# Patient Record
Sex: FEMALE | Race: BLACK OR AFRICAN AMERICAN | NOT HISPANIC OR LATINO | Employment: OTHER | URBAN - METROPOLITAN AREA
[De-identification: names, ages, dates, MRNs, and addresses within clinical notes are randomized per-mention and may not be internally consistent; named-entity substitution may affect disease eponyms.]

---

## 2019-05-01 ENCOUNTER — ANCILLARY PROCEDURE (OUTPATIENT)
Dept: MAMMOGRAPHY | Facility: CLINIC | Age: 69
End: 2019-05-01
Attending: INTERNAL MEDICINE
Payer: COMMERCIAL

## 2019-05-01 ENCOUNTER — OFFICE VISIT (OUTPATIENT)
Dept: INTERNAL MEDICINE | Facility: CLINIC | Age: 69
End: 2019-05-01
Payer: COMMERCIAL

## 2019-05-01 VITALS
BODY MASS INDEX: 35.22 KG/M2 | DIASTOLIC BLOOD PRESSURE: 68 MMHG | TEMPERATURE: 98.1 F | RESPIRATION RATE: 16 BRPM | OXYGEN SATURATION: 98 % | WEIGHT: 198.8 LBS | HEART RATE: 75 BPM | SYSTOLIC BLOOD PRESSURE: 110 MMHG | HEIGHT: 63 IN

## 2019-05-01 DIAGNOSIS — M54.16 LUMBAR RADICULOPATHY: Primary | ICD-10-CM

## 2019-05-01 DIAGNOSIS — I10 ESSENTIAL HYPERTENSION, BENIGN: ICD-10-CM

## 2019-05-01 DIAGNOSIS — Z12.31 VISIT FOR SCREENING MAMMOGRAM: ICD-10-CM

## 2019-05-01 DIAGNOSIS — Z78.0 ASYMPTOMATIC POSTMENOPAUSAL STATUS: ICD-10-CM

## 2019-05-01 DIAGNOSIS — Z13.220 LIPID SCREENING: ICD-10-CM

## 2019-05-01 DIAGNOSIS — E66.01 MORBID OBESITY (H): ICD-10-CM

## 2019-05-01 DIAGNOSIS — M17.0 PRIMARY OSTEOARTHRITIS OF BOTH KNEES: ICD-10-CM

## 2019-05-01 LAB
ANION GAP SERPL CALCULATED.3IONS-SCNC: 5 MMOL/L (ref 3–14)
BUN SERPL-MCNC: 26 MG/DL (ref 7–30)
CALCIUM SERPL-MCNC: 9.9 MG/DL (ref 8.5–10.1)
CHLORIDE SERPL-SCNC: 105 MMOL/L (ref 94–109)
CHOLEST SERPL-MCNC: 238 MG/DL
CO2 SERPL-SCNC: 30 MMOL/L (ref 20–32)
CREAT SERPL-MCNC: 0.99 MG/DL (ref 0.52–1.04)
GFR SERPL CREATININE-BSD FRML MDRD: 59 ML/MIN/{1.73_M2}
GLUCOSE SERPL-MCNC: 103 MG/DL (ref 70–99)
HDLC SERPL-MCNC: 77 MG/DL
LDLC SERPL CALC-MCNC: 146 MG/DL
NONHDLC SERPL-MCNC: 161 MG/DL
POTASSIUM SERPL-SCNC: 3.8 MMOL/L (ref 3.4–5.3)
SODIUM SERPL-SCNC: 140 MMOL/L (ref 133–144)
TRIGL SERPL-MCNC: 75 MG/DL

## 2019-05-01 PROCEDURE — 99203 OFFICE O/P NEW LOW 30 MIN: CPT | Performed by: INTERNAL MEDICINE

## 2019-05-01 PROCEDURE — 36415 COLL VENOUS BLD VENIPUNCTURE: CPT | Performed by: INTERNAL MEDICINE

## 2019-05-01 PROCEDURE — 77067 SCR MAMMO BI INCL CAD: CPT | Mod: TC

## 2019-05-01 PROCEDURE — 80061 LIPID PANEL: CPT | Performed by: INTERNAL MEDICINE

## 2019-05-01 PROCEDURE — 80048 BASIC METABOLIC PNL TOTAL CA: CPT | Performed by: INTERNAL MEDICINE

## 2019-05-01 RX ORDER — MELOXICAM 7.5 MG/1
7.5-15 TABLET ORAL DAILY PRN
Qty: 30 TABLET | Refills: 5 | Status: SHIPPED | OUTPATIENT
Start: 2019-05-01 | End: 2020-02-26

## 2019-05-01 RX ORDER — HYDROCHLOROTHIAZIDE 25 MG/1
25 TABLET ORAL DAILY
Qty: 90 TABLET | Refills: 0 | Status: SHIPPED | OUTPATIENT
Start: 2019-05-01 | End: 2019-05-09

## 2019-05-01 ASSESSMENT — MIFFLIN-ST. JEOR: SCORE: 1392.94

## 2019-05-01 NOTE — PROGRESS NOTES
SUBJECTIVE:   Laure Wood is a 68 year old female who presents to clinic today for the following   health issues:    Patient recently moved back from [place] to the [facility].  She moved there 3 to 4 years ago and has not been seen since.  Previously she had been treated for high blood pressure, significant osteoarthritis in both knees as well as a history of some lower back pain related to osteoarthritis.    Back Pain       Duration: 1 week        Specific cause: none    Description:   Location of pain: all along low back  Character of pain: sharp and constant  Pain radiation:radiates into the right buttocks, radiates into the right leg, radiates into the right foot, radiates into the left buttocks, radiates into the left leg and radiates into the left foot  New numbness or weakness in legs, not attributed to pain:  no     Intensity: Currently 8/10, At its worst 10/10    History:   Pain interferes with job: Not applicable  History of back problems: no prior back problems  Any previous MRI or X-rays: None  Sees a specialist for back pain:  No  Therapies tried without relief: Aloe gel    Alleviating factors:   Improved by: none      Precipitating factors:  Worsened by: Sitting, Lying Flat and Walking      Accompanying Signs & Symptoms:  Risk of Fracture:  None  Risk of Cauda Equina:  None  Risk of Infection:  None  Risk of Cancer:  None  Risk of Ankylosing Spondylitis:  Onset at age <35, male, AND morning back stiffness. no     Additional history: as documented    Reviewed  and updated as needed this visit by clinical staff  Tobacco  Allergies  Meds  Med Hx  Surg Hx  Fam Hx  Soc Hx        Reviewed and updated as needed this visit by Provider         Labs reviewed in EPIC    ROS:  Constitutional, HEENT, cardiovascular, pulmonary, gi and gu systems are negative, except as otherwise noted.    OBJECTIVE:                                                    /68   Pulse 75   Temp 98.1  F (36.7  C)  "(Oral)   Resp 16   Ht 1.588 m (5' 2.5\")   Wt 90.2 kg (198 lb 12.8 oz)   SpO2 98%   Breastfeeding? No   BMI 35.78 kg/m    Body mass index is 35.78 kg/m .  GENERAL APPEARANCE: alert, no distress and over weight  RESP: lungs clear to auscultation - no rales, rhonchi or wheezes  CV: regular rates and rhythm, normal S1 S2, no S3 or S4 and no murmur, click or rub  MS: Crepitus decreased range of motion in both knees.  SKIN: no suspicious lesions or rashes    Diagnostic test results:  Results for orders placed or performed in visit on 05/01/19   Basic metabolic panel   Result Value Ref Range    Sodium 140 133 - 144 mmol/L    Potassium 3.8 3.4 - 5.3 mmol/L    Chloride 105 94 - 109 mmol/L    Carbon Dioxide 30 20 - 32 mmol/L    Anion Gap 5 3 - 14 mmol/L    Glucose 103 (H) 70 - 99 mg/dL    Urea Nitrogen 26 7 - 30 mg/dL    Creatinine 0.99 0.52 - 1.04 mg/dL    GFR Estimate 59 (L) >60 mL/min/[1.73_m2]    GFR Estimate If Black 68 >60 mL/min/[1.73_m2]    Calcium 9.9 8.5 - 10.1 mg/dL   Lipid panel reflex to direct LDL Fasting   Result Value Ref Range    Cholesterol 238 (H) <200 mg/dL    Triglycerides 75 <150 mg/dL    HDL Cholesterol 77 >49 mg/dL    LDL Cholesterol Calculated 146 (H) <100 mg/dL    Non HDL Cholesterol 161 (H) <130 mg/dL         ASSESSMENT/PLAN:                                                    1. Lumbar radiculopathy  Mild.  She has a history of degenerative changes in the lumbar spine and given her recent move she likely aggravated her back.  I think physical therapy will be helpful as would weight loss.  NSAIDs to help with symptoms as needed  - [facility], HAND, AND CHIROPRACTIC REFERRAL; Future  - meloxicam (MOBIC) 7.5 MG tablet; Take 1-2 tablets (7.5-15 mg) by mouth daily as needed for moderate pain  Dispense: 30 tablet; Refill: 5    2. Primary osteoarthritis of both knees  - ORTHOPEDICS ADULT REFERRAL  - meloxicam (MOBIC) 7.5 MG tablet; Take 1-2 tablets (7.5-15 mg) by mouth daily as needed for moderate pain  " Dispense: 30 tablet; Refill: 5    3. Morbid obesity (H)  Weight loss    4. Essential hypertension, benign  Continue medication  - hydrochlorothiazide (HYDRODIURIL) 25 MG tablet; Take 1 tablet (25 mg) by mouth daily  Dispense: 90 tablet; Refill: 0  - Basic metabolic panel    5. Visit for screening mammogram  - [facility] SCREENING DIGITAL BILAT - Future  (s+30); Future    6. Asymptomatic postmenopausal status  - DX Hip/Pelvis/Spine; Future    7. Lipid screening  - Lipid panel reflex to direct LDL Fasting      Follow up with Provider -wellness exam    Yoshi Bowen MD  [facility]

## 2019-05-02 ENCOUNTER — ANCILLARY PROCEDURE (OUTPATIENT)
Dept: BONE DENSITY | Facility: CLINIC | Age: 69
End: 2019-05-02
Attending: INTERNAL MEDICINE
Payer: COMMERCIAL

## 2019-05-02 DIAGNOSIS — Z78.0 ASYMPTOMATIC POSTMENOPAUSAL STATUS: ICD-10-CM

## 2019-05-02 PROCEDURE — 77080 DXA BONE DENSITY AXIAL: CPT | Performed by: INTERNAL MEDICINE

## 2019-05-09 ENCOUNTER — OFFICE VISIT (OUTPATIENT)
Dept: INTERNAL MEDICINE | Facility: CLINIC | Age: 69
End: 2019-05-09
Payer: COMMERCIAL

## 2019-05-09 VITALS
WEIGHT: 199 LBS | SYSTOLIC BLOOD PRESSURE: 120 MMHG | TEMPERATURE: 97.6 F | BODY MASS INDEX: 35.26 KG/M2 | RESPIRATION RATE: 18 BRPM | HEART RATE: 58 BPM | DIASTOLIC BLOOD PRESSURE: 78 MMHG | HEIGHT: 63 IN | OXYGEN SATURATION: 99 %

## 2019-05-09 DIAGNOSIS — Z11.59 ENCOUNTER FOR HEPATITIS C SCREENING TEST FOR LOW RISK PATIENT: ICD-10-CM

## 2019-05-09 DIAGNOSIS — Z00.00 MEDICARE ANNUAL WELLNESS VISIT, INITIAL: Primary | ICD-10-CM

## 2019-05-09 DIAGNOSIS — E78.2 MIXED HYPERLIPIDEMIA: ICD-10-CM

## 2019-05-09 DIAGNOSIS — R73.9 HYPERGLYCEMIA: ICD-10-CM

## 2019-05-09 DIAGNOSIS — Z12.11 COLON CANCER SCREENING: ICD-10-CM

## 2019-05-09 DIAGNOSIS — I10 ESSENTIAL HYPERTENSION, BENIGN: ICD-10-CM

## 2019-05-09 LAB
GLUCOSE SERPL-MCNC: 87 MG/DL (ref 70–99)
HBA1C MFR BLD: 5.5 % (ref 0–5.6)
HCV AB SERPL QL IA: NONREACTIVE

## 2019-05-09 PROCEDURE — 82947 ASSAY GLUCOSE BLOOD QUANT: CPT | Performed by: INTERNAL MEDICINE

## 2019-05-09 PROCEDURE — 86803 HEPATITIS C AB TEST: CPT | Performed by: INTERNAL MEDICINE

## 2019-05-09 PROCEDURE — G0438 PPPS, INITIAL VISIT: HCPCS | Performed by: INTERNAL MEDICINE

## 2019-05-09 PROCEDURE — 83036 HEMOGLOBIN GLYCOSYLATED A1C: CPT | Performed by: INTERNAL MEDICINE

## 2019-05-09 PROCEDURE — 36415 COLL VENOUS BLD VENIPUNCTURE: CPT | Performed by: INTERNAL MEDICINE

## 2019-05-09 PROCEDURE — 99214 OFFICE O/P EST MOD 30 MIN: CPT | Mod: 25 | Performed by: INTERNAL MEDICINE

## 2019-05-09 RX ORDER — SIMVASTATIN 20 MG
20 TABLET ORAL AT BEDTIME
Qty: 90 TABLET | Refills: 1 | Status: SHIPPED | OUTPATIENT
Start: 2019-05-09 | End: 2020-07-09

## 2019-05-09 RX ORDER — HYDROCHLOROTHIAZIDE 25 MG/1
25 TABLET ORAL DAILY
Qty: 90 TABLET | Refills: 1 | Status: SHIPPED | OUTPATIENT
Start: 2019-05-09 | End: 2020-03-15

## 2019-05-09 ASSESSMENT — MIFFLIN-ST. JEOR: SCORE: 1393.85

## 2019-05-09 ASSESSMENT — ACTIVITIES OF DAILY LIVING (ADL): CURRENT_FUNCTION: NO ASSISTANCE NEEDED

## 2019-05-09 NOTE — LETTER
May 18, 2019      Laure [place]  [address] [facility] [address]        Dear ,    We are writing to inform you of your test results.    Your test results fall within the expected range(s) or remain unchanged from previous results.  Please continue with current treatment plan.    Resulted Orders   Hepatitis C Screen Reflex to HCV RNA Quant and Genotype   Result Value Ref Range    Hepatitis C Antibody Nonreactive NR^Nonreactive      Comment:      Assay performance characteristics have not been established for newborns,   infants, and children     Glucose   Result Value Ref Range    Glucose 87 70 - 99 mg/dL   Hemoglobin A1c   Result Value Ref Range    Hemoglobin A1C 5.5 0 - 5.6 %      Comment:      Normal <5.7% Prediabetes 5.7-6.4%  Diabetes 6.5% or higher - adopted from ADA   consensus guidelines.         If you have any questions or concerns, please call the clinic at the number listed above.       Sincerely,        Yoshi Bowen MD

## 2019-05-09 NOTE — PROGRESS NOTES
"SUBJECTIVE:   Laure Wood is a 68 year old female who presents for Preventive Visit.    Are you in the first 12 months of your Medicare coverage?  No- Rt eye: 20/100, Lt eye: 20/100, Both eyes: 20/80    Healthy Habits:    In general, how would you rate your overall health?  Excellent    Frequency of exercise:  None    Do you usually eat at least 4 servings of fruit and vegetables a day, include whole grains    & fiber and avoid regularly eating high fat or \"junk\" foods?  Yes    Taking medications regularly:  Yes    Barriers to taking medications:  None    Medication side effects:  None    Ability to successfully perform activities of daily living:  No assistance needed    Home Safety:  No safety concerns identified    Hearing Impairment:  No hearing concerns    In the past 6 months, have you been bothered by leaking of urine?  No    In general, how would you rate your overall mental or emotional health?  Excellent      PHQ-2 Total Score:    Additional concerns today:  No    Do you feel safe in your environment? Yes    Do you have a Health Care Directive? No: Advance care planning was reviewed with patient; patient declined at this time.    Fall risk  Fallen 2 or more times in the past year?: No  Any fall with injury in the past year?: No    Cognitive Screening   1) Repeat 3 items (Leader, Season, Table)    2) Clock draw: NORMAL  3) 3 item recall: Recalls 1 object   Results: NORMAL clock, 1-2 items recalled: COGNITIVE IMPAIRMENT LESS LIKELY    Mini-CogTM Copyright SHELLEY Fleming. Licensed by the author for use in [facility]; reprinted with permission (pita@[facility]). All rights reserved.      Reviewed and updated as needed this visit by clinical staff  Tobacco  Allergies  Meds  Med Hx  Surg Hx  Fam Hx  Soc Hx        Reviewed and updated as needed this visit by Provider        Social History     Tobacco Use     Smoking status: Never Smoker     Smokeless tobacco: Never Used   Substance Use Topics     " "Alcohol use: No     If you drink alcohol do you typically have >3 drinks per day or >7 drinks per week? Not applicable    Alcohol Use 5/9/2019   Prescreen: >3 drinks/day or >7 drinks/week? Not Applicable       Current providers sharing in care for this patient include:   Patient Care Team:  Yoshi Bowen MD as PCP - General (Internal Medicine)    The following health maintenance items are reviewed in Epic and correct as of today:  Health Maintenance   Topic Date Due     HEPATITIS C SCREENING  07/26/1968     ZOSTER IMMUNIZATION (1 of 2) 07/26/2000     MEDICARE ANNUAL WELLNESS VISIT  04/09/2013     ADVANCE DIRECTIVE PLANNING Q5 YRS  04/19/2018     INFLUENZA VACCINE (Season Ended) 09/01/2019     COLONOSCOPY Q5 YR  09/05/2019     FALL RISK ASSESSMENT  05/01/2020     MAMMO SCREEN Q2 YR (SYSTEM ASSIGNED)  05/01/2021     DTAP/TDAP/TD IMMUNIZATION (2 - Td) 05/29/2022     LIPID SCREEN Q5 YR FEMALE (SYSTEM ASSIGNED)  05/01/2024     DEXA SCAN SCREENING (SYSTEM ASSIGNED)  Completed     PHQ-2  Completed     IPV IMMUNIZATION  Aged Out     MENINGITIS IMMUNIZATION  Aged Out     Unknown vaccine status    Review of Systems  Constitutional, HEENT, cardiovascular, pulmonary, GI, , musculoskeletal, neuro, skin, endocrine and psych systems are negative, except as otherwise noted.    OBJECTIVE:   /78   Pulse 58   Temp 97.6  F (36.4  C) (Temporal)   Resp 18   Ht 1.588 m (5' 2.5\")   Wt 90.3 kg (199 lb)   SpO2 99%   Breastfeeding? No   BMI 35.82 kg/m   Estimated body mass index is 35.82 kg/m  as calculated from the following:    Height as of this encounter: 1.588 m (5' 2.5\").    Weight as of this encounter: 90.3 kg (199 lb).  Physical Exam  GENERAL APPEARANCE: alert, no distress and over weight  EYES: Eyes grossly normal to inspection, PERRL and conjunctivae and sclerae normal  HENT: ear canals and TM's normal, nose and mouth without ulcers or lesions, oropharynx clear and oral mucous membranes moist  NECK: no " adenopathy, no asymmetry, masses, or scars and thyroid normal to palpation  RESP: lungs clear to auscultation - no rales, rhonchi or wheezes  CV: regular rate and rhythm, normal S1 S2, no S3 or S4, no murmur, click or rub, no peripheral edema and peripheral pulses strong  ABDOMEN: soft, nontender, no hepatosplenomegaly, no masses and bowel sounds normal  MS: gait is age appropriate without ataxia, LLE exam reveals  Crepitus in L knee  SKIN: no suspicious lesions or rashes  NEURO: Normal strength and tone, sensory exam grossly normal, mentation intact and speech normal  PSYCH: mentation appears normal and affect normal/bright    Results for orders placed or performed in visit on 05/09/19   Glucose   Result Value Ref Range    Glucose 87 70 - 99 mg/dL   Hemoglobin A1c   Result Value Ref Range    Hemoglobin A1C 5.5 0 - 5.6 %       ASSESSMENT / PLAN:   1. Medicare annual wellness visit, initial  Completed Dex, mammography.  Colon cancer screening ordered  Statin for primary prevention discussed.  Patient wishes to proceed with treatment due to elevated CV risk.  Negative fall risk screen   - Hepatitis C Screen Reflex to HCV RNA Quant and Genotype    2. Hyperglycemia  Repeat normal- no evidence of pre diabetes or diabetes  - Glucose  - Hemoglobin A1c    3. Colon cancer screening  - GASTROENTEROLOGY ADULT REF PROCEDURE ONLY [facility] [phone]    5. Essential hypertension, benign  Well controlled  - hydrochlorothiazide (HYDRODIURIL) 25 MG tablet; Take 1 tablet (25 mg) by mouth daily  Dispense: 90 tablet; Refill: 1    6. Mixed hyperlipidemia  Start rx as above for elevated CV risk   - simvastatin (ZOCOR) 20 MG tablet; Take 1 tablet (20 mg) by mouth At Bedtime  Dispense: 90 tablet; Refill: 1    End of Life Planning:  Patient currently has an advanced directive: No.  I have verified the patient's ablity to prepare an advanced directive/make health care decisions.  Literature was provided to assist patient in  "preparing an advanced directive.    COUNSELING:  Reviewed preventive health counseling, as reflected in patient instructions    BP Readings from Last 1 Encounters:   05/09/19 120/78     Estimated body mass index is 35.82 kg/m  as calculated from the following:    Height as of this encounter: 1.588 m (5' 2.5\").    Weight as of this encounter: 90.3 kg (199 lb).      Weight management plan: Discussed healthy diet and exercise guidelines     reports that she has never smoked. She has never used smokeless tobacco.      Appropriate preventive services were discussed with this patient, including applicable screening as appropriate for cardiovascular disease, diabetes, osteopenia/osteoporosis, and glaucoma.  As appropriate for age/gender, discussed screening for colorectal cancer, prostate cancer, breast cancer, and cervical cancer. Checklist reviewing preventive services available has been given to the patient.    Reviewed patients plan of care and provided an AVS. The Basic Care Plan (routine screening as documented in Health Maintenance) for Laure meets the Care Plan requirement. This Care Plan has been established and reviewed with the Patient.    Counseling Resources:  ATP IV Guidelines  Pooled Cohorts Equation Calculator  Breast Cancer Risk Calculator  FRAX Risk Assessment  ICSI Preventive Guidelines  Dietary Guidelines for Americans, 2010  USDA's MyPlate  ASA Prophylaxis  Lung CA Screening    The 10-year ASCVD risk score (Fidelinaozzie RIOS Jr., et al., 2013) is: 10.2%    Values used to calculate the score:      Age: 68 years      Sex: Female      Is Non- : Yes      Diabetic: No      Tobacco smoker: No      Systolic Blood Pressure: 120 mmHg      Is BP treated: No      HDL Cholesterol: 77 mg/dL      Total Cholesterol: 238 mg/dL     Yoshi Bowen MD  [facility]    Identified Health Risks:  "

## 2019-05-09 NOTE — PATIENT INSTRUCTIONS
Patient Education      Personalized Prevention Plan  You are due for the preventive services outlined below.  Your care team is available to assist you in scheduling these services.  If you have already completed any of these items, please share that information with your care team to update in your medical record.  Health Maintenance Due   Topic Date Due     Hepatitis C Screening  07/26/1968     Zoster (Shingles) Vaccine (1 of 2) 07/26/2000     Annual Wellness Visit  04/09/2013     Discuss Advance Directive Planning  04/19/2018       The heart-healthy Mediterranean is a healthy eating plan based on typical foods and recipes of Mediterranean-style cooking. Here's how to adopt the Mediterranean diet.   If you're looking for a heart-healthy eating plan, the Mediterranean diet might be right for you. The Mediterranean diet incorporates the basics of healthy eating -- plus a splash of flavorful olive oil and perhaps even a glass of red wine -- among other components characterizing the traditional cooking style of countries bordering the [place] Sea.  Most healthy diets include fruits, vegetables, fish and whole grains, and limit unhealthy fats. While these parts of a healthy diet remain tried-and-true, subtle variations or differences in proportions of certain foods may make a difference in your risk of heart disease.  Research has shown that the traditional Mediterranean diet reduces the risk of heart disease. In fact, an analysis of more than 1.5 million healthy adults demonstrated that following a Mediterranean diet was associated with a reduced risk of death from heart disease and cancer, as well as a reduced incidence of Parkinson's and Alzheimer's diseases.   The Dietary Guidelines for Americans recommends the Mediterranean diet as an eating plan that can help promote health and prevent disease. And the Mediterranean diet is one your whole family can follow for good health.   The Mediterranean diet  "emphasizes:  Eating primarily plant-based foods, such as fruits and vegetables, whole grains, legumes and nuts   Replacing butter with healthy fats, such as olive oil   Using herbs and spices instead of salt to flavor foods   Limiting red meat to no more than a few times a month   Eating fish and poultry at least twice a week   Drinking red wine in moderation (optional)  The diet also recognizes the importance of being physically active, and enjoying meals with family and friends.  The Mediterranean diet traditionally includes fruits, vegetables and grains. For example, residents of [facility] average six or more servings a day of antioxidant-rich fruits and vegetables.  Grains in the Mediterranean region are typically whole grain and usually contain very few unhealthy trans fats, and bread is an important part of the diet. However, throughout the Mediterranean region, bread is eaten plain or dipped in olive oil -- not eaten with butter or margarine, which contains saturated or trans fats.   Nuts are another part of a healthy Mediterranean diet. Nuts are high in fat, but most of the fat is healthy. Because nuts are high in calories, they should not be eaten in large amounts -- generally no more than a handful a day. For the best nutrition, avoid candied or honey-roasted and heavily salted nuts.  The focus of the Mediterranean diet isn't on limiting total fat consumption, but rather on choosing healthier types of fat. The Mediterranean diet discourages saturated fats and hydrogenated oils (trans fats), both of which contribute to heart disease.  The Mediterranean diet features olive oil as the primary source of fat. Olive oil is mainly monounsaturated fat -- a type of fat that can help reduce low-density lipoprotein (LDL) cholesterol levels when used in place of saturated or trans fats. \"Extra-virgin\" and \"virgin\" olive oils (the least processed forms) also contain the highest levels of protective plant compounds that " provide antioxidant effects.  Canola oil and some nuts contain the beneficial linolenic acid (a type of omega-3 fatty acid) in addition to healthy unsaturated fat. Omega-3 fatty acids lower triglycerides, decrease blood clotting, and are associated with decreased incidence of sudden heart attacks, improve the health of your blood vessels, and help moderate blood pressure. Fatty fish -- such as mackerel, lake trout, herring, sardines, albacore tuna and salmon -- are rich sources of omega-3 fatty acids. Fish is eaten on a regular basis in the Mediterranean diet.  The health effects of alcohol have been debated for many years, and some doctors are reluctant to encourage alcohol consumption because of the health consequences of excessive drinking. However, alcohol -- in moderation -- has been associated with a reduced risk of heart disease in some research studies.  The Mediterranean diet typically includes a moderate amount of wine, usually red wine. This means no more than 5 ounces (148 milliliters) of wine daily for women of all ages and men older than age 65 and no more than 10 ounces (296 milliliters) of wine daily for younger men. More than this may increase the risk of health problems, including increased risk of certain types of cancer.   If you're unable to limit your alcohol intake to the amounts defined above, if you have a personal or family history of alcohol abuse, or if you have heart or liver disease, refrain from drinking wine or any other alcohol.  The Mediterranean diet is a delicious and healthy way to eat. Many people who switch to this style of eating say they'll never eat any other way. Here are some specific steps to get you started:   Eat your veggies and fruits -- and switch to whole grains. Avariety of plant foods should make up the majority of your meals. They should be minimally processed -- fresh and whole are best. Include veggies and fruits in every meal and eat them for snacks as well.  Switch to whole-grain bread and cereal, and begin to eat more whole-grain rice and pasta products. Keep baby carrots, apples and bananas on hand for quick, satisfying snacks. Fruit salads are a wonderful way to eat a variety of healthy fruit.   Go nuts. Nuts and seeds are good sources of fiber, protein and healthy fats. Keep almonds, cashews, pistachios and walnuts on hand for a quick snack. Choose natural peanut butter, rather than the kind with hydrogenated fat added. Try blended sesame seeds (tahini) as a dip or spread for bread.   Pass on the butter. Try olive or canola oil as a healthy replacement for butter or margarine. Lightly drizzle it over vegetables. After cooking pasta, add a touch of olive oil, some garlic and green onions for flavoring. Dip bread in flavored olive oil or lightly spread it on whole-grain bread for a tasty alternative to butter. Try tahini as a dip or spread for bread too.   Spice it up. Herbs and spices make food tasty and can  for salt and fat in recipes.   Go fish. Eat fish at least twice a week. Fresh or water-packed tuna, salmon, trout, mackerel and herring are healthy choices. [place], bake or broil fish for great taste and easy cleanup. Avoid breaded and fried fish.   Rein in the red meat. Limit red meat to no more than a few times a month. Substitute fish and poultry for red meat. When choosing red meat, make sure it's lean and keep portions small (about the size of a deck of cards). Also avoid sausage, azul and other high-fat, processed meats.   Choose low-fat dairy. Limit higher fat dairy products, such as whole or 2 percent milk, cheese and ice cream. Switch to skim milk, fat-free yogurt and low-fat cheese

## 2019-05-11 ENCOUNTER — THERAPY VISIT (OUTPATIENT)
Dept: PHYSICAL THERAPY | Facility: CLINIC | Age: 69
End: 2019-05-11
Attending: INTERNAL MEDICINE
Payer: COMMERCIAL

## 2019-05-11 DIAGNOSIS — M79.605 LUMBAR PAIN WITH RADIATION DOWN BOTH LEGS: ICD-10-CM

## 2019-05-11 DIAGNOSIS — M79.604 LUMBAR PAIN WITH RADIATION DOWN BOTH LEGS: ICD-10-CM

## 2019-05-11 DIAGNOSIS — M54.16 LUMBAR RADICULOPATHY: ICD-10-CM

## 2019-05-11 DIAGNOSIS — M54.50 LUMBAR PAIN WITH RADIATION DOWN BOTH LEGS: ICD-10-CM

## 2019-05-11 PROCEDURE — 97014 ELECTRIC STIMULATION THERAPY: CPT | Mod: GP | Performed by: PHYSICAL THERAPIST

## 2019-05-11 PROCEDURE — 97010 HOT OR COLD PACKS THERAPY: CPT | Mod: GP | Performed by: PHYSICAL THERAPIST

## 2019-05-11 PROCEDURE — 97161 PT EVAL LOW COMPLEX 20 MIN: CPT | Mod: GP | Performed by: PHYSICAL THERAPIST

## 2019-05-11 NOTE — LETTER
DEPARTMENT OF HEALTH AND HUMAN SERVICES  CENTERS FOR MEDICARE & MEDICAID SERVICES    PLAN/UPDATED PLAN OF PROGRESS FOR OUTPATIENT REHABILITATION    PATIENTS NAME:  Laure Wood   : 1950  [facility] NUMBER:    [id number]  [id number]  PROVIDER NAME: KATY KWONG PT  MEDICAL RECORD NUMBER: [id number]   START OF CARE DATE: 19   TYPE:  PT  PRIMARY/TREATMENT DIAGNOSIS: Lumbar radiculopathy  Lumbar pain with radiation down both legs  VISITS FROM START OF CARE:  Rxs Used: 1     [facility] Initial Evaluation  Subjective:  Onset of bilateral lumbar pain radiating into the lower extremities 2 weeks ago. Pt referred by MD for physical therapy on 19    The history is provided by the patient. History limited by: pt needed help filling out health history form. No  was used.   Lauer Wood is a 68 year old female with a lumbar condition.  Condition occurred with:  Insidious onset.  Condition occurred: for unknown reasons.  This is a recurrent condition     Patient reports pain:  Lumbar spine left and lumbar spine right.  Radiates to:  Gluteals left, gluteals right, knee left, knee right, thigh right and thigh left.  Pain is described as sharp and is constant and reported as 10/10.  Associated symptoms:  Loss of motion/stiffness, loss of motion, numbness and tingling. Pain is worse during the day.  Symptoms are exacerbated by bending, twisting, carrying, sitting, lifting, walking and standing and relieved by NSAID's and rest.  Since onset symptoms are unchanged.  Special testing: none recently.  Previous treatment: none for lower back.    General health as reported by patient is good.  Pertinent medical history includes:  Numbness/tingling and overweight.  Medical allergies: no.  Other surgeries include:  No.  Current medications:  Anti-inflammatory (hydrochlorothiazide).    Employment status: retired.      Barriers include:  None as reported by the patient.  Red  flags:  None as reported by the patient.    Objective:  Standing Alignment:    Lumbar deviations alignment: increased lordosis.  Flexibility/Screens:   Lower Extremity:  Decreased left lower extremity flexibility:Hamstrings  Decreased right lower extremity flexibility:  Hamstrings                 PATIENTS NAME:  Laure Wood   : 1950      Lumbar/SI Evaluation  ROM:    AROM Lumbar:   Flexion:          Mod loss with pain  Ext:                    Max loss with pain   Side Bend:        Left:  Min loss with pain    Right:  Min loss with pain  Rotation:           Left:     Right:   Side Glide:        Left:     Right:   Strength: weak lower abdominals, pt unable to perform bridge  Lumbar Myotomes:  normal  Lumbar DTR's:  normal  Lumbar Dermtomes:    L4 Left:  Hypo-light touch       L5 Left:  Hypo-light touch       S1 Left:  Hypo-light touch       Lumbar Palpation:  Palpation (lumbar): point tenderness L3-S1 spinous processes and adjacent lumbar paraspinals.     Assessment/Plan:    Patient is a 68 year old female with lumbar complaints.    Patient has the following significant findings with corresponding treatment plan.                Diagnosis 1:  Bilateral lumbar pain  Pain -  hot/cold therapy, electric stimulation, self management, education and home program  Decreased ROM/flexibility - therapeutic exercise, therapeutic activity, home program and manual therapy as indicated  Decreased strength - therapeutic exercise, therapeutic activities and home program    Therapy Evaluation Codes:   1) History comprised of:   Personal factors that impact the plan of care:      None.    Comorbidity factors that impact the plan of care are:      Numbness/tingling, Overweight and Weakness.     Medications impacting care: Anti-inflammatory and hydrochlorothiazide.  2) Examination of Body Systems comprised of:   Body structures and functions that impact the plan of care:      Lumbar spine.   Activity limitations that impact the  "plan of care are:      Bathing, Dressing, Lifting, Sitting, Standing and Walking.  3) Clinical presentation characteristics are:   Stable/Uncomplicated.  4) Decision-Making    Low complexity using standardized patient assessment instrument and/or measureable assessment of functional outcome.  Cumulative Therapy Evaluation is: Low complexity.          PATIENTS NAME:  Laure Wood   : 1950        Previous and current functional limitations:  (See Goal Flow Sheet for this information)    Short term and Long term goals: (See Goal Flow Sheet for this information)     Communication ability:  Patient appears to be able to clearly communicate and understand verbal and written communication and follow directions correctly.  Treatment Explanation - The following has been discussed with the patient:   RX ordered/plan of care  Anticipated outcomes  Possible risks and side effects  This patient would benefit from PT intervention to resume normal activities.   Rehab potential is good.    Frequency:  1 X week, once daily  Duration:  for 6 weeks  Discharge Plan:  Achieve all LTG.  Independent in home treatment program.  Reach maximal therapeutic benefit.      Caregiver Signature/Credentials _____________________________ Date ________       Treating Provider:  Juan Antonio Lee, PT     I have reviewed and certified the need for these services and plan of treatment while under my care.        PHYSICIAN'S SIGNATURE:   ____________________________________ Date___________     Yoshi Bowen MD    Certification period:  Beginning of Cert date period: 19 to  End of Cert period date: 19     Functional Level Progress Report: Please see attached \"Goal Flow sheet for Functional level.\"    ____X____ Continue Services or       ________ DC Services                Service dates: From  SOC Date: 19 date to present                         "

## 2019-05-11 NOTE — PROGRESS NOTES
[facility] Initial Evaluation  Subjective:  Onset of bilateral lumbar pain radiating into the lower extremities 2 weeks ago. Pt referred by MD for physical therapy on 5-1-19    The history is provided by the patient. History limited by: pt needed help filling out health history form. No  was used.   Laure Wood is a 68 year old female with a lumbar condition.  Condition occurred with:  Insidious onset.  Condition occurred: for unknown reasons.  This is a recurrent condition     Patient reports pain:  Lumbar spine left and lumbar spine right.  Radiates to:  Gluteals left, gluteals right, knee left, knee right, thigh right and thigh left.  Pain is described as sharp and is constant and reported as 10/10.  Associated symptoms:  Loss of motion/stiffness, loss of motion, numbness and tingling. Pain is worse during the day.  Symptoms are exacerbated by bending, twisting, carrying, sitting, lifting, walking and standing and relieved by NSAID's and rest.  Since onset symptoms are unchanged.  Special testing: none recently.  Previous treatment: none for lower back.    General health as reported by patient is good.  Pertinent medical history includes:  Numbness/tingling and overweight.  Medical allergies: no.  Other surgeries include:  No.  Current medications:  Anti-inflammatory (hydrochlorothiazide).    Employment status: retired.      Barriers include:  None as reported by the patient.    Red flags:  None as reported by the patient.                        Objective:  Standing Alignment:        Lumbar deviations alignment: increased lordosis.                Flexibility/Screens:       Lower Extremity:  Decreased left lower extremity flexibility:Hamstrings    Decreased right lower extremity flexibility:  Hamstrings               Lumbar/SI Evaluation  ROM:    AROM Lumbar:   Flexion:          Mod loss with pain  Ext:                    Max loss with pain   Side Bend:        Left:  Min  loss with pain    Right:  Min loss with pain  Rotation:           Left:     Right:   Side Glide:        Left:     Right:         Strength: weak lower abdominals, pt unable to perform bridge  Lumbar Myotomes:  normal            Lumbar DTR's:  normal        Lumbar Dermtomes:            L4 Left:  Hypo-light touch         L5 Left:  Hypo-light touch       S1 Left:  Hypo-light touch         Lumbar Palpation:  Palpation (lumbar): point tenderness L3-S1 spinous processes and adjacent lumbar paraspinals.                                                          General     ROS    Assessment/Plan:    Patient is a 68 year old female with lumbar complaints.    Patient has the following significant findings with corresponding treatment plan.                Diagnosis 1:  Bilateral lumbar pain  Pain -  hot/cold therapy, electric stimulation, self management, education and home program  Decreased ROM/flexibility - therapeutic exercise, therapeutic activity, home program and manual therapy as indicated  Decreased strength - therapeutic exercise, therapeutic activities and home program    Therapy Evaluation Codes:   1) History comprised of:   Personal factors that impact the plan of care:      None.    Comorbidity factors that impact the plan of care are:      Numbness/tingling, Overweight and Weakness.     Medications impacting care: Anti-inflammatory and hydrochlorothiazide.  2) Examination of Body Systems comprised of:   Body structures and functions that impact the plan of care:      Lumbar spine.   Activity limitations that impact the plan of care are:      Bathing, Dressing, Lifting, Sitting, Standing and Walking.  3) Clinical presentation characteristics are:   Stable/Uncomplicated.  4) Decision-Making    Low complexity using standardized patient assessment instrument and/or measureable assessment of functional outcome.  Cumulative Therapy Evaluation is: Low complexity.    Previous and current functional limitations:  (See Goal  Flow Sheet for this information)    Short term and Long term goals: (See Goal Flow Sheet for this information)     Communication ability:  Patient appears to be able to clearly communicate and understand verbal and written communication and follow directions correctly.  Treatment Explanation - The following has been discussed with the patient:   RX ordered/plan of care  Anticipated outcomes  Possible risks and side effects  This patient would benefit from PT intervention to resume normal activities.   Rehab potential is good.    Frequency:  1 X week, once daily  Duration:  for 6 weeks  Discharge Plan:  Achieve all LTG.  Independent in home treatment program.  Reach maximal therapeutic benefit.    Please refer to the daily flowsheet for treatment today, total treatment time and time spent performing 1:1 timed codes.

## 2019-05-11 NOTE — LETTER
DEPARTMENT OF HEALTH AND HUMAN SERVICES  CENTERS FOR MEDICARE & MEDICAID SERVICES    PLAN/UPDATED PLAN OF PROGRESS FOR OUTPATIENT REHABILITATION    PATIENTS NAME:  Laure Wood   : 1950  PROVIDER NUMBER:    [id number]  [id number]   PROVIDER NAME: KATY KWONG PT  MEDICAL RECORD NUMBER: [id number]   START OF CARE DATE:   19   TYPE:  PT  PRIMARY/TREATMENT DIAGNOSIS:  Lumbar radiculopathy  Lumbar pain with radiation down both legs  VISITS FROM START OF CARE:  Rxs Used: 1     [facility] Initial Evaluation  Subjective:  The history is provided by the patient. History limited by: pt needed help filling out health history form. No  was used.   Laure Wood is a 68 year old female with a lumbar condition.  Condition occurred with:  Insidious onset.  Condition occurred: for unknown reasons.  This is a recurrent condition     Patient reports pain:  Lumbar spine left and lumbar spine right.  Radiates to:  Gluteals left, gluteals right, knee left, knee right, thigh right and thigh left.  Pain is described as sharp and is constant and reported as 10/10.  Associated symptoms:  Loss of motion/stiffness, loss of motion, numbness and tingling. Pain is worse during the day.  Symptoms are exacerbated by bending, twisting, carrying, sitting, lifting, walking and standing and relieved by NSAID's and rest.  Since onset symptoms are unchanged.  Special testing: none recently.  Previous treatment: none for lower back.    General health as reported by patient is good.  Pertinent medical history includes:  Numbness/tingling and overweight.  Medical allergies: no.  Other surgeries include:  No.  Current medications:  Anti-inflammatory (hydrochlorothiazide).    Employment status: retired.      Barriers include:  None as reported by the patient.  Red flags:  None as reported by the patient.    Objective:  Standing Alignment:    Lumbar deviations alignment: increased  lordosis.  Flexibility/Screens:   Lower Extremity:  Decreased left lower extremity flexibility:Hamstrings  Decreased right lower extremity flexibility:  Hamstrings    Lumbar/SI Evaluation  ROM:    AROM Lumbar:   Flexion:          Mod loss with pain  Ext:                    Max loss with pain   Side Bend:        Left:  Min loss with pain    Right:  Min loss with pain  Rotation:           Left:     Right:   Side Glide:        Left:     Right:    PATIENTS NAME:  Laure Wood   : 1950         Strength: weak lower abdominals, pt unable to perform bridge  Lumbar Myotomes:  normal  Lumbar DTR's:  normal  Lumbar Dermtomes:    L4 Left:  Hypo-light touch       L5 Left:  Hypo-light touch       S1 Left:  Hypo-light touch       Lumbar Palpation:  Palpation (lumbar): point tenderness L3-S1 spinous processes and adjacent lumbar paraspinals.     Assessment/Plan:    Patient is a 68 year old female with lumbar complaints.    Patient has the following significant findings with corresponding treatment plan.                Diagnosis 1:  Bilateral lumbar pain  Pain -  hot/cold therapy, electric stimulation, self management, education and home program  Decreased ROM/flexibility - therapeutic exercise, therapeutic activity, home program and manual therapy as indicated  Decreased strength - therapeutic exercise, therapeutic activities and home program    Therapy Evaluation Codes:   1) History comprised of:   Personal factors that impact the plan of care:      None.    Comorbidity factors that impact the plan of care are:      Numbness/tingling, Overweight and Weakness.     Medications impacting care: Anti-inflammatory and hydrochlorothiazide.  2) Examination of Body Systems comprised of:   Body structures and functions that impact the plan of care:      Lumbar spine.   Activity limitations that impact the plan of care are:      Bathing, Dressing, Lifting, Sitting, Standing and Walking.  3) Clinical presentation characteristics  "are:   Stable/Uncomplicated.  4) Decision-Making    Low complexity using standardized patient assessment instrument and/or measureable assessment of functional outcome.  Cumulative Therapy Evaluation is: Low complexity.    Previous and current functional limitations:  (See Goal Flow Sheet for this information)    Short term and Long term goals: (See Goal Flow Sheet for this information)     Communication ability:  Patient appears to be able to clearly communicate and understand verbal and written communication and follow directions correctly.  Treatment Explanation - The following has been discussed with the patient:   RX ordered/plan of care  Anticipated outcomes  Possible risks and side effects      PATIENTS NAME:  Laure Wood   : 1950      This patient would benefit from PT intervention to resume normal activities.   Rehab potential is good.    Frequency:  1 X week, once daily  Duration:  for 6 weeks  Discharge Plan:  Achieve all LTG.  Independent in home treatment program.  Reach maximal therapeutic benefit.        Caregiver Signature/Credentials _____________________________ Date ________       Treating Provider: Juan Antonio Lee, PT     I have reviewed and certified the need for these services and plan of treatment while under my care.        PHYSICIAN'S SIGNATURE:   _____________________________________ Date___________     Yoshi Bowen MD    Certification period:  Beginning of Cert date period: 19 to  End of Cert period date: 19     Functional Level Progress Report: Please see attached \"Goal Flow sheet for Functional level.\"    ____X____ Continue Services or       ________ DC Services                Service dates: From  SOC Date: 19 date to present                         "

## 2019-05-13 PROBLEM — M54.50 LUMBAR PAIN WITH RADIATION DOWN BOTH LEGS: Status: ACTIVE | Noted: 2019-05-13

## 2019-05-13 PROBLEM — M79.604 LUMBAR PAIN WITH RADIATION DOWN BOTH LEGS: Status: ACTIVE | Noted: 2019-05-13

## 2019-05-13 PROBLEM — M79.605 LUMBAR PAIN WITH RADIATION DOWN BOTH LEGS: Status: ACTIVE | Noted: 2019-05-13

## 2019-06-14 ENCOUNTER — OFFICE VISIT (OUTPATIENT)
Dept: ORTHOPEDICS | Facility: CLINIC | Age: 69
End: 2019-06-14
Payer: COMMERCIAL

## 2019-06-14 VITALS
SYSTOLIC BLOOD PRESSURE: 136 MMHG | BODY MASS INDEX: 35.26 KG/M2 | WEIGHT: 199 LBS | HEIGHT: 63 IN | DIASTOLIC BLOOD PRESSURE: 98 MMHG

## 2019-06-14 DIAGNOSIS — M17.12 PRIMARY OSTEOARTHRITIS OF LEFT KNEE: Primary | ICD-10-CM

## 2019-06-14 DIAGNOSIS — M17.11 PRIMARY OSTEOARTHRITIS OF RIGHT KNEE: ICD-10-CM

## 2019-06-14 PROCEDURE — 99214 OFFICE O/P EST MOD 30 MIN: CPT | Mod: 25 | Performed by: FAMILY MEDICINE

## 2019-06-14 PROCEDURE — 20611 DRAIN/INJ JOINT/BURSA W/US: CPT | Mod: 50 | Performed by: FAMILY MEDICINE

## 2019-06-14 RX ORDER — METHYLPREDNISOLONE ACETATE 40 MG/ML
40 INJECTION, SUSPENSION INTRA-ARTICULAR; INTRALESIONAL; INTRAMUSCULAR; SOFT TISSUE
Status: DISCONTINUED | OUTPATIENT
Start: 2019-06-14 | End: 2020-02-26

## 2019-06-14 RX ORDER — MELOXICAM 15 MG/1
15 TABLET ORAL DAILY
Qty: 30 TABLET | Refills: 1 | Status: SHIPPED | OUTPATIENT
Start: 2019-06-14 | End: 2019-10-21

## 2019-06-14 RX ADMIN — METHYLPREDNISOLONE ACETATE 40 MG: 40 INJECTION, SUSPENSION INTRA-ARTICULAR; INTRALESIONAL; INTRAMUSCULAR; SOFT TISSUE at 10:27

## 2019-06-14 ASSESSMENT — MIFFLIN-ST. JEOR: SCORE: 1393.85

## 2019-06-14 NOTE — PROGRESS NOTES
ASSESSMENT & PLAN  Patient Instructions     1. Primary osteoarthritis of left knee    2. Primary osteoarthritis of right knee      -Patient is here for bilateral knee pain due to severe bone on bone arthritis  -Patient tolerated cortisone injection today in both knees without complications  -Patient will start physical therapy and home exercise program  -Patient will be referred to Ortho for evaluation for total knee replacements  -Patient will start Meloxicam as needed  -Call direct clinic number [[phone]] at any time with questions or concerns.    Albert Yeo MD [facility]  [facility]          -----    SUBJECTIVE  Laure Wood is a/an 68 year old female who is seen as a self referral for evaluation of bilateral knee pain. The patient is seen by themselves.    Onset: 1 years(s) ago. Reports insidious onset without acute precipitating event.  Location of Pain: bilateral anterior aspect of knees  Rating of Pain at worst: 10/10  Rating of Pain Currently: 10/10  Worsened by: walking, standing, going up and down stairs  Better with: rest/activity avoidance, sitting  Treatments tried: rest/activity avoidance, elevation, ice, other medications: meloxicam, previous imaging (xray right knee 8/21/15, left knee 11/16/12) and corticosteroid injection in left knee (most recent date: 11/11/15) that provided  3 month(s) of relief  Associated symptoms: swelling, locking or catching and feeling of instability  Orthopedic history: YES - h/o chronic bilateral knee pain  Relevant surgical history: NO  Social history: social history: retired    Past Medical History:   Diagnosis Date     Hyperlipidemia LDL goal <160      Hypertension, essential, benign      Knee osteoarthritis      Social History     Socioeconomic History     Marital status:      Spouse name: Not on file     Number of children: 6     Years of education: Not on file     Highest education level: Not on  "file   Occupational History     Occupation: dietary     Employer: [employer]   Social Needs     Financial resource strain: Not on file     Food insecurity:     Worry: Not on file     Inability: Not on file     Transportation needs:     Medical: Not on file     Non-medical: Not on file   Tobacco Use     Smoking status: Never Smoker     Smokeless tobacco: Never Used   Substance and Sexual Activity     Alcohol use: No     Drug use: No     Sexual activity: Never   Lifestyle     Physical activity:     Days per week: Not on file     Minutes per session: Not on file     Stress: Not on file   Relationships     Social connections:     Talks on phone: Not on file     Gets together: Not on file     Attends [employer] service: Not on file     Active member of club or organization: Not on file     Attends meetings of clubs or organizations: Not on file     Relationship status: Not on file     Intimate partner violence:     Fear of current or ex partner: Not on file     Emotionally abused: Not on file     Physically abused: Not on file     Forced sexual activity: Not on file   Other Topics Concern     Parent/sibling w/ CABG, MI or angioplasty before 65F 55M? Not Asked   Social History Narrative     Not on file         Patient's past medical, surgical, social, and family histories were reviewed today and no changes are noted.    REVIEW OF SYSTEMS:  10 point ROS is negative other than symptoms noted above in HPI, Past Medical History or as stated below  Constitutional: NEGATIVE for fever, chills, change in weight  Skin: NEGATIVE for worrisome rashes, moles or lesions  GI/: NEGATIVE for bowel or bladder changes  Neuro: NEGATIVE for weakness, dizziness or paresthesias    OBJECTIVE:  BP (!) 136/98   Ht 1.588 m (5' 2.5\")   Wt 90.3 kg (199 lb)   BMI 35.82 kg/m     General: healthy, alert and in no distress  HEENT: no scleral icterus or conjunctival erythema  Skin: no suspicious lesions or rash. No jaundice.  CV: " no pedal edema  Resp: normal respiratory effort without conversational dyspnea   Psych: normal mood and affect  Gait: normal steady gait with appropriate coordination and balance  Neuro: Normal light sensory exam of lower extremity  MSK:  BILATERAL KNEE  Inspection:    normal alignment  Palpation:    Tender about the lateral patellar facet, medial patellar facet, lateral joint line and medial joint line. Remainder of bony and ligamentous landmarks are nontender.    Trace effusion is present    Patellofemoral crepitus is Present  Range of Motion:     00 extension to 700 flexion  Strength:    Quadriceps 5-/5 and hamstrings 5-/5    Extensor mechanism intact  Special Tests:    Positive: Patellar grind    Negative: MCL/valgus stress (0 & 30 deg), LCL/varus stress (0 & 30 deg), Lachman's, anterior drawer, posterior drawer, Love's    Independent visualization of the below image:  No results found for this or any previous visit (from the past 24 hour(s)).  Right knee x-rays (3 views)  - 8/21/15  Impression:  1. Moderate narrowing of the medial joint line with osteophytes present.  2. Severe narrowing of the lateral joint line with subchondral cysts/osteophytes present.  3. Joint effusion present.  4. Likely intra-articular loose bodies noted of the posterior portion of the knee.  5. Moderate narrowing of the right patellofemoral joint with osteophytes present.  6. Negative for fracture or other acute osseous abnormalities.    Unofficial review of x-ray taken the left knee from 11/16/2012 revealed severe tricompartment bone-on-bone arthritis, osteophytes and deformity of the joint.    Large Joint Injection/Arthocentesis: bilateral knee  Date/Time: 6/14/2019 10:27 AM  Performed by: Yeo, Albert, MD  Authorized by: Yeo, Albert, MD     Indications:  Pain and joint swelling  Needle Size:  25 G  Guidance: ultrasound    Approach:  Superolateral  Location:  Knee  Laterality:  Bilateral      Medications (Right):  40 mg  methylPREDNISolone 40 MG/ML  Medications (Left):  40 mg methylPREDNISolone 40 MG/ML  Outcome:  Tolerated well, no immediate complications  Procedure discussed: discussed risks, benefits, and alternatives    Consent Given by:  Patient  Timeout: timeout called immediately prior to procedure    Prep: patient was prepped and draped in usual sterile fashion            Albert Yeo MD [facility]

## 2019-06-14 NOTE — LETTER
6/14/2019         RE: Laure Wood  [id number]  [facility] [address]        Dear Colleague,    Thank you for referring your patient, Laure Wood, to the [facility]. Please see a copy of my visit note below.    ASSESSMENT & PLAN  Patient Instructions     1. Primary osteoarthritis of left knee    2. Primary osteoarthritis of right knee      -Patient is here for bilateral knee pain due to severe bone on bone arthritis  -Patient tolerated cortisone injection today in both knees without complications  -Patient will start physical therapy and home exercise program  -Patient will be referred to Ortho for evaluation for total knee replacements  -Patient will start Meloxicam as needed  -Call direct clinic number [[phone]] at any time with questions or concerns.    Albert Yeo MD [facility]  [facility] [place]          -----    SUBJECTIVE  Laure Wood is a/an 68 year old female who is seen as a self referral for evaluation of bilateral knee pain. The patient is seen by themselves.    Onset: 1 years(s) ago. Reports insidious onset without acute precipitating event.  Location of Pain: bilateral anterior aspect of knees  Rating of Pain at worst: 10/10  Rating of Pain Currently: 10/10  Worsened by: walking, standing, going up and down stairs  Better with: rest/activity avoidance, sitting  Treatments tried: rest/activity avoidance, elevation, ice, other medications: meloxicam, previous imaging (xray right knee 8/21/15, left knee 11/16/12) and corticosteroid injection in left knee (most recent date: 11/11/15) that provided  3 month(s) of relief  Associated symptoms: swelling, locking or catching and feeling of instability  Orthopedic history: YES - h/o chronic bilateral knee pain  Relevant surgical history: NO  Social history: social history: retired    Past Medical History:   Diagnosis Date     Hyperlipidemia LDL goal <160       Hypertension, essential, benign      Knee osteoarthritis      Social History     Socioeconomic History     Marital status:      Spouse name: Not on file     Number of children: 6     Years of education: Not on file     Highest education level: Not on file   Occupational History     Occupation: dietary     Employer: [employer]   Social Needs     Financial resource strain: Not on file     Food insecurity:     Worry: Not on file     Inability: Not on file     Transportation needs:     Medical: Not on file     Non-medical: Not on file   Tobacco Use     Smoking status: Never Smoker     Smokeless tobacco: Never Used   Substance and Sexual Activity     Alcohol use: No     Drug use: No     Sexual activity: Never   Lifestyle     Physical activity:     Days per week: Not on file     Minutes per session: Not on file     Stress: Not on file   Relationships     Social connections:     Talks on phone: Not on file     Gets together: Not on file     Attends Restorationist service: Not on file     Active member of club or organization: Not on file     Attends meetings of clubs or organizations: Not on file     Relationship status: Not on file     Intimate partner violence:     Fear of current or ex partner: Not on file     Emotionally abused: Not on file     Physically abused: Not on file     Forced sexual activity: Not on file   Other Topics Concern     Parent/sibling w/ CABG, MI or angioplasty before 65F 55M? Not Asked   Social History Narrative     Not on file         Patient's past medical, surgical, social, and family histories were reviewed today and no changes are noted.    REVIEW OF SYSTEMS:  10 point ROS is negative other than symptoms noted above in HPI, Past Medical History or as stated below  Constitutional: NEGATIVE for fever, chills, change in weight  Skin: NEGATIVE for worrisome rashes, moles or lesions  GI/: NEGATIVE for bowel or bladder changes  Neuro: NEGATIVE for weakness, dizziness or  "paresthesias    OBJECTIVE:  BP (!) 136/98   Ht 1.588 m (5' 2.5\")   Wt 90.3 kg (199 lb)   BMI 35.82 kg/m      General: healthy, alert and in no distress  HEENT: no scleral icterus or conjunctival erythema  Skin: no suspicious lesions or rash. No jaundice.  CV: no pedal edema  Resp: normal respiratory effort without conversational dyspnea   Psych: normal mood and affect  Gait: normal steady gait with appropriate coordination and balance  Neuro: Normal light sensory exam of lower extremity  MSK:  BILATERAL KNEE  Inspection:    normal alignment  Palpation:    Tender about the lateral patellar facet, medial patellar facet, lateral joint line and medial joint line. Remainder of bony and ligamentous landmarks are nontender.    Trace effusion is present    Patellofemoral crepitus is Present  Range of Motion:     00 extension to 700 flexion  Strength:    Quadriceps 5-/5 and hamstrings 5-/5    Extensor mechanism intact  Special Tests:    Positive: Patellar grind    Negative: MCL/valgus stress (0 & 30 deg), LCL/varus stress (0 & 30 deg), Lachman's, anterior drawer, posterior drawer, Love's    Independent visualization of the below image:  No results found for this or any previous visit (from the past 24 hour(s)).  Right knee x-rays (3 views)  - 8/21/15  Impression:  1. Moderate narrowing of the medial joint line with osteophytes present.  2. Severe narrowing of the lateral joint line with subchondral cysts/osteophytes present.  3. Joint effusion present.  4. Likely intra-articular loose bodies noted of the posterior portion of the knee.  5. Moderate narrowing of the right patellofemoral joint with osteophytes present.  6. Negative for fracture or other acute osseous abnormalities.    Unofficial review of x-ray taken the left knee from 11/16/2012 revealed severe tricompartment bone-on-bone arthritis, osteophytes and deformity of the joint.    Large Joint Injection/Arthocentesis: bilateral knee  Date/Time: 6/14/2019 10:27 " AM  Performed by: Yeo, Albert, MD  Authorized by: Yeo, Albert, MD     Indications:  Pain and joint swelling  Needle Size:  25 G  Guidance: ultrasound    Approach:  Superolateral  Location:  Knee  Laterality:  Bilateral      Medications (Right):  40 mg methylPREDNISolone 40 MG/ML  Medications (Left):  40 mg methylPREDNISolone 40 MG/ML  Outcome:  Tolerated well, no immediate complications  Procedure discussed: discussed risks, benefits, and alternatives    Consent Given by:  Patient  Timeout: timeout called immediately prior to procedure    Prep: patient was prepped and draped in usual sterile fashion            Albert Yeo MD [facility]      Again, thank you for allowing me to participate in the care of your patient.        Sincerely,        Albert Yeo, MD

## 2019-06-14 NOTE — PATIENT INSTRUCTIONS
1. Primary osteoarthritis of left knee    2. Primary osteoarthritis of right knee      -Patient is here for bilateral knee pain due to severe bone on bone arthritis  -Patient tolerated cortisone injection today in both knees without complications  -Patient will start physical therapy and home exercise program  -Patient will be referred to Ortho for evaluation for total knee replacements  -Patient will start Meloxicam as needed.  A cane was ordered today.  -Call direct clinic number [[phone]] at any time with questions or concerns.    Albert Yeo MD CAM  [facility]  [facility] [place]

## 2019-06-19 NOTE — PROGRESS NOTES
[facility]: Physical Therapy Initial Evaluation   Jun 20, 2019  Subjective:   Chief Complaint: Bilateral knee pain   Pain: Bilateral knee pain, front and sides, left worse than right.   Numbness/Tingling: Tingling in the hips   Weakness: knee gest tired   Stiffness: None  New/Recurrent/Chronic: Chronic  DOI/onset: roughly one year   Referral Date: 6/14/2019 - Yeo, Albert, MD   Mechanism of onset: Just started  PMH/surgical history/trauma: Patient states that there are no other medical conditions to note.   Medications: See chart  Occupation: Retried  Previous Treatment (Effect): Corticosteroid injections of both knees (helped a little bit),  Imaging: No recent imaging of the knee on file.   AM/PM: more painful in the day, tough to get up in the morning because of the pain  Quality of Pain: sharp  Pain: 10/10 at present, 7/10 at best, 10/10 at worst  Worse: standing, walking  Better: aleve, ice  Current Functional Status:   - standing up - painful to rise from sitting   - standing - can stand for about 5 minutes  - walking - trouble with walking in from the parking lot  Previous Functional Status: no restrictions prior  Current HEP/exercise regimen: walking (tries to walk around the house in the evening), used to go to the gym,   Transportation to Therapy: Get rides covered by insurance  Live with Others: lives with her daughter (32 years old, able bodied)  Red Flags:   - Patient reports the following: Locking (knee);     Patient's goal(s): Get something to help her out.      Objective:    Standing Posture: anterior pevlic tilt, genu valgum bilaterally, greater weightbearing through right lower extremity    Gait: bilateral hip drop, left worse than right.     Swelling:    R L   Joint Line 40.5 cm 42 cm         AROM: (* indicates patient's pain)      R  L   Hyperextension 0 0   Extension 4 11   Flexion 92* 89*     Palpation: Tenderness around the patella bilaterally    Patellar tracking,  positioning, mobility: lateral shift of the resting patella, hypomobile, bilaterally    Other:  - Difficulty with straight leg raise into flexion x5 per side  - Patient reported feeling better after exercises      Assessment/Plan:    Patient is a 68 year old female with both sides knee complaints.    Patient has the following significant findings with corresponding treatment plan.                Referring Diagnosis: Primary osteoarthritis of left knee ; Primary osteoarthritis of right knee  Pain -  hot/cold therapy, manual therapy, splint/taping/bracing/orthotics, self management, education and home program  Decreased ROM/flexibility - manual therapy, therapeutic exercise, therapeutic activity and home program  Decreased strength - therapeutic exercise, therapeutic activities and home program  Impaired gait - gait training and home program  Decreased function - therapeutic activities and home program  Impaired posture - neuro re-education, therapeutic activities and home program      Therapy Evaluation Codes:   1) History comprised of:   Personal factors that impact the plan of care:      Language and Literacy.    Comorbidity factors that impact the plan of care are:      Osteoarthritis.     Medications impacting care: Steroids.  2) Examination of Body Systems comprised of:   Body structures and functions that impact the plan of care:      Hip and Knee.   Activity limitations that impact the plan of care are:      Standing and Walking.  3) Clinical presentation characteristics are:   Evolving/Changing.  4) Decision-Making    Moderate complexity using standardized patient assessment instrument and/or measureable assessment of functional outcome.  Cumulative Therapy Evaluation is: Moderate complexity.    Previous and current functional limitations:  (See Goal Flow Sheet for this information)    Short term and Long term goals: (See Goal Flow Sheet for this information)     Communication ability:  Patient appears to be  able to communicate and understand verbal communication and follow directions correctly. Not able to communicate through written instructions.   Treatment Explanation - The following has been discussed with the patient:   RX ordered/plan of care  Anticipated outcomes  Possible risks and side effects  This patient would benefit from PT intervention to resume normal activities.   Rehab potential is fair.    Frequency:  1 X week, once daily  Duration:  for 4 weeks tapering to 2 X a month over 4 weeks  Discharge Plan:  Achieve all LTG.  Independent in home treatment program.  Reach maximal therapeutic benefit.    Please refer to the daily flowsheet for treatment today, total treatment time and time spent performing 1:1 timed codes.

## 2019-06-20 ENCOUNTER — THERAPY VISIT (OUTPATIENT)
Dept: PHYSICAL THERAPY | Facility: CLINIC | Age: 69
End: 2019-06-20
Payer: COMMERCIAL

## 2019-06-20 DIAGNOSIS — M17.11 PRIMARY OSTEOARTHRITIS OF RIGHT KNEE: ICD-10-CM

## 2019-06-20 DIAGNOSIS — M25.562 BILATERAL KNEE PAIN: ICD-10-CM

## 2019-06-20 DIAGNOSIS — M17.12 PRIMARY OSTEOARTHRITIS OF LEFT KNEE: ICD-10-CM

## 2019-06-20 DIAGNOSIS — M25.561 BILATERAL KNEE PAIN: ICD-10-CM

## 2019-06-20 PROCEDURE — 97162 PT EVAL MOD COMPLEX 30 MIN: CPT | Mod: GP | Performed by: PHYSICAL THERAPIST

## 2019-06-20 PROCEDURE — 97110 THERAPEUTIC EXERCISES: CPT | Mod: GP | Performed by: PHYSICAL THERAPIST

## 2019-06-21 ENCOUNTER — TELEPHONE (OUTPATIENT)
Dept: ORTHOPEDICS | Facility: CLINIC | Age: 69
End: 2019-06-21

## 2019-06-21 NOTE — TELEPHONE ENCOUNTER
[facility] voicemail requesting office visit notes from 6/14/19 appointment with Dr. Yeo.  Rx was provided at that appointment for a cane.     Office visit notes, and copy of DME order were faxed to provided number; [phone].     Breann Messer ATC

## 2019-06-24 PROBLEM — M54.50 LUMBAR PAIN WITH RADIATION DOWN BOTH LEGS: Status: RESOLVED | Noted: 2019-05-13 | Resolved: 2019-06-24

## 2019-06-24 PROBLEM — M79.604 LUMBAR PAIN WITH RADIATION DOWN BOTH LEGS: Status: RESOLVED | Noted: 2019-05-13 | Resolved: 2019-06-24

## 2019-06-24 PROBLEM — M79.605 LUMBAR PAIN WITH RADIATION DOWN BOTH LEGS: Status: RESOLVED | Noted: 2019-05-13 | Resolved: 2019-06-24

## 2019-07-10 ENCOUNTER — TELEPHONE (OUTPATIENT)
Dept: ORTHOPEDICS | Facility: CLINIC | Age: 69
End: 2019-07-10

## 2019-07-10 ENCOUNTER — ANCILLARY PROCEDURE (OUTPATIENT)
Dept: GENERAL RADIOLOGY | Facility: CLINIC | Age: 69
End: 2019-07-10
Attending: ORTHOPAEDIC SURGERY
Payer: COMMERCIAL

## 2019-07-10 ENCOUNTER — OFFICE VISIT (OUTPATIENT)
Dept: ORTHOPEDICS | Facility: CLINIC | Age: 69
End: 2019-07-10
Payer: COMMERCIAL

## 2019-07-10 VITALS
SYSTOLIC BLOOD PRESSURE: 128 MMHG | BODY MASS INDEX: 35.26 KG/M2 | HEIGHT: 63 IN | WEIGHT: 199 LBS | DIASTOLIC BLOOD PRESSURE: 74 MMHG

## 2019-07-10 DIAGNOSIS — G89.29 CHRONIC PAIN OF LEFT KNEE: Primary | ICD-10-CM

## 2019-07-10 DIAGNOSIS — G89.29 CHRONIC PAIN OF RIGHT KNEE: ICD-10-CM

## 2019-07-10 DIAGNOSIS — M25.561 CHRONIC PAIN OF RIGHT KNEE: ICD-10-CM

## 2019-07-10 DIAGNOSIS — G89.29 CHRONIC PAIN OF LEFT KNEE: ICD-10-CM

## 2019-07-10 DIAGNOSIS — M25.562 CHRONIC PAIN OF LEFT KNEE: ICD-10-CM

## 2019-07-10 DIAGNOSIS — M17.12 PRIMARY OSTEOARTHRITIS OF LEFT KNEE: ICD-10-CM

## 2019-07-10 DIAGNOSIS — M25.562 CHRONIC PAIN OF LEFT KNEE: Primary | ICD-10-CM

## 2019-07-10 PROCEDURE — 99204 OFFICE O/P NEW MOD 45 MIN: CPT | Performed by: ORTHOPAEDIC SURGERY

## 2019-07-10 PROCEDURE — 73562 X-RAY EXAM OF KNEE 3: CPT | Mod: LT | Performed by: ORTHOPAEDIC SURGERY

## 2019-07-10 ASSESSMENT — MIFFLIN-ST. JEOR: SCORE: 1393.85

## 2019-07-10 NOTE — PROGRESS NOTES
HISTORY OF PRESENT ILLNESS:    Laure Wood is a 68 year old female who is seen in consultation at the request of Dr. Yeo for chronic right and left knee pain. Patient reports onset of left knee pain over the last year. No trauma or injury noted at that time.     Present symptoms: bilateral generalized anterior knee pain. Pain is sharp. Pain comes and goes.  Increased pain with sitting for extended periods of time, at nighttime, pain with stairs, and walking for extended periods of time.  Patient states it is difficult to get comfortable at nighttime due to her knee. Patient denies swelling.  Patient states the knee locks frequently. Patient states the knee feels weak, and unstable with standing for extended periods of time. Current pain level: 8/10, Worst pain : 10/10  At this point, she has pain at rest and at night.  Recent cortisone injection provided very minimal improvement.  Treatments tried to this point: corticosteroid injections 6/10/19 , 11/11/15, rest/ activity avoidance, ice, elevation, Meloxicam, and physical therapy with home exercise program.  Orthopedic PMH: Chronic history of arthritis    Past Medical History:   Diagnosis Date     Hyperlipidemia LDL goal <160      Hypertension, essential, benign      Knee osteoarthritis        Past Surgical History:   Procedure Laterality Date     CATARACT IOL, RT/LT Bilateral     Cataract IOL RT/LT       Family History   Family history unknown: Yes       Social History     Socioeconomic History     Marital status:      Spouse name: Not on file     Number of children: 6     Years of education: Not on file     Highest education level: Not on file   Occupational History     Occupation: dietary     Employer: [employer]   Social Needs     Financial resource strain: Not on file     Food insecurity:     Worry: Not on file     Inability: Not on file     Transportation needs:     Medical: Not on file     Non-medical: Not on file   Tobacco Use      Smoking status: Never Smoker     Smokeless tobacco: Never Used   Substance and Sexual Activity     Alcohol use: No     Drug use: No     Sexual activity: Never   Lifestyle     Physical activity:     Days per week: Not on file     Minutes per session: Not on file     Stress: Not on file   Relationships     Social connections:     Talks on phone: Not on file     Gets together: Not on file     Attends Scientology service: Not on file     Active member of club or organization: Not on file     Attends meetings of clubs or organizations: Not on file     Relationship status: Not on file     Intimate partner violence:     Fear of current or ex partner: Not on file     Emotionally abused: Not on file     Physically abused: Not on file     Forced sexual activity: Not on file   Other Topics Concern     Parent/sibling w/ CABG, MI or angioplasty before 65F 55M? Not Asked   Social History Narrative     Not on file       Current Outpatient Medications   Medication Sig Dispense Refill     hydrochlorothiazide (HYDRODIURIL) 25 MG tablet Take 1 tablet (25 mg) by mouth daily 90 tablet 1     meloxicam (MOBIC) 15 MG tablet Take 1 tablet (15 mg) by mouth daily 30 tablet 1     simvastatin (ZOCOR) 20 MG tablet Take 1 tablet (20 mg) by mouth At Bedtime 90 tablet 1     meloxicam (MOBIC) 7.5 MG tablet Take 1-2 tablets (7.5-15 mg) by mouth daily as needed for moderate pain (Patient not taking: Reported on 7/10/2019) 30 tablet 5     order for DME Cane 1 Units 0       Allergies   Allergen Reactions     No Known Drug Allergy        REVIEW OF SYSTEMS:  CONSTITUTIONAL:  NEGATIVE for fever, chills, change in weight  INTEGUMENTARY/SKIN:  NEGATIVE for worrisome rashes, moles or lesions  EYES:  NEGATIVE for vision changes or irritation  ENT/MOUTH:  NEGATIVE for ear, mouth and throat problems  RESP:  NEGATIVE for significant cough or SOB  BREAST:  NEGATIVE for masses, tenderness or discharge  CV:  NEGATIVE for chest pain, palpitations or peripheral  "edema  GI:  NEGATIVE for nausea, abdominal pain, heartburn, or change in bowel habits  :  Negative   MUSCULOSKELETAL:  See HPI above  NEURO:  NEGATIVE for weakness, dizziness or paresthesias  ENDOCRINE:  NEGATIVE for temperature intolerance, skin/hair changes  HEME/ALLERGY/IMMUNE:  NEGATIVE for bleeding problems  PSYCHIATRIC:  NEGATIVE for changes in mood or affect      PHYSICAL EXAM:  /74 (BP Location: Right arm, Patient Position: Chair, Cuff Size: Adult Regular)   Ht 1.588 m (5' 2.5\")   Wt 90.3 kg (199 lb)   BMI 35.82 kg/m    Body mass index is 35.82 kg/m .   GENERAL APPEARANCE: healthy, alert and no distress   HEENT: No apparent thyroid megaly. Clear sclera with normal ocular movement  RESPIRATORY: No labored breathing  SKIN: no suspicious lesions or rashes  NEURO: Normal strength and tone, mentation intact and speech normal  VASCULAR: Good pulses, and capillary refill   LYMPH: no lymphadenopathy   PSYCH:  mentation appears normal and affect normal/bright    MUSCULOSKELETAL:  Difficulty of getting up from sitting  Obvious limping of mild degree  Stands with a valgus deformities, left slightly worse  Left knee has 5 degree lack of full extension  Right knee has almost full extension  Flexion is to 90 degrees barely  Pain with the lateral joint line palpation, bilateral  Pain with patellofemoral compression, bilateral  Marked crepitus, bilateral  Calf is not tender  Swelling in the left knee more so than right  Ligaments are stable  Grossly motor strength is full  Circulation is intact  Sensation is intact       ASSESSMENT:  Severe knee DJD, bilateral  Left knee is more symptomatic    PLAN:  Today's knee x-rays were visualized and compared to the previous ones from 2012 for the left in 2015 for the right.  Severe degenerative changes consistent with a joint space narrowing and large bone spurs, worse on the left are noted once again.  With very minimal improvement from the recent cortisone injection, at " this point she is interested in considering knee replacement while her daughter is able to help.  1 of her daughters is not working and will be staying with her 2 more months.  We had a long discussion as to the details of the surgery, hospitalization course, potential risks including possibility persisting pain, limitation of motion and infection etc.  Informational materials provided today.  Left total knee arthroplasty will be scheduled sometime in the future.  She is planning on going to [facility] in January 2020.  We will see whether we will be able to accomplish doing both knees before she travels.      Imaging Interpretation:     Bilateral knee x-ray series demonstrate valgus alignment and severe degenerative changes slightly worse on the left.  Joint space narrowing to the point of near complete obliteration in the lateral joint space is noted.  Large bone spurs are noted in all compartments but mostly in the left patellofemoral compartment.  No acute fractures or other osseous pathology are noted.      James Mcdonald MD  Department of Orthopedic Surgery        Disclaimer: This note consists of symbols derived from keyboarding, dictation and/or voice recognition software. As a result, there may be errors in the script that have gone undetected. Please consider this when interpreting information found in this chart.

## 2019-07-10 NOTE — PATIENT INSTRUCTIONS
"We discussed the option of  total knee replacement. Here is the summary of our discussion.  This operation takes about 60-80 minutes of time. The hospitalization  will be 2 days in most cases. You will be encouraged to progress with activities as soon as possible. Staying in bed is not good for circulation, breathing and digestive function. You will begin physical therapy on the day of surgery or next day.    As a review, 90-95% of patients with knee replacement are happy with the operation.The  patients who are not happy with the operation will include those patients with infection, mal-positioning of the components, mal-sizing of the components, continuing post-surgical leg weakness,  the excessive postoperative scarring limiting the range of motion, as well as rare situations of unexplained pain despite absence of any specific identifiable causes. Only about 75-80 % of the patients with total knee replacement will have no pain. For that reason, we feel it is good to have realistic  Expectations.    After the surgery, to prevent the potential problem of blood clot after the surgery, you'll be asked to take aspirin or to undergo a period of blood thinner injections. If you were taking Coumadin preoperatively, you'll be going back to Coumadin.    Compared to a hip replacement, in general, a knee replacement Is more painful. Pain is significant for the first 2-5  days.  In addition,  The amount of  work you have to put in after the surgery is greater. Unless you are very comfortable with the exercise routines, you'll be going to an out patient  physical therapy facility 2-3 times per week for 2-4 weeks after the surgery. The level of pain following the surgery varies from one person to another, but it would not be unreasonable to expect the pain will continue for 6 to 8 weeks before you stop \"thinking\" about the pain. During that time it is expected that you will have \"good\" days and \"not so good\" days. If pain " "increases, it probably means you are pushing yourself too much. Take it a little easier and focus on stretching until you are back on track.    The adage of \"No pain, no gain\" should not be adopted in this situation. \"Over doing\" certainly can cause more irritation/discomfort and can cause slow recovery rather that quick recovery . The general goal in terms of range of motion  is approximately  115 degrees, however you may not get that amount of flexion if you have a significant pre-existing problems of tightness before the surgery. It is very important to remember, maintaining full extension(straightening) is far more important than flexion(bending) first 2 to 3 weeks. You will need to use a knee immobilizer until you can do 10 straight leg raises. Crutches/walker can be discontinued according to your progress.      One of the major long-term problem with the knee replacement is loosening which limits the lifespan of the knee replacement. This problem can happen at any time but typically happens after 10-15 years from the surgery. For this reason, we do not recommend any type of impact activities such as jogging or running. Walking, biking, swimming, golfing and double tennis are O.K. To do.      For some patients with the risks factors such as diabetes, immunocompromise, chronic prednisone usage and chronic illness, you are recommended to take an antibiotic for procedures such as dental cleaning, oral surgery, colonoscopy as well as any type of surgery that involves a significant incision. Usually amoxicillin or clindamycin is used for this purpose. you'll be taking the medication one hour before the procedure.Please let it dentist know that you have artificial implants in your body.  "

## 2019-07-10 NOTE — LETTER
7/10/2019         RE: Laure Wood  [address] [facility] [address]        Dear Colleague,    Thank you for referring your patient, Laure Wood, to the [facility] ORTHOPEDIC SURGERY. Please see a copy of my visit note below.    HISTORY OF PRESENT ILLNESS:    Laure Wood is a 68 year old female who is seen in consultation at the request of Dr. Yeo for chronic right and left knee pain. Patient reports onset of left knee pain over the last year. No trauma or injury noted at that time.     Present symptoms: bilateral generalized anterior knee pain. Pain is sharp. Pain comes and goes.  Increased pain with sitting for extended periods of time, at nighttime, pain with stairs, and walking for extended periods of time.  Patient states it is difficult to get comfortable at nighttime due to her knee. Patient denies swelling.  Patient states the knee locks frequently. Patient states the knee feels weak, and unstable with standing for extended periods of time. Current pain level: 8/10, Worst pain : 10/10  At this point, she has pain at rest and at night.  Recent cortisone injection provided very minimal improvement.  Treatments tried to this point: corticosteroid injections 6/10/19 , 11/11/15, rest/ activity avoidance, ice, elevation, Meloxicam, and physical therapy with home exercise program.  Orthopedic PMH: Chronic history of arthritis    Past Medical History:   Diagnosis Date     Hyperlipidemia LDL goal <160      Hypertension, essential, benign      Knee osteoarthritis        Past Surgical History:   Procedure Laterality Date     CATARACT IOL, RT/LT Bilateral     Cataract IOL RT/LT       Family History   Family history unknown: Yes       Social History     Socioeconomic History     Marital status:      Spouse name: Not on file     Number of children: 6     Years of education: Not on file     Highest education level: Not on file   Occupational History     Occupation: dietary     Employer:  [employer]   Social Needs     Financial resource strain: Not on file     Food insecurity:     Worry: Not on file     Inability: Not on file     Transportation needs:     Medical: Not on file     Non-medical: Not on file   Tobacco Use     Smoking status: Never Smoker     Smokeless tobacco: Never Used   Substance and Sexual Activity     Alcohol use: No     Drug use: No     Sexual activity: Never   Lifestyle     Physical activity:     Days per week: Not on file     Minutes per session: Not on file     Stress: Not on file   Relationships     Social connections:     Talks on phone: Not on file     Gets together: Not on file     Attends Yarsanism service: Not on file     Active member of club or organization: Not on file     Attends meetings of clubs or organizations: Not on file     Relationship status: Not on file     Intimate partner violence:     Fear of current or ex partner: Not on file     Emotionally abused: Not on file     Physically abused: Not on file     Forced sexual activity: Not on file   Other Topics Concern     Parent/sibling w/ CABG, MI or angioplasty before 65F 55M? Not Asked   Social History Narrative     Not on file       Current Outpatient Medications   Medication Sig Dispense Refill     hydrochlorothiazide (HYDRODIURIL) 25 MG tablet Take 1 tablet (25 mg) by mouth daily 90 tablet 1     meloxicam (MOBIC) 15 MG tablet Take 1 tablet (15 mg) by mouth daily 30 tablet 1     simvastatin (ZOCOR) 20 MG tablet Take 1 tablet (20 mg) by mouth At Bedtime 90 tablet 1     meloxicam (MOBIC) 7.5 MG tablet Take 1-2 tablets (7.5-15 mg) by mouth daily as needed for moderate pain (Patient not taking: Reported on 7/10/2019) 30 tablet 5     order for DME Cane 1 Units 0       Allergies   Allergen Reactions     No Known Drug Allergy        REVIEW OF SYSTEMS:  CONSTITUTIONAL:  NEGATIVE for fever, chills, change in weight  INTEGUMENTARY/SKIN:  NEGATIVE for worrisome rashes, moles or lesions  EYES:  NEGATIVE  for vision changes or irritation  ENT/MOUTH:  NEGATIVE for ear, mouth and throat problems  RESP:  NEGATIVE for significant cough or SOB  BREAST:  NEGATIVE for masses, tenderness or discharge  CV:  NEGATIVE for chest pain, palpitations or peripheral edema  GI:  NEGATIVE for nausea, abdominal pain, heartburn, or change in bowel habits  :  Negative   MUSCULOSKELETAL:  See HPI above  NEURO:  NEGATIVE for weakness, dizziness or paresthesias  ENDOCRINE:  NEGATIVE for temperature intolerance, skin/hair changes  HEME/ALLERGY/IMMUNE:  NEGATIVE for bleeding problems  PSYCHIATRIC:  NEGATIVE for changes in mood or affect      PHYSICAL EXAM:  There were no vitals taken for this visit.  There is no height or weight on file to calculate BMI.   GENERAL APPEARANCE: healthy, alert and no distress   HEENT: No apparent thyroid megaly. Clear sclera with normal ocular movement  RESPIRATORY: No labored breathing  SKIN: no suspicious lesions or rashes  NEURO: Normal strength and tone, mentation intact and speech normal  VASCULAR: Good pulses, and capillary refill   LYMPH: no lymphadenopathy   PSYCH:  mentation appears normal and affect normal/bright    MUSCULOSKELETAL:  Difficulty of getting up from sitting  Obvious limping of mild degree  Stands with a valgus deformities, left slightly worse  Left knee has 5 degree lack of full extension  Right knee has almost full extension  Flexion is to 90 degrees barely  Pain with the lateral joint line palpation, bilateral  Pain with patellofemoral compression, bilateral  Marked crepitus, bilateral  Calf is not tender  Swelling in the left knee more so than right  Ligaments are stable  Grossly motor strength is full  Circulation is intact  Sensation is intact       ASSESSMENT:  Severe knee DJD, bilateral  Left knee is more symptomatic    PLAN:  Today's knee x-rays were visualized and compared to the previous ones from 2012 for the left in 2015 for the right.  Severe degenerative changes consistent  with a joint space narrowing and large bone spurs, worse on the left are noted once again.  With very minimal improvement from the recent cortisone injection, at this point she is interested in considering knee replacement while her daughter is able to help.  1 of her daughters is not working and will be staying with her 2 more months.  We had a long discussion as to the details of the surgery, hospitalization course, potential risks including possibility persisting pain, limitation of motion and infection etc.  Informational materials provided today.  Left total knee arthroplasty will be scheduled sometime in the future.  She is planning on going to [facility] in January 2020.  We will see whether we will be able to accomplish doing both knees before she travels.      Imaging Interpretation:     Bilateral knee x-ray series demonstrate valgus alignment and severe degenerative changes slightly worse on the left.  Joint space narrowing to the point of near complete obliteration in the lateral joint space is noted.  Large bone spurs are noted in all compartments but mostly in the left patellofemoral compartment.  No acute fractures or other osseous pathology are noted.      James Mcdonald MD  Department of Orthopedic Surgery        Disclaimer: This note consists of symbols derived from keyboarding, dictation and/or voice recognition software. As a result, there may be errors in the script that have gone undetected. Please consider this when interpreting information found in this chart.      Again, thank you for allowing me to participate in the care of your patient.        Sincerely,        James Mcdonald MD

## 2019-07-23 ENCOUNTER — TELEPHONE (OUTPATIENT)
Dept: INTERNAL MEDICINE | Facility: CLINIC | Age: 69
End: 2019-07-23

## 2019-07-23 ENCOUNTER — NURSE TRIAGE (OUTPATIENT)
Dept: NURSING | Facility: CLINIC | Age: 69
End: 2019-07-23

## 2019-07-23 NOTE — TELEPHONE ENCOUNTER
"Clinic Action Needed:  Yes    Reason for Call:  Please call Patient 7/24/19 at [phone]. Per Patient OK to leave a message.    Patient states she has a colonoscopy scheduled and has questions re the time and prep. Patient believes it is scheduled for 7/25/19 at \"[facility].\"  Patient states she does not currently have written information and instructions.    Per Epic chart this RN notes the Date 7/25/19 and Time 12:35 PM at location \"[facility].\"  No additional information found in Patient's chart.  Per FNA conversation Patient needs additional information prior to scheduled colonoscopy.    Protocol-  Information Only Call- Adult  Care advice reviewed.   Disposition-  Information given.  This RN will send a Telephone message now to PCP to follow up with Patient 7/24/19.  Caller states understanding of the recommended disposition.   Caller plans to call PCP 7/24/19 for instructions.  Advised to call back if further questions or concerns.     Routed to: Dr. Andrés SALAZAR / [facility] /   [place]  Will send directly to Provider per FNA Guideline.      Mely Nuñez, ELIJAHN RN  [facility]        "

## 2019-07-23 NOTE — TELEPHONE ENCOUNTER
"Patient states she has a colonoscopy scheduled and has questions re the time and prep. Patient believes it is scheduled for 7/25/19 at \"[facility].\"  Patient states she does not currently have written information and instructions.    Per Epic chart this RN notes the Date 7/25/19 and Time 12:35 PM at location \"[facility].\"  No additional information found in Patient's chart.  Per FNA conversation Patient needs additional information prior to scheduled colonoscopy.    Protocol-  Information Only Call- Adult  Care advice reviewed.   Disposition-  Information given.  This RN will send a Telephone message now to PCP to follow up with Patient 7/24/19.  Message sent to PCP.  Caller states understanding of the recommended disposition.   Caller plans to call PCP 7/24/19 for instructions.  Advised to call back if further questions or concerns.     SYLWIA Rosen RN  [facility]     Reason for Disposition    [1] Caller requesting NON-URGENT health information AND [2] PCP's office is the best resource    Protocols used: INFORMATION ONLY CALL-A-AH    "

## 2019-07-24 NOTE — TELEPHONE ENCOUNTER
Read letter out of patient. Went over prep instructions. Advised she can bring bottle back to pharmacy to re-review with a pharmacist if she has more questions. Also gave GI scheduling number if she needs more assistance.     Reviewed with patient foods to avoid as well as medications.    Patricia HARRIS RN, BSN, PHN

## 2019-07-25 ENCOUNTER — HOSPITAL ENCOUNTER (OUTPATIENT)
Facility: CLINIC | Age: 69
Discharge: HOME | End: 2019-07-25
Attending: INTERNAL MEDICINE | Admitting: INTERNAL MEDICINE
Payer: COMMERCIAL

## 2019-07-25 VITALS
RESPIRATION RATE: 16 BRPM | BODY MASS INDEX: 33.29 KG/M2 | SYSTOLIC BLOOD PRESSURE: 150 MMHG | WEIGHT: 195 LBS | DIASTOLIC BLOOD PRESSURE: 81 MMHG | OXYGEN SATURATION: 98 % | HEART RATE: 64 BPM | HEIGHT: 64 IN

## 2019-07-25 LAB — COLONOSCOPY: NORMAL

## 2019-07-25 PROCEDURE — G0121 COLON CA SCRN NOT HI RSK IND: HCPCS | Performed by: INTERNAL MEDICINE

## 2019-07-25 PROCEDURE — 45378 DIAGNOSTIC COLONOSCOPY: CPT | Performed by: INTERNAL MEDICINE

## 2019-07-25 PROCEDURE — 25000128 H RX IP 250 OP 636: Performed by: INTERNAL MEDICINE

## 2019-07-25 PROCEDURE — G0500 MOD SEDAT ENDO SERVICE >5YRS: HCPCS | Performed by: INTERNAL MEDICINE

## 2019-07-25 RX ORDER — FENTANYL CITRATE 50 UG/ML
INJECTION, SOLUTION INTRAMUSCULAR; INTRAVENOUS PRN
Status: DISCONTINUED | OUTPATIENT
Start: 2019-07-25 | End: 2019-07-25 | Stop reason: HOSPADM

## 2019-07-25 RX ORDER — NALOXONE HYDROCHLORIDE 0.4 MG/ML
.1-.4 INJECTION, SOLUTION INTRAMUSCULAR; INTRAVENOUS; SUBCUTANEOUS
Status: DISCONTINUED | OUTPATIENT
Start: 2019-07-25 | End: 2019-07-25 | Stop reason: HOSPADM

## 2019-07-25 RX ORDER — FLUMAZENIL 0.1 MG/ML
0.2 INJECTION, SOLUTION INTRAVENOUS
Status: DISCONTINUED | OUTPATIENT
Start: 2019-07-25 | End: 2019-07-25 | Stop reason: HOSPADM

## 2019-07-25 RX ORDER — ONDANSETRON 4 MG/1
4 TABLET, ORALLY DISINTEGRATING ORAL EVERY 6 HOURS PRN
Status: DISCONTINUED | OUTPATIENT
Start: 2019-07-25 | End: 2019-07-25 | Stop reason: HOSPADM

## 2019-07-25 RX ORDER — LIDOCAINE 40 MG/G
CREAM TOPICAL
Status: DISCONTINUED | OUTPATIENT
Start: 2019-07-25 | End: 2019-07-25 | Stop reason: HOSPADM

## 2019-07-25 RX ORDER — ONDANSETRON 2 MG/ML
4 INJECTION INTRAMUSCULAR; INTRAVENOUS
Status: DISCONTINUED | OUTPATIENT
Start: 2019-07-25 | End: 2019-07-25 | Stop reason: HOSPADM

## 2019-07-25 RX ORDER — ONDANSETRON 2 MG/ML
4 INJECTION INTRAMUSCULAR; INTRAVENOUS EVERY 6 HOURS PRN
Status: DISCONTINUED | OUTPATIENT
Start: 2019-07-25 | End: 2019-07-25 | Stop reason: HOSPADM

## 2019-07-25 ASSESSMENT — MIFFLIN-ST. JEOR: SCORE: 1399.51

## 2019-07-25 NOTE — LETTER
June 6, 2019      Laure Wood  [address] [facility] [address]        Dear Laure,    Thank you for choosing [facility]. You are scheduled for the following service.   Date:  6/21/2019 Friday       Procedure: COLONOSCOPY  Doctor:   Dr. Jono Egan         Arrival Time:  12:30 pm   *check in at Emergency/Endoscopy desk*  Procedure Time:  1:00 pm    Location:   [facility]        Endoscopy Department, First Floor (Enter through ER Doors) *         [address] [place], [place] [address]      [phone] or [phone] ([facility]) to reschedule        NuLYTELY  PREP  Colonoscopy is the most accurate test to detect colon polyps and colon cancer; and the only test where polyps can be removed. During this procedure, a doctor examines the lining of your large intestine and rectum through a flexible tube.         Transportation  Arrange for a ride for the day of your procedures with a responsible adult.  A taxi ride is not an option unless you are accompanied by a responsible adult. If you fail to arrange transportation with a responsible adult, your procedure will be cancelled and rescheduled.    Fill your enclosed prescription for NuLYTELY  at your local pharmacy. Please call our office at [phone] if you did not receive a prescription.    COLONOSCOPY PRE-PROCEDURE CHECKLIST  If you have diabetes, ask your regular doctor for diet and medication restrictions.  If you take an anticoagulant or anti-platelet medication (such as Coumadin , Lovenox , Pradaxa , Xarelto , Eliquis , etc.), please call your primary doctor for advice on holding this medication.  If you take aspirin you may continue to do so.  If you are or may be pregnant, please discuss the risks and benefits of this procedure with your doctor.    PREPARATION FOR COLONOSCOPY    7 days before:    Discontinue fiber supplements and medications containing iron. This includes Metamucil  and  Fibercon ; and multivitamins with iron.  3 days before:    Begin a low-fiber diet. A low-fiber diet helps making the cleanout more effective. For additional details on low-fiber diet, please refer to the table on the last page.  2 days before:    Continue the low-fiber diet.     Drink at least 8 glasses of water throughout the day.     Stop eating solid foods at 11:45 pm.  1 day before:    In the morning: begin a clear liquid diet (liquids you can see through).     Examples of a clear liquid diet include: water, clear broth or bouillon, Gatorade, Pedialyte or Powerade, carbonated and non-carbonated soft drinks (Sprite , 7-Up , ginger ale), strained fruit juices without pulp (apple, white grape, white cranberry), Jell-O  and popsicles.     The following are not allowed on a clear liquid diet: red liquids, alcoholic beverages,  dairy products (milk, creamer, and yogurt), protein shakes,  juice with pulp and chewing tobacco.    At 4pm: drink 1 (one) 8 oz glass of NuLYTELY  solution every 15 minutes (approximately 8 glasses of 8 oz or half the bottle). Keep the solution refrigerated. Do not drink any other liquids while you are drinking the NuLYTELY  solution.    Over the course of the evening, drink an additional   liter of clear liquids and continue clear liquid diet.    Day of procedure:    6 hours before the procedure: drink 1 (one) 8 oz glass of NuLYTELY  solution every 15 minutes until the last half of the bottle (approximately 8 glasses of 8 oz) is gone. Keep the solution refrigerated. Do not drink any other liquids while you are drinking the NuLYTELY  solution. After that, you may continue the clear liquid diet until 3 hours before the procedure.      You may take all of your morning medications including blood pressure medications, blood thinners (if you have not been instructed to stop these by our office), methadone, and anti-seizure medications with sips of water 2 hours prior to your procedure or earlier.  Do not take insulin or vitamins prior to your procedure.       3 hours prior:   o STOP consuming all liquids   o Do not take anything by mouth during this time.   o Allow extra time to travel to your procedure as you may need to stop and use a restroom along the way.  You are ready for the procedure, if you followed all instructions and your stool is no longer formed, but clear or yellow liquid. If you are unsure whether your colon is clean, please call our office at [phone] before you leave for your appointment.    COLON CLEANSING TIPS: drink adequate amounts of fluids before and after your colon cleansing to prevent dehydration. Stay near a toilet because you will have diarrhea. Even if you are sitting on the toilet, continue to drink the cleansing solution every 15 minutes. If you feel nauseous or vomit, rinse your mouth with water, take a 15 to 30-minute-break and then continue drinking the solution. You will be uncomfortable until the stool has flushed from your colon (in about 2 to 4 hours). You may feel chilled.    Bring the following to your procedure:  - Insurance Card/Photo ID.   - List of current medications including over-the-counter medications and supplements.   - Your rescue inhaler if you currently use one to control asthma.    Canceling or rescheduling your appointment:   If you must cancel or reschedule your appointment, please call [phone] as soon as possible.      What happens during a colonoscopy?    Plan to spend up to two hours, starting at registration time, at the endoscopy center the day of your procedure. The colonoscopy takes an average of 15 to 30 minutes. Recovery time is about 30 minutes.    Before the exam:    You will change into a gown.    Your medical history and medication list will be reviewed with you, unless that has been done over the phone prior to the procedure.     A nurse will insert an intravenous (IV) line into your hand or arm.    The doctor will meet with you  and will give you a consent form to sign.    During the exam:     Medicine will be given through the IV line to help you relax.     Your heart rate and oxygen levels will be monitored. If your blood pressure is low, you may be given fluids through the IV line.     The doctor will insert a flexible hollow tube, called a colonoscope, into your rectum. The scope will be advanced slowly through the large intestine (colon).    You may have a feeling of fullness or pressure.     If an abnormal tissue or a polyp is found, the doctor may remove it through the endoscope for closer examination, or biopsy. Tissue removal is painless.    After the exam:           Any tissue samples removed during the exam will be sent to a lab for evaluation. It may take 5-7 working days for you to be notified of the results.     A nurse will provide you with complete discharge instructions before you leave the endoscopy center. Be sure to ask the nurse for specific instructions if you take blood thinners such as Aspirin, Coumadin or Plavix.     The doctor will prepare a full report for you and for the physician who referred you for the procedure.     Your doctor will talk with you about the initial results of your exam.      Medication given during the exam will prohibit you from driving for the rest of the day.     Following the exam, you may resume your normal diet. Your first meal should be light, no greasy foods. Avoid alcohol until the next day.     You may resume your regular activities the day after the procedure.     LOW-FIBER DIET  Foods RECOMMENDED Foods to AVOID   Breads, Cereal, Rice and Pasta:   White bread, rolls, biscuits, croissant and jayla toast.   Waffles, Danish toast and pancakes.   White rice, noodles, pasta, macaroni and peeled cooked potatoes.   Plain crackers and saltines.   Cooked cereals: farina, cream of rice.   Cold cereals: Puffed Rice , Rice Krispies , Corn Flakes  and Special K    Breads, Cereal, Rice and  Pasta:   Breads or rolls with nuts, seeds or fruit.   Whole wheat, pumpernickel, rye breads and cornbread.   Potatoes with skin, brown or wild rice, and kasha (buckwheat).     Vegetables:   Tender cooked and canned vegetables without seeds: carrots, asparagus tips, green or wax beans, pumpkin, spinach, lima beans. Vegetables:   Raw or steamed vegetables.   Vegetables with seeds.   Sauerkraut.   Winter squash, peas, broccoli, Brussel sprouts, cabbage, onions, cauliflower, baked beans, peas and corn.   Fruits:   Strained fruit juice.   Canned fruit, except pineapple.   Ripe bananas and melon. Fruits:   Prunes and prune juice.   Raw fruits.   Dried fruits: figs, dates and raisins.   Milk/Dairy:   Milk: plain or flavored.   Yogurt, custard and ice cream.   Cheese and cottage cheese Milk/Dairy:     Meat and other proteins:   ground, well-cooked tender beef, lamb, ham, veal, pork, fish, poultry and organ meats.   Eggs.   Peanut butter without nuts. Meat and other proteins:   Tough, fibrous meats with gristle.   Dry beans, peas and lentils.   Peanut butter with nuts.   Tofu.   Fats, Snack, Sweets, Condiments and Beverages:   Margarine, butter, oils, mayonnaise, sour cream and salad dressing, plain gravy.   Sugar, hard candy, clear jelly, honey and syrup.   Spices, cooked herbs, bouillon, broth and soups made with allowed vegetable, ketchup and mustard.   Coffee, tea and carbonated drinks.   Plain cakes, cookies and pretzels.   Gelatin, plain puddings, custard, ice cream, sherbet and popsicles. Fats, Snack, Sweets, Condiments and Beverages:   Nuts, seeds and coconut.   Jam, marmalade and preserves.   Pickles, olives, relish and horseradish.   All desserts containing nuts, seeds, dried fruit and coconut; or made from whole grains or bran.   Candy made with nuts or seeds.   Popcorn.       DIRECTIONS TO THE ENDOSCOPY DEPARTMENT    From the [place] ([facility] [place])  Take [address] South, exit on [address]. Get  into the left hand melanie, turn left (east), go one-half mile to [address] and turn left. Go north to the first stoplight, take a right on [address] and follow it to the Emergency entrance.    From the south ([facility])  Take [address]N to the [address]E split and exit on [address] . On [address] , turn left (west) to [address]. Turn right (north) on [address]. Go north to the first stoplight, take a right on [address] and follow it to the Emergency entrance.    From the east via 35[address] ([facility])  Take [address] south to [address]  exit. Turn right on [address]. Go west to [address]. Turn right (north) on [address]. Go to the first stoplight, take a right and follow on [address] to the Emergency entrance.    From the east via [address] ([facility])  Take [address] to [address]. Turn left (south) on [address] to [address]. Turn left (east) on [address] and follow it to the Emergency entrance.    From the west via [address] ([place], [place])  Take [address] east to [address]. Turn right (south) on [address] to [address]. Turn left (east) on [address] and follow it to the Emergency entrance.

## 2019-07-25 NOTE — H&P
Pre-Endoscopy History and Physical     Laure Wood MRN# [id number]   YOB: 1950 Age: 68 year old     Date of Procedure: 7/25/2019  Primary care provider: Yoshi Bowen  Type of Endoscopy: Colonoscopy with possible biopsy, possible polypectomy  Reason for Procedure: screen  Type of Anesthesia Anticipated: Conscious Sedation    HPI:    Laure is a 68 year old female who will be undergoing the above procedure.      A history and physical has been performed. The patient's medications and allergies have been reviewed. The risks and benefits of the procedure and the sedation options and risks were discussed with the patient.  All questions were answered and informed consent was obtained.      She denies a personal or family history of anesthesia complications or bleeding disorders.     Patient Active Problem List   Diagnosis     Essential hypertension, benign     CARDIOVASCULAR SCREENING; LDL GOAL LESS THAN 130     Fibroid uterus     Thickened endometrium     Cervical pain     Pain in shoulder     Lumbar pain     Advanced care planning/counseling discussion     Esophageal reflux     Knee osteoarthritis     Over weight     Obesity (BMI 35.0-39.9) with comorbidity (H)     Bilateral knee pain        Past Medical History:   Diagnosis Date     Hyperlipidemia LDL goal <160      Hypertension, essential, benign      Knee osteoarthritis         Past Surgical History:   Procedure Laterality Date     CATARACT IOL, RT/LT Bilateral     Cataract IOL RT/LT       Social History     Tobacco Use     Smoking status: Never Smoker     Smokeless tobacco: Never Used   Substance Use Topics     Alcohol use: No       Family History   Family history unknown: Yes       Prior to Admission medications    Medication Sig Start Date End Date Taking? Authorizing Provider   hydrochlorothiazide (HYDRODIURIL) 25 MG tablet Take 1 tablet (25 mg) by mouth daily 5/9/19  Yes Yoshi Bowen MD   meloxicam (MOBIC) 15 MG tablet Take 1 tablet  "(15 mg) by mouth daily 6/14/19  Yes Yeo, Albert, MD   simvastatin (ZOCOR) 20 MG tablet Take 1 tablet (20 mg) by mouth At Bedtime 5/9/19  Yes Yoshi Bowen MD   meloxicam (MOBIC) 7.5 MG tablet Take 1-2 tablets (7.5-15 mg) by mouth daily as needed for moderate pain  Patient not taking: Reported on 7/10/2019 5/1/19   Yoshi Bowen MD   order for DME [facility]ane 6/14/19   Yeo, Albert, MD       Allergies   Allergen Reactions     No Known Drug Allergy         REVIEW OF SYSTEMS:   5 point ROS negative except as noted above in HPI, including Gen., Resp., CV, GI &  system review.    PHYSICAL EXAM:   /80   Pulse 64   Resp 17   Ht 1.626 m (5' 4\")   Wt 88.5 kg (195 lb)   SpO2 96%   BMI 33.47 kg/m   Estimated body mass index is 33.47 kg/m  as calculated from the following:    Height as of this encounter: 1.626 m (5' 4\").    Weight as of this encounter: 88.5 kg (195 lb).   GENERAL APPEARANCE: alert, and oriented  MENTAL STATUS: alert  AIRWAY EXAM: Mallampatti Class I (visualization of the soft palate, fauces, uvula, anterior and posterior pillars)  RESP: lungs clear to auscultation - no rales, rhonchi or wheezes  CV: regular rates and rhythm  DIAGNOSTICS:    Not indicated    IMPRESSION   ASA Class 2 - Mild systemic disease    PLAN:   Plan for Colonoscopy with possible biopsy, possible polypectomy. We discussed the risks, benefits and alternatives and the patient wished to proceed.    The above has been forwarded to the consulting provider.      Signed Electronically by: Jono Egan  July 25, 2019          "

## 2019-07-25 NOTE — LETTER
June 6, 2019      Laure Wood  [address] [address] [facility] [address]        Dear Laure,    Thank you for choosing [facility]. You are scheduled for the following service.   Date:  07/10/19      Procedure: COLONOSCOPY  Doctor:   Dr. Jono Egan         Arrival Time:  12:30 pm   *check in at Emergency/Endoscopy desk*  Procedure Time:  1:00 pm    Location:   [facility]        Endoscopy Department, First Floor (Enter through ER Doors) *         [address] [place], [place] [address]      [phone] or [phone] () to reschedule        NuLYTELY  PREP  Colonoscopy is the most accurate test to detect colon polyps and colon cancer; and the only test where polyps can be removed. During this procedure, a doctor examines the lining of your large intestine and rectum through a flexible tube.         Transportation  Arrange for a ride for the day of your procedures with a responsible adult.  A taxi ride is not an option unless you are accompanied by a responsible adult. If you fail to arrange transportation with a responsible adult, your procedure will be cancelled and rescheduled.    Fill your enclosed prescription for NuLYTELY  at your local pharmacy. Please call our office at [phone] if you did not receive a prescription.    COLONOSCOPY PRE-PROCEDURE CHECKLIST  If you have diabetes, ask your regular doctor for diet and medication restrictions.  If you take an anticoagulant or anti-platelet medication (such as Coumadin , Lovenox , Pradaxa , Xarelto , Eliquis , etc.), please call your primary doctor for advice on holding this medication.  If you take aspirin you may continue to do so.  If you are or may be pregnant, please discuss the risks and benefits of this procedure with your doctor.    PREPARATION FOR COLONOSCOPY    7 days before:    Discontinue fiber supplements and medications containing iron. This includes Metamucil  and Fibercon ; and  multivitamins with iron.  3 days before:    Begin a low-fiber diet. A low-fiber diet helps making the cleanout more effective. For additional details on low-fiber diet, please refer to the table on the last page.  2 days before:    Continue the low-fiber diet.     Drink at least 8 glasses of water throughout the day.     Stop eating solid foods at 11:45 pm.  1 day before:    In the morning: begin a clear liquid diet (liquids you can see through).     Examples of a clear liquid diet include: water, clear broth or bouillon, Gatorade, Pedialyte or Powerade, carbonated and non-carbonated soft drinks (Sprite , 7-Up , ginger ale), strained fruit juices without pulp (apple, white grape, white cranberry), Jell-O  and popsicles.     The following are not allowed on a clear liquid diet: red liquids, alcoholic beverages,  dairy products (milk, creamer, and yogurt), protein shakes,  juice with pulp and chewing tobacco.    At 4pm: drink 1 (one) 8 oz glass of NuLYTELY  solution every 15 minutes (approximately 8 glasses of 8 oz or half the bottle). Keep the solution refrigerated. Do not drink any other liquids while you are drinking the NuLYTELY  solution.    Over the course of the evening, drink an additional   liter of clear liquids and continue clear liquid diet.    Day of procedure:    6 hours before the procedure: drink 1 (one) 8 oz glass of NuLYTELY  solution every 15 minutes until the last half of the bottle (approximately 8 glasses of 8 oz) is gone. Keep the solution refrigerated. Do not drink any other liquids while you are drinking the NuLYTELY  solution. After that, you may continue the clear liquid diet until 3 hours before the procedure.      You may take all of your morning medications including blood pressure medications, blood thinners (if you have not been instructed to stop these by our office), methadone, and anti-seizure medications with sips of water 2 hours prior to your procedure or earlier. Do not take  insulin or vitamins prior to your procedure.       3 hours prior:   o STOP consuming all liquids   o Do not take anything by mouth during this time.   o Allow extra time to travel to your procedure as you may need to stop and use a restroom along the way.  You are ready for the procedure, if you followed all instructions and your stool is no longer formed, but clear or yellow liquid. If you are unsure whether your colon is clean, please call our office at [phone] before you leave for your appointment.    COLON CLEANSING TIPS: drink adequate amounts of fluids before and after your colon cleansing to prevent dehydration. Stay near a toilet because you will have diarrhea. Even if you are sitting on the toilet, continue to drink the cleansing solution every 15 minutes. If you feel nauseous or vomit, rinse your mouth with water, take a 15 to 30-minute-break and then continue drinking the solution. You will be uncomfortable until the stool has flushed from your colon (in about 2 to 4 hours). You may feel chilled.    Bring the following to your procedure:  - Insurance Card/Photo ID.   - List of current medications including over-the-counter medications and supplements.   - Your rescue inhaler if you currently use one to control asthma.    Canceling or rescheduling your appointment:   If you must cancel or reschedule your appointment, please call [phone] as soon as possible.      What happens during a colonoscopy?    Plan to spend up to two hours, starting at registration time, at the endoscopy center the day of your procedure. The colonoscopy takes an average of 15 to 30 minutes. Recovery time is about 30 minutes.    Before the exam:    You will change into a gown.    Your medical history and medication list will be reviewed with you, unless that has been done over the phone prior to the procedure.     A nurse will insert an intravenous (IV) line into your hand or arm.    The doctor will meet with you and will  give you a consent form to sign.    During the exam:     Medicine will be given through the IV line to help you relax.     Your heart rate and oxygen levels will be monitored. If your blood pressure is low, you may be given fluids through the IV line.     The doctor will insert a flexible hollow tube, called a colonoscope, into your rectum. The scope will be advanced slowly through the large intestine (colon).    You may have a feeling of fullness or pressure.     If an abnormal tissue or a polyp is found, the doctor may remove it through the endoscope for closer examination, or biopsy. Tissue removal is painless.    After the exam:           Any tissue samples removed during the exam will be sent to a lab for evaluation. It may take 5-7 working days for you to be notified of the results.     A nurse will provide you with complete discharge instructions before you leave the endoscopy center. Be sure to ask the nurse for specific instructions if you take blood thinners such as Aspirin, Coumadin or Plavix.     The doctor will prepare a full report for you and for the physician who referred you for the procedure.     Your doctor will talk with you about the initial results of your exam.      Medication given during the exam will prohibit you from driving for the rest of the day.     Following the exam, you may resume your normal diet. Your first meal should be light, no greasy foods. Avoid alcohol until the next day.     You may resume your regular activities the day after the procedure.     LOW-FIBER DIET  Foods RECOMMENDED Foods to AVOID   Breads, Cereal, Rice and Pasta:   White bread, rolls, biscuits, croissant and jayla toast.   Waffles, [place] of [place] toast and pancakes.   White rice, noodles, pasta, macaroni and peeled cooked potatoes.   Plain crackers and saltines.   Cooked cereals: farina, cream of rice.   Cold cereals: Puffed Rice , Rice Krispies , Corn Flakes  and Special K    Breads, Cereal, Rice and Pasta:   Breads  or rolls with nuts, seeds or fruit.   Whole wheat, pumpernickel, rye breads and cornbread.   Potatoes with skin, brown or wild rice, and kasha (buckwheat).     Vegetables:   Tender cooked and canned vegetables without seeds: carrots, asparagus tips, green or wax beans, pumpkin, spinach, lima beans. Vegetables:   Raw or steamed vegetables.   Vegetables with seeds.   Sauerkraut.   Winter squash, peas, broccoli, Brussel sprouts, cabbage, onions, cauliflower, baked beans, peas and corn.   Fruits:   Strained fruit juice.   Canned fruit, except pineapple.   Ripe bananas and melon. Fruits:   Prunes and prune juice.   Raw fruits.   Dried fruits: figs, dates and raisins.   Milk/Dairy:   Milk: plain or flavored.   Yogurt, custard and ice cream.   Cheese and cottage cheese Milk/Dairy:     Meat and other proteins:   ground, well-cooked tender beef, lamb, ham, veal, pork, fish, poultry and organ meats.   Eggs.   Peanut butter without nuts. Meat and other proteins:   Tough, fibrous meats with gristle.   Dry beans, peas and lentils.   Peanut butter with nuts.   Tofu.   Fats, Snack, Sweets, Condiments and Beverages:   Margarine, butter, oils, mayonnaise, sour cream and salad dressing, plain gravy.   Sugar, hard candy, clear jelly, honey and syrup.   Spices, cooked herbs, bouillon, broth and soups made with allowed vegetable, ketchup and mustard.   Coffee, tea and carbonated drinks.   Plain cakes, cookies and pretzels.   Gelatin, plain puddings, custard, ice cream, sherbet and popsicles. Fats, Snack, Sweets, Condiments and Beverages:   Nuts, seeds and coconut.   Jam, marmalade and preserves.   Pickles, olives, relish and horseradish.   All desserts containing nuts, seeds, dried fruit and coconut; or made from whole grains or bran.   Candy made with nuts or seeds.   Popcorn.       DIRECTIONS TO THE ENDOSCOPY DEPARTMENT    [place] [facility]  Take [address], exit on [address]. Get into the left  hand melanie, turn left (east), go one-half mile to [address] and turn left. Go north to the first stoplight, take a right on [address] and follow it to the Emergency entrance.    From the south ([facility])  Take [address]N to the [address]E split and exit on [address] . On [address] , turn left (west) to [address]. Turn right (north) on [address]. Go north to the first stoplight, take a right on [address] and follow it to the Emergency entrance.    From the east via 35E ([facility])  Take [address] south to [address]  exit. Turn right on [address]. Go west to [address]. Turn right (north) on [address]. Go to the first stoplight, take a right and follow on [address] to the Emergency entrance.    From the east via [address] ([facility])  Take [address] to [address]. Turn left (south) on [address] to [address]. Turn left (east) on [address] and follow it to the Emergency entrance.    From the west via [address] ([place], [place])  Take [address] east to [address]. Turn right (south) on [address] to [address]. Turn left (east) on [address] and follow it to the Emergency entrance.

## 2019-07-25 NOTE — LETTER
July 15, 2019      Laure Wood  [address] [address] [address]        Dear Lauer,     Thank you for choosing [facility]. You are scheduled for the following service(s).   Please be aware that coverage of these services is subject to the terms and limitations of your health insurance plan.  Call member services at your health plan with any benefit or coverage questions.  Date:  7/25/2018 Thursday       Procedure: COLONOSCOPY  Doctor:   Dr. Jono Egan         Arrival Time:  12:00 PM   *check in at Emergency/Endoscopy desk*  Procedure Time:  12:30 PM    Location:   [facility]        Endoscopy Department, First Floor (Enter through ER Doors) *         [address] [place], [place] [address] [phone] or [phone] ([facility]) to reschedule        NuLYTELY  PREP  Colonoscopy is the most accurate test to detect colon polyps and colon cancer; and the only test where polyps can be removed. During this procedure, a doctor examines the lining of your large intestine and rectum through a flexible tube.         Transportation  You must arrange for a ride for the day of your procedure with a responsible adult. A taxi , Uber, etc, is not an option unless you are accompanied by a responsible adult. If you fail to arrange transportation with a responsible adult, your procedure will be cancelled and rescheduled.    Fill your enclosed prescription for NuLYTELY  at your local pharmacy. Please call our office at [phone] if you did not receive a prescription.    COLONOSCOPY PRE-PROCEDURE CHECKLIST  If you have diabetes, ask your regular doctor for diet and medication restrictions.  If you take an anticoagulant or anti-platelet medication (such as Coumadin , Lovenox , Pradaxa , Xarelto , Eliquis , etc.), please call your primary doctor for advice on holding this medication.  If you take aspirin you may continue to do so.  If you are or may be  pregnant, please discuss the risks and benefits of this procedure with your doctor.    PREPARATION FOR COLONOSCOPY    7 days before:    Discontinue fiber supplements and medications containing iron. This includes Metamucil  and Fibercon ; and multivitamins with iron.  3 days before:    Begin a low-fiber diet. A low-fiber diet helps making the cleanout more effective. For additional details on low-fiber diet, please refer to the table on the last page.  2 days before:    Continue the low-fiber diet.     Drink at least 8 glasses of water throughout the day.     Stop eating solid foods at 11:45 pm.  1. 1 day before:    In the morning: begin a clear liquid diet (liquids you can see through).     Examples of a clear liquid diet include: water, clear broth or bouillon, Gatorade, Pedialyte or Powerade, carbonated and non-carbonated soft drinks (Sprite , 7-Up , ginger ale), strained fruit juices without pulp (apple, white grape, white cranberry), Jell-O  and popsicles.     The following are not allowed on a clear liquid diet: red liquids, alcoholic beverages,  dairy products (milk, creamer, and yogurt), protein shakes,  juice with pulp and chewing tobacco.    At 4pm: drink 1 (one) 8 oz glass of NuLYTELY  solution every 15 minutes (approximately 8 glasses of 8 oz or half the bottle). Keep the solution refrigerated. Do not drink any other liquids while you are drinking the NuLYTELY  solution.    Over the course of the evening, drink an additional   liter of clear liquids and continue clear liquid diet.    Day of procedure:    6 hours before the procedure: drink 1 (one) 8 oz glass of NuLYTELY  solution every 15 minutes until the last half of the bottle (approximately 8 glasses of 8 oz) is gone. Keep the solution refrigerated. Do not drink any other liquids while you are drinking the NuLYTELY  solution. After that, you may continue the clear liquid diet until two hours before the procedure.      You may take all of your morning  medications including blood pressure medications, blood thinners (if you have not been instructed to stop these by our office), methadone, and anti-seizure medications with sips of water 2 hours prior to your procedure or earlier. Do not take insulin or vitamins prior to your procedure.       3 hours prior:   o STOP consuming all liquids   o Do not take anything by mouth during this time.   o Allow extra time to travel to your procedure as you may need to stop and use a restroom along the way.  You are ready for the procedure, if you followed all instructions and your stool is no longer formed, but clear or yellow liquid. If you are unsure whether your colon is clean, please call our office at [phone] before you leave for your appointment.    COLON CLEANSING TIPS: drink adequate amounts of fluids before and after your colon cleansing to prevent dehydration. Stay near a toilet because you will have diarrhea. Even if you are sitting on the toilet, continue to drink the cleansing solution every 15 minutes. If you feel nauseous or vomit, rinse your mouth with water, take a 15 to 30-minute-break and then continue drinking the solution. You will be uncomfortable until the stool has flushed from your colon (in about 2 to 4 hours). You may feel chilled.    Bring the following to your procedure:  - Insurance Card/Photo ID.   - List of current medications including over-the-counter medications and supplements.   - Your rescue inhaler if you currently use one to control asthma.    Canceling or rescheduling your appointment:   If you must cancel or reschedule your appointment, please call [phone] as soon as possible.      What happens during a colonoscopy?    Plan to spend up to two hours, starting at registration time, at the endoscopy center the day of your procedure. The colonoscopy takes an average of 15 to 30 minutes. Recovery time is about 30 minutes.    Before the exam:    You will change into a gown.    Your  medical history and medication list will be reviewed with you, unless that has been done over the phone prior to the procedure.     A nurse will insert an intravenous (IV) line into your hand or arm.    The doctor will meet with you and will give you a consent form to sign.  1.   2. During the exam:     Medicine will be given through the IV line to help you relax.     Your heart rate and oxygen levels will be monitored. If your blood pressure is low, you may be given fluids through the IV line.     The doctor will insert a flexible hollow tube, called a colonoscope, into your rectum. The scope will be advanced slowly through the large intestine (colon).    You may have a feeling of fullness or pressure.     If an abnormal tissue or a polyp is found, the doctor may remove it through the endoscope for closer examination, or biopsy. Tissue removal is painless.    After the exam:           Any tissue samples removed during the exam will be sent to a lab for evaluation. It may take 5-7 working days for you to be notified of the results.     A nurse will provide you with complete discharge instructions before you leave the endoscopy center. Be sure to ask the nurse for specific instructions if you take blood thinners such as Aspirin, Coumadin or Plavix.     The doctor will prepare a full report for you and for the physician who referred you for the procedure.     Your doctor will talk with you about the initial results of your exam.      Medication given during the exam will prohibit you from driving for the rest of the day.     Following the exam, you may resume your normal diet. Your first meal should be light, no greasy foods. Avoid alcohol until the next day.     You may resume your regular activities the day after the procedure.     LOW-FIBER DIET  Foods RECOMMENDED Foods to AVOID   Breads, Cereal, Rice and Pasta:   White bread, rolls, biscuits, croissant and jayla toast.   Waffles, Yi toast and pancakes.   White  rice, noodles, pasta, macaroni and peeled cooked potatoes.   Plain crackers and saltines.   Cooked cereals: farina, cream of rice.   Cold cereals: Puffed Rice , Rice Krispies , Corn Flakes  and Special K    Breads, Cereal, Rice and Pasta:   Breads or rolls with nuts, seeds or fruit.   Whole wheat, pumpernickel, rye breads and cornbread.   Potatoes with skin, brown or wild rice, and kasha (buckwheat).     Vegetables:   Tender cooked and canned vegetables without seeds: carrots, asparagus tips, green or wax beans, pumpkin, spinach, lima beans. Vegetables:   Raw or steamed vegetables.   Vegetables with seeds.   Sauerkraut.   Winter squash, peas, broccoli, Brussel sprouts, cabbage, onions, cauliflower, baked beans, peas and corn.   Fruits:   Strained fruit juice.   Canned fruit, except pineapple.   Ripe bananas and melon. Fruits:   Prunes and prune juice.   Raw fruits.   Dried fruits: figs, dates and raisins.   Milk/Dairy:   Milk: plain or flavored.   Yogurt, custard and ice cream.   Cheese and cottage cheese Milk/Dairy:     Meat and other proteins:   ground, well-cooked tender beef, lamb, ham, veal, pork, fish, poultry and organ meats.   Eggs.   Peanut butter without nuts. Meat and other proteins:   Tough, fibrous meats with gristle.   Dry beans, peas and lentils.   Peanut butter with nuts.   Tofu.   Fats, Snack, Sweets, Condiments and Beverages:   Margarine, butter, oils, mayonnaise, sour cream and salad dressing, plain gravy.   Sugar, hard candy, clear jelly, honey and syrup.   Spices, cooked herbs, bouillon, broth and soups made with allowed vegetable, ketchup and mustard.   Coffee, tea and carbonated drinks.   Plain cakes, cookies and pretzels.   Gelatin, plain puddings, custard, ice cream, sherbet and popsicles. Fats, Snack, Sweets, Condiments and Beverages:   Nuts, seeds and coconut.   Jam, marmalade and preserves.   Pickles, olives, relish and horseradish.   All desserts containing nuts, seeds, dried fruit and  coconut; or made from whole grains or bran.   Candy made with nuts or seeds.   Popcorn.       DIRECTIONS TO THE ENDOSCOPY DEPARTMENT    From the north ([place], [facility])  Take [address] South, exit on [address] . Get into the left hand melanie, turn left (east), go one-half mile to [address] and turn left. Go north to the first stoplight, take a right on [address] and follow it to the Emergency entrance.    From the south ([facility])  Take 35N to the 35E split and exit on [address] . On [address] , turn left (west) to [address]. Turn right (north) on [address]. Go north to the first stoplight, take a right on [address] and follow it to the Emergency entrance.    From the east via 35E ([facility])  Take [address]E south to [address]  exit. Turn right on [address]. Go west to [address]. Turn right (north) on [address]. Go to the first stoplight, take a right and follow on [address] to the Emergency entrance.    From the east via [address] ([facility])  Take [address] West to [address]. Turn left (south) on [address] to [address]. Turn left (east) on [address] and follow it to the Emergency entrance.    From the west via [address] ([place], [place])  Take [address] east to [address]. Turn right (south) on [address] to [address]. Turn left (east) on [address] and follow it to the Emergency entrance.

## 2019-07-29 PROBLEM — M25.561 BILATERAL KNEE PAIN: Status: RESOLVED | Noted: 2019-06-20 | Resolved: 2019-07-29

## 2019-07-29 PROBLEM — M25.562 BILATERAL KNEE PAIN: Status: RESOLVED | Noted: 2019-06-20 | Resolved: 2019-07-29

## 2019-07-29 NOTE — PROGRESS NOTES
DISCHARGE REPORT    Updated as of July 29, 2019.    Discharge report is from initial evaluation on Jun 20, 2019.       SUBJECTIVE  Subjective changes noted by patient: Patient has not returned since initial evaluation.   Changes in function:  Patient has not returned to clinic to assess.   Adverse reaction to treatment or activity: Patient has not returned to clinic to assess.     OBJECTIVE  Changes noted in objective findings:  Patient has failed to return to therapy so current objective findings are unknown.  Objective: See initial evaluation note.      ASSESSMENT/PLAN  STG/LTGs have been met or progress has been made towards goals:  Goal status unknown  Assessment of Progress: Patient has not returned to therapy.  Current status is unknown and discharge G code cannot be reported.  Shari continues to require the following intervention to meet STG and LTG's:  Unknown, patient has not returned to therapy.     Recommendations:  No recommendations can accurately be made. Patient has failed to return to therapy.     Please refer to the daily flowsheet for treatment today, total treatment time and time spent performing 1:1 timed codes.

## 2019-10-09 ENCOUNTER — TRANSFERRED RECORDS (OUTPATIENT)
Dept: HEALTH INFORMATION MANAGEMENT | Facility: CLINIC | Age: 69
End: 2019-10-09

## 2019-10-18 DIAGNOSIS — M17.11 PRIMARY OSTEOARTHRITIS OF RIGHT KNEE: ICD-10-CM

## 2019-10-18 DIAGNOSIS — M17.12 PRIMARY OSTEOARTHRITIS OF LEFT KNEE: ICD-10-CM

## 2019-10-21 ENCOUNTER — OFFICE VISIT (OUTPATIENT)
Dept: INTERNAL MEDICINE | Facility: CLINIC | Age: 69
End: 2019-10-21
Payer: COMMERCIAL

## 2019-10-21 VITALS
HEART RATE: 72 BPM | OXYGEN SATURATION: 100 % | SYSTOLIC BLOOD PRESSURE: 126 MMHG | TEMPERATURE: 98.1 F | HEIGHT: 64 IN | DIASTOLIC BLOOD PRESSURE: 74 MMHG | BODY MASS INDEX: 33.63 KG/M2 | WEIGHT: 197 LBS

## 2019-10-21 DIAGNOSIS — M79.672 PAIN IN BOTH FEET: ICD-10-CM

## 2019-10-21 DIAGNOSIS — M79.671 PAIN IN BOTH FEET: ICD-10-CM

## 2019-10-21 DIAGNOSIS — H81.10 BPV (BENIGN POSITIONAL VERTIGO), UNSPECIFIED LATERALITY: Primary | ICD-10-CM

## 2019-10-21 PROCEDURE — 99214 OFFICE O/P EST MOD 30 MIN: CPT | Mod: 25 | Performed by: INTERNAL MEDICINE

## 2019-10-21 PROCEDURE — G0008 ADMIN INFLUENZA VIRUS VAC: HCPCS | Performed by: INTERNAL MEDICINE

## 2019-10-21 PROCEDURE — 90662 IIV NO PRSV INCREASED AG IM: CPT | Performed by: INTERNAL MEDICINE

## 2019-10-21 RX ORDER — MELOXICAM 15 MG/1
15 TABLET ORAL DAILY
Qty: 30 TABLET | Refills: 1 | Status: SHIPPED | OUTPATIENT
Start: 2019-10-21 | End: 2019-12-10

## 2019-10-21 ASSESSMENT — MIFFLIN-ST. JEOR: SCORE: 1403.59

## 2019-10-21 NOTE — PROGRESS NOTES
"Subjective     Laure Wood is a 69 year old female who presents to clinic today for the following health issues:    HPI   Vertigo, when tilting head all the way back w/neck extension      Duration: 2+ weeks    Description (location/character/radiation): notice field of vision shrinks when looking up    Intensity:  moderate    Accompanying signs and symptoms: none    History (similar episodes/previous evaluation): None    Precipitating or alleviating factors: None    Therapies tried and outcome: None     She notes no speech abnormality,  weakness of any extremities or sensory abnormalities.          Reviewed and updated as needed this visit by Provider         Review of Systems   ROS COMP: Constitutional, HEENT, cardiovascular, pulmonary, gi and gu systems are negative, except as otherwise noted.      Objective    /74   Pulse (!) 43   Temp 98.1  F (36.7  C) (Temporal)   Ht 1.626 m (5' 4\")   Wt 89.4 kg (197 lb)   SpO2 100%   BMI 33.81 kg/m    Body mass index is 33.81 kg/m .  Physical Exam   GENERAL APPEARANCE: healthy, alert and no distress  EYES: Eyes grossly normal to inspection, PERRL and conjunctivae and sclerae normal  HENT: ear canals and TM's normal, nose and mouth without ulcers or lesions and normal cephalic/atraumatic  NECK: no adenopathy, no asymmetry, masses, or scars and thyroid normal to palpation  RESP: lungs clear to auscultation - no rales, rhonchi or wheezes  CV: regular rates and rhythm, normal S1 S2, no S3 or S4 and no murmur, click or rub  NEURO: Normal strength and tone, mentation intact, speech normal and Raman-Hallpike maneuver is negative bilaterally  MSK: Tender along the midfoot fourth and fifth metatarsals bilaterally    none         Assessment & Plan     1. BPV (benign positional vertigo), unspecified laterality  By history seems consistent with BPV.  Epley maneuver exercises given for the patient to do at home if she has any recurrence in her symptoms.      2. Pain in both " "feet  Has a history of significant arthritis in both knees.  She could be experiencing osteoarthritis in her feet as well.  She can use her meloxicam but continues see podiatry  - meloxicam (MOBIC) 15 MG tablet; Take 1 tablet (15 mg) by mouth daily  Dispense: 30 tablet; Refill: 1     BMI:   Estimated body mass index is 33.81 kg/m  as calculated from the following:    Height as of this encounter: 1.626 m (5' 4\").    Weight as of this encounter: 89.4 kg (197 lb).   Weight management plan: Discussed healthy diet and exercise guidelines        See Patient Instructions    No follow-ups on file.    Yoshi Bowen MD  [facility]      "

## 2019-10-21 NOTE — TELEPHONE ENCOUNTER
Called patient and verified that she did request refill of Meloxicam medication.   Patient states that she did call in the medication refill, but did not receive any medication over the weekend.   She states that she has no remaining medication, and has bought Aleve in the interim.     Please advise on refill of medication.     Breann Messer, ATC

## 2019-10-21 NOTE — TELEPHONE ENCOUNTER
Medication was filled today 10/21/19, 30 tablets, with 1 refill. Please cancel request. Eunice Jasso on 10/21/2019 at 1:34 PM

## 2019-10-21 NOTE — NURSING NOTE
"Chief Complaint   Patient presents with     Eye Problem     vision changes when looking up     /74   Pulse (!) 43   Temp 98.1  F (36.7  C) (Temporal)   Ht 1.626 m (5' 4\")   Wt 89.4 kg (197 lb)   SpO2 100%   BMI 33.81 kg/m   Estimated body mass index is 33.81 kg/m  as calculated from the following:    Height as of this encounter: 1.626 m (5' 4\").    Weight as of this encounter: 89.4 kg (197 lb).        Health Maintenance due pending provider review:  NONE    n/a    Hallie Weller CMA  "

## 2019-10-22 RX ORDER — MELOXICAM 15 MG/1
TABLET ORAL
Qty: 90 TABLET | Refills: 1 | OUTPATIENT
Start: 2019-10-22

## 2019-10-25 RX ORDER — MELOXICAM 15 MG/1
TABLET ORAL
Qty: 30 TABLET | Refills: 0 | Status: SHIPPED | OUTPATIENT
Start: 2019-10-25 | End: 2020-02-26

## 2019-10-25 NOTE — TELEPHONE ENCOUNTER
Short-term refill provided in Dr. Yeo's absence. Disp #30 although the request is for 90 tabs.     Hx of HTN - fairly well controlled BP  > 66 yo  GFR mildly low in 5/2019    Dr. Yeo can extend the prescription if he deems appropriate.    Woodrow Talley DO, CAM  [facility]

## 2019-10-30 ENCOUNTER — ANCILLARY PROCEDURE (OUTPATIENT)
Dept: GENERAL RADIOLOGY | Facility: CLINIC | Age: 69
End: 2019-10-30
Attending: FAMILY MEDICINE
Payer: COMMERCIAL

## 2019-10-30 ENCOUNTER — OFFICE VISIT (OUTPATIENT)
Dept: ORTHOPEDICS | Facility: CLINIC | Age: 69
End: 2019-10-30
Payer: COMMERCIAL

## 2019-10-30 VITALS
BODY MASS INDEX: 33.63 KG/M2 | HEIGHT: 64 IN | SYSTOLIC BLOOD PRESSURE: 122 MMHG | DIASTOLIC BLOOD PRESSURE: 74 MMHG | WEIGHT: 197 LBS

## 2019-10-30 DIAGNOSIS — M79.671 FOOT PAIN, BILATERAL: ICD-10-CM

## 2019-10-30 DIAGNOSIS — M79.672 FOOT PAIN, BILATERAL: Primary | ICD-10-CM

## 2019-10-30 DIAGNOSIS — M76.821 POSTERIOR TIBIAL TENDINITIS OF RIGHT LOWER EXTREMITY: ICD-10-CM

## 2019-10-30 DIAGNOSIS — M19.071 PRIMARY OSTEOARTHRITIS OF BOTH FEET: ICD-10-CM

## 2019-10-30 DIAGNOSIS — M76.71 PERONEAL TENDINITIS OF RIGHT LOWER EXTREMITY: ICD-10-CM

## 2019-10-30 DIAGNOSIS — M79.672 FOOT PAIN, BILATERAL: ICD-10-CM

## 2019-10-30 DIAGNOSIS — M79.671 FOOT PAIN, BILATERAL: Primary | ICD-10-CM

## 2019-10-30 DIAGNOSIS — M19.072 PRIMARY OSTEOARTHRITIS OF BOTH FEET: ICD-10-CM

## 2019-10-30 PROCEDURE — 73630 X-RAY EXAM OF FOOT: CPT | Mod: LT | Performed by: FAMILY MEDICINE

## 2019-10-30 PROCEDURE — 99214 OFFICE O/P EST MOD 30 MIN: CPT | Performed by: FAMILY MEDICINE

## 2019-10-30 ASSESSMENT — MIFFLIN-ST. JEOR: SCORE: 1403.59

## 2019-10-30 NOTE — LETTER
10/30/2019         RE: Laure Wood  [address] [address]  [place] [address]        Dear Colleague,    Thank you for referring your patient, Laure Wood, to the [facility]. Please see a copy of my visit note below.    ASSESSMENT & PLAN  Patient Instructions     1. Foot pain, bilateral    2. Primary osteoarthritis of both feet    3. Peroneal tendinitis of right lower extremity    4. Posterior tibial tendinitis of right lower extremity      -Patient has bilateral foot pain and swelling due to aggravation of arthritis, and tendinitis.  -Patient will call her insurance company to see if a cam walker boot is covered.  If the boot is affordable, patient may return to the clinic to pick it up.  If she does not have coverage, patient is instructed to call us for some cheaper online sources  -Patient will start Voltaren gel to be applied to the bilateral feet to 3 times a day as needed  -Patient was offered physical therapy but is not covered by insurance and so we will avoid that at this time  -Patient also has bilateral knee pain and swelling due to arthritis and will schedule an appointment in the near future for repeat cortisone injection.  -Call direct clinic number [[phone]] at any time with questions or concerns.    Albert Yeo MD [facility]  [facility] [place]          -----    SUBJECTIVE  Laure Wood is a/an 69 year old female who is seen as a self referral for evaluation of bilateral foot pain. The patient is seen by themselves.    Onset: 2 week(s) ago. Patient states that she went to [place] and prior to her trip she had a pedicure with a fake nail applied.She is unsure if she has an infection in her foot, or if her shoes were too tight causing problems. Patient has since removed the applied nail.  Location of Pain: bilateral arch pain, and pain across the top of the metatarsals.   Rating of Pain at worst: 10/10  Rating  of Pain Currently: 8/10  Worsened by: walking, weightbearing.  Better with: wearing her sneakers   Treatments tried: Icy Hot topical  Associated symptoms: swelling  Orthopedic history: NO  Relevant surgical history: NO  Social history: social history: retired    Past Medical History:   Diagnosis Date     Hyperlipidemia LDL goal <160      Hypertension, essential, benign      Knee osteoarthritis      Social History     Socioeconomic History     Marital status:      Spouse name: Not on file     Number of children: 6     Years of education: Not on file     Highest education level: Not on file   Occupational History     Occupation: dietary     Employer: [employer]   Social Needs     Financial resource strain: Not on file     Food insecurity:     Worry: Not on file     Inability: Not on file     Transportation needs:     Medical: Not on file     Non-medical: Not on file   Tobacco Use     Smoking status: Never Smoker     Smokeless tobacco: Never Used   Substance and Sexual Activity     Alcohol use: No     Drug use: No     Sexual activity: Never   Lifestyle     Physical activity:     Days per week: Not on file     Minutes per session: Not on file     Stress: Not on file   Relationships     Social connections:     Talks on phone: Not on file     Gets together: Not on file     Attends Tenriism service: Not on file     Active member of club or organization: Not on file     Attends meetings of clubs or organizations: Not on file     Relationship status: Not on file     Intimate partner violence:     Fear of current or ex partner: Not on file     Emotionally abused: Not on file     Physically abused: Not on file     Forced sexual activity: Not on file   Other Topics Concern     Parent/sibling w/ CABG, MI or angioplasty before 65F 55M? Not Asked   Social History Narrative     Not on file         Patient's past medical, surgical, social, and family histories were reviewed today and no changes are  "noted.    REVIEW OF SYSTEMS:  10 point ROS is negative other than symptoms noted above in HPI, Past Medical History or as stated below  Constitutional: NEGATIVE for fever, chills, change in weight  Skin: NEGATIVE for worrisome rashes, moles or lesions  GI/: NEGATIVE for bowel or bladder changes  Neuro: NEGATIVE for weakness, dizziness or paresthesias    OBJECTIVE:  /74 (BP Location: Right arm, Patient Position: Chair, Cuff Size: Adult Large)   Ht 1.626 m (5' 4\")   Wt 89.4 kg (197 lb)   BMI 33.81 kg/m      General: healthy, alert and in no distress  HEENT: no scleral icterus or conjunctival erythema  Skin: no suspicious lesions or rash. No jaundice.  CV:  no pedal edema  Resp: normal respiratory effort without conversational dyspnea   Psych: normal mood and affect  Gait: normal steady gait with appropriate coordination and balance  Neuro: Normal light sensory exam of lower extremity  MSK:  BILATERAL FOOT  Inspection:    no swelling or ecchymosis  Palpation:    Tender about the navicular, talus, peroneal tendon at lateral malleolus, posterior tibial tendon at medial malleolus and posterior tibial tendon at navicular. Otherwise remainder of bony/ligamentous landmarks are non-tender.  Range of Motion:     Grossly intact and non-painful  Strength:    Grossly intact in all planes  Special Tests:    Positive: none    Negative: anterior drawer, heel strike and calcaneal squeeze        Independent visualization of the below image:  Recent Results (from the past 24 hour(s))   XR Foot Bilateral G/E 3 Views    Narrative    Xrays of right foot show joint space narrowing and osteophytes at the   talonavicular and calcaneocuboid joints.  Mild calcifications of insertion   of achilles and origin of plantar fascia.  Collapse of arch bilaterally.    Left foot xrays show mild 1st MTP and talonavicular degenerative changes.         Albert Yeo MD [facility]        Again, thank you for allowing " me to participate in the care of your patient.        Sincerely,        Albert Yeo, MD

## 2019-10-30 NOTE — PATIENT INSTRUCTIONS
1. Foot pain, bilateral    2. Primary osteoarthritis of both feet    3. Peroneal tendinitis of right lower extremity    4. Posterior tibial tendinitis of right lower extremity      -Patient has bilateral foot pain and swelling due to aggravation of arthritis, and tendinitis.  -Patient will call her insurance company to see if a cam walker boot is covered.  If the boot is affordable, patient may return to the clinic to pick it up.  If she does not have coverage, patient is instructed to call us for some cheaper online sources  -Patient will start Voltaren gel to be applied to the bilateral feet to 3 times a day as needed  -Patient was offered physical therapy but is not covered by insurance and so we will avoid that at this time  -Patient also has bilateral knee pain and swelling due to arthritis and will schedule an appointment in the near future for repeat cortisone injection.  -Call direct clinic number [[phone]] at any time with questions or concerns.    Albert Yeo MD [facility]  [facility] [place]

## 2019-10-30 NOTE — PROGRESS NOTES
ASSESSMENT & PLAN  Patient Instructions     1. Foot pain, bilateral    2. Primary osteoarthritis of both feet    3. Peroneal tendinitis of right lower extremity    4. Posterior tibial tendinitis of right lower extremity      -Patient has bilateral foot pain and swelling due to aggravation of arthritis, and tendinitis.  -Patient will call her insurance company to see if a cam walker boot is covered.  If the boot is affordable, patient may return to the clinic to pick it up.  If she does not have coverage, patient is instructed to call us for some cheaper online sources  -Patient will start Voltaren gel to be applied to the bilateral feet to 3 times a day as needed  -Patient was offered physical therapy but is not covered by insurance and so we will avoid that at this time  -Patient also has bilateral knee pain and swelling due to arthritis and will schedule an appointment in the near future for repeat cortisone injection.  -Call direct clinic number [[phone]] at any time with questions or concerns.    Albert Yeo MD [facility] Orthopedics and Sports Medicine  [facility]          -----    SUBJECTIVE  Laure Wood is a/an 69 year old female who is seen as a self referral for evaluation of bilateral foot pain. The patient is seen by themselves.    Onset: 2 week(s) ago. Patient states that she went to [place] and prior to her trip she had a pedicure with a fake nail applied.She is unsure if she has an infection in her foot, or if her shoes were too tight causing problems. Patient has since removed the applied nail.  Location of Pain: bilateral arch pain, and pain across the top of the metatarsals.   Rating of Pain at worst: 10/10  Rating of Pain Currently: 8/10  Worsened by: walking, weightbearing.  Better with: wearing her sneakers   Treatments tried: Icy Hot topical  Associated symptoms: swelling  Orthopedic history: NO  Relevant surgical history: NO  Social history: social history:  retired    Past Medical History:   Diagnosis Date     Hyperlipidemia LDL goal <160      Hypertension, essential, benign      Knee osteoarthritis      Social History     Socioeconomic History     Marital status:      Spouse name: Not on file     Number of children: 6     Years of education: Not on file     Highest education level: Not on file   Occupational History     Occupation: dietary     Employer: [employer]   Social Needs     Financial resource strain: Not on file     Food insecurity:     Worry: Not on file     Inability: Not on file     Transportation needs:     Medical: Not on file     Non-medical: Not on file   Tobacco Use     Smoking status: Never Smoker     Smokeless tobacco: Never Used   Substance and Sexual Activity     Alcohol use: No     Drug use: No     Sexual activity: Never   Lifestyle     Physical activity:     Days per week: Not on file     Minutes per session: Not on file     Stress: Not on file   Relationships     Social connections:     Talks on phone: Not on file     Gets together: Not on file     Attends Denominational service: Not on file     Active member of club or organization: Not on file     Attends meetings of clubs or organizations: Not on file     Relationship status: Not on file     Intimate partner violence:     Fear of current or ex partner: Not on file     Emotionally abused: Not on file     Physically abused: Not on file     Forced sexual activity: Not on file   Other Topics Concern     Parent/sibling w/ CABG, MI or angioplasty before 65F 55M? Not Asked   Social History Narrative     Not on file         Patient's past medical, surgical, social, and family histories were reviewed today and no changes are noted.    REVIEW OF SYSTEMS:  10 point ROS is negative other than symptoms noted above in HPI, Past Medical History or as stated below  Constitutional: NEGATIVE for fever, chills, change in weight  Skin: NEGATIVE for worrisome rashes, moles or  "lesions  GI/: NEGATIVE for bowel or bladder changes  Neuro: NEGATIVE for weakness, dizziness or paresthesias    OBJECTIVE:  /74 (BP Location: Right arm, Patient Position: Chair, Cuff Size: Adult Large)   Ht 1.626 m (5' 4\")   Wt 89.4 kg (197 lb)   BMI 33.81 kg/m     General: healthy, alert and in no distress  HEENT: no scleral icterus or conjunctival erythema  Skin: no suspicious lesions or rash. No jaundice.  CV:  no pedal edema  Resp: normal respiratory effort without conversational dyspnea   Psych: normal mood and affect  Gait: normal steady gait with appropriate coordination and balance  Neuro: Normal light sensory exam of lower extremity  MSK:  BILATERAL FOOT  Inspection:    no swelling or ecchymosis  Palpation:    Tender about the navicular, talus, peroneal tendon at lateral malleolus, posterior tibial tendon at medial malleolus and posterior tibial tendon at navicular. Otherwise remainder of bony/ligamentous landmarks are non-tender.  Range of Motion:     Grossly intact and non-painful  Strength:    Grossly intact in all planes  Special Tests:    Positive: none    Negative: anterior drawer, heel strike and calcaneal squeeze        Independent visualization of the below image:  Recent Results (from the past 24 hour(s))   XR Foot Bilateral G/E 3 Views    Narrative    Xrays of right foot show joint space narrowing and osteophytes at the   talonavicular and calcaneocuboid joints.  Mild calcifications of insertion   of achilles and origin of plantar fascia.  Collapse of arch bilaterally.    Left foot xrays show mild 1st MTP and talonavicular degenerative changes.         Albert Yeo MD [facility]      "

## 2019-10-31 ENCOUNTER — TELEPHONE (OUTPATIENT)
Dept: ORTHOPEDICS | Facility: CLINIC | Age: 69
End: 2019-10-31

## 2019-10-31 NOTE — TELEPHONE ENCOUNTER
Prior Authorization Specialty Medication Request    Medication/Dose: Diclofenac soduim 1% Gel  Previously Tried and Failed:  Icy hot topical    Important Lab Values: NA  Rationale: localized application    Insurance Name: medica  Insurance ID: [id number]  Insurance Phone Number: [phone]     Pharmacy Information (if different than what is on RX)  Name:  [id number]   Phone:  [phone]

## 2019-11-01 NOTE — TELEPHONE ENCOUNTER
Patient calls stating Dr. Yeo prescribed a gel for at her appointment 10/30/19. She called Medicare and was told it was covered. She states the pharmacy states it was denied. Informed that our information is that her insurance requires a prior authorization and we are in the process of sending insurance more information. It can take up to 2 weeks to hear back from insurance. Will contact her once we know more. Also informed it is not a guarantee that it will be covered. She verbalized understanding.     She can be reached at: [phone]  Ok to leave message: Yes    TONYA Ceron RN

## 2019-11-04 NOTE — TELEPHONE ENCOUNTER
Central Prior Authorization Team   Phone: [phone]      PA Initiation    Medication: Diclofenac soduim 1% Gel  Insurance Company: [employer] - Phone [phone] Fax [phone]  Pharmacy Filling the Rx: [id number] [place], [place] - [address]  Filling Pharmacy Phone: [phone]  Filling Pharmacy Fax:    Start Date: 11/4/2019    Manually faxed in PA request.

## 2019-11-04 NOTE — TELEPHONE ENCOUNTER
Prior Authorization Approval    Authorization Effective Date: 8/6/2019  Authorization Expiration Date: 11/3/2020  Medication: Diclofenac soduim 1% Gel  Approved Dose/Quantity:    Reference #:     Insurance Company: [employer] - Phone [phone] Fax [phone]  Expected CoPay:       CoPay Card Available:      Foundation Assistance Needed:    Which Pharmacy is filling the prescription (Not needed for infusion/clinic administered): [id number] [address]  Pharmacy Notified: Yes  Patient Notified: Yes **Instructed pharmacy to notify patient when script is ready to /ship.**

## 2019-11-11 ENCOUNTER — OFFICE VISIT (OUTPATIENT)
Dept: ORTHOPEDICS | Facility: CLINIC | Age: 69
End: 2019-11-11
Payer: COMMERCIAL

## 2019-11-11 DIAGNOSIS — M17.12 PRIMARY OSTEOARTHRITIS OF LEFT KNEE: Primary | ICD-10-CM

## 2019-11-11 DIAGNOSIS — M17.11 PRIMARY OSTEOARTHRITIS OF RIGHT KNEE: ICD-10-CM

## 2019-11-11 PROCEDURE — 20611 DRAIN/INJ JOINT/BURSA W/US: CPT | Mod: 50 | Performed by: FAMILY MEDICINE

## 2019-11-11 RX ORDER — METHYLPREDNISOLONE ACETATE 40 MG/ML
40 INJECTION, SUSPENSION INTRA-ARTICULAR; INTRALESIONAL; INTRAMUSCULAR; SOFT TISSUE
Status: DISCONTINUED | OUTPATIENT
Start: 2019-11-11 | End: 2020-02-26

## 2019-11-11 RX ADMIN — METHYLPREDNISOLONE ACETATE 40 MG: 40 INJECTION, SUSPENSION INTRA-ARTICULAR; INTRALESIONAL; INTRAMUSCULAR; SOFT TISSUE at 10:50

## 2019-11-11 NOTE — PROGRESS NOTES
ASSESSMENT & PLAN  Patient Instructions     1. Primary osteoarthritis of left knee    2. Primary osteoarthritis of right knee      -Patient tolerated bilateral knee pain due to aggravation of arthritis  -Patient tolerated bilateral cortisone injections today without complications  -Patient will follow-up in a few months before her trip for repeat cortisone injection.  -Call direct clinic number [[phone]] at any time with questions or concerns.    Albert Yeo MD [facility] Orthopedics and Sports Medicine  [facility]          -----    SUBJECTIVE:  Laure Wood is a 69 year old female who is seen for bilat knee IA CSI. Previously had cortisone injections on 6/14/19.    Large Joint Injection/Arthocentesis: bilateral knee  Date/Time: 11/11/2019 10:50 AM  Performed by: Yeo, Albert, MD  Authorized by: Yeo, Albert, MD     Indications:  Pain and osteoarthritis  Needle Size:  25 G  Guidance: ultrasound    Approach:  Superolateral  Location:  Knee  Laterality:  Bilateral      Medications (Right):  40 mg methylPREDNISolone 40 MG/ML  Medications (Left):  40 mg methylPREDNISolone 40 MG/ML  Outcome:  Tolerated well, no immediate complications  Procedure discussed: discussed risks, benefits, and alternatives    Consent Given by:  Patient  Timeout: timeout called immediately prior to procedure    Prep: patient was prepped and draped in usual sterile fashion              Albert Yeo MD, [facility]

## 2019-11-11 NOTE — PATIENT INSTRUCTIONS
1. Primary osteoarthritis of left knee    2. Primary osteoarthritis of right knee      -Patient tolerated bilateral knee pain due to aggravation of arthritis  -Patient tolerated bilateral cortisone injections today without complications  -Patient will follow-up in a few months before her trip for repeat cortisone injection.  -Call direct clinic number [[phone]] at any time with questions or concerns.    Albert Yeo MD [facility]  [facility] [place]

## 2019-11-11 NOTE — LETTER
11/11/2019         RE: Laure Wood  [address] [address]        Dear Colleague,    Thank you for referring your patient, Laure Wood, to the [facility] SPORTS MEDICINE. Please see a copy of my visit note below.    ASSESSMENT & PLAN  Patient Instructions     1. Primary osteoarthritis of left knee    2. Primary osteoarthritis of right knee      -Patient tolerated bilateral knee pain due to aggravation of arthritis  -Patient tolerated bilateral cortisone injections today without complications  -Patient will follow-up in a few months before her trip for repeat cortisone injection.  -Call direct clinic number [[phone]] at any time with questions or concerns.    Albert Yeo MD [facility] Orthopedics and Sports Medicine  [facility]          -----    SUBJECTIVE:  Laure Wood is a 69 year old female who is seen for bilat knee IA CSI. Previously had cortisone injections on 6/14/19.    Large Joint Injection/Arthocentesis: bilateral knee  Date/Time: 11/11/2019 10:50 AM  Performed by: Yeo, Albert, MD  Authorized by: Yeo, Albert, MD     Indications:  Pain and osteoarthritis  Needle Size:  25 G  Guidance: ultrasound    Approach:  Superolateral  Location:  Knee  Laterality:  Bilateral      Medications (Right):  40 mg methylPREDNISolone 40 MG/ML  Medications (Left):  40 mg methylPREDNISolone 40 MG/ML  Outcome:  Tolerated well, no immediate complications  Procedure discussed: discussed risks, benefits, and alternatives    Consent Given by:  Patient  Timeout: timeout called immediately prior to procedure    Prep: patient was prepped and draped in usual sterile fashion              Albert Yeo MD, [facility]opedic [facility]      Again, thank you for allowing me to participate in the care of your patient.        Sincerely,        Albert Yeo, MD

## 2019-12-10 DIAGNOSIS — M79.671 PAIN IN BOTH FEET: ICD-10-CM

## 2019-12-10 DIAGNOSIS — M79.672 PAIN IN BOTH FEET: ICD-10-CM

## 2019-12-11 NOTE — TELEPHONE ENCOUNTER
"Requested Prescriptions   Pending Prescriptions Disp Refills     meloxicam (MOBIC) 15 MG tablet [Pharmacy Med Name: MELOXICAM 15MG TABLETS] 30 tablet 0     Sig: TAKE 1 TABLET(15 MG) BY MOUTH DAILY       NSAID Medications Failed - 12/10/2019  6:01 AM        Failed - Normal ALT on file in past 12 months     Recent Labs   Lab Test 04/15/13  2030   ALT 20             Failed - Normal AST on file in past 12 months     Recent Labs   Lab Test 04/15/13  2030   AST 27             Failed - Patient is age 6-64 years        Failed - Normal CBC on file in past 12 months     Recent Labs   Lab Test 04/15/13  2030   WBC 6.2   RBC 4.67   HGB 12.7   HCT 39.3                    Passed - Blood pressure under 140/90 in past 12 months     BP Readings from Last 3 Encounters:   10/30/19 122/74   10/21/19 126/74   07/25/19 150/81                 Passed - Recent (12 mo) or future (30 days) visit within the authorizing provider's specialty     Patient has had an office visit with the authorizing provider or a provider within the authorizing providers department within the previous 12 mos or has a future within next 30 days. See \"Patient Info\" tab in inbasket, or \"Choose Columns\" in Meds & Orders section of the refill encounter.              Passed - Medication is active on med list        Passed - No active pregnancy on record        Passed - Normal serum creatinine on file in past 12 months     Recent Labs   Lab Test 05/01/19  0803   CR 0.99             Passed - No positive pregnancy test in past 12 months          "

## 2019-12-12 RX ORDER — MELOXICAM 15 MG/1
TABLET ORAL
Qty: 30 TABLET | Refills: 0 | Status: SHIPPED | OUTPATIENT
Start: 2019-12-12 | End: 2020-02-17

## 2020-01-24 ENCOUNTER — TELEPHONE (OUTPATIENT)
Dept: ORTHOPEDICS | Facility: CLINIC | Age: 70
End: 2020-01-24

## 2020-01-24 ENCOUNTER — OFFICE VISIT (OUTPATIENT)
Dept: ORTHOPEDICS | Facility: CLINIC | Age: 70
End: 2020-01-24
Payer: COMMERCIAL

## 2020-01-24 VITALS
SYSTOLIC BLOOD PRESSURE: 108 MMHG | HEIGHT: 64 IN | DIASTOLIC BLOOD PRESSURE: 62 MMHG | BODY MASS INDEX: 32.98 KG/M2 | WEIGHT: 193.2 LBS

## 2020-01-24 DIAGNOSIS — M17.11 PRIMARY OSTEOARTHRITIS OF RIGHT KNEE: ICD-10-CM

## 2020-01-24 DIAGNOSIS — M17.12 PRIMARY OSTEOARTHRITIS OF LEFT KNEE: Primary | ICD-10-CM

## 2020-01-24 PROCEDURE — 99213 OFFICE O/P EST LOW 20 MIN: CPT | Mod: 25 | Performed by: FAMILY MEDICINE

## 2020-01-24 PROCEDURE — 20611 DRAIN/INJ JOINT/BURSA W/US: CPT | Mod: 50 | Performed by: FAMILY MEDICINE

## 2020-01-24 RX ORDER — METHYLPREDNISOLONE ACETATE 40 MG/ML
40 INJECTION, SUSPENSION INTRA-ARTICULAR; INTRALESIONAL; INTRAMUSCULAR; SOFT TISSUE
Status: DISCONTINUED | OUTPATIENT
Start: 2020-01-24 | End: 2020-02-26

## 2020-01-24 RX ADMIN — METHYLPREDNISOLONE ACETATE 40 MG: 40 INJECTION, SUSPENSION INTRA-ARTICULAR; INTRALESIONAL; INTRAMUSCULAR; SOFT TISSUE at 15:24

## 2020-01-24 ASSESSMENT — MIFFLIN-ST. JEOR: SCORE: 1386.35

## 2020-01-24 NOTE — LETTER
"    1/24/2020         RE: Laure Wood  [address]  [place] [address]        Dear Colleague,    Thank you for referring your patient, Laure Wood, to the [facility] SPORTS MEDICINE. Please see a copy of my visit note below.    ASSESSMENT & PLAN  Patient Instructions     1. Primary osteoarthritis of left knee    2. Primary osteoarthritis of right knee      -Patient is here for follow up of bilateral knee pain due to arthritis  -Patient tolerated bilateral knee cortisone injection today without complications.    -Patient will get prior authorization for Synvisc 1   -Patient will follow up after her trip to [facility] to administer the Synvisc after authorization was obtained.  -Call direct clinic number [[phone]] at any time with questions or concerns.    Albert Yeo MD [facility] Orthopedics and Sports Medicine  [facility]          -----    SUBJECTIVE:  Laure Wood is a 69 year old female who is seen in follow-up for primary osteoarthritis of bilateral knees.They were last seen 11/11/2019.     Since their last visit reports 50% improvement. They indicate that their current pain level is 2/10. They have tried home exercises corticosteroid injection (most recent date: 11/11/2019) that provided  2.5 month(s) of relief.      The patient is seen by themselves.    Patient's past medical, surgical, social, and family histories were reviewed today and no changes are noted.    REVIEW OF SYSTEMS:  Constitutional: NEGATIVE for fever, chills, change in weight  Skin: NEGATIVE for worrisome rashes, moles or lesions  GI/: NEGATIVE for bowel or bladder changes  Neuro: NEGATIVE for weakness, dizziness or paresthesias    OBJECTIVE:  /62   Ht 1.626 m (5' 4\")   Wt 87.6 kg (193 lb 3.2 oz)   BMI 33.16 kg/m      General: healthy, alert and in no distress  HEENT: no scleral icterus or conjunctival erythema  Skin: no suspicious lesions or rash. No jaundice.  CV: regular rhythm by " palpation, no pedal edema  Resp: normal respiratory effort without conversational dyspnea   Psych: normal mood and affect  Gait: normal steady gait with appropriate coordination and balance  Neuro: normal light touch sensory exam of the extremities.    MSK:  BILATERAL KNEE  Inspection:    Normal alignment; no edema, erythema, or ecchymosis present and genu valgum  Palpation:    Tender about the lateral patellar facet, lateral joint line and medial joint line. Remainder of bony and ligamentous landmarks are nontender.    Trace effusion is present    Patellofemoral crepitus is Present  Range of Motion:     00 extension to 1150 flexion  Strength:    Quadriceps 5-/5    Extensor mechanism intact  Special Tests:    Positive: none    Negative: MCL/valgus stress (0 & 30 deg), LCL/varus stress (0 & 30 deg), Lachman's, anterior drawer, posterior drawer, Love's          Large Joint Injection/Arthocentesis: bilateral knee  Date/Time: 1/24/2020 3:24 PM  Performed by: Yeo, Albert, MD  Authorized by: Yeo, Albert, MD     Indications:  Pain and osteoarthritis  Needle Size:  25 G  Guidance: ultrasound    Approach:  Superolateral  Location:  Knee  Laterality:  Bilateral      Medications (Right):  40 mg methylPREDNISolone 40 MG/ML  Medications (Left):  40 mg methylPREDNISolone 40 MG/ML  Outcome:  Tolerated well, no immediate complications  Procedure discussed: discussed risks, benefits, and alternatives    Consent Given by:  Patient  Timeout: timeout called immediately prior to procedure    Prep: patient was prepped and draped in usual sterile fashion            Albert Yeo MD, [facility] Orthopedic Care      Again, thank you for allowing me to participate in the care of your patient.        Sincerely,        Albert Yeo, MD

## 2020-01-24 NOTE — PROGRESS NOTES
"ASSESSMENT & PLAN  Patient Instructions     1. Primary osteoarthritis of left knee    2. Primary osteoarthritis of right knee      -Patient is here for follow up of bilateral knee pain due to arthritis  -Patient tolerated bilateral knee cortisone injection today without complications.    -Patient will get prior authorization for Synvisc 1   -Patient will follow up after her trip to [facility] to administer the Synvisc after authorization was obtained.  -Call direct clinic number [[phone]] at any time with questions or concerns.    Albert Yeo MD [facility] Orthopedics and Sports Medicine  [facility]          -----    SUBJECTIVE:  Laure Wood is a 69 year old female who is seen in follow-up for primary osteoarthritis of bilateral knees.They were last seen 11/11/2019.     Since their last visit reports 50% improvement. They indicate that their current pain level is 2/10. They have tried home exercises corticosteroid injection (most recent date: 11/11/2019) that provided  2.5 month(s) of relief.      The patient is seen by themselves.    Patient's past medical, surgical, social, and family histories were reviewed today and no changes are noted.    REVIEW OF SYSTEMS:  Constitutional: NEGATIVE for fever, chills, change in weight  Skin: NEGATIVE for worrisome rashes, moles or lesions  GI/: NEGATIVE for bowel or bladder changes  Neuro: NEGATIVE for weakness, dizziness or paresthesias    OBJECTIVE:  /62   Ht 1.626 m (5' 4\")   Wt 87.6 kg (193 lb 3.2 oz)   BMI 33.16 kg/m     General: healthy, alert and in no distress  HEENT: no scleral icterus or conjunctival erythema  Skin: no suspicious lesions or rash. No jaundice.  CV: regular rhythm by palpation, no pedal edema  Resp: normal respiratory effort without conversational dyspnea   Psych: normal mood and affect  Gait: normal steady gait with appropriate coordination and balance  Neuro: normal light touch sensory exam of the extremities.  "   MSK:  BILATERAL KNEE  Inspection:    Normal alignment; no edema, erythema, or ecchymosis present and genu valgum  Palpation:    Tender about the lateral patellar facet, lateral joint line and medial joint line. Remainder of bony and ligamentous landmarks are nontender.    Trace effusion is present    Patellofemoral crepitus is Present  Range of Motion:     00 extension to 1150 flexion  Strength:    Quadriceps 5-/5    Extensor mechanism intact  Special Tests:    Positive: none    Negative: MCL/valgus stress (0 & 30 deg), LCL/varus stress (0 & 30 deg), Lachman's, anterior drawer, posterior drawer, Love's          Large Joint Injection/Arthocentesis: bilateral knee  Date/Time: 1/24/2020 3:24 PM  Performed by: Yeo, Albert, MD  Authorized by: Yeo, Albert, MD     Indications:  Pain and osteoarthritis  Needle Size:  25 G  Guidance: ultrasound    Approach:  Superolateral  Location:  Knee  Laterality:  Bilateral      Medications (Right):  40 mg methylPREDNISolone 40 MG/ML  Medications (Left):  40 mg methylPREDNISolone 40 MG/ML  Outcome:  Tolerated well, no immediate complications  Procedure discussed: discussed risks, benefits, and alternatives    Consent Given by:  Patient  Timeout: timeout called immediately prior to procedure    Prep: patient was prepped and draped in usual sterile fashion            Albert Yeo MD, [facility] Orthopedic Care

## 2020-01-24 NOTE — TELEPHONE ENCOUNTER
Patient scheduled for appointment in 2 weeks for discussion of viscosupplementation injection vs steroid injection of left knee.      Steroid  injection last completed 1/24/2020.       Prior authorization referral for SynviscOne injection pended.     Please advise.

## 2020-01-24 NOTE — PATIENT INSTRUCTIONS
1. Primary osteoarthritis of left knee    2. Primary osteoarthritis of right knee      -Patient is here for follow up of bilateral knee pain due to arthritis  -Patient tolerated bilateral knee cortisone injection today without complications.    -Patient will get prior authorization for Synvisc 1   -Patient will follow up after her trip to [facility] to administer the Synvisc after authorization was obtained.  -Call direct clinic number [[phone]] at any time with questions or concerns.    Albert Yeo MD [facility]  [facility] [place]

## 2020-01-28 ENCOUNTER — TELEPHONE (OUTPATIENT)
Dept: ORTHOPEDICS | Facility: CLINIC | Age: 70
End: 2020-01-28
Payer: COMMERCIAL

## 2020-01-28 DIAGNOSIS — M17.12 PRIMARY OSTEOARTHRITIS OF LEFT KNEE: Primary | ICD-10-CM

## 2020-01-28 NOTE — TELEPHONE ENCOUNTER
Patient scheduled for appointment in two weeks for discussion of viscosupplementation injection vs steroid injection of bilateral knee.      Steroid  injection last completed 1/24/2020.       Prior authorization referral for Euflexxa injection pended.     Insurance prefers Euflexxa. This is a new order being placed.     Please advise.    Priti hCan MS, ATC

## 2020-01-28 NOTE — TELEPHONE ENCOUNTER
PA signed.    Woodrow Talley DO, CAJOSEPHINEM  [facility]

## 2020-02-14 DIAGNOSIS — M79.672 PAIN IN BOTH FEET: ICD-10-CM

## 2020-02-14 DIAGNOSIS — M79.671 PAIN IN BOTH FEET: ICD-10-CM

## 2020-02-14 NOTE — TELEPHONE ENCOUNTER
"  Failed protocol  Requested Prescriptions   Pending Prescriptions Disp Refills     meloxicam (MOBIC) 15 MG tablet [Pharmacy Med Name: MELOXICAM 15MG TABLETS] 30 tablet 0     Sig: TAKE 1 TABLET(15 MG) BY MOUTH DAILY       NSAID Medications Failed - 2/14/2020  3:56 PM        Failed - Normal ALT on file in past 12 months     Recent Labs   Lab Test 04/15/13  2030   ALT 20             Failed - Normal AST on file in past 12 months     Recent Labs   Lab Test 04/15/13  2030   AST 27             Failed - Patient is age 6-64 years        Failed - Normal CBC on file in past 12 months     Recent Labs   Lab Test 04/15/13  2030   WBC 6.2   RBC 4.67   HGB 12.7   HCT 39.3                    Passed - Blood pressure under 140/90 in past 12 months     BP Readings from Last 3 Encounters:   01/24/20 108/62   10/30/19 122/74   10/21/19 126/74                 Passed - Recent (12 mo) or future (30 days) visit within the authorizing provider's specialty     Patient has had an office visit with the authorizing provider or a provider within the authorizing providers department within the previous 12 mos or has a future within next 30 days. See \"Patient Info\" tab in inbasket, or \"Choose Columns\" in Meds & Orders section of the refill encounter.              Passed - Medication is active on med list        Passed - No active pregnancy on record        Passed - Normal serum creatinine on file in past 12 months     Recent Labs   Lab Test 05/01/19  0803   CR 0.99             Passed - No positive pregnancy test in past 12 months          "

## 2020-02-17 RX ORDER — MELOXICAM 15 MG/1
15 TABLET ORAL DAILY PRN
Qty: 30 TABLET | Refills: 0 | Status: SHIPPED | OUTPATIENT
Start: 2020-02-17 | End: 2020-03-17

## 2020-02-20 ENCOUNTER — APPOINTMENT (OUTPATIENT)
Dept: GENERAL RADIOLOGY | Facility: CLINIC | Age: 70
End: 2020-02-20
Attending: EMERGENCY MEDICINE
Payer: COMMERCIAL

## 2020-02-20 ENCOUNTER — HOSPITAL ENCOUNTER (EMERGENCY)
Facility: CLINIC | Age: 70
Discharge: HOME | End: 2020-02-20
Attending: EMERGENCY MEDICINE | Admitting: EMERGENCY MEDICINE
Payer: COMMERCIAL

## 2020-02-20 VITALS
SYSTOLIC BLOOD PRESSURE: 157 MMHG | RESPIRATION RATE: 18 BRPM | WEIGHT: 195.77 LBS | TEMPERATURE: 97.8 F | DIASTOLIC BLOOD PRESSURE: 103 MMHG | BODY MASS INDEX: 33.6 KG/M2 | HEART RATE: 89 BPM | OXYGEN SATURATION: 100 %

## 2020-02-20 DIAGNOSIS — J06.9 VIRAL URI WITH COUGH: ICD-10-CM

## 2020-02-20 LAB
FLUAV+FLUBV AG SPEC QL: NEGATIVE
FLUAV+FLUBV AG SPEC QL: NEGATIVE
SPECIMEN SOURCE: NORMAL

## 2020-02-20 PROCEDURE — 87804 INFLUENZA ASSAY W/OPTIC: CPT | Mod: 59 | Performed by: EMERGENCY MEDICINE

## 2020-02-20 PROCEDURE — 99284 EMERGENCY DEPT VISIT MOD MDM: CPT | Mod: 25

## 2020-02-20 PROCEDURE — 71046 X-RAY EXAM CHEST 2 VIEWS: CPT

## 2020-02-20 RX ORDER — PREDNISONE 20 MG/1
TABLET ORAL
Qty: 10 TABLET | Refills: 0 | Status: SHIPPED | OUTPATIENT
Start: 2020-02-20 | End: 2020-02-26

## 2020-02-20 RX ORDER — ALBUTEROL SULFATE 90 UG/1
2 AEROSOL, METERED RESPIRATORY (INHALATION) EVERY 6 HOURS PRN
Qty: 1 INHALER | Refills: 0 | Status: SHIPPED | OUTPATIENT
Start: 2020-02-20 | End: 2021-03-23

## 2020-02-20 ASSESSMENT — ENCOUNTER SYMPTOMS
SHORTNESS OF BREATH: 1
FEVER: 0
CHILLS: 1
SORE THROAT: 1
VOMITING: 0
COUGH: 1
NAUSEA: 0

## 2020-02-20 NOTE — DISCHARGE INSTRUCTIONS
Fortunately, you do not have pneumonia and your influenza test is negative.  Most likely you have an upper respiratory virus.  I will prescribe some medication that may help your symptoms.  You can use the albuterol inhaler if you feel you are short of breath or wheezing.  The steroid medication (prednisone) may help with your symptoms as well.  Please follow-up with your primary care clinic in about 2 days.  If you cannot be seen by them Saturday you may make an appointment for early next week.  Please return to the ED if you have worsening shortness of breath, fevers you cannot control, lightheadedness, or other symptoms that concern you.    Thank you for allowing us to care for you today.

## 2020-02-20 NOTE — ED PROVIDER NOTES
History     Chief Complaint:  Cold Symptoms    HPI   Laure Wood is a 69 year old female who presents to the emergency department today with cold symptoms. The patient reports productive cough with green and yellow phlegm, congestion, chills, sore throat, and pain in the chest only with coughing for the last week+ and feels like she is getting worse. She states she had a subjective fever this morning, but is afebrile at 97.8 F in the ED. She denies taking any antipyretics this morning. She reports associated shortness of breath. She denies nausea, vomiting. She denies sickness exposure. She has been taking Nyquil for 1 week.     Allergies:  No Known Drug Allergy     Medications:    Hydrochlorothiazide   Meloxicam      Past Medical History:    Hyperlipidemia   Hypertension  Knee osteoarthritis   Esophageal reflux   Obesity  Cervical pain  Lumbar pain  Shoulder pain  Fibroid uterus   Thickened endometrium      Past Surgical History:    Cataract IOL bilateral   Colonoscopy      Family History:    Family history unknown     Social History:  The patient was accompanied to the ED by daughter.  Smoking Status: Never  Smokeless Tobacco: Never  Alcohol Use: No    Marital Status:       Review of Systems   Constitutional: Positive for chills. Negative for fever.   HENT: Positive for congestion and sore throat.    Respiratory: Positive for cough and shortness of breath.    Cardiovascular: Positive for chest pain (when coughing).   Gastrointestinal: Negative for nausea and vomiting.   All other systems reviewed and are negative.    Physical Exam     Patient Vitals for the past 24 hrs:   BP Temp Temp src Pulse Resp SpO2 Weight   02/20/20 1005 (!) 157/103 97.8  F (36.6  C) Temporal 89 18 100 % 88.8 kg (195 lb 12.3 oz)      Physical Exam  Constitutional: Vital signs reviewed as above.   HEENT:    Head: No external signs of trauma. No lesions noted.   Eyes: PEERL, EOMI B/L, no pain or limitation of superior gaze   Ears:  Normal B/L TM and external canals   Nose: Noncongested, no exudates. No rhinorrhea. No FB noted   Mouth/Throat:     Mucous membranes are moist and normal.     No Oropharyngeal exudate. No oropharyngeal erythema noted.    No tonsilar swelling noted.     No uvular deviation noted.    No swelling noted on the floor of the mouth  Neck: FROM. Neck is supple   Cardiovascular: Normal rate, regular rhythm and normal heart sounds.  No murmur heard. Equal B/L peripheral pulses.  Pulmonary/Chest: Effort normal and breath sounds normal. No respiratory distress. Patient has no wheezes. Patient has no rales.   Gastrointestinal: Soft. There is no tenderness.   Musculoskeletal/Extremities: No edema noted. Normal tone.  Neurological: Patient is alert and oriented to person, place, and time.   Skin: Skin is warm and dry. There is no diaphoresis noted.   Psychiatric: The patient appears calm.    Emergency Department Course   Imaging:  Radiology findings were communicated with the patient and family who voiced understanding of the findings.  XR Chest 2 Views   Final Result   IMPRESSION: Low lung volumes accentuate the cardiac silhouette, which   is likely within normal limits. No confluent airspace consolidation.   No pleural effusion or pneumothorax.      MELANIE HANNA MD      Report per radiology      Laboratory:  Laboratory findings were communicated with the patient and family who voiced understanding of the findings.  Influenza A/B Antigen: negative      Emergency Department Course:  Nursing notes and vitals reviewed.  1012: I performed an exam of the patient as documented above.   Patient was swabbed for influenza.  The patient was sent for a Chest XR while in the emergency department, results above.   1057: Patient rechecked and updated. She is comfortable being discharged. She asks about a prescription for an antacid medication, noting that she had a prescription for this in the past and thought it helped with some  indigestion.  1100: Findings and plan explained to the Patient and daughter. Patient discharged home with instructions regarding supportive care, medications, and reasons to return. The importance of close follow-up was reviewed.    I personally reviewed the laboratory and imaging results with the Patient and daughter and answered all related questions prior to discharge.      Impression & Plan    Medical Decision Making:  This 69-year-old female patient presents the ED due to upper respiratory symptoms.  Please see the HPI and exam for specifics.  No pneumonia or influenza was found today.  The patient likely has some degree of an influenza-like illness.  I will encourage supportive care at home as well as prescribe albuterol and prednisone. She also requests a prescription for an antacid. The patient should follow-up in the primary care clinic and return to the ED if worse.  Anticipatory guidance given prior to discharge.    Diagnosis:    ICD-10-CM    1. Viral URI with cough J06.9     B97.89        Disposition:  discharged to home    Discharge Medications:  New Prescriptions    ALBUTEROL 108 (90 BASE) MCG/ACT IN INHALER    Inhale 2 puffs into the lungs every 6 hours as needed for shortness of breath / dyspnea or wheezing    OMEPRAZOLE (PRILOSEC) 20 MG PO DR CAPSULE    Take 1 capsule (20 mg) by mouth daily    PREDNISONE 20 MG PO TABLET    Take two tablets (= 40mg) each day for 5 (five) days     Scribe Disclosure:  I, Kimberly Winters MD, am serving as a scribe at 10:14 AM on 2/20/2020 to document services personally performed by Steve Aaron DO based on my observations and the provider's statements to me.    2/20/2020   [facility] EMERGENCY DEPARTMENT       Steve Aaron DO  02/20/20 1105       Steve Aaron DO  02/20/20 1106

## 2020-02-20 NOTE — ED TRIAGE NOTES
Patient comes in for evaluation of cough and cold symptoms for more than a week. Reports congestion, chills, cough and pain in chest with cough.

## 2020-02-26 ENCOUNTER — OFFICE VISIT (OUTPATIENT)
Dept: INTERNAL MEDICINE | Facility: CLINIC | Age: 70
End: 2020-02-26
Payer: COMMERCIAL

## 2020-02-26 VITALS
TEMPERATURE: 97.1 F | HEIGHT: 64 IN | DIASTOLIC BLOOD PRESSURE: 88 MMHG | OXYGEN SATURATION: 98 % | HEART RATE: 79 BPM | BODY MASS INDEX: 33.46 KG/M2 | WEIGHT: 196 LBS | SYSTOLIC BLOOD PRESSURE: 128 MMHG

## 2020-02-26 DIAGNOSIS — J06.9 VIRAL URI WITH COUGH: Primary | ICD-10-CM

## 2020-02-26 DIAGNOSIS — Z01.00 ENCOUNTER FOR VISION SCREENING: ICD-10-CM

## 2020-02-26 PROCEDURE — 99213 OFFICE O/P EST LOW 20 MIN: CPT | Performed by: INTERNAL MEDICINE

## 2020-02-26 ASSESSMENT — MIFFLIN-ST. JEOR: SCORE: 1399.05

## 2020-02-26 NOTE — PROGRESS NOTES
"Subjective     Laure Wood is a 69 year old female who presents to clinic today for the following health issues:    HPI   ED/UC Followup:    Facility:  [facility]  Date of visit: 02/20/2020  Reason for visit: URI  Current Status: no change     Subjective shortness of breath.  No fever.  Her work-up including x-ray influenza screening was negative.  Presented to the ER with cough  She feels she continues to have cough and sinus congestion.  No shortness of breath or wheezing.  She is not certain the medications given to her really did her much good.          Reviewed and updated as needed this visit by Provider         Review of Systems   ROS COMP: Constitutional, HEENT, cardiovascular, pulmonary, gi and gu systems are negative, except as otherwise noted.      Objective    /88   Pulse 79   Temp 97.1  F (36.2  C) (Temporal)   Ht 1.626 m (5' 4\")   Wt 88.9 kg (196 lb)   SpO2 98%   BMI 33.64 kg/m    Body mass index is 33.64 kg/m .  Physical Exam   GENERAL APPEARANCE: alert, no distress and over weight  HENT: nose and mouth without ulcers or lesions and normal cephalic/atraumatic  NECK: no adenopathy, no asymmetry, masses, or scars and thyroid normal to palpation  RESP: lungs clear to auscultation - no rales, rhonchi or wheezes  CV: regular rates and rhythm, normal S1 S2, no S3 or S4 and no murmur, click or rub    Labs reviewed in Epic        Assessment & Plan     1. Viral URI with cough  Continue supportive care.  She looks quite good today and I think she is just continues to have symptoms from this viral URI.      See Patient Instructions    Return in about 2 weeks (around 3/11/2020) for Illness follow up visit as needed.    Yoshi Bowne MD  [facility]        "

## 2020-02-26 NOTE — NURSING NOTE
"Chief Complaint   Patient presents with     ER F/U     /88   Pulse 79   Temp 97.1  F (36.2  C) (Temporal)   Ht 1.626 m (5' 4\")   Wt 88.9 kg (196 lb)   SpO2 98%   BMI 33.64 kg/m   Estimated body mass index is 33.64 kg/m  as calculated from the following:    Height as of this encounter: 1.626 m (5' 4\").    Weight as of this encounter: 88.9 kg (196 lb).        Health Maintenance due pending provider review:  NONE    n/a    Hallie Weller CMA  "

## 2020-02-26 NOTE — PATIENT INSTRUCTIONS
Purchase a decongestant or cold remedy over the counter    Pseudoephedrine (generic sudaphed) - take 1-2 tablets 2-3x per day     Robitussin cough syrup or Musinex cough medication

## 2020-03-15 DIAGNOSIS — I10 ESSENTIAL HYPERTENSION, BENIGN: ICD-10-CM

## 2020-03-15 RX ORDER — HYDROCHLOROTHIAZIDE 25 MG/1
TABLET ORAL
Qty: 90 TABLET | Refills: 0 | Status: SHIPPED | OUTPATIENT
Start: 2020-03-15 | End: 2020-06-14

## 2020-03-15 NOTE — TELEPHONE ENCOUNTER
"Requested Prescriptions   Pending Prescriptions Disp Refills     hydrochlorothiazide (HYDRODIURIL) 25 MG tablet [Pharmacy Med Name: HYDROCHLOROTHIAZIDE 25MG TABLETS] 90 tablet 1     Sig: TAKE 1 TABLET(25 MG) BY MOUTH DAILY   Last Written Prescription Date:  5/9/2019  Last Fill Quantity: 90,  # refills: 1   Last Office Visit: 2/26/2020   Future Office Visit:         Diuretics (Including Combos) Protocol Passed - 3/15/2020  5:45 AM        Passed - Blood pressure under 140/90 in past 12 months     BP Readings from Last 3 Encounters:   02/26/20 128/88   02/20/20 (!) 157/103   01/24/20 108/62                 Passed - Recent (12 mo) or future (30 days) visit within the authorizing provider's specialty     Patient has had an office visit with the authorizing provider or a provider within the authorizing providers department within the previous 12 mos or has a future within next 30 days. See \"Patient Info\" tab in inbasket, or \"Choose Columns\" in Meds & Orders section of the refill encounter.              Passed - Medication is active on med list        Passed - Patient is age 18 or older        Passed - No active pregancy on record        Passed - Normal serum creatinine on file in past 12 months     Recent Labs   Lab Test 05/01/19  0803   CR 0.99              Passed - Normal serum potassium on file in past 12 months     Recent Labs   Lab Test 05/01/19  0803   POTASSIUM 3.8                    Passed - Normal serum sodium on file in past 12 months     Recent Labs   Lab Test 05/01/19  0803                 Passed - No positive pregnancy test in past 12 months             "

## 2020-03-16 DIAGNOSIS — M79.671 PAIN IN BOTH FEET: ICD-10-CM

## 2020-03-16 DIAGNOSIS — M79.672 PAIN IN BOTH FEET: ICD-10-CM

## 2020-03-16 NOTE — TELEPHONE ENCOUNTER
Requested Prescriptions   Pending Prescriptions Disp Refills     meloxicam (MOBIC) 15 MG tablet 30 tablet 0     Sig: Take 1 tablet (15 mg) by mouth daily as needed for moderate pain       There is no refill protocol information for this order        Last Written Prescription Date:  2/17/2020  Last Fill Quantity: 30,  # refills: 0   Last Office Visit: 2/26/2020   Future Office Visit:

## 2020-03-17 RX ORDER — MELOXICAM 15 MG/1
15 TABLET ORAL DAILY PRN
Qty: 30 TABLET | Refills: 0 | Status: SHIPPED | OUTPATIENT
Start: 2020-03-17 | End: 2020-04-10

## 2020-04-09 DIAGNOSIS — M79.671 PAIN IN BOTH FEET: ICD-10-CM

## 2020-04-09 DIAGNOSIS — M79.672 PAIN IN BOTH FEET: ICD-10-CM

## 2020-04-09 DIAGNOSIS — Z79.1 NSAID LONG-TERM USE: Primary | ICD-10-CM

## 2020-04-09 NOTE — TELEPHONE ENCOUNTER
Requested Prescriptions   Pending Prescriptions Disp Refills     omeprazole (PRILOSEC) 20 MG DR capsule 30 capsule 0     Sig: Take 1 capsule (20 mg) by mouth daily  Last Written Prescription Date:  02/20/20  Last Fill Quantity: 30,  # refills: 0   Last office visit: 2/26/2020 with prescribing provider: 02/26/20    Future Office Visit:  0       There is no refill protocol information for this order        meloxicam (MOBIC) 15 MG tablet 30 tablet 0     Sig: Take 1 tablet (15 mg) by mouth daily as needed for moderate pain  Last Written Prescription Date:  03/17/20  Last Fill Quantity: 30,  # refills: 0   Last office visit: 2/26/2020 with prescribing provider:  02/26/20   Future Office Visit:  0       There is no refill protocol information for this order

## 2020-04-09 NOTE — TELEPHONE ENCOUNTER
"Requested Prescriptions   Pending Prescriptions Disp Refills     omeprazole (PRILOSEC) 20 MG DR capsule 30 capsule 0     Sig: Take 1 capsule (20 mg) by mouth daily       PPI Protocol Failed - 4/9/2020  3:13 PM        Failed - Medication is active on med list        Passed - Not on Clopidogrel (unless Pantoprazole ordered)        Passed - No diagnosis of osteoporosis on record        Passed - Recent (12 mo) or future (30 days) visit within the authorizing provider's specialty     Patient has had an office visit with the authorizing provider or a provider within the authorizing providers department within the previous 12 mos or has a future within next 30 days. See \"Patient Info\" tab in inbasket, or \"Choose Columns\" in Meds & Orders section of the refill encounter.              Passed - Patient is age 18 or older        Passed - No active pregnacy on record        Passed - No positive pregnancy test in past 12 months       Last Written Prescription Date:  2/20/2020  Last Fill Quantity: 30,  # refills: 0   Last office visit: 2/26/2020 with prescribing provider:  Dr. Bowen   Future Office Visit:      Routing refill request to provider for review/approval because:  PCP please advise if patient should continue regimen             meloxicam (MOBIC) 15 MG tablet 30 tablet 0     Sig: Take 1 tablet (15 mg) by mouth daily as needed for moderate pain       NSAID Medications Failed - 4/9/2020  3:13 PM        Failed - Normal ALT on file in past 12 months     Recent Labs   Lab Test 04/15/13  2030   ALT 20             Failed - Normal AST on file in past 12 months     Recent Labs   Lab Test 04/15/13  2030   AST 27             Failed - Patient is age 6-64 years        Failed - Normal CBC on file in past 12 months     Recent Labs   Lab Test 04/15/13  2030   WBC 6.2   RBC 4.67   HGB 12.7   HCT 39.3                    Passed - Blood pressure under 140/90 in past 12 months     BP Readings from Last 3 Encounters:   02/26/20 " "128/88   02/20/20 (!) 157/103   01/24/20 108/62                 Passed - Recent (12 mo) or future (30 days) visit within the authorizing provider's specialty     Patient has had an office visit with the authorizing provider or a provider within the authorizing providers department within the previous 12 mos or has a future within next 30 days. See \"Patient Info\" tab in inbasket, or \"Choose Columns\" in Meds & Orders section of the refill encounter.              Passed - Medication is active on med list        Passed - No active pregnancy on record        Passed - Normal serum creatinine on file in past 12 months     Recent Labs   Lab Test 05/01/19  0803   CR 0.99       Ok to refill medication if creatinine is low          Passed - No positive pregnancy test in past 12 months           Last Written Prescription Date:  3/17/2020  Last Fill Quantity: 30,  # refills: 0   Last office visit: 2/26/2020 with prescribing provider:  Dr. Bowen   Future Office Visit:      Routing refill request to provider for review/approval because:  Labs out of range:  CBC, Hepatic Panel    Failed protocol d/t age      "

## 2020-04-10 RX ORDER — MELOXICAM 15 MG/1
15 TABLET ORAL DAILY PRN
Qty: 30 TABLET | Refills: 0 | Status: SHIPPED | OUTPATIENT
Start: 2020-04-10 | End: 2020-05-14

## 2020-05-14 DIAGNOSIS — M79.672 PAIN IN BOTH FEET: ICD-10-CM

## 2020-05-14 DIAGNOSIS — M79.671 PAIN IN BOTH FEET: ICD-10-CM

## 2020-05-14 RX ORDER — MELOXICAM 15 MG/1
15 TABLET ORAL DAILY PRN
Qty: 30 TABLET | Refills: 0 | Status: SHIPPED | OUTPATIENT
Start: 2020-05-14 | End: 2020-06-15

## 2020-05-14 NOTE — TELEPHONE ENCOUNTER
Routing refill request to provider for review/approval because:   out of range:  age  Labs not current:  Alt, ast, cbc,creat

## 2020-05-26 DIAGNOSIS — Z79.1 NSAID LONG-TERM USE: ICD-10-CM

## 2020-05-28 NOTE — ED AVS SNAPSHOT
[facility] Emergency Department  [address]  [facility] [address]  Phone:  [phone]  Fax:  [phone]                                    Laure Wood   MRN: [id number]    Department:  [facility] Emergency Department   Date of Visit:  2/20/2020           After Visit Summary Signature Page    I have received my discharge instructions, and my questions have been answered. I have discussed any challenges I see with this plan with the nurse or doctor.    ..........................................................................................................................................  Patient/Patient Representative Signature      ..........................................................................................................................................  Patient Representative Print Name and Relationship to Patient    ..................................................               ................................................  Date                                   Time    ..........................................................................................................................................  Reviewed by Signature/Title    ...................................................              ..............................................  Date                                               Time          22EPIC Rev 08/18        Patient on RA with sats as documented.  Will continue to monitor.

## 2020-06-13 DIAGNOSIS — M79.671 PAIN IN BOTH FEET: ICD-10-CM

## 2020-06-13 DIAGNOSIS — M79.672 PAIN IN BOTH FEET: ICD-10-CM

## 2020-06-14 DIAGNOSIS — I10 ESSENTIAL HYPERTENSION, BENIGN: ICD-10-CM

## 2020-06-15 RX ORDER — MELOXICAM 15 MG/1
TABLET ORAL
Qty: 30 TABLET | Refills: 0 | Status: SHIPPED | OUTPATIENT
Start: 2020-06-15 | End: 2020-07-01

## 2020-06-16 RX ORDER — HYDROCHLOROTHIAZIDE 25 MG/1
25 TABLET ORAL DAILY
Qty: 90 TABLET | Refills: 0 | Status: SHIPPED | OUTPATIENT
Start: 2020-06-16 | End: 2020-07-09

## 2020-06-16 NOTE — TELEPHONE ENCOUNTER
Routing refill request to provider for review/approval because:  Labs not current:  ARIELLA Amos, Na

## 2020-06-29 DIAGNOSIS — I10 ESSENTIAL HYPERTENSION, BENIGN: ICD-10-CM

## 2020-06-29 LAB
ANION GAP SERPL CALCULATED.3IONS-SCNC: 6 MMOL/L (ref 3–14)
BUN SERPL-MCNC: 32 MG/DL (ref 7–30)
CALCIUM SERPL-MCNC: 9.2 MG/DL (ref 8.5–10.1)
CHLORIDE SERPL-SCNC: 107 MMOL/L (ref 94–109)
CO2 SERPL-SCNC: 28 MMOL/L (ref 20–32)
CREAT SERPL-MCNC: 1.15 MG/DL (ref 0.52–1.04)
GFR SERPL CREATININE-BSD FRML MDRD: 48 ML/MIN/{1.73_M2}
GLUCOSE SERPL-MCNC: 91 MG/DL (ref 70–99)
POTASSIUM SERPL-SCNC: 3.5 MMOL/L (ref 3.4–5.3)
SODIUM SERPL-SCNC: 141 MMOL/L (ref 133–144)

## 2020-06-29 PROCEDURE — 80048 BASIC METABOLIC PNL TOTAL CA: CPT | Performed by: INTERNAL MEDICINE

## 2020-06-29 PROCEDURE — 36415 COLL VENOUS BLD VENIPUNCTURE: CPT | Performed by: INTERNAL MEDICINE

## 2020-07-08 ENCOUNTER — ALLIED HEALTH/NURSE VISIT (OUTPATIENT)
Dept: NURSING | Facility: CLINIC | Age: 70
End: 2020-07-08
Payer: COMMERCIAL

## 2020-07-08 VITALS — DIASTOLIC BLOOD PRESSURE: 78 MMHG | SYSTOLIC BLOOD PRESSURE: 136 MMHG | OXYGEN SATURATION: 100 % | HEART RATE: 69 BPM

## 2020-07-08 DIAGNOSIS — Z01.30 BLOOD PRESSURE CHECK: Primary | ICD-10-CM

## 2020-07-08 PROCEDURE — 99207 ZZC NO CHARGE NURSE ONLY: CPT

## 2020-07-08 NOTE — PROGRESS NOTES
I met with Laure Wood at the request of  to recheck her blood pressure.  Blood pressure medications on the med list were reviewed with patient.    Patient has taken all medications as per usual regimen: yes  Patient reports tolerating them without any issues or concerns: yes    Vitals:    07/08/20 1039   BP: 136/78   BP Location: Right arm   Patient Position: Sitting   Cuff Size: Adult Large   Pulse: 69   SpO2: 100%       Blood pressure was taken, previous encounter was reviewed, recorded blood pressure below 140/90. Patient was discharged and the note will be sent to the provider for final review.

## 2020-07-09 ENCOUNTER — VIRTUAL VISIT (OUTPATIENT)
Dept: INTERNAL MEDICINE | Facility: CLINIC | Age: 70
End: 2020-07-09
Payer: COMMERCIAL

## 2020-07-09 DIAGNOSIS — E78.2 MIXED HYPERLIPIDEMIA: ICD-10-CM

## 2020-07-09 DIAGNOSIS — I10 ESSENTIAL HYPERTENSION, BENIGN: Primary | ICD-10-CM

## 2020-07-09 DIAGNOSIS — M17.0 OSTEOARTHRITIS OF BOTH KNEES, UNSPECIFIED OSTEOARTHRITIS TYPE: ICD-10-CM

## 2020-07-09 PROCEDURE — 99214 OFFICE O/P EST MOD 30 MIN: CPT | Mod: TEL | Performed by: INTERNAL MEDICINE

## 2020-07-09 RX ORDER — HYDROCHLOROTHIAZIDE 25 MG/1
25 TABLET ORAL DAILY
Qty: 90 TABLET | Refills: 1 | Status: SHIPPED | OUTPATIENT
Start: 2020-07-09 | End: 2020-09-14

## 2020-07-09 RX ORDER — SIMVASTATIN 20 MG
20 TABLET ORAL AT BEDTIME
Qty: 90 TABLET | Refills: 1 | Status: SHIPPED | OUTPATIENT
Start: 2020-07-09 | End: 2020-07-14

## 2020-07-09 NOTE — PROGRESS NOTES
"Laure Wood is a 69 year old female who is being evaluated via a billable telephone visit.      The patient has been notified of following:     \"This telephone visit will be conducted via a call between you and your physician/provider. We have found that certain health care needs can be provided without the need for a physical exam.  This service lets us provide the care you need with a short phone conversation.  If a prescription is necessary we can send it directly to your pharmacy.  If lab work is needed we can place an order for that and you can then stop by our lab to have the test done at a later time.    Telephone visits are billed at different rates depending on your insurance coverage. During this emergency period, for some insurers they may be billed the same as an in-person visit.  Please reach out to your insurance provider with any questions.    If during the course of the call the physician/provider feels a telephone visit is not appropriate, you will not be charged for this service.\"    Patient has given verbal consent for Telephone visit?  Yes    What phone number would you like to be contacted at? [phone]    How would you like to obtain your AVS? Mail a copy    Subjective     Laure Wood is a 69 year old female who presents via phone visit today for the following health issues:    HPI    Pt would like to go over lab results from 6-29-20         Hyperlipidemia Follow-Up      Are you regularly taking any medication or supplement to lower your cholesterol?   Yes- statoin    Are you having muscle aches or other side effects that you think could be caused by your cholesterol lowering medication?  No    Hypertension Follow-up      Do you check your blood pressure regularly outside of the clinic? Yes     Are you following a low salt diet? Yes    Are your blood pressures ever more than 140 on the top number (systolic) OR more   than 90 on the bottom number (diastolic), for example 140/90? " No    OA  Significant - considering knee replacement  Was taking nsaids daily- we advised to cut back if possible as we are seeing a gradual decline in her GFR nearing CKD territory.          Reviewed and updated as needed this visit by Provider         Review of Systems   Constitutional, HEENT, cardiovascular, pulmonary, gi and gu systems are negative, except as otherwise noted.       Objective   Reported vitals:  There were no vitals taken for this visit.   alert and no distress  PSYCH: Alert and oriented times 3; coherent speech, normal   rate and volume, able to articulate logical thoughts, able   to abstract reason, no tangential thoughts, no hallucinations   or delusions  Her affect is normal  RESP: No cough, no audible wheezing, able to talk in full sentences  Remainder of exam unable to be completed due to telephone visits    Labs reviewed in Epic        Assessment/Plan:  1. Mixed hyperlipidemia  Recheck labs  - simvastatin (ZOCOR) 20 MG tablet; Take 1 tablet (20 mg) by mouth At Bedtime  Dispense: 90 tablet; Refill: 1  - Lipid panel reflex to direct LDL Fasting; Future    2. Essential hypertension, benign  Ok- cont meds  - hydrochlorothiazide (HYDRODIURIL) 25 MG tablet; Take 1 tablet (25 mg) by mouth daily  Dispense: 90 tablet; Refill: 1    3. OA  Cut back as much as possible on nsaids  Tylenol , intraarticular rxs vs joint replacements    No follow-ups on file.      Phone call duration:  8 minutes    Yoshi Bowen MD

## 2020-07-10 NOTE — TELEPHONE ENCOUNTER
Inform pt that future RF of this should be from [facility]- as she is currently seeing them for her knees.   normal...

## 2020-07-13 ENCOUNTER — OFFICE VISIT (OUTPATIENT)
Dept: ORTHOPEDICS | Facility: CLINIC | Age: 70
End: 2020-07-13
Payer: COMMERCIAL

## 2020-07-13 VITALS — HEIGHT: 64 IN | BODY MASS INDEX: 33.46 KG/M2 | WEIGHT: 196 LBS

## 2020-07-13 DIAGNOSIS — M17.11 PRIMARY OSTEOARTHRITIS OF RIGHT KNEE: ICD-10-CM

## 2020-07-13 DIAGNOSIS — E78.2 MIXED HYPERLIPIDEMIA: ICD-10-CM

## 2020-07-13 DIAGNOSIS — M79.671 PAIN IN BOTH FEET: ICD-10-CM

## 2020-07-13 DIAGNOSIS — M79.672 PAIN IN BOTH FEET: ICD-10-CM

## 2020-07-13 DIAGNOSIS — M17.12 PRIMARY OSTEOARTHRITIS OF LEFT KNEE: Primary | ICD-10-CM

## 2020-07-13 PROCEDURE — 99213 OFFICE O/P EST LOW 20 MIN: CPT | Performed by: FAMILY MEDICINE

## 2020-07-13 ASSESSMENT — MIFFLIN-ST. JEOR: SCORE: 1399.05

## 2020-07-13 NOTE — LETTER
"    7/13/2020         RE: Laure Wood  [address] [facility] [address]        Dear Colleague,    Thank you for referring your patient, Laure Wood, to the [facility] SPORTS MEDICINE. Please see a copy of my visit note below.    ASSESSMENT & PLAN  Patient Instructions     1. Primary osteoarthritis of left knee    2. Primary osteoarthritis of right knee      -Patient is following up for bilateral knee pain due to severe end-stage arthritis  -Patient will be referred to Ortho for surgical consultation to discuss bilateral total knee replacement surgery  -Call direct clinic number [[phone]] at any time with questions or concerns.    Albert Yeo MD [facility] Orthopedics and Sports Medicine  [facility]          -----    SUBJECTIVE:  Laure Wood is a 69 year old female who is seen in follow-up for bilat knee pain.They were last seen 1/28/2020.     Since their last visit reports worsening pain. They indicate that their current pain level is 10/10. They have tried Tylenol and corticosteroid injection (most recent date: 1/24/2020) that provided  4 month(s) of relief.  Sitting from standing bothers.     The patient is seen by themselves.    Patient's past medical, surgical, social, and family histories were reviewed today and no changes are noted.    REVIEW OF SYSTEMS:  Constitutional: NEGATIVE for fever, chills, change in weight  Skin: NEGATIVE for worrisome rashes, moles or lesions  GI/: NEGATIVE for bowel or bladder changes  Neuro: NEGATIVE for weakness, dizziness or paresthesias    OBJECTIVE:  Ht 1.626 m (5' 4\")   Wt 88.9 kg (196 lb)   BMI 33.64 kg/m     General: healthy, alert and in no distress  HEENT: no scleral icterus or conjunctival erythema  Skin: no suspicious lesions or rash. No jaundice.  CV: regular rhythm by palpation, no pedal edema  Resp: normal respiratory effort without conversational dyspnea   Psych: normal mood and affect  Gait: normal steady gait with " appropriate coordination and balance  Neuro: normal light touch sensory exam of the extremities.    MSK:  BILATERAL KNEE  Inspection:    Normal alignment; no edema, erythema, or ecchymosis present and genu valgum  Palpation:    Tender about the lateral patellar facet, lateral joint line and medial joint line. Remainder of bony and ligamentous landmarks are nontender.    Trace effusion is present    Patellofemoral crepitus is Present  Range of Motion:     00 extension to 1150 flexion  Strength:    Quadriceps 5-/5    Extensor mechanism intact  Special Tests:    Positive: none    Negative: MCL/valgus stress (0 & 30 deg), LCL/varus stress (0 & 30 deg), Lachman's, anterior drawer, posterior drawer, Love's     Independent visualization of the below image:    Patient's conditions were thoroughly discussed during today's visit with greater than 50% of the visit spent counseling the patient with total time spent face-to-face with the patient being 15 minutes.    Albert Yeo MD, [facility] and Orthopedic Care            Again, thank you for allowing me to participate in the care of your patient.        Sincerely,        Albert Yeo, MD

## 2020-07-13 NOTE — PROGRESS NOTES
"ASSESSMENT & PLAN  Patient Instructions     1. Primary osteoarthritis of left knee    2. Primary osteoarthritis of right knee      -Patient is following up for bilateral knee pain due to severe end-stage arthritis  -Patient will be referred to Ortho for surgical consultation to discuss bilateral total knee replacement surgery  -Call direct clinic number [[phone]] at any time with questions or concerns.    Albert Yeo MD [facility] Orthopedics and Sports Medicine  [facility]          -----    SUBJECTIVE:  Laure Wood is a 69 year old female who is seen in follow-up for bilat knee pain.They were last seen 1/28/2020.     Since their last visit reports worsening pain. They indicate that their current pain level is 10/10. They have tried Tylenol and corticosteroid injection (most recent date: 1/24/2020) that provided  4 month(s) of relief.  Sitting from standing bothers.     The patient is seen by themselves.    Patient's past medical, surgical, social, and family histories were reviewed today and no changes are noted.    REVIEW OF SYSTEMS:  Constitutional: NEGATIVE for fever, chills, change in weight  Skin: NEGATIVE for worrisome rashes, moles or lesions  GI/: NEGATIVE for bowel or bladder changes  Neuro: NEGATIVE for weakness, dizziness or paresthesias    OBJECTIVE:  Ht 1.626 m (5' 4\")   Wt 88.9 kg (196 lb)   BMI 33.64 kg/m     General: healthy, alert and in no distress  HEENT: no scleral icterus or conjunctival erythema  Skin: no suspicious lesions or rash. No jaundice.  CV: regular rhythm by palpation, no pedal edema  Resp: normal respiratory effort without conversational dyspnea   Psych: normal mood and affect  Gait: normal steady gait with appropriate coordination and balance  Neuro: normal light touch sensory exam of the extremities.    MSK:  BILATERAL KNEE  Inspection:    Normal alignment; no edema, erythema, or ecchymosis present and genu valgum  Palpation:    Tender about the " lateral patellar facet, lateral joint line and medial joint line. Remainder of bony and ligamentous landmarks are nontender.    Trace effusion is present    Patellofemoral crepitus is Present  Range of Motion:     00 extension to 1150 flexion  Strength:    Quadriceps 5-/5    Extensor mechanism intact  Special Tests:    Positive: none    Negative: MCL/valgus stress (0 & 30 deg), LCL/varus stress (0 & 30 deg), Lachman's, anterior drawer, posterior drawer, Love's     Independent visualization of the below image:    Patient's conditions were thoroughly discussed during today's visit with greater than 50% of the visit spent counseling the patient with total time spent face-to-face with the patient being 15 minutes.    Albert Yeo MD, [facility] Sports and Orthopedic Care

## 2020-07-13 NOTE — PATIENT INSTRUCTIONS
1. Primary osteoarthritis of left knee    2. Primary osteoarthritis of right knee      -Patient is following up for bilateral knee pain due to severe end-stage arthritis  -Patient will be referred to Ortho for surgical consultation to discuss bilateral total knee replacement surgery  -Call direct clinic number [[phone]] at any time with questions or concerns.    Albert Yeo MD CAQSM  [place] Orthopedics and Sports Medicine  [facility] [place]

## 2020-07-14 RX ORDER — SIMVASTATIN 20 MG
TABLET ORAL
Qty: 90 TABLET | Refills: 0 | Status: SHIPPED | OUTPATIENT
Start: 2020-07-14 | End: 2020-07-22

## 2020-07-14 RX ORDER — MELOXICAM 15 MG/1
TABLET ORAL
Qty: 30 TABLET | Refills: 0 | OUTPATIENT
Start: 2020-07-14

## 2020-07-15 ENCOUNTER — OFFICE VISIT (OUTPATIENT)
Dept: ORTHOPEDICS | Facility: CLINIC | Age: 70
End: 2020-07-15
Attending: FAMILY MEDICINE
Payer: COMMERCIAL

## 2020-07-15 VITALS
WEIGHT: 190 LBS | BODY MASS INDEX: 32.44 KG/M2 | DIASTOLIC BLOOD PRESSURE: 80 MMHG | HEIGHT: 64 IN | SYSTOLIC BLOOD PRESSURE: 130 MMHG

## 2020-07-15 DIAGNOSIS — M17.12 PRIMARY OSTEOARTHRITIS OF LEFT KNEE: ICD-10-CM

## 2020-07-15 DIAGNOSIS — M17.11 PRIMARY OSTEOARTHRITIS OF RIGHT KNEE: ICD-10-CM

## 2020-07-15 PROCEDURE — 99214 OFFICE O/P EST MOD 30 MIN: CPT | Performed by: ORTHOPAEDIC SURGERY

## 2020-07-15 ASSESSMENT — MIFFLIN-ST. JEOR: SCORE: 1371.83

## 2020-07-15 NOTE — PROGRESS NOTES
"HISTORY OF PRESENT ILLNESS:    Laure Wood is a 69 year old female who is seen in follow up for bilateral knee pain.  Present symptoms: Patient reports bilateral knee pain. Pain is generalized to the entire knee. Increased pain with moving from from sit to stand, walking.  Her knee pain will wake her up at nighttime.  Increased weakness of her knees.  She also reports heaviness of her hips bilaterally.    Current pain level: 10/10, Worst pain level: 10/10     Treatments tried to this point: Tylenol, Corticosteroid injection performed on 1/24/20, with 4 months relief. Patient had previous trial of Meloxicam, discontinued by PCP due to concern of kidney function.   Past Medical History: Unchanged from the visit of 7/19/19. Please refer to that note.    REVIEW OF SYSTEMS:  CONSTITUTIONAL:  NEGATIVE for fever, chills, change in weight  INTEGUMENTARY/SKIN:  NEGATIVE for worrisome rashes, moles or lesions  EYES:  NEGATIVE for vision changes or irritation  ENT/MOUTH:  NEGATIVE for ear, mouth and throat problems  RESP:  NEGATIVE for significant cough or SOB  BREAST:  NEGATIVE for masses, tenderness or discharge  CV:  NEGATIVE for chest pain, palpitations or peripheral edema  GI:  NEGATIVE for nausea, abdominal pain, heartburn, or change in bowel habits  :  Negative   MUSCULOSKELETAL:  See HPI above  NEURO:  NEGATIVE for weakness, dizziness or paresthesias  ENDOCRINE:  NEGATIVE for temperature intolerance, skin/hair changes  HEME/ALLERGY/IMMUNE:  NEGATIVE for bleeding problems  PSYCHIATRIC:  NEGATIVE for changes in mood or affect  PHYSICAL EXAM:  /80 (BP Location: Right arm, Patient Position: Chair, Cuff Size: Adult Regular)   Ht 1.626 m (5' 4\")   Wt 86.2 kg (190 lb)   BMI 32.61 kg/m    Body mass index is 32.61 kg/m .   GENERAL APPEARANCE: healthy, alert and no distress   SKIN: no suspicious lesions or rashes  NEURO: Normal strength and tone, mentation intact and speech normal  VASCULAR:  good pulses, and " cappillary refill   LYMPH: no lymphadenopathy   PSYCH:  mentation appears normal and affect normal/bright    MSK:  She demonstrates a fair amount of difficulty of getting up from sitting  Knee range of motion is well-maintained otherwise.  Valgus alignment more so on the left as noted previously.  Basically unchanged from her visit of July 10, 2019.    IMAGING INTERPRETATION:  None taken today, X-ray of bilateral knees reviewed from 7/10/19.      ASSESSMENT:  Severe bilateral knee DJD, left more symptomatic but also has more valgus deformity on the x-rays    PLAN:  1 of her daughters came with her today.  We looked at the x-rays of July 10, 2019.  Findings were thoroughly explained.  Since her pain is getting too significant, at this point, she decided to go ahead with total knee arthroplasty as we discussed previously.  We went over the details of surgery and potential complications, overall success rate, overnight stay, the possibility persisting pain, risk of DVT etc.  Informational materials provided once again today.  The amount of pain that she feels is about the same between the right and the left.  The left one seems to be slightly worse in terms of her symptoms as well as the x-ray findings.  For those reasons, left total knee arthroplasty will be scheduled sometime in near future.  All the questions were answered.           James Mcdonald MD  Dept. Orthopedic Surgery  [facility]       Disclaimer: This note consists of symbols derived from keyboarding, dictation and/or voice recognition software. As a result, there may be errors in the script that have gone undetected. Please consider this when interpreting information found in this chart.

## 2020-07-15 NOTE — LETTER
"    7/15/2020         RE: Laure Wood  [address] [place] [facility] [address]        Dear Colleague,    Thank you for referring your patient, Laure Wood, to the [facility] ORTHOPEDIC SURGERY. Please see a copy of my visit note below.    HISTORY OF PRESENT ILLNESS:    Laure Wood is a 69 year old female who is seen in follow up for bilateral knee pain.  Present symptoms: Patient reports bilateral knee pain. Pain is generalized to the entire knee. Increased pain with moving from from sit to stand, walking.  Her knee pain will wake her up at nighttime.  Increased weakness of her knees.  She also reports heaviness of her hips bilaterally.    Current pain level: 10/10, Worst pain level: 10/10     Treatments tried to this point: Tylenol, Corticosteroid injection performed on 1/24/20, with 4 months relief. Patient had previous trial of Meloxicam, discontinued by PCP due to concern of kidney function.   Past Medical History: Unchanged from the visit of 7/19/19. Please refer to that note.    REVIEW OF SYSTEMS:  CONSTITUTIONAL:  NEGATIVE for fever, chills, change in weight  INTEGUMENTARY/SKIN:  NEGATIVE for worrisome rashes, moles or lesions  EYES:  NEGATIVE for vision changes or irritation  ENT/MOUTH:  NEGATIVE for ear, mouth and throat problems  RESP:  NEGATIVE for significant cough or SOB  BREAST:  NEGATIVE for masses, tenderness or discharge  CV:  NEGATIVE for chest pain, palpitations or peripheral edema  GI:  NEGATIVE for nausea, abdominal pain, heartburn, or change in bowel habits  :  Negative   MUSCULOSKELETAL:  See HPI above  NEURO:  NEGATIVE for weakness, dizziness or paresthesias  ENDOCRINE:  NEGATIVE for temperature intolerance, skin/hair changes  HEME/ALLERGY/IMMUNE:  NEGATIVE for bleeding problems  PSYCHIATRIC:  NEGATIVE for changes in mood or affect  PHYSICAL EXAM:  /80 (BP Location: Right arm, Patient Position: Chair, Cuff Size: Adult Regular)   Ht 1.626 m (5' 4\")   Wt 86.2 kg (190 lb)  "  BMI 32.61 kg/m    Body mass index is 32.61 kg/m .   GENERAL APPEARANCE: healthy, alert and no distress   SKIN: no suspicious lesions or rashes  NEURO: Normal strength and tone, mentation intact and speech normal  VASCULAR:  good pulses, and cappillary refill   LYMPH: no lymphadenopathy   PSYCH:  mentation appears normal and affect normal/bright    MSK:  She demonstrates a fair amount of difficulty of getting up from sitting  Knee range of motion is well-maintained otherwise.  Valgus alignment more so on the left as noted previously.  Basically unchanged from her visit of July 10, 2019.    IMAGING INTERPRETATION:  None taken today, X-ray of bilateral knees reviewed from 7/10/19.      ASSESSMENT:  Severe bilateral knee DJD, left more symptomatic but also has more valgus deformity on the x-rays    PLAN:  1 of her daughters came with her today.  We looked at the x-rays of July 10, 2019.  Findings were thoroughly explained.  Since her pain is getting too significant, at this point, she decided to go ahead with total knee arthroplasty as we discussed previously.  We went over the details of surgery and potential complications, overall success rate, overnight stay, the possibility persisting pain, risk of DVT etc.  Informational materials provided once again today.  The amount of pain that she feels is about the same between the right and the left.  The left one seems to be slightly worse in terms of her symptoms as well as the x-ray findings.  For those reasons, left total knee arthroplasty will be scheduled sometime in near future.  All the questions were answered.           James Mcdonald MD  Dept. Orthopedic Surgery  [facility]       Disclaimer: This note consists of symbols derived from keyboarding, dictation and/or voice recognition software. As a result, there may be errors in the script that have gone undetected. Please consider this when interpreting information found in this chart.      Again, thank you  for allowing me to participate in the care of your patient.        Sincerely,        James Mcdonald MD

## 2020-07-16 ENCOUNTER — PREP FOR PROCEDURE (OUTPATIENT)
Dept: ORTHOPEDICS | Facility: CLINIC | Age: 70
End: 2020-07-16

## 2020-07-16 ENCOUNTER — TELEPHONE (OUTPATIENT)
Dept: ORTHOPEDICS | Facility: CLINIC | Age: 70
End: 2020-07-16

## 2020-07-16 DIAGNOSIS — M17.12 PRIMARY OSTEOARTHRITIS OF LEFT KNEE: Primary | ICD-10-CM

## 2020-07-16 NOTE — TELEPHONE ENCOUNTER
Scheduled surgery.     Please 2nd sign surgery order. Also review and sign PT order.     Type of surgery: left TKA  Location of surgery: [facility]  Date and time of surgery: 7/27/20 @ 0730am   Surgeon: Tamara   Pre-Op Appt Date: 7/21/20  Post-Op Appt Date: 8/7/20   Packet sent out: Yes  Pre-cert/Authorization completed:  No  Date: 7/16/20      Yaima Nicolas Surgery Scheduler

## 2020-07-20 DIAGNOSIS — Z11.59 ENCOUNTER FOR SCREENING FOR OTHER VIRAL DISEASES: Primary | ICD-10-CM

## 2020-07-20 PROBLEM — M17.12 PRIMARY OSTEOARTHRITIS OF LEFT KNEE: Status: ACTIVE | Noted: 2020-07-20

## 2020-07-21 ENCOUNTER — OFFICE VISIT (OUTPATIENT)
Dept: INTERNAL MEDICINE | Facility: CLINIC | Age: 70
End: 2020-07-21
Payer: COMMERCIAL

## 2020-07-21 VITALS
OXYGEN SATURATION: 100 % | HEART RATE: 74 BPM | SYSTOLIC BLOOD PRESSURE: 124 MMHG | HEIGHT: 64 IN | TEMPERATURE: 97.1 F | DIASTOLIC BLOOD PRESSURE: 78 MMHG | BODY MASS INDEX: 31.98 KG/M2 | WEIGHT: 187.3 LBS

## 2020-07-21 DIAGNOSIS — E78.2 MIXED HYPERLIPIDEMIA: ICD-10-CM

## 2020-07-21 DIAGNOSIS — Z01.818 PREOP GENERAL PHYSICAL EXAM: Primary | ICD-10-CM

## 2020-07-21 DIAGNOSIS — I10 ESSENTIAL HYPERTENSION, BENIGN: ICD-10-CM

## 2020-07-21 LAB
ANION GAP SERPL CALCULATED.3IONS-SCNC: 6 MMOL/L (ref 3–14)
BUN SERPL-MCNC: 26 MG/DL (ref 7–30)
CALCIUM SERPL-MCNC: 9.9 MG/DL (ref 8.5–10.1)
CHLORIDE SERPL-SCNC: 107 MMOL/L (ref 94–109)
CHOLEST SERPL-MCNC: 233 MG/DL
CO2 SERPL-SCNC: 26 MMOL/L (ref 20–32)
CREAT SERPL-MCNC: 0.96 MG/DL (ref 0.52–1.04)
GFR SERPL CREATININE-BSD FRML MDRD: 60 ML/MIN/{1.73_M2}
GLUCOSE SERPL-MCNC: 86 MG/DL (ref 70–99)
HDLC SERPL-MCNC: 77 MG/DL
HGB BLD-MCNC: 13.2 G/DL (ref 11.7–15.7)
LDLC SERPL CALC-MCNC: 139 MG/DL
NONHDLC SERPL-MCNC: 156 MG/DL
POTASSIUM SERPL-SCNC: 4.1 MMOL/L (ref 3.4–5.3)
SODIUM SERPL-SCNC: 139 MMOL/L (ref 133–144)
TRIGL SERPL-MCNC: 86 MG/DL

## 2020-07-21 PROCEDURE — 99214 OFFICE O/P EST MOD 30 MIN: CPT | Performed by: INTERNAL MEDICINE

## 2020-07-21 PROCEDURE — 85018 HEMOGLOBIN: CPT | Performed by: INTERNAL MEDICINE

## 2020-07-21 PROCEDURE — 80061 LIPID PANEL: CPT | Performed by: INTERNAL MEDICINE

## 2020-07-21 PROCEDURE — 93000 ELECTROCARDIOGRAM COMPLETE: CPT | Performed by: INTERNAL MEDICINE

## 2020-07-21 PROCEDURE — 80048 BASIC METABOLIC PNL TOTAL CA: CPT | Performed by: INTERNAL MEDICINE

## 2020-07-21 PROCEDURE — 36415 COLL VENOUS BLD VENIPUNCTURE: CPT | Performed by: INTERNAL MEDICINE

## 2020-07-21 ASSESSMENT — MIFFLIN-ST. JEOR: SCORE: 1359.59

## 2020-07-21 NOTE — LETTER
July 23, 2020      Laure [place]  [address] [facility] [address]        Dear ,    We are writing to inform you of your test results.    Your kidney labs are better .  Lipids are still elevated but some improvement. I recommend increasing to 40 mg simvastatin. (2- 20 mg tabs) each night.     Resulted Orders   Hemoglobin   Result Value Ref Range    Hemoglobin 13.2 11.7 - 15.7 g/dL   Basic metabolic panel   Result Value Ref Range    Sodium 139 133 - 144 mmol/L    Potassium 4.1 3.4 - 5.3 mmol/L    Chloride 107 94 - 109 mmol/L    Carbon Dioxide 26 20 - 32 mmol/L    Anion Gap 6 3 - 14 mmol/L    Glucose 86 70 - 99 mg/dL    Urea Nitrogen 26 7 - 30 mg/dL    Creatinine 0.96 0.52 - 1.04 mg/dL    GFR Estimate 60 (L) >60 mL/min/[1.73_m2]      Comment:      Non  GFR Calc  Starting 12/18/2018, serum creatinine based estimated GFR (eGFR) will be   calculated using the Chronic Kidney Disease Epidemiology Collaboration   (CKD-EPI) equation.      GFR Estimate If Black 70 >60 mL/min/[1.73_m2]      Comment:       GFR Calc  Starting 12/18/2018, serum creatinine based estimated GFR (eGFR) will be   calculated using the Chronic Kidney Disease Epidemiology Collaboration   (CKD-EPI) equation.      Calcium 9.9 8.5 - 10.1 mg/dL   Lipid panel reflex to direct LDL Fasting   Result Value Ref Range    Cholesterol 233 (H) <200 mg/dL      Comment:      Desirable:       <200 mg/dl    Triglycerides 86 <150 mg/dL    HDL Cholesterol 77 >49 mg/dL    LDL Cholesterol Calculated 139 (H) <100 mg/dL      Comment:      Above desirable:  100-129 mg/dl  Borderline High:  130-159 mg/dL  High:             160-189 mg/dL  Very high:       >189 mg/dl      Non HDL Cholesterol 156 (H) <130 mg/dL      Comment:      Above Desirable:  130-159 mg/dl  Borderline high:  160-189 mg/dl  High:             190-219 mg/dl  Very high:       >219 mg/dl         If you have any questions or concerns, please call the clinic at the  number listed above.       Sincerely,        Yoshi Bowen MD

## 2020-07-21 NOTE — NURSING NOTE
"Chief Complaint   Patient presents with     Pre-Op Exam     /78   Pulse 74   Temp 97.1  F (36.2  C) (Temporal)   Ht 1.626 m (5' 4\")   Wt 85 kg (187 lb 4.8 oz)   SpO2 100%   BMI 32.15 kg/m   Estimated body mass index is 32.15 kg/m  as calculated from the following:    Height as of this encounter: 1.626 m (5' 4\").    Weight as of this encounter: 85 kg (187 lb 4.8 oz).        Health Maintenance due pending provider review:  Annual wellness exam    Aware, will schedule after surg recovery    Hallie Weller CMA  "

## 2020-07-21 NOTE — PATIENT INSTRUCTIONS
Before Your Surgery      Call your surgeon if there is any change in your health. This includes signs of a cold or flu (such as a sore throat, runny nose, cough, rash or fever).    Do not smoke, drink alcohol or take over the counter medicine (unless your surgeon or primary care doctor tells you to) for the 24 hours before and after surgery.    If you take prescribed drugs: Follow your doctor s orders about which medicines to take and which to stop until after surgery.    Eating and drinking prior to surgery: follow the instructions from your surgeon    Take a shower or bath the night before surgery. Use the soap your surgeon gave you to gently clean your skin. If you do not have soap from your surgeon, use your regular soap. Do not shave or scrub the surgery site.  Wear clean pajamas and have clean sheets on your bed.     Take your AM meds with a small sip of water on your surgery date.

## 2020-07-21 NOTE — PROGRESS NOTES
[address] [address]  [phone]  Dept: [phone]    PRE-OP EVALUATION:  Today's date: 2020    Laure Wood (: 1950) presents for pre-operative evaluation assessment as requested by Dr. Mcdonald.  She requires evaluation and anesthesia risk assessment prior to undergoing surgery/procedure for treatment of knee .    Proposed Surgery/ Procedure: Left total knee arthroplasty  Date of Surgery/ Procedure: 2020  Time of Surgery/ Procedure: 730am  Hospital/Surgical Facility: [facility]  Surgery Fax Number:   Primary Physician: Yoshi Bowen  Type of Anesthesia Anticipated: to be determined    Preoperative Questionnaire:   No - Have you ever had a heart attack or stroke?  No - Have you ever had surgery on your heart or blood vessels, such as a stent, coronary (heart) bypass, or surgery on an artery in the head, neck, heart, or legs?  No - Do you have chest pain when you are physically active?  No - Do you have a history of heart failure?  No - Do you currently have a cold, bronchitis, or symptoms of other respiratory (head and chest) infections?  No - Do you have a cough, shortness of breath, or wheezing?  No - Do you or anyone in your family have a history of blood clots?  No - Do you or anyone in your family have a serious bleeding problem, such as long-lasting bleeding after surgeries or cuts?  No - Have you ever had anemia or been told to take iron pills?  No - Have you had any abnormal blood loss such as black, tarry or bloody stools, or abnormal vaginal bleeding?  No - Have you ever had a blood transfusion?  Yes - Are you willing to have a blood transfusion if it is medically needed before, during, or after your surgery?  No - Have you or anyone in your family ever had problems with anesthesia (sedation for surgery)?  No - Do you have sleep apnea, excessive snoring, or daytime drowsiness?   No - Do you have any artifical heart  valves or other implanted medical devices, such as a pacemaker, defibrillator, or continuous glucose monitor?  No - Do you have any artifical joints?  No - Are you allergic to latex?  No - Is there any chance that you may be pregnant?    Patient has a Health Care Directive or Living Will:  NO    HPI:     Patient with bilateral knee osteoarthritis.  Despite conservative medical treatment pain is been worsening and she is now scheduled for knee replacement.  Over time she will likely require both knees replaced due to significant arthritis.        MEDICAL HISTORY:     Patient Active Problem List    Diagnosis Date Noted     Esophageal reflux 07/11/2013     Priority: High     Essential hypertension, benign 04/09/2012     Priority: High     Primary osteoarthritis of left knee 07/20/2020     Priority: Medium     Added automatically from request for surgery [id number]       Obesity (BMI 35.0-39.9) with comorbidity (H) 05/01/2019     Priority: Medium     Over weight 07/19/2014     Priority: Medium     Knee osteoarthritis 07/18/2014     Priority: Medium     Advanced care planning/counseling discussion 04/19/2013     Priority: Medium     Advance Care Planning:   ACP Review and Resources Provided: patient given information              Cervical pain 01/07/2013     Priority: Medium     Pain in shoulder 01/07/2013     Priority: Medium     Lumbar pain 01/07/2013     Priority: Medium     Fibroid uterus 12/20/2012     Priority: Medium     Thickened endometrium 12/20/2012     Priority: Medium     CARDIOVASCULAR SCREENING; LDL GOAL LESS THAN 130 05/29/2012     Priority: Medium      Past Medical History:   Diagnosis Date     Gastroesophageal reflux disease      Hyperlipidemia LDL goal <160      Hypertension, essential, benign      Knee osteoarthritis      Past Surgical History:   Procedure Laterality Date     CATARACT IOL, RT/LT Bilateral     Cataract IOL RT/LT     COLONOSCOPY Left 7/25/2019    Procedure: COLONOSCOPY;  Surgeon: Jignesh  "Jono FUNES MD;  Location:  GI     Current Outpatient Medications   Medication Sig Dispense Refill     albuterol 108 (90 Base) MCG/ACT IN inhaler Inhale 2 puffs into the lungs every 6 hours as needed for shortness of breath / dyspnea or wheezing 1 Inhaler 0     diclofenac (VOLTAREN) 1 % topical gel Place 4 g onto the skin 3 times daily as needed for moderate pain 1 Tube 3     hydrochlorothiazide (HYDRODIURIL) 25 MG tablet Take 1 tablet (25 mg) by mouth daily 90 tablet 1     omeprazole (PRILOSEC) 20 MG DR capsule Take 1 capsule (20 mg) by mouth daily 30 capsule 8     simvastatin (ZOCOR) 40 MG tablet Take 1 tablet (40 mg) by mouth At Bedtime 90 tablet 1     OTC products: None, except as noted above    Allergies   Allergen Reactions     No Known Drug Allergy       Latex Allergy: NO    Social History     Tobacco Use     Smoking status: Never Smoker     Smokeless tobacco: Never Used   Substance Use Topics     Alcohol use: No     History   Drug Use No       REVIEW OF SYSTEMS:   Constitutional, neuro, ENT, endocrine, pulmonary, cardiac, gastrointestinal, genitourinary, musculoskeletal, integument and psychiatric systems are negative, except as otherwise noted.    EXAM:   /78   Pulse 74   Temp 97.1  F (36.2  C) (Temporal)   Ht 1.626 m (5' 4\")   Wt 85 kg (187 lb 4.8 oz)   SpO2 100%   BMI 32.15 kg/m      GENERAL APPEARANCE: alert, active and over weight     EYES: EOMI, PERRL     HENT: nose and mouth without ulcers or lesions and normal cephalic/atraumatic     NECK: no adenopathy, no asymmetry, masses, or scars and thyroid normal to palpation     RESP: lungs clear to auscultation - no rales, rhonchi or wheezes     CV: regular rates and rhythm, normal S1 S2, no S3 or S4 and no murmur, click or rub     ABDOMEN:  soft, nontender, no HSM or masses and bowel sounds normal     MS: extremities normal- no gross deformities noted, no evidence of inflammation in joints, FROM in all extremities.     SKIN: no suspicious lesions " or rashes     NEURO: Normal strength and tone, sensory exam grossly normal, mentation intact and speech normal     PSYCH: mentation appears normal. and affect normal/bright     LYMPHATICS: No cervical adenopathy    DIAGNOSTICS:   EKG: Normal Sinus Rhythm, normal axis, normal intervals, no acute ST/T changes c/w ischemia, no LVH by voltage criteria, unchanged from previous tracings    Results for orders placed or performed in visit on 07/21/20   Hemoglobin     Status: None   Result Value Ref Range    Hemoglobin 13.2 11.7 - 15.7 g/dL   Basic metabolic panel     Status: Abnormal   Result Value Ref Range    Sodium 139 133 - 144 mmol/L    Potassium 4.1 3.4 - 5.3 mmol/L    Chloride 107 94 - 109 mmol/L    Carbon Dioxide 26 20 - 32 mmol/L    Anion Gap 6 3 - 14 mmol/L    Glucose 86 70 - 99 mg/dL    Urea Nitrogen 26 7 - 30 mg/dL    Creatinine 0.96 0.52 - 1.04 mg/dL    GFR Estimate 60 (L) >60 mL/min/[1.73_m2]    GFR Estimate If Black 70 >60 mL/min/[1.73_m2]    Calcium 9.9 8.5 - 10.1 mg/dL   Lipid panel reflex to direct LDL Fasting     Status: Abnormal   Result Value Ref Range    Cholesterol 233 (H) <200 mg/dL    Triglycerides 86 <150 mg/dL    HDL Cholesterol 77 >49 mg/dL    LDL Cholesterol Calculated 139 (H) <100 mg/dL    Non HDL Cholesterol 156 (H) <130 mg/dL      Recent Labs   Lab Test 06/29/20  0818 05/09/19  0956 05/01/19  0803  04/15/13  2030   HGB  --   --   --   --  12.7   PLT  --   --   --   --  334     --  140   < > 142   POTASSIUM 3.5  --  3.8   < > 3.9   CR 1.15*  --  0.99   < > 0.85   A1C  --  5.5  --   --   --     < > = values in this interval not displayed.        IMPRESSION:   Reason for surgery/procedure: Osteoarthritis  Diagnosis/reason for consult: Hypertension, hyperlipidemia    The proposed surgical procedure is considered INTERMEDIATE risk.    REVISED CARDIAC RISK INDEX  The patient has the following serious cardiovascular risks for perioperative complications such as (MI, PE, VFib and 3  AV  Block):  No serious cardiac risks  INTERPRETATION: 0 risks: Class I (very low risk - 0.4% complication rate)    The patient has the following additional risks for perioperative complications:  No identified additional risks      ICD-10-CM    1. Preop general physical exam  Z01.818 EKG 12-lead complete w/read - Clinics     Hemoglobin   2. Essential hypertension, benign  I10 EKG 12-lead complete w/read - Clinics     Basic metabolic panel   3. Mixed hyperlipidemia  E78.2 Lipid panel reflex to direct LDL Fasting     simvastatin (ZOCOR) 40 MG tablet       RECOMMENDATIONS:     --Patient is to take all scheduled medications on the day of surgery     APPROVAL GIVEN to proceed with proposed procedure, without further diagnostic evaluation       Signed Electronically by: Yoshi Bowen MD    Copy of this evaluation report is provided to requesting physician.    [facility] Preop Guidelines    Revised Cardiac Risk Index

## 2020-07-22 ENCOUNTER — TELEPHONE (OUTPATIENT)
Dept: ORTHOPEDICS | Facility: CLINIC | Age: 70
End: 2020-07-22

## 2020-07-22 RX ORDER — SIMVASTATIN 40 MG
40 TABLET ORAL AT BEDTIME
Qty: 90 TABLET | Refills: 1 | Status: SHIPPED | OUTPATIENT
Start: 2020-07-22 | End: 2021-02-26 | Stop reason: SINTOL

## 2020-07-22 NOTE — LETTER
July 22, 2020      Laure Wood  [address] [address] [address]        To Whom It May Concern:    Laure Wood  was seen on 7/15/20 regarding bilateral knee pain. She is anticipated to have left total knee replacement surgery on 7/27/20.  Please excuse her from work starting on 7/27/20 due to surgery, and a period of convalescence.  She will be seen in follow up by my team on 8/7/20 and formal FMLA forms to be completed at that date, after completion of the procedure.        Sincerely,            James Mcdonald MD

## 2020-07-22 NOTE — TELEPHONE ENCOUNTER
Reason for Call:  Form, our goal is to have forms completed with 7 days, however, some forms may require a visit or additional information.    Type of letter, form or note:  FMLA     Who is the form from?: Insurance    Where did the form come from: form was faxed in    Phone number of person requesting form: [phone]  Can we leave a detailed message on this number:  NO    Desired completion date of form: 8/5/20      How will form be returned?:  fax to [phone]    Has the patient signed a consent form for release of information (may be included with form)? NO    Additional comments: left TKA, DOS: 7/27/20-- Letter faxed to [employer] to inform of upcoming procedure and completion of form at post-op appointment.     Form was started and place in accordion folder on Emerson's desk  for provider review/ completion at [facility].

## 2020-07-24 DIAGNOSIS — Z11.59 ENCOUNTER FOR SCREENING FOR OTHER VIRAL DISEASES: ICD-10-CM

## 2020-07-24 PROCEDURE — U0003 INFECTIOUS AGENT DETECTION BY NUCLEIC ACID (DNA OR RNA); SEVERE ACUTE RESPIRATORY SYNDROME CORONAVIRUS 2 (SARS-COV-2) (CORONAVIRUS DISEASE [COVID-19]), AMPLIFIED PROBE TECHNIQUE, MAKING USE OF HIGH THROUGHPUT TECHNOLOGIES AS DESCRIBED BY CMS-2020-01-R: HCPCS | Performed by: ORTHOPAEDIC SURGERY

## 2020-07-25 LAB
SARS-COV-2 RNA SPEC QL NAA+PROBE: NOT DETECTED
SPECIMEN SOURCE: NORMAL

## 2020-07-27 ENCOUNTER — ANESTHESIA (OUTPATIENT)
Dept: SURGERY | Facility: CLINIC | Age: 70
DRG: 470 | End: 2020-07-27
Payer: COMMERCIAL

## 2020-07-27 ENCOUNTER — HOSPITAL ENCOUNTER (INPATIENT)
Facility: CLINIC | Age: 70
LOS: 1 days | Discharge: HOME | DRG: 470 | End: 2020-07-29
Attending: ORTHOPAEDIC SURGERY | Admitting: ORTHOPAEDIC SURGERY
Payer: COMMERCIAL

## 2020-07-27 ENCOUNTER — ANESTHESIA EVENT (OUTPATIENT)
Dept: SURGERY | Facility: CLINIC | Age: 70
DRG: 470 | End: 2020-07-27
Payer: COMMERCIAL

## 2020-07-27 ENCOUNTER — APPOINTMENT (OUTPATIENT)
Dept: PHYSICAL THERAPY | Facility: CLINIC | Age: 70
DRG: 470 | End: 2020-07-27
Attending: ORTHOPAEDIC SURGERY
Payer: COMMERCIAL

## 2020-07-27 DIAGNOSIS — Z78.9 DEEP VEIN THROMBOSIS (DVT) PROPHYLAXIS PRESCRIBED AT DISCHARGE: ICD-10-CM

## 2020-07-27 DIAGNOSIS — T40.2X5A CONSTIPATION DUE TO OPIOID THERAPY: Primary | ICD-10-CM

## 2020-07-27 DIAGNOSIS — M17.12 PRIMARY OSTEOARTHRITIS OF LEFT KNEE: ICD-10-CM

## 2020-07-27 DIAGNOSIS — K59.03 CONSTIPATION DUE TO OPIOID THERAPY: Primary | ICD-10-CM

## 2020-07-27 DIAGNOSIS — Z96.652 S/P TOTAL KNEE REPLACEMENT, LEFT: ICD-10-CM

## 2020-07-27 PROBLEM — Z96.642 STATUS POST TOTAL HIP REPLACEMENT, LEFT: Status: ACTIVE | Noted: 2020-07-27

## 2020-07-27 LAB
CREAT SERPL-MCNC: 0.96 MG/DL (ref 0.52–1.04)
GFR SERPL CREATININE-BSD FRML MDRD: 60 ML/MIN/{1.73_M2}
PLATELET # BLD AUTO: 253 10E9/L (ref 150–450)

## 2020-07-27 PROCEDURE — 0SRD0J9 REPLACEMENT OF LEFT KNEE JOINT WITH SYNTHETIC SUBSTITUTE, CEMENTED, OPEN APPROACH: ICD-10-PCS | Performed by: ORTHOPAEDIC SURGERY

## 2020-07-27 PROCEDURE — 97116 GAIT TRAINING THERAPY: CPT | Mod: GP | Performed by: PHYSICAL THERAPIST

## 2020-07-27 PROCEDURE — 25000132 ZZH RX MED GY IP 250 OP 250 PS 637: Performed by: ORTHOPAEDIC SURGERY

## 2020-07-27 PROCEDURE — 27210794 ZZH OR GENERAL SUPPLY STERILE: Performed by: ORTHOPAEDIC SURGERY

## 2020-07-27 PROCEDURE — 25800030 ZZH RX IP 258 OP 636: Performed by: ORTHOPAEDIC SURGERY

## 2020-07-27 PROCEDURE — 27447 TOTAL KNEE ARTHROPLASTY: CPT | Mod: AS | Performed by: PHYSICIAN ASSISTANT

## 2020-07-27 PROCEDURE — 71000012 ZZH RECOVERY PHASE 1 LEVEL 1 FIRST HR: Performed by: ORTHOPAEDIC SURGERY

## 2020-07-27 PROCEDURE — C1776 JOINT DEVICE (IMPLANTABLE): HCPCS | Performed by: ORTHOPAEDIC SURGERY

## 2020-07-27 PROCEDURE — 27810169 ZZH OR IMPLANT GENERAL: Performed by: ORTHOPAEDIC SURGERY

## 2020-07-27 PROCEDURE — 85049 AUTOMATED PLATELET COUNT: CPT | Performed by: ORTHOPAEDIC SURGERY

## 2020-07-27 PROCEDURE — 25000128 H RX IP 250 OP 636: Performed by: ANESTHESIOLOGY

## 2020-07-27 PROCEDURE — 27110028 ZZH OR GENERAL SUPPLY NON-STERILE: Performed by: ORTHOPAEDIC SURGERY

## 2020-07-27 PROCEDURE — 82565 ASSAY OF CREATININE: CPT | Performed by: ORTHOPAEDIC SURGERY

## 2020-07-27 PROCEDURE — 25000128 H RX IP 250 OP 636: Performed by: NURSE ANESTHETIST, CERTIFIED REGISTERED

## 2020-07-27 PROCEDURE — 25800030 ZZH RX IP 258 OP 636: Performed by: NURSE ANESTHETIST, CERTIFIED REGISTERED

## 2020-07-27 PROCEDURE — 97161 PT EVAL LOW COMPLEX 20 MIN: CPT | Mod: GP | Performed by: PHYSICAL THERAPIST

## 2020-07-27 PROCEDURE — 37000008 ZZH ANESTHESIA TECHNICAL FEE, 1ST 30 MIN: Performed by: ORTHOPAEDIC SURGERY

## 2020-07-27 PROCEDURE — 97530 THERAPEUTIC ACTIVITIES: CPT | Mod: GP | Performed by: PHYSICAL THERAPIST

## 2020-07-27 PROCEDURE — 36000063 ZZH SURGERY LEVEL 4 EA 15 ADDTL MIN: Performed by: ORTHOPAEDIC SURGERY

## 2020-07-27 PROCEDURE — 25800030 ZZH RX IP 258 OP 636: Performed by: ANESTHESIOLOGY

## 2020-07-27 PROCEDURE — 25000125 ZZHC RX 250: Performed by: ANESTHESIOLOGY

## 2020-07-27 PROCEDURE — 40000171 ZZH STATISTIC PRE-PROCEDURE ASSESSMENT III: Performed by: ORTHOPAEDIC SURGERY

## 2020-07-27 PROCEDURE — 25000125 ZZHC RX 250: Performed by: NURSE ANESTHETIST, CERTIFIED REGISTERED

## 2020-07-27 PROCEDURE — 25000125 ZZHC RX 250: Performed by: ORTHOPAEDIC SURGERY

## 2020-07-27 PROCEDURE — 25000128 H RX IP 250 OP 636: Performed by: ORTHOPAEDIC SURGERY

## 2020-07-27 PROCEDURE — 27447 TOTAL KNEE ARTHROPLASTY: CPT | Mod: LT | Performed by: ORTHOPAEDIC SURGERY

## 2020-07-27 PROCEDURE — 36415 COLL VENOUS BLD VENIPUNCTURE: CPT | Performed by: ORTHOPAEDIC SURGERY

## 2020-07-27 PROCEDURE — 37000009 ZZH ANESTHESIA TECHNICAL FEE, EACH ADDTL 15 MIN: Performed by: ORTHOPAEDIC SURGERY

## 2020-07-27 PROCEDURE — 36000093 ZZH SURGERY LEVEL 4 1ST 30 MIN: Performed by: ORTHOPAEDIC SURGERY

## 2020-07-27 DEVICE — IMP INSERT TIB S&N LGN PS HI FLEX XLPE SZ3-4 9MM 71453211: Type: IMPLANTABLE DEVICE | Site: KNEE | Status: FUNCTIONAL

## 2020-07-27 DEVICE — BONE CEMENT SIMPLEX FULL DOSE 6191-1-001: Type: IMPLANTABLE DEVICE | Site: KNEE | Status: FUNCTIONAL

## 2020-07-27 DEVICE — GENESIS II RESURFACING PATELLAR                                    PROSTHESIS  32MM
Type: IMPLANTABLE DEVICE | Site: KNEE | Status: FUNCTIONAL
Brand: GENESIS II

## 2020-07-27 DEVICE — LEGION POSTERIOR STABILIZED                                    NONPOROUS FEMORAL SIZE 4 LEFT
Type: IMPLANTABLE DEVICE | Site: KNEE | Status: FUNCTIONAL
Brand: LEGION

## 2020-07-27 DEVICE — GENESIS II NON-POROUS TIBIAL                                    BASEPLATE SIZE 3 LEFT
Type: IMPLANTABLE DEVICE | Site: KNEE | Status: FUNCTIONAL
Brand: GENESIS II

## 2020-07-27 RX ORDER — DEXAMETHASONE SODIUM PHOSPHATE 4 MG/ML
INJECTION, SOLUTION INTRA-ARTICULAR; INTRALESIONAL; INTRAMUSCULAR; INTRAVENOUS; SOFT TISSUE PRN
Status: DISCONTINUED | OUTPATIENT
Start: 2020-07-27 | End: 2020-07-27

## 2020-07-27 RX ORDER — ALBUTEROL SULFATE 90 UG/1
2 AEROSOL, METERED RESPIRATORY (INHALATION) EVERY 6 HOURS PRN
Status: DISCONTINUED | OUTPATIENT
Start: 2020-07-27 | End: 2020-07-29 | Stop reason: HOSPADM

## 2020-07-27 RX ORDER — LIDOCAINE 40 MG/G
CREAM TOPICAL
Status: DISCONTINUED | OUTPATIENT
Start: 2020-07-27 | End: 2020-07-27 | Stop reason: HOSPADM

## 2020-07-27 RX ORDER — TRANEXAMIC ACID 650 MG/1
1950 TABLET ORAL
Status: COMPLETED | OUTPATIENT
Start: 2020-07-27 | End: 2020-07-27

## 2020-07-27 RX ORDER — ACETAMINOPHEN 325 MG/1
650 TABLET ORAL EVERY 4 HOURS PRN
Status: DISCONTINUED | OUTPATIENT
Start: 2020-07-30 | End: 2020-07-29 | Stop reason: HOSPADM

## 2020-07-27 RX ORDER — BUPIVACAINE HYDROCHLORIDE AND EPINEPHRINE 5; 5 MG/ML; UG/ML
INJECTION, SOLUTION PERINEURAL PRN
Status: DISCONTINUED | OUTPATIENT
Start: 2020-07-27 | End: 2020-07-27

## 2020-07-27 RX ORDER — MEPERIDINE HYDROCHLORIDE 25 MG/ML
12.5 INJECTION INTRAMUSCULAR; INTRAVENOUS; SUBCUTANEOUS
Status: DISCONTINUED | OUTPATIENT
Start: 2020-07-27 | End: 2020-07-27 | Stop reason: HOSPADM

## 2020-07-27 RX ORDER — LIDOCAINE 40 MG/G
CREAM TOPICAL
Status: DISCONTINUED | OUTPATIENT
Start: 2020-07-27 | End: 2020-07-29 | Stop reason: HOSPADM

## 2020-07-27 RX ORDER — AMOXICILLIN 250 MG
2 CAPSULE ORAL 2 TIMES DAILY
Status: DISCONTINUED | OUTPATIENT
Start: 2020-07-27 | End: 2020-07-29 | Stop reason: HOSPADM

## 2020-07-27 RX ORDER — OXYCODONE HYDROCHLORIDE 5 MG/1
5-10 TABLET ORAL
Status: DISCONTINUED | OUTPATIENT
Start: 2020-07-27 | End: 2020-07-28 | Stop reason: CLARIF

## 2020-07-27 RX ORDER — ONDANSETRON 2 MG/ML
4 INJECTION INTRAMUSCULAR; INTRAVENOUS EVERY 6 HOURS PRN
Status: DISCONTINUED | OUTPATIENT
Start: 2020-07-27 | End: 2020-07-29 | Stop reason: HOSPADM

## 2020-07-27 RX ORDER — CEFAZOLIN SODIUM 2 G/100ML
2 INJECTION, SOLUTION INTRAVENOUS
Status: COMPLETED | OUTPATIENT
Start: 2020-07-27 | End: 2020-07-27

## 2020-07-27 RX ORDER — ONDANSETRON 2 MG/ML
INJECTION INTRAMUSCULAR; INTRAVENOUS PRN
Status: DISCONTINUED | OUTPATIENT
Start: 2020-07-27 | End: 2020-07-27

## 2020-07-27 RX ORDER — METHOCARBAMOL 500 MG/1
500 TABLET, FILM COATED ORAL 4 TIMES DAILY PRN
Status: DISCONTINUED | OUTPATIENT
Start: 2020-07-27 | End: 2020-07-29 | Stop reason: HOSPADM

## 2020-07-27 RX ORDER — ACETAMINOPHEN 325 MG/1
975 TABLET ORAL ONCE
Status: COMPLETED | OUTPATIENT
Start: 2020-07-27 | End: 2020-07-27

## 2020-07-27 RX ORDER — FENTANYL CITRATE 50 UG/ML
25-50 INJECTION, SOLUTION INTRAMUSCULAR; INTRAVENOUS EVERY 5 MIN PRN
Status: DISCONTINUED | OUTPATIENT
Start: 2020-07-27 | End: 2020-07-27 | Stop reason: HOSPADM

## 2020-07-27 RX ORDER — NALOXONE HYDROCHLORIDE 0.4 MG/ML
.1-.4 INJECTION, SOLUTION INTRAMUSCULAR; INTRAVENOUS; SUBCUTANEOUS
Status: DISCONTINUED | OUTPATIENT
Start: 2020-07-27 | End: 2020-07-29 | Stop reason: HOSPADM

## 2020-07-27 RX ORDER — HYDROCHLOROTHIAZIDE 25 MG/1
25 TABLET ORAL DAILY
Status: DISCONTINUED | OUTPATIENT
Start: 2020-07-28 | End: 2020-07-29 | Stop reason: HOSPADM

## 2020-07-27 RX ORDER — LIDOCAINE HYDROCHLORIDE ANHYDROUS AND EPINEPHRINE 10; 10 MG/ML; UG/ML
30 INJECTION, SOLUTION INFILTRATION ONCE
Status: DISCONTINUED | OUTPATIENT
Start: 2020-07-27 | End: 2020-07-27 | Stop reason: HOSPADM

## 2020-07-27 RX ORDER — ALBUTEROL SULFATE 0.83 MG/ML
2.5 SOLUTION RESPIRATORY (INHALATION) EVERY 4 HOURS PRN
Status: DISCONTINUED | OUTPATIENT
Start: 2020-07-27 | End: 2020-07-27 | Stop reason: HOSPADM

## 2020-07-27 RX ORDER — ONDANSETRON 4 MG/1
4 TABLET, ORALLY DISINTEGRATING ORAL EVERY 30 MIN PRN
Status: DISCONTINUED | OUTPATIENT
Start: 2020-07-27 | End: 2020-07-27 | Stop reason: HOSPADM

## 2020-07-27 RX ORDER — HYDROMORPHONE HYDROCHLORIDE 1 MG/ML
0.2 INJECTION, SOLUTION INTRAMUSCULAR; INTRAVENOUS; SUBCUTANEOUS
Status: DISCONTINUED | OUTPATIENT
Start: 2020-07-27 | End: 2020-07-29 | Stop reason: HOSPADM

## 2020-07-27 RX ORDER — ONDANSETRON 4 MG/1
4 TABLET, ORALLY DISINTEGRATING ORAL EVERY 6 HOURS PRN
Status: DISCONTINUED | OUTPATIENT
Start: 2020-07-27 | End: 2020-07-29 | Stop reason: HOSPADM

## 2020-07-27 RX ORDER — HYDROMORPHONE HYDROCHLORIDE 1 MG/ML
.3-.5 INJECTION, SOLUTION INTRAMUSCULAR; INTRAVENOUS; SUBCUTANEOUS EVERY 10 MIN PRN
Status: DISCONTINUED | OUTPATIENT
Start: 2020-07-27 | End: 2020-07-27 | Stop reason: HOSPADM

## 2020-07-27 RX ORDER — EPHEDRINE SULFATE 50 MG/ML
INJECTION, SOLUTION INTRAMUSCULAR; INTRAVENOUS; SUBCUTANEOUS PRN
Status: DISCONTINUED | OUTPATIENT
Start: 2020-07-27 | End: 2020-07-27

## 2020-07-27 RX ORDER — SODIUM CHLORIDE, SODIUM LACTATE, POTASSIUM CHLORIDE, CALCIUM CHLORIDE 600; 310; 30; 20 MG/100ML; MG/100ML; MG/100ML; MG/100ML
INJECTION, SOLUTION INTRAVENOUS CONTINUOUS
Status: DISCONTINUED | OUTPATIENT
Start: 2020-07-27 | End: 2020-07-27 | Stop reason: HOSPADM

## 2020-07-27 RX ORDER — NALOXONE HYDROCHLORIDE 0.4 MG/ML
.1-.4 INJECTION, SOLUTION INTRAMUSCULAR; INTRAVENOUS; SUBCUTANEOUS
Status: DISCONTINUED | OUTPATIENT
Start: 2020-07-27 | End: 2020-07-27 | Stop reason: HOSPADM

## 2020-07-27 RX ORDER — SIMVASTATIN 40 MG
40 TABLET ORAL AT BEDTIME
Status: DISCONTINUED | OUTPATIENT
Start: 2020-07-27 | End: 2020-07-29 | Stop reason: HOSPADM

## 2020-07-27 RX ORDER — HYDROXYZINE HYDROCHLORIDE 10 MG/1
10 TABLET, FILM COATED ORAL EVERY 6 HOURS PRN
Status: DISCONTINUED | OUTPATIENT
Start: 2020-07-27 | End: 2020-07-29 | Stop reason: HOSPADM

## 2020-07-27 RX ORDER — FENTANYL CITRATE 50 UG/ML
25-50 INJECTION, SOLUTION INTRAMUSCULAR; INTRAVENOUS
Status: DISCONTINUED | OUTPATIENT
Start: 2020-07-27 | End: 2020-07-27 | Stop reason: HOSPADM

## 2020-07-27 RX ORDER — ACETAMINOPHEN 325 MG/1
975 TABLET ORAL EVERY 8 HOURS
Status: DISCONTINUED | OUTPATIENT
Start: 2020-07-27 | End: 2020-07-29 | Stop reason: HOSPADM

## 2020-07-27 RX ORDER — CEFAZOLIN SODIUM 1 G/3ML
1 INJECTION, POWDER, FOR SOLUTION INTRAMUSCULAR; INTRAVENOUS SEE ADMIN INSTRUCTIONS
Status: DISCONTINUED | OUTPATIENT
Start: 2020-07-27 | End: 2020-07-27 | Stop reason: HOSPADM

## 2020-07-27 RX ORDER — BISACODYL 10 MG
10 SUPPOSITORY, RECTAL RECTAL DAILY PRN
Status: DISCONTINUED | OUTPATIENT
Start: 2020-07-27 | End: 2020-07-29 | Stop reason: HOSPADM

## 2020-07-27 RX ORDER — BUPIVACAINE HYDROCHLORIDE 7.5 MG/ML
INJECTION, SOLUTION INTRASPINAL PRN
Status: DISCONTINUED | OUTPATIENT
Start: 2020-07-27 | End: 2020-07-27

## 2020-07-27 RX ORDER — CEFAZOLIN SODIUM 2 G/100ML
2 INJECTION, SOLUTION INTRAVENOUS EVERY 8 HOURS
Status: COMPLETED | OUTPATIENT
Start: 2020-07-27 | End: 2020-07-28

## 2020-07-27 RX ORDER — GABAPENTIN 100 MG/1
100 CAPSULE ORAL 3 TIMES DAILY
Status: DISCONTINUED | OUTPATIENT
Start: 2020-07-28 | End: 2020-07-29 | Stop reason: HOSPADM

## 2020-07-27 RX ORDER — PROPOFOL 10 MG/ML
INJECTION, EMULSION INTRAVENOUS CONTINUOUS PRN
Status: DISCONTINUED | OUTPATIENT
Start: 2020-07-27 | End: 2020-07-27

## 2020-07-27 RX ORDER — POLYETHYLENE GLYCOL 3350 17 G/17G
17 POWDER, FOR SOLUTION ORAL DAILY
Status: DISCONTINUED | OUTPATIENT
Start: 2020-07-28 | End: 2020-07-29 | Stop reason: HOSPADM

## 2020-07-27 RX ORDER — ONDANSETRON 2 MG/ML
4 INJECTION INTRAMUSCULAR; INTRAVENOUS EVERY 30 MIN PRN
Status: DISCONTINUED | OUTPATIENT
Start: 2020-07-27 | End: 2020-07-27 | Stop reason: HOSPADM

## 2020-07-27 RX ORDER — SODIUM CHLORIDE 9 MG/ML
INJECTION, SOLUTION INTRAVENOUS CONTINUOUS
Status: DISCONTINUED | OUTPATIENT
Start: 2020-07-27 | End: 2020-07-29 | Stop reason: HOSPADM

## 2020-07-27 RX ADMIN — BUPIVACAINE HYDROCHLORIDE AND EPINEPHRINE BITARTRATE 20 ML: 5; .005 INJECTION, SOLUTION EPIDURAL; INTRACAUDAL; PERINEURAL at 09:17

## 2020-07-27 RX ADMIN — CEFAZOLIN SODIUM 2 G: 2 INJECTION, SOLUTION INTRAVENOUS at 16:58

## 2020-07-27 RX ADMIN — ACETAMINOPHEN 975 MG: 325 TABLET, FILM COATED ORAL at 06:20

## 2020-07-27 RX ADMIN — ONDANSETRON HYDROCHLORIDE 4 MG: 2 INJECTION, SOLUTION INTRAVENOUS at 07:34

## 2020-07-27 RX ADMIN — CEFAZOLIN SODIUM 2 G: 2 INJECTION, SOLUTION INTRAVENOUS at 07:30

## 2020-07-27 RX ADMIN — GABAPENTIN 400 MG: 100 CAPSULE ORAL at 06:21

## 2020-07-27 RX ADMIN — TRANEXAMIC ACID 1950 MG: 650 TABLET ORAL at 06:19

## 2020-07-27 RX ADMIN — SODIUM CHLORIDE: 9 INJECTION, SOLUTION INTRAVENOUS at 10:32

## 2020-07-27 RX ADMIN — ACETAMINOPHEN 975 MG: 325 TABLET, FILM COATED ORAL at 13:52

## 2020-07-27 RX ADMIN — OXYCODONE HYDROCHLORIDE 5 MG: 5 TABLET ORAL at 21:07

## 2020-07-27 RX ADMIN — Medication 10 MG: at 07:42

## 2020-07-27 RX ADMIN — SODIUM CHLORIDE, POTASSIUM CHLORIDE, SODIUM LACTATE AND CALCIUM CHLORIDE: 600; 310; 30; 20 INJECTION, SOLUTION INTRAVENOUS at 07:17

## 2020-07-27 RX ADMIN — OXYCODONE HYDROCHLORIDE 5 MG: 5 TABLET ORAL at 16:57

## 2020-07-27 RX ADMIN — PHENYLEPHRINE HYDROCHLORIDE 100 MCG: 10 INJECTION INTRAVENOUS at 08:19

## 2020-07-27 RX ADMIN — SIMVASTATIN 40 MG: 40 TABLET, FILM COATED ORAL at 22:48

## 2020-07-27 RX ADMIN — ONDANSETRON 4 MG: 2 INJECTION INTRAMUSCULAR; INTRAVENOUS at 20:23

## 2020-07-27 RX ADMIN — PROPOFOL 150 MCG/KG/MIN: 10 INJECTION, EMULSION INTRAVENOUS at 07:29

## 2020-07-27 RX ADMIN — BUPIVACAINE HYDROCHLORIDE IN DEXTROSE 1.4 ML: 7.5 INJECTION, SOLUTION SUBARACHNOID at 07:12

## 2020-07-27 RX ADMIN — OXYCODONE HYDROCHLORIDE 5 MG: 5 TABLET ORAL at 13:50

## 2020-07-27 RX ADMIN — DEXAMETHASONE SODIUM PHOSPHATE 4 MG: 4 INJECTION, SOLUTION INTRA-ARTICULAR; INTRALESIONAL; INTRAMUSCULAR; INTRAVENOUS; SOFT TISSUE at 07:34

## 2020-07-27 RX ADMIN — MIDAZOLAM 1 MG: 1 INJECTION INTRAMUSCULAR; INTRAVENOUS at 07:09

## 2020-07-27 RX ADMIN — DOCUSATE SODIUM AND SENNOSIDES 2 TABLET: 8.6; 5 TABLET, FILM COATED ORAL at 20:23

## 2020-07-27 RX ADMIN — SODIUM CHLORIDE, POTASSIUM CHLORIDE, SODIUM LACTATE AND CALCIUM CHLORIDE: 600; 310; 30; 20 INJECTION, SOLUTION INTRAVENOUS at 08:06

## 2020-07-27 RX ADMIN — PHENYLEPHRINE HYDROCHLORIDE 100 MCG: 10 INJECTION INTRAVENOUS at 08:09

## 2020-07-27 RX ADMIN — ACETAMINOPHEN 975 MG: 325 TABLET, FILM COATED ORAL at 22:48

## 2020-07-27 RX ADMIN — SODIUM CHLORIDE: 9 INJECTION, SOLUTION INTRAVENOUS at 23:28

## 2020-07-27 RX ADMIN — MIDAZOLAM 1 MG: 1 INJECTION INTRAMUSCULAR; INTRAVENOUS at 07:35

## 2020-07-27 RX ADMIN — Medication 5 MG: at 07:53

## 2020-07-27 RX ADMIN — Medication 10 MG: at 07:56

## 2020-07-27 SDOH — HEALTH STABILITY: MENTAL HEALTH: CURRENT SMOKER: 0

## 2020-07-27 ASSESSMENT — ENCOUNTER SYMPTOMS
DYSRHYTHMIAS: 0
SEIZURES: 0

## 2020-07-27 ASSESSMENT — MIFFLIN-ST. JEOR: SCORE: 1366.83

## 2020-07-27 NOTE — PHARMACY-ADMISSION MEDICATION HISTORY
Medication history and patient interview completed by pre-admitting RN.  Reviewed by pharmacist, including [facility] dispense records, [facility] Care Everywhere, and chart review.     Simvastatin 40mg is new dose-increase ordered at pre-op physical.       Jose Miguel Lazar, Pharm.D.      Nurse Complete Set By: Ambar Wright, RN at 07/22/2020 9:55 AM       Prior to Admission medications    Medication Sig Last Dose Taking? Auth Provider   diclofenac (VOLTAREN) 1 % topical gel Place 4 g onto the skin 3 times daily as needed for moderate pain  Yes Yeo, Albert, MD   hydrochlorothiazide (HYDRODIURIL) 25 MG tablet Take 1 tablet (25 mg) by mouth daily 7/26/2020 at 0900 Yes Yoshi Bowen MD   omeprazole (PRILOSEC) 20 MG DR capsule Take 1 capsule (20 mg) by mouth daily 7/26/2020 at 0900 Yes Yoshi Bowen MD   albuterol 108 (90 Base) MCG/ACT IN inhaler Inhale 2 puffs into the lungs every 6 hours as needed for shortness of breath / dyspnea or wheezing More than a month at Unknown time  Steve Aaron,    simvastatin (ZOCOR) 40 MG tablet Take 1 tablet (40 mg) by mouth At Bedtime 7/25/2020  Yoshi Bowen MD

## 2020-07-27 NOTE — ANESTHESIA POSTPROCEDURE EVALUATION
Patient: Laure Wood    Procedure(s):  Left total knee arthroplasty    Diagnosis:Primary osteoarthritis of left knee [M17.12]  Diagnosis Additional Information: No value filed.    Anesthesia Type:  Spinal    Note:  Anesthesia Post Evaluation    Patient location during evaluation: PACU  Patient participation: Able to participate in evaluation but full recovery from regional anesthesia has not yet ocurrred but is anticipated to occur within 48 hours  Level of consciousness: awake and alert  Pain management: adequate  Airway patency: patent  Cardiovascular status: acceptable  Respiratory status: acceptable  Hydration status: acceptable  PONV: controlled             Last vitals:  Vitals:    07/27/20 1030 07/27/20 1100 07/27/20 1130   BP: 127/66 125/72 121/68   Pulse: 68     Resp: 16 20 14   Temp: 96.7  F (35.9  C)     SpO2: 97% 94% 95%         Electronically Signed By: Hakeem Amor MD  July 27, 2020  11:55 AM

## 2020-07-27 NOTE — ANESTHESIA PROCEDURE NOTES
Procedure note : intrathecal  Staff -   Anesthesiologist:  Hakeem Amor MD      Performed By: anesthesiologist    Referred By: mark    Pre-Procedure  Performed by Hakeem Amor MD  Referred by mark  Location: pre-op      Pre-Anesthestic Checklist: patient identified, IV checked, risks and benefits discussed, informed consent, monitors and equipment checked, pre-op evaluation and at physician/surgeon's request    Timeout  Correct Patient: Yes   Correct Procedure: Yes   Correct Site: Yes   Correct Laterality: N/A   Correct Position: Yes   Site Marked: N/A   .   Procedure Documentation  ASA 2  Diagnosis:arthritis.    Procedure: intrathecal, .   Patient Position:sitting Insertion Site:L3-4  (midline approach)     Patient Prep/Sterile Barriers; mask, sterile gloves, povidone-iodine 7.5% surgical scrub, patient draped.  .  Needle:  Spinal Needle (gauge): 24  Spinal/LP Needle Length (inches): 3.5 # of attempts: 1 and # of redirects:  Introducer used Introducer: 20 G .        Assessment/Narrative  Paresthesias: No.  .  .  clear CSF fluid removed .

## 2020-07-27 NOTE — BRIEF OP NOTE
[facility]    Brief Operative Note    Pre-operative diagnosis: Primary osteoarthritis of left knee [M17.12]  Post-operative diagnosis left knee djd    Procedure: Procedure(s):  Left total knee arthroplasty  Surgeon: Surgeon(s) and Role:     * James Mcdonald MD - Primary     * Jarred Reeves PA-C - Assisting  Anesthesia: Spinal   Estimated blood loss: 20 cc  Drains: Hemovac  Specimens: * No specimens in log *  Findings:   None.  Complications: None.  Implants:   Implant Name Type Inv. Item Serial No.  Lot No. LRB No. Used Action   BONE CEMENT SIMPLEX FULL DOSE 6191-1-001 Cement, Bone BONE CEMENT SIMPLEX FULL DOSE 6191-1-001  [employer] [id number] Left 1 Implanted   IMP COMP FEMORAL S&amp;N LEGION PS NP SZ4 LT [id number] Total Joint Component/Insert IMP COMP FEMORAL S&amp;N LEGION PS NP SZ4 LT [id number]  [place]  [id number] Left 1 Implanted   ROBBIE II LEFT NON-POROUS TIBIAL BASEPLATE, SIZE 3    [employer] [id number] Left 1 Implanted   IMP COMP PATELLA SNR ROBBIE II 9X32MM [id number] Total Joint Component/Insert IMP COMP PATELLA SNR ROBBIE II 9X32MM [id number]  [place]  [id number] Left 1 Implanted   IMP INSERT TIB S&amp;N LGN PS HI FLEX XLPE SZ3-4 9MM [id number] Total Joint Component/Insert IMP INSERT TIB S&amp;N LGN PS HI FLEX XLPE SZ3-4 9MM [id number]  [place]  [id number] Left 1 Implanted

## 2020-07-27 NOTE — PROVIDER NOTIFICATION
Web-Paged Admitting Pager:  Sepsis protocol triggered, critical high LA 2.8, HR tachy max 130s.  Do you want to do anything?  Thank you!

## 2020-07-27 NOTE — PROGRESS NOTES
07/27/20 1610   Quick Adds   Type of Visit Initial PT Evaluation   Living Environment   Living Environment Comment Pt lives in a house with daughters. 4 ANIYA with unilateral railings on left. No stairs once inside. Uses cane for mobility at baseline.    Self-Care   Usual Activity Tolerance moderate   Current Activity Tolerance fair   Equipment Currently Used at Home cane, straight;raised toilet   Functional Level Prior   Ambulation 1-->assistive equipment   Transferring 0-->independent   Toileting 1-->assistive equipment   Bathing 0-->independent   Fall history within last six months no   General Information   Onset of Illness/Injury or Date of Surgery - Date 07/27/20   Patient/Family Goals Statement To decrease nausea and walk   Pertinent History of Current Problem (include personal factors and/or comorbidities that impact the POC) Pt is a 70 year old female POD0 s/p L TKA.   Precautions/Limitations fall precautions   Weight-Bearing Status - LLE weight-bearing as tolerated   Weight-Bearing Status - RLE full weight-bearing   Cognitive Status Examination   Orientation orientation to person, place and time   Level of Consciousness alert   Follows Commands and Answers Questions able to follow single-step instructions;100% of the time   Personal Safety and Judgment intact   Memory intact   Pain Assessment   Patient Currently in Pain Yes, see Vital Sign flowsheet  (4/10 sore L knee)   Range of Motion (ROM)   ROM Comment decreased L knee ROM, otherwise WFL   Strength   Strength Comments difficulty with L SLR; functionally demonstrated >3/5 R LE strength   Bed Mobility   Bed Mobility Comments ModA sit <> supine   Transfer Skills   Transfer Comments ModA sit <> stand   Gait   Gait Comments not assessed d/t nausea   Balance   Balance Comments good unsupported sitting; fair- standing with FWW   General Therapy Interventions   Planned Therapy Interventions balance training;bed mobility training;gait training;neuromuscular  "re-education;strengthening;ROM;transfer training;home program guidelines;progressive activity/exercise   Clinical Impression   Criteria for Skilled Therapeutic Intervention yes, treatment indicated   PT Diagnosis impaired functional mobility   Influenced by the following impairments decreased L knee ROM, nausea, pain   Functional limitations due to impairments impaired bed mobility, transfers, ambulation, stairs   Clinical Presentation Stable/Uncomplicated   Clinical Presentation Rationale Pt is medically stable   Clinical Decision Making (Complexity) Low complexity   Therapy Frequency   (BID)   Predicted Duration of Therapy Intervention (days/wks) 2 days   Anticipated Equipment Needs at Discharge walker   Anticipated Discharge Disposition Home with Assist   Risk & Benefits of therapy have been explained Yes   Patient, Family & other staff in agreement with plan of care Yes   [facility] [employer] TM \"6 Clicks\"   2016, Trustees of [facility], under license to [employer].  All rights reserved.   6 Clicks Short Forms Basic Mobility Inpatient Short Form   [facility] [employer]  \"6 Clicks\" V.2 Basic Mobility Inpatient Short Form   1. Turning from your back to your side while in a flat bed without using bedrails? 2 - A Lot   2. Moving from lying on your back to sitting on the side of a flat bed without using bedrails? 2 - A Lot   3. Moving to and from a bed to a chair (including a wheelchair)? 2 - A Lot   4. Standing up from a chair using your arms (e.g., wheelchair, or bedside chair)? 2 - A Lot   5. To walk in hospital room? 2 - A Lot   6. Climbing 3-5 steps with a railing? 2 - A Lot   Basic Mobility Raw Score (Score out of 24.Lower scores equate to lower levels of function) 12   Total Evaluation Time   Total Evaluation Time (Minutes) 4     "

## 2020-07-27 NOTE — ANESTHESIA CARE TRANSFER NOTE
Patient: Laure Wood    Procedure(s):  Left total knee arthroplasty    Diagnosis: Primary osteoarthritis of left knee [M17.12]  Diagnosis Additional Information: No value filed.    Anesthesia Type:   Spinal     Note:  Airway :Room Air  Patient transferred to:PACU  Comments: Pt sitting up in bed doing well, spont resp. Handoff Report: Identifed the Patient, Identified the Reponsible Provider, Reviewed the pertinent medical history, Discussed the surgical course, Reviewed Intra-OP anesthesia mangement and issues during anesthesia, Set expectations for post-procedure period and Allowed opportunity for questions and acknowledgement of understanding      Vitals: (Last set prior to Anesthesia Care Transfer)    CRNA VITALS  7/27/2020 0834 - 7/27/2020 0909      7/27/2020             NIBP:  96/54    Pulse:  86    NIBP Mean:  66    SpO2:  97 %                Electronically Signed By: RUBEN Love CRNA  July 27, 2020  9:09 AM

## 2020-07-27 NOTE — PLAN OF CARE
PT: order received and evaluation complete. Pt is a 70 year old female POD0 s/p L TKA. Pt lives in a house with daughters. 4 ANIYA with unilateral railings on left. No stairs once inside. Uses cane for mobility at baseline. Difficulty with SLR, KI on with OOB mobility.     Patient plan: home   Current status: Pt supine, willing to participate in PT. Educated pt WBAT status and knee contraction prevention. Supine to sit with ModA. Good unsupported sitting balance, but c/o nausea. Sit to stand with modA and FWW.. Able to tolerate ~15 seconds of standing before needing to sit d/t nausea. Requesting to return to supine with Callie. All needs in place.   Anticipated status at discharge: at least SBA with all mobility and use of FWW

## 2020-07-27 NOTE — ANESTHESIA PROCEDURE NOTES
Procedure note : Femoral (femoral via adductor)  Staff -   Anesthesiologist:  Hakeem Amor MD      Performed By: anesthesiologist    Referred By: mark    Pre-Procedure  Performed by Hakeem Amor MD  Referred by mark  Location: post-op      Pre-Anesthestic Checklist: patient identified, IV checked, site marked, risks and benefits discussed, informed consent, monitors and equipment checked, pre-op evaluation, at physician/surgeon's request and post-op pain management    Timeout  Correct Patient: Yes   Correct Procedure: Yes   Correct Site: Yes   Correct Laterality: Yes   Correct Position: Yes   Site Marked: Yes   .   Procedure Documentation    Diagnosis:ARTHRITIS.    Procedure: Femoral (femoral via adductor), left.   Patient Position:supine   Ultrasound used to identify targeted nerve, plexus, or vascular marker and placed a needle adjacent to it., Ultrasound was used to visualize the spread of the anesthetic in close proximity to the above stated nerve.   Patient Prep/Sterile Barriers; mask, sterile gloves, chlorhexidine gluconate and isopropyl alcohol.  .  Needle: insulated, short bevel   Needle Gauge: 21.    Needle Length (Inches) 4   Insertion Method: Single Shot.        Assessment/Narrative  Paresthesias: No.  Injection made incrementally with aspirations every 5 mL..  The placement was negative for: blood aspirated, painful injection and site bleeding.  Bolus given via needle..   Secured via.   Complications: none. Comments:  20 ml of 0.5% bupi with epi 1:[id number]

## 2020-07-27 NOTE — PLAN OF CARE
Arrived to Unit at 1015.  VSS.  Minimal pain well controlled with oxycodone 5 mg.  Ice to site.  KI when up.  CMS intact.  Up with 2 assist and voided 600 ml on BSC.  Plan on home at discharge.

## 2020-07-27 NOTE — OP NOTE
"July 27, 2020    Pre Operative Diagnosis: left Knee DJD  Post operative Diagnosis: left Knee DJD  Title of the Procedure: left Total Knee Arthroplasty ( hough and nephew #4 PS femur; #3 tibia; 9 mm tibial insert; 32 mm patella  )  Anesthesia: spinal  Surgeon: James Mcdonald M.D.  Assistant:VERONICA Cullen    Assistance from the PA was necessary for this case due to the complexity of the procedure. PA helped with satisfactory exposure of the bones, protecting vital neurovascular and ligamentous structures. He also helped with maintaining the instruments for bone osteotomy and subsequent implantation of the components. He also performed wound closure and placement of postoperative dressing.    Drain: Hemovac    Indication of the Procedure:  This patient is a 70 y.o. female who has had chronic knee pain with progressively worsening due to advanced osteoarthritis. All reasonable and appropriate non-operative treatments have been tried. The knee pain is severe to the point of affecting this patient's day-to-day activities and There has been disturbances of sleeping. With understanding of the available options including further observation, nature of the surgery and potential complications of the surgery, total knee arthroplasty is chosen to be carried out today. As far as potential complications are concerned, emphasis has been placed on infection, deep vein thrombosis, arthrofibrosis, manjinder-prosthetic fractures, loosening of the components, unexpected anesthetic complications as well as persisting pain that cannot be explained.    Description of the procedure:  After satisfactory anesthesia was administered, the correct lower extremity was then prepped and draped in a routine sterile fashion in a supine position. The \"time out\" was then carried out per protocol. At this time administration of preoperative prophylactic antibiotic was confirmed.    Following exsanguination, the  tourniquet was then inflated to 300 mm Hg. A " midline longitudinal incision was made from just above the patella  down to the tibial tubercle. A sharp dissection was carried through the subcutaneous tissue and subsequently,  a medial parapatellar arthrotomy was carried out entering the knee joint. The patella was then displaced laterally and knee was flexed. All osteophytes were removed from the femur,  tibia and patella.    After placement of the intramedullary angélica into the femur distal femoral cut was made using 5  valgus angle. After measurement of the size, the rest of the femoral cuts were made;anterior and posterior as well as chamfer.The trial femoral component was then applied and noted that fitting was satisfactory.  Using the extra medullary alignment guide, a perpendicular osteotomy was made on the proximal tibia. Using the appropriately sized tibial trial components, alignment was assessed using the alignment angélica. It was felt that a normal medical axis from the hip joint to the ankle joint was reestablished when all trial components were placed. Valgus and varus stress were applied and soft tissue tension was evaluated at this time. It was also confirmed there is a full extension and full flexion with gravity alone. The space for the tibial keel was then punched into the proximal tibia.    Subsequently, the patella was osteotomized after measurement of the thickness. The size was measured and the pegs were drilled. Patellofemoral tracking was then evaluated and noted to be satisfactory.    All osteotomized bone surfaces were thoroughly irrigated with the jet lavage system. The bone cement was mixed and components were placed while the bone cement was doughy.Excess cement was removed from components. When the bonecement became completely hard, another trial reduction was carried out to determine the thickness of the tibial polyethylene component. The final polyethylene tibial component was then inserted.  With further irrigation of the knee joint a  medium Hemovac drain was placed. The wound was closed In layers in a routine fashion.The skin layer was closed with staples.A routine compression soft dressing was applied and the knee immobilizer was applied. Tourniquet was deflated at the end of the procedure.  The needle and sponge counts were correct at the end of the case. Blood loss was minimum. No intraoperative complications were identified at the time of finishing the operation.    James Mcdonald M.D.

## 2020-07-27 NOTE — ANESTHESIA PREPROCEDURE EVALUATION
Anesthesia Pre-Procedure Evaluation    Patient: Laure Wood   MRN: [id number] : 1950          Preoperative Diagnosis: Primary osteoarthritis of left knee [M17.12]    Procedure(s):  Left total knee arthroplasty    Past Medical History:   Diagnosis Date     Gastroesophageal reflux disease      Hyperlipidemia LDL goal <160      Hypertension, essential, benign      Knee osteoarthritis      Past Surgical History:   Procedure Laterality Date     CATARACT IOL, RT/LT Bilateral     Cataract IOL RT/LT     COLONOSCOPY Left 2019    Procedure: COLONOSCOPY;  Surgeon: Jono Egan MD;  Location:  GI     Anesthesia Evaluation     . Pt has had prior anesthetic. Type: MAC    No history of anesthetic complications          ROS/MED HX    ENT/Pulmonary:  - neg pulmonary ROS    (-) asthma   Neurologic:  - neg neurologic ROS    (-) seizures, Other neuro hx and Neuropathy   Cardiovascular:     (+) Dyslipidemia, hypertension----. : . . . :. .      (-) CAD, syncope and arrhythmias   METS/Exercise Tolerance:     Hematologic:  - neg hematologic  ROS       Musculoskeletal:   (+) arthritis,  -       GI/Hepatic:  - neg GI/hepatic ROS      (-) GERD   Renal/Genitourinary:  - ROS Renal section negative       Endo:     (+) Obesity, .      Psychiatric:         Infectious Disease:  - neg infectious disease ROS       Malignancy:      - no malignancy   Other:                          Physical Exam  Normal systems: cardiovascular, pulmonary and dental    Airway   Mallampati: I  TM distance: >3 FB  Neck ROM: full    Dental     Cardiovascular       Pulmonary             Lab Results   Component Value Date    WBC 6.2 04/15/2013    HGB 13.2 2020    HCT 39.3 04/15/2013     04/15/2013     2020    POTASSIUM 4.1 2020    CHLORIDE 107 2020    CO2 26 2020    BUN 26 2020    CR 0.96 2020    GLC 86 2020    ALONSO 9.9 2020    ALBUMIN 4.3 04/15/2013    PROTTOTAL 7.8 04/15/2013    ALT  "20 04/15/2013    AST 27 04/15/2013    ALKPHOS 94 04/15/2013    BILITOTAL 0.5 04/15/2013    LIPASE 120 04/15/2013    TSH 0.93 04/09/2012       Preop Vitals  BP Readings from Last 3 Encounters:   07/27/20 (!) 145/82   07/21/20 124/78   07/15/20 130/80    Pulse Readings from Last 3 Encounters:   07/21/20 74   07/08/20 69   02/26/20 79      Resp Readings from Last 3 Encounters:   07/27/20 20   02/20/20 18   07/25/19 16    SpO2 Readings from Last 3 Encounters:   07/27/20 100%   07/21/20 100%   07/08/20 100%      Temp Readings from Last 1 Encounters:   07/27/20 97.7  F (36.5  C) (Temporal)    Ht Readings from Last 1 Encounters:   07/27/20 1.626 m (5' 4\")      Wt Readings from Last 1 Encounters:   07/27/20 86.2 kg (190 lb)    Estimated body mass index is 32.61 kg/m  as calculated from the following:    Height as of this encounter: 1.626 m (5' 4\").    Weight as of this encounter: 86.2 kg (190 lb).       Anesthesia Plan      History & Physical Review  History and physical reviewed and following examination; no interval change.    ASA Status:  2 .    NPO Status:  > 8 hours    Plan for Spinal (GA backup PRN) with Intravenous induction. Maintenance will be TIVA.    PONV prophylaxis:  Ondansetron (or other 5HT-3) and Dexamethasone or Solumedrol    Consented for adductor canal block post op for post op pain control    The surgeon has given a verbal order transferring care of this patient to me for the performance of a regional analgesia block for post-op pain control. It is requested of me because I am uniquely trained and qualified to perform this block and the surgeon is neither trained nor qualified to perform this procedure.   The patient is not a current smoker      Postoperative Care  Postoperative pain management:  IV analgesics, Oral pain medications, Multi-modal analgesia, Neuraxial analgesia and Peripheral nerve block (Single Shot).      Consents  Anesthetic plan, risks, benefits and alternatives discussed with:  " Patient..                 Hakeem Amor MD                    .

## 2020-07-28 ENCOUNTER — APPOINTMENT (OUTPATIENT)
Dept: OCCUPATIONAL THERAPY | Facility: CLINIC | Age: 70
DRG: 470 | End: 2020-07-28
Attending: ORTHOPAEDIC SURGERY
Payer: COMMERCIAL

## 2020-07-28 ENCOUNTER — APPOINTMENT (OUTPATIENT)
Dept: PHYSICAL THERAPY | Facility: CLINIC | Age: 70
DRG: 470 | End: 2020-07-28
Attending: ORTHOPAEDIC SURGERY
Payer: COMMERCIAL

## 2020-07-28 LAB
GLUCOSE SERPL-MCNC: 110 MG/DL (ref 70–99)
HGB BLD-MCNC: 10.4 G/DL (ref 11.7–15.7)

## 2020-07-28 PROCEDURE — 82947 ASSAY GLUCOSE BLOOD QUANT: CPT | Performed by: ORTHOPAEDIC SURGERY

## 2020-07-28 PROCEDURE — 85018 HEMOGLOBIN: CPT | Performed by: ORTHOPAEDIC SURGERY

## 2020-07-28 PROCEDURE — 97116 GAIT TRAINING THERAPY: CPT | Mod: GP | Performed by: PHYSICAL THERAPIST

## 2020-07-28 PROCEDURE — 97110 THERAPEUTIC EXERCISES: CPT | Mod: GP | Performed by: PHYSICAL THERAPIST

## 2020-07-28 PROCEDURE — 36415 COLL VENOUS BLD VENIPUNCTURE: CPT | Performed by: ORTHOPAEDIC SURGERY

## 2020-07-28 PROCEDURE — 97166 OT EVAL MOD COMPLEX 45 MIN: CPT | Mod: GO | Performed by: OCCUPATIONAL THERAPIST

## 2020-07-28 PROCEDURE — 12000000 ZZH R&B MED SURG/OB

## 2020-07-28 PROCEDURE — 25000128 H RX IP 250 OP 636: Performed by: ORTHOPAEDIC SURGERY

## 2020-07-28 PROCEDURE — 25000132 ZZH RX MED GY IP 250 OP 250 PS 637: Performed by: ORTHOPAEDIC SURGERY

## 2020-07-28 PROCEDURE — 97530 THERAPEUTIC ACTIVITIES: CPT | Mod: GP | Performed by: PHYSICAL THERAPIST

## 2020-07-28 PROCEDURE — 97535 SELF CARE MNGMENT TRAINING: CPT | Mod: GO | Performed by: OCCUPATIONAL THERAPIST

## 2020-07-28 RX ORDER — HYDROMORPHONE HYDROCHLORIDE 2 MG/1
2-4 TABLET ORAL
Status: DISCONTINUED | OUTPATIENT
Start: 2020-07-28 | End: 2020-07-29 | Stop reason: HOSPADM

## 2020-07-28 RX ADMIN — ACETAMINOPHEN 975 MG: 325 TABLET, FILM COATED ORAL at 06:59

## 2020-07-28 RX ADMIN — SIMVASTATIN 40 MG: 40 TABLET, FILM COATED ORAL at 21:54

## 2020-07-28 RX ADMIN — HYDROMORPHONE HYDROCHLORIDE 4 MG: 2 TABLET ORAL at 21:54

## 2020-07-28 RX ADMIN — OXYCODONE HYDROCHLORIDE 10 MG: 5 TABLET ORAL at 04:26

## 2020-07-28 RX ADMIN — GABAPENTIN 100 MG: 100 CAPSULE ORAL at 14:47

## 2020-07-28 RX ADMIN — ACETAMINOPHEN 975 MG: 325 TABLET, FILM COATED ORAL at 14:46

## 2020-07-28 RX ADMIN — CEFAZOLIN SODIUM 2 G: 2 INJECTION, SOLUTION INTRAVENOUS at 00:46

## 2020-07-28 RX ADMIN — OXYCODONE HYDROCHLORIDE 10 MG: 5 TABLET ORAL at 00:46

## 2020-07-28 RX ADMIN — ACETAMINOPHEN 975 MG: 325 TABLET, FILM COATED ORAL at 21:54

## 2020-07-28 RX ADMIN — HYDROMORPHONE HYDROCHLORIDE 2 MG: 2 TABLET ORAL at 16:35

## 2020-07-28 RX ADMIN — ONDANSETRON 4 MG: 2 INJECTION INTRAMUSCULAR; INTRAVENOUS at 08:50

## 2020-07-28 RX ADMIN — DOCUSATE SODIUM AND SENNOSIDES 2 TABLET: 8.6; 5 TABLET, FILM COATED ORAL at 21:54

## 2020-07-28 RX ADMIN — ENOXAPARIN SODIUM 40 MG: 40 INJECTION SUBCUTANEOUS at 06:59

## 2020-07-28 RX ADMIN — OXYCODONE HYDROCHLORIDE 10 MG: 5 TABLET ORAL at 07:31

## 2020-07-28 RX ADMIN — HYDROMORPHONE HYDROCHLORIDE 0.2 MG: 1 INJECTION, SOLUTION INTRAMUSCULAR; INTRAVENOUS; SUBCUTANEOUS at 11:33

## 2020-07-28 RX ADMIN — GABAPENTIN 100 MG: 100 CAPSULE ORAL at 21:54

## 2020-07-28 ASSESSMENT — ACTIVITIES OF DAILY LIVING (ADL)
BATHING: 0-->INDEPENDENT
ADLS_ACUITY_SCORE: 12
COGNITION: 0 - NO COGNITION ISSUES REPORTED
SWALLOWING: 0-->SWALLOWS FOODS/LIQUIDS WITHOUT DIFFICULTY
TOILETING: 0-->INDEPENDENT
DRESS: 0-->INDEPENDENT
AMBULATION: 1-->ASSISTIVE EQUIPMENT
RETIRED_EATING: 0-->INDEPENDENT
TRANSFERRING: 0-->INDEPENDENT
RETIRED_COMMUNICATION: 0-->UNDERSTANDS/COMMUNICATES WITHOUT DIFFICULTY
ADLS_ACUITY_SCORE: 12
FALL_HISTORY_WITHIN_LAST_SIX_MONTHS: NO
WHICH_OF_THE_ABOVE_FUNCTIONAL_RISKS_HAD_A_RECENT_ONSET_OR_CHANGE?: AMBULATION;TRANSFERRING

## 2020-07-28 NOTE — PLAN OF CARE
OT order received, eval completed, and treatment initiated but limited due to pain and fatigue.  Pt. is a 69 yo female s/p POD 1 L TKA  who presents with increased pain, decreased LE ROM, and weight bear as tolerated impairing previous level of independence and tolerance for ADLs.  Pt. lives in a split level home with her adult dtr. and has a walk in shower, low toilet, can stay on one level, and family will be available to stay with pt. and assist with ADLs and IADLs as needed.  Skilled IP OT necessary for ADL training, transfer training, and risk factor education for safe discharge home with assist.    Patient plan: planning on home with dtr.  Current status: pt. seated up in chair with dtr. present and agreable to therapy.  OT provided handout and education on DME to assist with toilet transfer and walk in shower transfer.  Pt. and dtr. asking questions about DME and where to locate.  OT showed dtr. RTS with grab bars and shower chair.  Pt. and dtr. planning to purchase shower chair and RTS with grab bars.  OT provided education on home setup, EC/WS, and modification of tasks to ease back into activities and balance activity and rest.  Pt. will have family present as long as needed and is open to setup and assist.  Pt. noting 7/10 pain and fatigue impairing tolerance and independence with tasks.  OT provided setup of commode near chair in room and min A for transfer from chair to commode.  Pt. needing CGA with standing for pericare.  Pt. struggling with sit to stand due to pain and needing increased time.  OT provided CGA with ambulation from commode to bedside and to sit on EOB.  Pt. needing min A to mod A  for sit to supine in bed.  Pt. fatigued and increased pain after activity.  Bed alarm on and call light within reach.  Pt. appreciative of ideas and hopeful to reduce pain to assist with increased movement.  Anticipated status at discharge: min A to SBA with LE dressing, min A with functional transfers and  use of DME for toilet transfer and shower transfer, setup for ADLs and assist for IADLs

## 2020-07-28 NOTE — PLAN OF CARE
Patient plan: home with daughters  Current status: ModA for supine to sit transfer at R LE and trunk. No c/o dizziness. Min-ModA for sit to stand transfers during session with FWW. Instructed pt in pregait activities without KI, no instability in L knee noted. Instructed patient to ambulate with FWW and CGA 2x20 feet. Patient reports increased soreness in R LE this AM. Ambulated without KI on, no noted instability. Pt demonstrated significantly decreased gait speed and antalgic step to patterning. Increased UE support on FWW. Unable to modify with cuing for step through. Pt reporting return of nasuea with mobilization.  Anticipated status at discharge: SBA with ambulation using FWW, Vinicio transfers, bed mobility and stairs    PM: Pt reports feeling much better than afternoon. Instructed pt to ambulate with FWW and CGA ~80 feet. Patient with improved gait speed but still with increased UE support and antalgic patterning. x6 standing rest breaks d/t UE fatigue. Vinicio with bed mobility and transfers.

## 2020-07-28 NOTE — PLAN OF CARE
"PRIMARY DIAGNOSIS: \"GENERIC\" NURSING  OUTPATIENT/OBSERVATION GOALS TO BE MET BEFORE DISCHARGE:  ADLs back to baseline: No    Activity and level of assistance: A2 belt walker + KI    Pain status: Oxycodone 5mg    Return to near baseline physical activity: NO     Discharge Planner Nurse   Safe discharge environment identified: YES  Barriers to discharge:  Yes       Entered by: Kenan Canela 07/27/2020 7:43 PM     Please review provider order for any additional goals.   Nurse to notify provider when observation goals have been met and patient is ready for discharge.    RN shift [id number]:  A/O.  High BPs max systolic 150s.  Pain managed with oxycodone 5mg, sleepy after but wakes easily.  Interm. Nausea, declined antiemetic, encouraged clears.  LS clear bilaterally, O2, instructed hourly CDB/IS x10.  Denies N/T.  Drsg CDI.  A2 belt walker, TEGAN OOB.  Voiding via BSC.  Plan DC home tomorrow if able.    "

## 2020-07-28 NOTE — PROGRESS NOTES
[facility]  Orthopedics Progress Note             Interval History:     70 year old female admitted postoperatively for elective Left Total knee arthroplasty  with Dr. James Mcdonald due to DJD unresponsive to non operative treatment.  There were no intraoperative complications.  Patient currently Post op day #1.    Patient reports not doing very well.  Getting dizzy/light headed when standing, nausea with feelings she may vomit, poor oral intake and significant pain with ambulation.    Nursing/Therapies noting slow to ambulate due to pain and symptoms.    Pain: evolving.  Nausea: some without emesis but affecting intake.  Light headedness : none  Chest pain: none  Shortness of breath: none              Assessment and Plan:     S/P Left TKA, day #1.  VSS,   Medication vs BL anemia symptomatic.  Nausea  Slow to ambulate.  Has stairs to enter home.    PLAN:  Discontinue drain.  Oxy to Dilaudid.  PT/OT  Nursing cares.    Discharge hopeful to home with Dtr tomorrow.                      Physical Exam:     Patient Vitals for the past 12 hrs:   BP Temp Temp src Pulse Heart Rate Resp SpO2   07/28/20 1135 121/67 97.8  F (36.6  C) Temporal 62 -- 16 96 %   07/28/20 0731 135/64 97.5  F (36.4  C) Temporal 67 -- 16 95 %   07/28/20 0422 116/62 97.3  F (36.3  C) Temporal -- 67 16 94 %     Hemoglobin   Date Value Ref Range Status   07/28/2020 10.4 (L) 11.7 - 15.7 g/dL Final   07/21/2020 13.2 11.7 - 15.7 g/dL Final       Patient is alert and oriented.  Neurovascular status: Grossly intact to LLE  Dressing: clean and dry  Calves: soft and non tender    Unable to SLR due to pain.      Jarred Reeves PA-C  [facility] - Surgery    7/28/2020

## 2020-07-28 NOTE — PLAN OF CARE
Patient nauseous this am after taking dose of Oxycodone.  Relieved after mid morning nap.  Changed to PO Dilaudid.  Poor PO intake.  Voiding in adequate amounts.  Up with one assist to BSC with KI on.  Drain removed without difficulty.   [place] CDI and incision covered with sterile [place] Dressing.  Ace re-wrapped.  Dorsi and plantar flexion moderate to left foot.  Lovenox for anticoagulant.  Plan on discharge to home Wednesday am.

## 2020-07-28 NOTE — PLAN OF CARE
RN from [id number]. Alert and oriented. Tolerating regular diet. Transfers with Ax1, gait belt, and walker. Dressing CDI. Voiding in adequate amounts. Pain managed with scheduled Tylenol and prn PO Dilaudid. Plans d/c to home tomorrow.

## 2020-07-28 NOTE — UTILIZATION REVIEW
Admission Status; Secondary Review Determination    Under the authority of the Utilization Management Committee, the utilization review process indicated a secondary review on the above patient. The review outcome is based on review of the medical records, discussions with staff, and applying clinical experience noted on the date of the review.    (x) Inpatient Status Appropriate - This patient's medical care is consistent with medical management for inpatient care and reasonable inpatient medical practice.    RATIONALE FOR DETERMINATION: 70-year-old female who underwent left total knee arthroplasty on 7/27/2020.  On postop day 1 patient is struggling with adequate pain control, recurrent episodes of dizziness and lightheadedness with standing along with nausea and poor p.o. intake.  Patient thus not safe for discharge and requires a second hospital day appropriate for inpatient management.    At the time of admission with the information available to the attending physician more than 2 nights Hospital complex care was anticipated, based on patient risk of adverse outcome if treated as outpatient and complex care required. Inpatient admission is appropriate based on the Medicare guidelines.    This document was produced using voice recognition software    The information on this document is developed by the utilization review team in order for the business office to ensure compliance. This only denotes the appropriateness of proper admission status and does not reflect the quality of care rendered.    The definitions of Inpatient Status and Observation Status used in making the determination above are those provided in the CMS Coverage Manual, Chapter 1 and Chapter 6, section 70.4.    Sincerely,    Mehran Santana MD  Utilization Review  Physician Advisor  [facility].

## 2020-07-28 NOTE — PLAN OF CARE
Up with assist of one using walker and K.I. Pain managed with PRN oxy 10, ice, and scheduled Tylenol. Passing gas, faint bowel sounds. Voiding well. No nausea. Tolerated clear liquids, will order fulls for breakfast. Plans to discharge home.

## 2020-07-28 NOTE — PROGRESS NOTES
07/28/20 0947   Quick Adds   Type of Visit Initial Occupational Therapy Evaluation   Living Environment   Lives With child(tomy), adult   Living Arrangements house  (split)   Home Accessibility stairs to enter home;stairs within home   Transportation Anticipated family or friend will provide;car, drives self   Living Environment Comment Walk in shower   Self-Care   Usual Activity Tolerance moderate   Current Activity Tolerance fair   Regular Exercise No   Equipment Currently Used at Home cane, straight   Activity/Exercise/Self-Care Comment Pt. reports independence with ADLs and IADLs but limited with tolerance due to increased L knee pain.   Functional Level   Ambulation 1-->assistive equipment   Transferring 0-->independent   Toileting 0-->independent   Bathing 0-->independent   Dressing 0-->independent   Eating 0-->independent   Communication 0-->understands/communicates without difficulty   Cognition 0 - no cognition issues reported   Fall history within last six months no   Which of the above functional risks had a recent onset or change? ambulation;transferring;toileting;bathing;dressing   General Information   Onset of Illness/Injury or Date of Surgery - Date 07/27/20   Referring Physician James Mcdonald   Patient/Family Goals Statement decreased pain and increased independence   Additional Occupational Profile Info/Pertinent History of Current Problem 70 year old female POD1 s/p L TKA   Precautions/Limitations fall precautions   Weight-Bearing Status - LLE weight-bearing as tolerated   General Observations Pt. seated up in chair with dtr. present.   Cognitive Status Examination   Orientation orientation to person, place and time   Level of Consciousness alert   Follows Commands (Cognition) WFL   Memory intact   Visual Perception   Visual Perception No deficits were identified;Wears glasses   Visual Perception Comments Pt. had bilateral cataract surgery.   Pain Assessment   Patient Currently in Pain Yes, see Vital  Sign flowsheet  (7/10)   Range of Motion (ROM)   ROM Comment B UE WFL   Strength   Strength Comments B UE WFL   Coordination   Upper Extremity Coordination No deficits were identified   Mobility   Bed Mobility Comments min A   Transfer Skills   Transfer Comments min A with sit to stand from chair to commode   Transfer Skill: Toilet Transfer   Level of [place]: Toilet minimum assist (75% patients effort)   Physical Assist/Nonphysical Assist: Toilet set-up required;1 person assist   Weight-Bearing Restrictions: Toilet weight-bearing as tolerated   Assistive Device standard walker;seat riser;grab bars   Upper Body Dressing   Level of [place]: Dress Upper Body independent   Lower Body Dressing   Level of [place]: Dress Lower Body moderate assist (50% patients effort)   Physical Assist/Nonphysical Assist: Dress Lower Body 1 person assist   Toileting   Level of [place]: Toilet contact guard   Physical Assist/Nonphysical Assist: Toilet set-up required;1 person assist   Assistive Device raised toilet seat;grab bars   Eating/Self Feeding   Level of [place]: Eating independent   Instrumental Activities of Daily Living (IADL)   IADL Comments shares responsibilities with dtr.   Activities of Daily Living Analysis   Impairments Contributing to Impaired Activities of Daily Living pain;post surgical precautions;ROM decreased   General Therapy Interventions   Planned Therapy Interventions ADL retraining;transfer training;risk factor education   Clinical Impression   Criteria for Skilled Therapeutic Interventions Met yes, treatment indicated   OT Diagnosis impaired independence and tolerance for ADLs   Influenced by the following impairments see above   Assessment of Occupational Performance 3-5 Performance Deficits   Identified Performance Deficits decreased LE dressing, decreased standing tolerance, decreased functional transfers, decreased functional ambulation, decreased bending and reaching and carrying  "  Clinical Decision Making (Complexity) Moderate complexity   Therapy Frequency Daily   Predicted Duration of Therapy Intervention (days/wks) 2 days   Anticipated Equipment Needs at Discharge shower chair;raised toilet seat   Anticipated Discharge Disposition Home with Assist   Risks and Benefits of Treatment have been explained. Yes   Patient, Family & other staff in agreement with plan of care Yes   Clinical Impression Comments Pt. is a 69 yo female s/p POD 1 L TKA  who presents with increased pain, decreased LE ROM, and weight bear as tolerated impairing previous level of independence and tolerance for ADLs.  Pt. lives in a split level home with her adult dtr. and has a walk in shower, low toilet, can stay on one level, and family will be available to stay with pt. and assist with ADLs and IADLs as needed.  Skilled IP OT necessary for ADL training, transfer training, and risk factor education for safe discharge home with assist.   [facility] [email or web address] TM \"6 Clicks\"   2016, Trustees of [facility], under license to [employer].  All rights reserved.   6 Clicks Short Forms Daily Activity Inpatient Short Form   [facility] AM-PAC  \"6 Clicks\" Daily Activity Inpatient Short Form   1. Putting on and taking off regular lower body clothing? 2 - A Lot   2. Bathing (including washing, rinsing, drying)? 2 - A Lot   3. Toileting, which includes using toilet, bedpan or urinal? 2 - A Lot   4. Putting on and taking off regular upper body clothing? 4 - None   5. Taking care of personal grooming such as brushing teeth? 3 - A Little   6. Eating meals? 4 - None   Daily Activity Raw Score (Score out of 24.Lower scores equate to lower levels of function) 17   Total Evaluation Time   Total Evaluation Time (Minutes) 10     "

## 2020-07-29 ENCOUNTER — APPOINTMENT (OUTPATIENT)
Dept: PHYSICAL THERAPY | Facility: CLINIC | Age: 70
DRG: 470 | End: 2020-07-29
Attending: ORTHOPAEDIC SURGERY
Payer: COMMERCIAL

## 2020-07-29 ENCOUNTER — APPOINTMENT (OUTPATIENT)
Dept: OCCUPATIONAL THERAPY | Facility: CLINIC | Age: 70
DRG: 470 | End: 2020-07-29
Attending: ORTHOPAEDIC SURGERY
Payer: COMMERCIAL

## 2020-07-29 VITALS
RESPIRATION RATE: 16 BRPM | TEMPERATURE: 98.6 F | SYSTOLIC BLOOD PRESSURE: 144 MMHG | BODY MASS INDEX: 32.44 KG/M2 | OXYGEN SATURATION: 93 % | DIASTOLIC BLOOD PRESSURE: 71 MMHG | HEIGHT: 64 IN | HEART RATE: 80 BPM | WEIGHT: 190 LBS

## 2020-07-29 LAB
GLUCOSE SERPL-MCNC: 99 MG/DL (ref 70–99)
HGB BLD-MCNC: 9.3 G/DL (ref 11.7–15.7)

## 2020-07-29 PROCEDURE — 97110 THERAPEUTIC EXERCISES: CPT | Mod: GP | Performed by: PHYSICAL THERAPIST

## 2020-07-29 PROCEDURE — 25000132 ZZH RX MED GY IP 250 OP 250 PS 637: Performed by: ORTHOPAEDIC SURGERY

## 2020-07-29 PROCEDURE — 97530 THERAPEUTIC ACTIVITIES: CPT | Mod: GP | Performed by: PHYSICAL THERAPIST

## 2020-07-29 PROCEDURE — 97166 OT EVAL MOD COMPLEX 45 MIN: CPT | Mod: GO | Performed by: OCCUPATIONAL THERAPIST

## 2020-07-29 PROCEDURE — 25000128 H RX IP 250 OP 636: Performed by: ORTHOPAEDIC SURGERY

## 2020-07-29 PROCEDURE — 85018 HEMOGLOBIN: CPT | Performed by: ORTHOPAEDIC SURGERY

## 2020-07-29 PROCEDURE — 97116 GAIT TRAINING THERAPY: CPT | Mod: GP | Performed by: PHYSICAL THERAPIST

## 2020-07-29 PROCEDURE — 36415 COLL VENOUS BLD VENIPUNCTURE: CPT | Performed by: ORTHOPAEDIC SURGERY

## 2020-07-29 PROCEDURE — 97535 SELF CARE MNGMENT TRAINING: CPT | Mod: GO | Performed by: OCCUPATIONAL THERAPIST

## 2020-07-29 PROCEDURE — 82947 ASSAY GLUCOSE BLOOD QUANT: CPT | Performed by: ORTHOPAEDIC SURGERY

## 2020-07-29 RX ORDER — HYDROMORPHONE HYDROCHLORIDE 2 MG/1
2 TABLET ORAL
Qty: 45 TABLET | Refills: 0 | Status: SHIPPED | OUTPATIENT
Start: 2020-07-29 | End: 2021-02-26

## 2020-07-29 RX ORDER — ASPIRIN 325 MG
325 TABLET ORAL DAILY
Qty: 45 TABLET | Refills: 0 | Status: SHIPPED | OUTPATIENT
Start: 2020-07-29 | End: 2021-02-26

## 2020-07-29 RX ORDER — AMOXICILLIN 250 MG
2 CAPSULE ORAL 2 TIMES DAILY PRN
Qty: 20 TABLET | Refills: 0 | Status: SHIPPED | OUTPATIENT
Start: 2020-07-29 | End: 2021-02-26

## 2020-07-29 RX ORDER — ACETAMINOPHEN 325 MG/1
650 TABLET ORAL EVERY 6 HOURS PRN
Qty: 100 TABLET | Refills: 0 | Status: SHIPPED | OUTPATIENT
Start: 2020-07-29 | End: 2021-02-26

## 2020-07-29 RX ADMIN — GABAPENTIN 100 MG: 100 CAPSULE ORAL at 08:46

## 2020-07-29 RX ADMIN — HYDROCHLOROTHIAZIDE 25 MG: 25 TABLET ORAL at 08:46

## 2020-07-29 RX ADMIN — HYDROMORPHONE HYDROCHLORIDE 4 MG: 2 TABLET ORAL at 01:14

## 2020-07-29 RX ADMIN — HYDROMORPHONE HYDROCHLORIDE 4 MG: 2 TABLET ORAL at 08:46

## 2020-07-29 RX ADMIN — DOCUSATE SODIUM AND SENNOSIDES 2 TABLET: 8.6; 5 TABLET, FILM COATED ORAL at 08:46

## 2020-07-29 RX ADMIN — ENOXAPARIN SODIUM 40 MG: 40 INJECTION SUBCUTANEOUS at 08:47

## 2020-07-29 RX ADMIN — ACETAMINOPHEN 975 MG: 325 TABLET, FILM COATED ORAL at 05:28

## 2020-07-29 RX ADMIN — OMEPRAZOLE 20 MG: 20 CAPSULE, DELAYED RELEASE ORAL at 08:46

## 2020-07-29 RX ADMIN — HYDROMORPHONE HYDROCHLORIDE 2 MG: 2 TABLET ORAL at 12:22

## 2020-07-29 RX ADMIN — HYDROMORPHONE HYDROCHLORIDE 4 MG: 2 TABLET ORAL at 05:28

## 2020-07-29 RX ADMIN — POLYETHYLENE GLYCOL 3350 17 G: 17 POWDER, FOR SOLUTION ORAL at 08:47

## 2020-07-29 ASSESSMENT — ACTIVITIES OF DAILY LIVING (ADL)
ADLS_ACUITY_SCORE: 12
ADLS_ACUITY_SCORE: 14

## 2020-07-29 NOTE — DISCHARGE SUMMARY
[facility]  Discharge Summary            Interval History:     70 year old female admitted postoperatively for elective Left total knee arthroplasty  with Dr. James Mcdonald due to DJD unresponsive to non operative treatment.  There were no notable intraoperative complications.  Patient being discharged post op Day #2.  Laure Wood received skilled nursing, PT, OT, SW inquiry.  There was no Hospitalist care during the stay.  Patient stayed postop 2 days due to failure to progress with ambulation, intake and nausea and dizziness symptoms thought to be from pain medication.  After modifying medication, symptoms and progress improved.   Laure Wood has progressed satisfactorily with ambulation and pain management and without medical complication.  Patient is confident in their discharge and has  met goals with PT and OT. Pt lives at home with daughter for support and will be discharged to home based on home living conditions and level of support.  Laure Wood leaves the hospital in stable condition with good chance for recovery.  Today: doing better.  Eating some, resting well, voiding, ambulation, painful but tolerable.  No new complaints.    Pain: tolerable.   Nausea: none.  Light headedness : none  Chest pain: none  Shortness of breath: none              Assessment and Plan:     S/P Left TKA Day #2.  Final Diagnosis: same.  VSS, Hemodynamically asymptomatic, pain management adequate.    PLAN:  DVT prophylaxis with aspirin daily, as patient has no history of VTE.  Pain management with Dilaudid and tylenol.  Bowel regiment.  OP PT.  Patient instructions attached to discharge.      Discharge to home.  Follow up in clinic as previously scheduled, approx 10-14 days post op.    [facility]  [address] [place]  [address]  [phone]  [phone] fax  [phone] for scheduling                      Physical Exam:     Patient Vitals for the past 12 hrs:   BP Temp Temp src Pulse  Heart Rate Resp SpO2   07/29/20 0837 (!) 144/71 98.6  F (37  C) Temporal 80 -- 18 93 %   07/29/20 0114 125/61 99.4  F (37.4  C) Temporal -- 86 16 94 %     Hemoglobin   Date Value Ref Range Status   07/29/2020 9.3 (L) 11.7 - 15.7 g/dL Final   07/28/2020 10.4 (L) 11.7 - 15.7 g/dL Final       Patient is alert and oriented.  Vitals: stable  Neurovascular status: Grossly intact to LLE  Dressing: clean and dry  Calves: soft and non tender    SLR lagging due to pain.      Jarred Reeves PA-C  [facility] - Surgery    7/29/2020    Reviewed the note and agreed.

## 2020-07-29 NOTE — PLAN OF CARE
Patient plan: home with dgt  Current status: Instructed patient to walk with CGA and FWW ~140 feet. Patient with significantly decreased UE gait speed with step to antalgic patterning. Increased UE support and multiple standing rest breaks throughout. Seated rest break before and after stair trial. Pt navigated x4 and x2 stairs with unilateral railing and HHA. Dgt present for family training. Cuing provided for step to patterning. Increased time to complete all mobility d/t pain. No instability noted in L knee.   Anticipated status at discharge: Vinicio with stairs and transfers, SBA with ambulation and FWW    Physical Therapy Discharge Summary    Reason for therapy discharge:    Discharged to home.    Progress towards therapy goal(s). See goals on Care Plan in [facility] electronic health record for goal details.  Goals not met.  Barriers to achieving goals:   discharge from facility.    Therapy recommendation(s):    Continued therapy is recommended.  Rationale/Recommendations:  defer to ortho.

## 2020-07-29 NOTE — PLAN OF CARE
Patient plan: home with daughter  Current status: Pt c/o numbness/heaviness LLE, unchanged from yesterday. Vinicio LLE supine <> sit.  SBA sit <> stand and ambulation in room with 2WW.  SBA toilet transfer to [facility], independent with manjinder-cares/clothing management.  Vinicio dressing LLE. Niece in room, stating family has obtained RTS with arms, shower chair, and walker. States family can assist as needed.  Anticipated status at discharge: Vinicio LE dressing, SBA toilet transfer with DME, Vinicio shower transfer with DME, assist for home chores.

## 2020-07-29 NOTE — DISCHARGE INSTRUCTIONS
POST OP TOTAL KNEE INSTRUCTIONS:    Medication: You will be discharged from the hospital with pain medication(s). In most cases, consistent use of the pain medication can be limited to a few days. After this period, the medication should be weaned off as soon as possible to avoid potential complications of constipation, nausea, or rash, etc.   As you taper off the pain pills, you may find using them only at nighttime and then controlling the pain with over-the-counter medication(s) such as Ibuprofen/Naproxen and/or Tylenol during the day, would be a good way of managing the pain.  NOTE:  if you were prescribed Celebrex/Celecoxhib, Do NOT start Ibuprofen (advil/Motrin) or Naproxen (Naprosyn/Alleve) until done taking it.     In order to minimize the potential problem of blood clot, you'll be asked to take aspirin (low risk patients) or undergo a period of blood thinner injections.  If you were on Coumadin or other anticoagulation therapies before the surgery, you will be going back to it after the surgery.    Increase water and fiber intake while on pain medication.  You may also be sent home with stool softener, it is recommended you take this until your bowel movements become normal for you.  You may also need to add a laxative to the routine if you have not had a bowel movement for several days following surgery.    Activity: You will be using a walker or crutches until you feel confident. You also need to use the knee immobilizer until you are able to straight leg raise 10-15 times. Gradual rather than sudden increase of walking distance is recommended as the comfort level dictates. A cane instead of walker or crutches can be used when your strength and balance are improved.    Dressing: Typically, the first dressing change will be done on postop day #2. Showers can be taken with plastic covering over the original post surgical dressing for the first 4 days from the surgery. At that point, the incision site can be  wet for showering but should not be soaked. A light gauze dressing along with paper tape should be placed over the wound after each shower. The staples will be removed 10-14 days from the operation. During that time it is best to cover the incision site to protect the staples from being pulled or snagged.    If you were discharged with an aquacell dressing, (flesh colored rubber like bandage), you may leave this dressing in place until your follow up appointment in the clinic, unless the dressing; becomes completely saturated, is leaking, gets water inside as evident by moisture appearing on the inside, or you have a skin reaction.  In this case you should remove it and use dressings like what is mentioned in the paragraph above.    Exercises: It is recommended that you do the flexibility exercises (range of motion) and strengthening exercises in a small amounts frequently throughout the day rather than infrequent long sessions. Being overly aggressive with the exercises can hinder rather than help. We are looking for a gradual steady improvement even though the general goal for range of motion post-operatively is 0-90 degrees of bending within the first 2 weeks. During this time, gaining full Extension (straightening) is far more important than gaining flexion. Generally, you be working with a physical therapist 2-3 times per week for the first 2-4 weeks.    5 keys to the knee,- to be done throughout the day:  1. Knee Bending: Dangle- in a seated position where foot can swing, let the leg from the knee down dangle off the edge of bed or table. You may use the other leg/foot to gently push the affected leg into more bending.  2. Knee straightening: Heel blocking - While sitting or laying, place pillow, ottoman or some other support under the heel with toes pointing up but ankle relaxed.  Rest there, working up to 10 minutes relaxing the leg to induce knee straightening.  Once relaxed, push/squeeze the knee towards  the floor, hold for 2 seconds, and relax. Repeat x 10.    NOTE:  Alternate 1 and 2 every other hour.  3. Straight leg raises 3x daily: from a laying or sitting position, while keeping the knee locked straight, slowly lift the foot up 12-16 inches and hold for 2 seconds and relax.  Repeat working up to 20 repetitions.    4. Ice and elevate: whenever you are not up and about.  No standing around or sitting with the knee bend for more than very short periods.  5. Walk:  2-5 minutes of short laps every couple hours.  Use assistive devices as needed such as walker or cane to promote safety.  Walk focusing more on good form than getting somewhere.  Lead with the knee and then straighten the knee trying to make the surgical leg move like normal knee.    Soft tissue: It is not unusual to have swelling in the leg; thigh down to through the toes for several months from the surgery.  Frequent ankle pumping, elevation of the leg above the heart level and gentle compression support with ace wrap should help with swelling. If you wrap the leg, start at the toes and end in the thigh.  Icing regularly, for 20 minutes every hour, will also help with swelling and pain.  Flushing (pink) of the tissue is also often noted due to excess blood flow for healing.   You may also experience bruising.  This may occur anywhere from your toes, through the leg and even onto your torso.  It may show up even 1 week after surgery.     Progressively worsening redness along with increasing pain or increasing drainage from the wound should be a reason to alert us immediately.     Follow up in clinic within 14 days after surgery.  May call [phone] to schedule.    Jarred Reeves PA-C  [facility] - Surgery  [facility]  [address] [place]  [address]  [phone]

## 2020-07-29 NOTE — PLAN OF CARE
Up with assist of 1 using walker. Pain managed with PRN PO dilaudid, ice, and scheduled Tylenol. Tolerating regular diet, no nausea during shift. Island dressing has scant, dried drainage. Voiding appropriately. Plans to discharge home today.

## 2020-07-29 NOTE — PLAN OF CARE
Occupational Therapy Discharge Summary    Reason for therapy discharge:    Discharged to home.    Progress towards therapy goal(s). See goals on Care Plan in [facility] electronic health record for goal details.  Goals partially met.  Barriers to achieving goals:   discharge from facility.    Therapy recommendation(s):    No further therapy is recommended.

## 2020-07-29 NOTE — PLAN OF CARE
Reviewed discharge instructions and medications with patient and dtrNisha. Questions answered. Patient discharged to home with dtr driving, discharge instructions, medications (Dilaudid/Tylenol/Aspirin/Senna), and belongings at this time.

## 2020-07-30 ENCOUNTER — TELEPHONE (OUTPATIENT)
Dept: ORTHOPEDICS | Facility: CLINIC | Age: 70
End: 2020-07-30

## 2020-07-30 ENCOUNTER — TELEPHONE (OUTPATIENT)
Dept: INTERNAL MEDICINE | Facility: CLINIC | Age: 70
End: 2020-07-30

## 2020-07-30 NOTE — TELEPHONE ENCOUNTER
NA / LVM - Surgical follow up call: Left non descript, confirming follow up and left phone number for questions.    Jarred Reeves PA-C  [facility] - Surgery

## 2020-07-31 DIAGNOSIS — Z96.652 S/P TOTAL KNEE REPLACEMENT, LEFT: ICD-10-CM

## 2020-07-31 RX ORDER — HYDROMORPHONE HYDROCHLORIDE 2 MG/1
2 TABLET ORAL
Qty: 45 TABLET | Refills: 0 | OUTPATIENT
Start: 2020-07-31

## 2020-07-31 NOTE — TELEPHONE ENCOUNTER
Liz[facility]      Last Written Prescription Date:  7/29/2020  Last Fill Quantity: 45,   # refills: 0  Last Office Visit: 7/21/2020- Dr. Bowen  Future Office visit:    Next 5 appointments (look out 90 days)    Aug 07, 2020 12:00 PM CDT  Return Visit with Jarred Reeves PA-C  [facility] ([facility]) [address] [address] [address]  [phone]           Routing refill request to provider for review/approval because:  Drug not on the [facility], [facility] or [facility] refill protocol or controlled substance    Routing to specialty d/t post op patient.    Patricia NAVAN, RN, PHN

## 2020-07-31 NOTE — TELEPHONE ENCOUNTER
"Hospital/TCU/ED for chronic condition Discharge Protocol    \"Hi, my name is Patricia Boo RN, a registered nurse, and I am calling from [facility] ([facility]  I am calling to follow up and see how things are going for you after your recent emergency visit/hospital/TCU stay.\"    Tell me how you are doing now that you are home?\" Things are fine. Taking Stool softener and that his helping. Pain is better.       Discharge Instructions    \"Let's review your discharge instructions.  What is/are the follow-up recommendations?  Pt. ResponseFollow up with me, Jarred Reeves PA-C, within 14 days as  scheduled. to evaluate after surgery. The following labs/tests are  recommended: x ray at appropriate time.    \"Has an appointment with your primary care provider been scheduled?\"   No will follow up with Orthopedic- triage encouraged setting up follow up as soon as able.    \"When you see the provider, I would recommend that you bring your medications with you.\"    Medications    \"Tell me what changed about your medicines when you discharged?\"    Changes to chronic meds?    0-1    \"What questions do you have about your medications?\"    None     New diagnoses of heart failure, COPD, diabetes, or MI?    No              Post Discharge Medication Reconciliation Status: discharge medications reconciled and changed, per note/orders.    Was MTM referral placed (*Make sure to put transitions as reason for referral)?   No    Call Summary    \"What questions or concerns do you have about your recent visit and your follow-up care?\"     none    \"If you have questions or things don't continue to improve, we encourage you contact us through the main clinic number (give number).  Even if the clinic is not open, triage nurses are available 24/7 to help you.     We would like you to know that our clinic has extended hours (provide information).  We also have urgent care (provide details on closest location and hours/contact " "info)\"      \"Thank you for your time and take care!\"         Patricia NAVAN, RN, PHN      "

## 2020-07-31 NOTE — TELEPHONE ENCOUNTER
Pt has been taking 2 every 3 hours or so. Pt says that was the instructions from the hospital and has been in a lot of pain.    [phone] pt phone

## 2020-08-03 ENCOUNTER — TELEPHONE (OUTPATIENT)
Dept: ORTHOPEDICS | Facility: CLINIC | Age: 70
End: 2020-08-03

## 2020-08-03 ENCOUNTER — THERAPY VISIT (OUTPATIENT)
Dept: PHYSICAL THERAPY | Facility: CLINIC | Age: 70
End: 2020-08-03
Payer: COMMERCIAL

## 2020-08-03 DIAGNOSIS — M25.562 LEFT KNEE PAIN: ICD-10-CM

## 2020-08-03 DIAGNOSIS — Z96.652 S/P TOTAL KNEE REPLACEMENT, LEFT: Primary | ICD-10-CM

## 2020-08-03 DIAGNOSIS — M17.12 PRIMARY OSTEOARTHRITIS OF LEFT KNEE: ICD-10-CM

## 2020-08-03 DIAGNOSIS — Z96.652 S/P TOTAL KNEE REPLACEMENT, LEFT: ICD-10-CM

## 2020-08-03 PROCEDURE — 97161 PT EVAL LOW COMPLEX 20 MIN: CPT | Mod: GP | Performed by: PHYSICAL THERAPIST

## 2020-08-03 PROCEDURE — 97110 THERAPEUTIC EXERCISES: CPT | Mod: GP | Performed by: PHYSICAL THERAPIST

## 2020-08-03 RX ORDER — HYDROMORPHONE HYDROCHLORIDE 2 MG/1
2 TABLET ORAL EVERY 4 HOURS PRN
Qty: 25 TABLET | Refills: 0 | Status: SHIPPED | OUTPATIENT
Start: 2020-08-03 | End: 2020-08-08

## 2020-08-03 ASSESSMENT — ACTIVITIES OF DAILY LIVING (ADL)
RISE FROM A CHAIR: ACTIVITY IS VERY DIFFICULT
SQUAT: I AM UNABLE TO DO THE ACTIVITY
HOW_WOULD_YOU_RATE_THE_OVERALL_FUNCTION_OF_YOUR_KNEE_DURING_YOUR_USUAL_DAILY_ACTIVITIES?: SEVERELY ABNORMAL
KNEE_ACTIVITY_OF_DAILY_LIVING_SUM: 6
KNEEL ON THE FRONT OF YOUR KNEE: I AM UNABLE TO DO THE ACTIVITY
SIT WITH YOUR KNEE BENT: I AM UNABLE TO DO THE ACTIVITY
GIVING WAY, BUCKLING OR SHIFTING OF KNEE: THE SYMPTOM PREVENTS ME FROM ALL DAILY ACTIVITIES
WALK: ACTIVITY IS VERY DIFFICULT
WEAKNESS: THE SYMPTOM AFFECTS MY ACTIVITY SEVERELY
HOW_WOULD_YOU_RATE_THE_CURRENT_FUNCTION_OF_YOUR_KNEE_DURING_YOUR_USUAL_DAILY_ACTIVITIES_ON_A_SCALE_FROM_0_TO_100_WITH_100_BEING_YOUR_LEVEL_OF_KNEE_FUNCTION_PRIOR_TO_YOUR_INJURY_AND_0_BEING_THE_INABILITY_TO_PERFORM_ANY_OF_YOUR_USUAL_DAILY_ACTIVITIES?: 0
SWELLING: THE SYMPTOM PREVENTS ME FROM ALL DAILY ACTIVITIES
KNEE_ACTIVITY_OF_DAILY_LIVING_SCORE: 8.57
RAW_SCORE: 6
PAIN: THE SYMPTOM PREVENTS ME FROM ALL DAILY ACTIVITIES
STIFFNESS: THE SYMPTOM PREVENTS ME FROM ALL DAILY ACTIVITIES
AS_A_RESULT_OF_YOUR_KNEE_INJURY,_HOW_WOULD_YOU_RATE_YOUR_CURRENT_LEVEL_OF_DAILY_ACTIVITY?: SEVERELY ABNORMAL
STAND: ACTIVITY IS VERY DIFFICULT
GO DOWN STAIRS: ACTIVITY IS VERY DIFFICULT
LIMPING: THE SYMPTOM PREVENTS ME FROM ALL DAILY ACTIVITIES
GO UP STAIRS: ACTIVITY IS VERY DIFFICULT

## 2020-08-03 NOTE — TELEPHONE ENCOUNTER
LVM that the order was filled and she can get it at the  specialty care center to pick it up.    Can call with further questions.    Melody Tinoco ATC

## 2020-08-03 NOTE — TELEPHONE ENCOUNTER
rx sent to [facility] pharm.    Jarred Reeves PA-C  [facility] - Surgery

## 2020-08-03 NOTE — PROGRESS NOTES
[facility] Initial Evaluation  Subjective:    Therapist Generated HPI Evaluation  Problem details: Laure is being seen today for post-op physical therapy following a left total knee arthroplasty on 7/27/20 by Dr. Mcdonald.  She reports that she has been doing some ankle pumps and walking short distances at home but did not receive any instructions to bend it following surgery so she has not done much for exercise in the first week..         Type of problem:  Left knee.    This is a new condition.  Condition occurred with:  Degenerative joint disease.    Patient reports pain:  In the joint.        Associated symptoms:  Edema, loss of motion/stiffness, loss of strength and buckling/giving out. Symptoms are exacerbated by activity, ascending stairs, certain positions, bending/squatting, descending stairs, transfers, weight bearing, standing and walking  and relieved by ice and rest.                              Objective:  Standing Alignment:              Knee:  Normal      Gait:  Patient was brought back in wheelchair, required max assist of 1 for transfers.    Gait Type:  Not assessed and antalgic   Weight Bearing Status:  WBAT   Assistive Devices:  Walker                                                        Knee Evaluation:  ROM:  AROM: not assessed      PROM      Extension: Left: 0   Right:   Flexion: Left: 54   Right:       Strength:         Quad Set Left: Poor    Pain:   Quad Set Right: WNL    Pain:        Edema:  Edema of the knee: 3+ post surgical edema in left knee.    Mobility Testing:      Patellofemoral Medial:  Left: normal      Patellofemoral Lateral:  Left: normal      Patellofemoral Superior:  Left: normal      Patellofemoral Inferior:  Left: normal      Functional Testing:  not assessed                  General     ROS    Assessment/Plan:    Patient is a 70 year old female with left side knee complaints.    Patient has the following significant findings with corresponding treatment  plan.                Diagnosis 1:  S/p L TKA  Pain -  hot/cold therapy, self management, education and home program  Decreased ROM/flexibility - manual therapy and therapeutic exercise  Decreased joint mobility - manual therapy and therapeutic exercise  Decreased strength - therapeutic exercise and therapeutic activities  Impaired balance - neuro re-education and therapeutic activities  Decreased proprioception - neuro re-education and therapeutic activities  Edema - vasopneumatics and cold therapy  Impaired gait - gait training  Impaired muscle performance - neuro re-education  Decreased function - therapeutic activities    Therapy Evaluation Codes:   1) History comprised of:   Personal factors that impact the plan of care:      Language and Overall behavior pattern.    Comorbidity factors that impact the plan of care are:      None.     Medications impacting care: Pain.  2) Examination of Body Systems comprised of:   Body structures and functions that impact the plan of care:      Knee.   Activity limitations that impact the plan of care are:      Bathing, Cooking, Driving, Dressing, Lifting, Sitting, Squatting/kneeling, Stairs, Standing, Walking, Sleeping and Laying down.  3) Clinical presentation characteristics are:   Stable/Uncomplicated.  4) Decision-Making    Low complexity using standardized patient assessment instrument and/or measureable assessment of functional outcome.  Cumulative Therapy Evaluation is: Low complexity.    Previous and current functional limitations:  (See Goal Flow Sheet for this information)    Short term and Long term goals: (See Goal Flow Sheet for this information)     Communication ability:  Patient appears to be able to clearly communicate and understand verbal and written communication and follow directions correctly.  Treatment Explanation - The following has been discussed with the patient:   RX ordered/plan of care  Anticipated outcomes  Possible risks and side effects  This  patient would benefit from PT intervention to resume normal activities.   Rehab potential is good.    Frequency:  2 X week, once daily  Duration:  for 5 weeks tapering to 1 x week over 6 weeks  Discharge Plan:  Achieve all LTG.  Independent in home treatment program.  Reach maximal therapeutic benefit.    Please refer to the daily flowsheet for treatment today, total treatment time and time spent performing 1:1 timed codes.

## 2020-08-03 NOTE — TELEPHONE ENCOUNTER
Called and spoke to patient and her daughter.     Was a misunderstanding from discharge. Once they called and clarified she only took 1 pill about 2x/day. They have also been alternating with Tylenol.     She has about 2 left and doesn't see Emerson until Friday.     Please advise.    Melody Tinoco ATC

## 2020-08-04 NOTE — TELEPHONE ENCOUNTER
Second set of forms received, on behalf of patient's daughter to be completed.   Forms started and left on Emerson Reeves PA-C's desk for provider review at [facility].     Breann Messer ATC

## 2020-08-06 ENCOUNTER — THERAPY VISIT (OUTPATIENT)
Dept: PHYSICAL THERAPY | Facility: CLINIC | Age: 70
End: 2020-08-06
Payer: COMMERCIAL

## 2020-08-06 DIAGNOSIS — M25.562 LEFT KNEE PAIN: ICD-10-CM

## 2020-08-06 DIAGNOSIS — Z96.652 S/P TOTAL KNEE REPLACEMENT, LEFT: ICD-10-CM

## 2020-08-06 PROCEDURE — 97110 THERAPEUTIC EXERCISES: CPT | Mod: GP | Performed by: PHYSICAL THERAPIST

## 2020-08-07 ENCOUNTER — OFFICE VISIT (OUTPATIENT)
Dept: ORTHOPEDICS | Facility: CLINIC | Age: 70
End: 2020-08-07
Payer: COMMERCIAL

## 2020-08-07 DIAGNOSIS — Z47.89 ORTHOPEDIC AFTERCARE: Primary | ICD-10-CM

## 2020-08-07 PROCEDURE — 99024 POSTOP FOLLOW-UP VISIT: CPT | Performed by: PHYSICIAN ASSISTANT

## 2020-08-07 NOTE — PROGRESS NOTES
HISTORY OF PRESENT ILLNESS:    Laure Wood is a 70 year old female who is seen in follow up for Left TKA, DOS 7/27/20, Dr. Mcdonald.    Presents with Nisha, daughter with whom she lives and aids as historian.  Present symptoms: Pt reports burning in feet when wb,but not when at rest.  No numbness, does note bilateral foot and ankle swelling.  Denies SOB, chest pain, cough at night, fever.  States inability to get comfortable.  However, sleeps well, only rising to use restroom and returns to sleep easily.  PT x2.  Walks in home only 3-4 times per day for ADLs.  Daughter doing ROM exercises.  Some icing, some elevation.  Treatments include Scheduled tylenol and Dilaudid 2 mg, and daily aspirin.  Using WC or Walker for ambulation.  No new injuries.  No new complaints.  Denies Chest pain, Calve pain, Fever, Chills.    PHYSICAL EXAM:  There were no vitals taken for this visit.  There is no height or weight on file to calculate BMI.   GENERAL APPEARANCE: healthy, alert and no distress   PSYCH:  mentation appears normal and affect normal/bright    MSK:  Left:  Knee.  Ambulates: Present sin WC.  Incision clean and dry, staples under aquacell present, healing.  Appropriate incisional erythema.   Yes Ecchymosis mild at knee.  No calve pain on palpation.  Edema moderate at knee and then none until Bilateral foot, moderate to severe pitting edema.  CMS: manjinder incisional numbness, otherwise grossly intact.  AROM Flexion 0-80.  Cannot SLR, can hold and resist flexion at 20 deg of flexion after Passive extension.  Pain to level 8/10 with staples and bandage removal.    IMAGING INTERPRETATION:  None today.     ASSESSMENT:  Laure Wood is a 70 year old female S/P Left TKA, DOS 7/27/20, Dr. Mcdonald..    Pain sensitive.  Low activity level.  Foot edema.    PLAN:  - Encouraged to move more and that she will not damage knee with any controlled activities.  - Surgery discussed, images reviewed if applicable, and all questions were answered at  this time.  - Staples removed with sterile technique, steri-strips applied in usual fashion, care instructions given and verbally acknowledged.  - Medications: add NSAIDs.  - Physical Therapy: Physical therapy: continue with current physical therapy.  - AAT.    Return to clinic 4 weeks for x ray.    Jarred Reeves PA-C    Dept. Orthopedic Surgery  [facility]   8/7/2020

## 2020-08-07 NOTE — PATIENT INSTRUCTIONS
Incision care instructions:  Keep dry 24 hours.  Showering is ok after that time, however no soaking or scrubbing of incision for 2 weeks.  Tape-strips will most likely fall off on their own, however they may be removed after 2 weeks with rubbing alcohol if they have not.    If draining or bleeding stops the tape-strips are enough coverage unless you were instructed otherwise or you would like to cover for comfort.  If drainage or bleeding continues please cover with clean dressings.       Elevate, ice and compression for swelling.  Compression: stocking, wraps or elastic tubing for the feet.  Place on in the morning and take off at night.    Medications.  - Tylenol on schedule.  - Either Naproxen sodium 440 mg every 12 hours OR Ibuprofen 600 mg every 6 hours but not both.  - Diluadid 2 mg, ie 1 pill every 4 hours if not controlled with the above measures.    It is recommended that you avoid invasive procedures such as dental work, colonoscopy or other surgery for 4 months after surgery unless it is an emergency.  In the case of an emergency, please notify the provider, and an antibiotic may be prescribed for you.    You may discontinue the aspirin or return to a dose recommended by your Primary care provider 6 weeks after surgery.    You may increase your activities as you can tolerate them.  It is recommended that you do not do prolonged kneeling.    It is also recommended that you continue your exercises on your own for several additional months to avoid regressing.    Please follow up in 4 weeks for an X ray of your knee.

## 2020-08-10 ENCOUNTER — THERAPY VISIT (OUTPATIENT)
Dept: PHYSICAL THERAPY | Facility: CLINIC | Age: 70
End: 2020-08-10
Payer: COMMERCIAL

## 2020-08-10 DIAGNOSIS — Z96.652 S/P TOTAL KNEE REPLACEMENT, LEFT: ICD-10-CM

## 2020-08-10 DIAGNOSIS — M25.562 LEFT KNEE PAIN: ICD-10-CM

## 2020-08-10 PROCEDURE — 97530 THERAPEUTIC ACTIVITIES: CPT | Mod: GP | Performed by: PHYSICAL THERAPIST

## 2020-08-10 PROCEDURE — 97110 THERAPEUTIC EXERCISES: CPT | Mod: GP | Performed by: PHYSICAL THERAPIST

## 2020-08-14 ENCOUNTER — THERAPY VISIT (OUTPATIENT)
Dept: PHYSICAL THERAPY | Facility: CLINIC | Age: 70
End: 2020-08-14
Payer: COMMERCIAL

## 2020-08-14 DIAGNOSIS — Z96.652 S/P TOTAL KNEE REPLACEMENT, LEFT: ICD-10-CM

## 2020-08-14 DIAGNOSIS — M25.562 LEFT KNEE PAIN: ICD-10-CM

## 2020-08-14 PROCEDURE — 97530 THERAPEUTIC ACTIVITIES: CPT | Mod: GP | Performed by: PHYSICAL THERAPIST

## 2020-08-14 PROCEDURE — 97110 THERAPEUTIC EXERCISES: CPT | Mod: GP | Performed by: PHYSICAL THERAPIST

## 2020-08-14 NOTE — PROGRESS NOTES
Subjective:  HPI  Physical Exam                    Objective:  System    Physical Exam    General     ROS    Assessment/Plan:    SUBJECTIVE  Subjective changes as noted by pt:  Pt reports continued intermittent pain in the knee. Pt notes increased mobility in the knee     Current pain level: 6/10     Changes in function:  Pt notes increased endurance walking with her walker at home     Adverse reaction to treatment or activity:  None    OBJECTIVE  Changes in objective findings:  Left knee PROM 75 degrees. Pt still hesitant with weight bearing through the left lower extremity        ASSESSMENT  Laure continues to require intervention to meet STG and LTG's: PT  Patient's symptoms are resolving.  Response to therapy has shown an improvement in  ROM  and function  Progress made towards STG/LTG?  Yes,     PLAN  Current treatment program is being advanced to more complex exercises.    PTA/ATC plan:  N/A    Please refer to the daily flowsheet for treatment today, total treatment time and time spent performing 1:1 timed codes.

## 2020-08-18 ENCOUNTER — THERAPY VISIT (OUTPATIENT)
Dept: PHYSICAL THERAPY | Facility: CLINIC | Age: 70
End: 2020-08-18
Payer: COMMERCIAL

## 2020-08-18 DIAGNOSIS — Z96.652 S/P TOTAL KNEE REPLACEMENT, LEFT: ICD-10-CM

## 2020-08-18 DIAGNOSIS — M25.562 ACUTE PAIN OF LEFT KNEE: ICD-10-CM

## 2020-08-18 PROCEDURE — 97110 THERAPEUTIC EXERCISES: CPT | Mod: GP | Performed by: PHYSICAL THERAPY ASSISTANT

## 2020-08-18 PROCEDURE — 97530 THERAPEUTIC ACTIVITIES: CPT | Mod: GP | Performed by: PHYSICAL THERAPY ASSISTANT

## 2020-08-20 ENCOUNTER — THERAPY VISIT (OUTPATIENT)
Dept: PHYSICAL THERAPY | Facility: CLINIC | Age: 70
End: 2020-08-20
Payer: COMMERCIAL

## 2020-08-20 DIAGNOSIS — M25.562 ACUTE PAIN OF LEFT KNEE: ICD-10-CM

## 2020-08-20 DIAGNOSIS — Z96.652 S/P TOTAL KNEE REPLACEMENT, LEFT: ICD-10-CM

## 2020-08-20 PROCEDURE — 97140 MANUAL THERAPY 1/> REGIONS: CPT | Mod: GP | Performed by: PHYSICAL THERAPIST

## 2020-08-20 PROCEDURE — 97110 THERAPEUTIC EXERCISES: CPT | Mod: GP | Performed by: PHYSICAL THERAPIST

## 2020-08-24 ENCOUNTER — THERAPY VISIT (OUTPATIENT)
Dept: PHYSICAL THERAPY | Facility: CLINIC | Age: 70
End: 2020-08-24
Payer: COMMERCIAL

## 2020-08-24 DIAGNOSIS — Z96.652 S/P TOTAL KNEE REPLACEMENT, LEFT: ICD-10-CM

## 2020-08-24 DIAGNOSIS — M25.562 ACUTE PAIN OF LEFT KNEE: ICD-10-CM

## 2020-08-24 PROCEDURE — 97140 MANUAL THERAPY 1/> REGIONS: CPT | Mod: GP | Performed by: PHYSICAL THERAPIST

## 2020-08-24 PROCEDURE — 97110 THERAPEUTIC EXERCISES: CPT | Mod: GP | Performed by: PHYSICAL THERAPIST

## 2020-08-27 ENCOUNTER — THERAPY VISIT (OUTPATIENT)
Dept: PHYSICAL THERAPY | Facility: CLINIC | Age: 70
End: 2020-08-27
Payer: COMMERCIAL

## 2020-08-27 DIAGNOSIS — M25.562 ACUTE PAIN OF LEFT KNEE: ICD-10-CM

## 2020-08-27 DIAGNOSIS — Z96.652 S/P TOTAL KNEE REPLACEMENT, LEFT: ICD-10-CM

## 2020-08-27 PROCEDURE — 97110 THERAPEUTIC EXERCISES: CPT | Mod: GP | Performed by: PHYSICAL THERAPIST

## 2020-08-27 PROCEDURE — 97530 THERAPEUTIC ACTIVITIES: CPT | Mod: GP | Performed by: PHYSICAL THERAPIST

## 2020-08-31 ENCOUNTER — THERAPY VISIT (OUTPATIENT)
Dept: PHYSICAL THERAPY | Facility: CLINIC | Age: 70
End: 2020-08-31
Payer: COMMERCIAL

## 2020-08-31 DIAGNOSIS — M25.562 ACUTE PAIN OF LEFT KNEE: ICD-10-CM

## 2020-08-31 DIAGNOSIS — Z96.652 S/P TOTAL KNEE REPLACEMENT, LEFT: ICD-10-CM

## 2020-08-31 PROCEDURE — 97112 NEUROMUSCULAR REEDUCATION: CPT | Mod: GP | Performed by: PHYSICAL THERAPIST

## 2020-08-31 PROCEDURE — 97110 THERAPEUTIC EXERCISES: CPT | Mod: GP | Performed by: PHYSICAL THERAPIST

## 2020-09-03 ENCOUNTER — THERAPY VISIT (OUTPATIENT)
Dept: PHYSICAL THERAPY | Facility: CLINIC | Age: 70
End: 2020-09-03
Payer: COMMERCIAL

## 2020-09-03 DIAGNOSIS — Z96.652 S/P TOTAL KNEE REPLACEMENT, LEFT: ICD-10-CM

## 2020-09-03 DIAGNOSIS — M25.562 ACUTE PAIN OF LEFT KNEE: ICD-10-CM

## 2020-09-03 PROCEDURE — 97530 THERAPEUTIC ACTIVITIES: CPT | Mod: GP | Performed by: PHYSICAL THERAPIST

## 2020-09-03 PROCEDURE — 97110 THERAPEUTIC EXERCISES: CPT | Mod: GP | Performed by: PHYSICAL THERAPIST

## 2020-09-09 ENCOUNTER — THERAPY VISIT (OUTPATIENT)
Dept: PHYSICAL THERAPY | Facility: CLINIC | Age: 70
End: 2020-09-09
Payer: COMMERCIAL

## 2020-09-09 DIAGNOSIS — M25.562 ACUTE PAIN OF LEFT KNEE: ICD-10-CM

## 2020-09-09 DIAGNOSIS — Z96.652 S/P TOTAL KNEE REPLACEMENT, LEFT: ICD-10-CM

## 2020-09-09 PROCEDURE — 97530 THERAPEUTIC ACTIVITIES: CPT | Mod: GP | Performed by: PHYSICAL THERAPIST

## 2020-09-09 PROCEDURE — 97110 THERAPEUTIC EXERCISES: CPT | Mod: GP | Performed by: PHYSICAL THERAPIST

## 2020-09-10 ENCOUNTER — OFFICE VISIT (OUTPATIENT)
Dept: ORTHOPEDICS | Facility: CLINIC | Age: 70
End: 2020-09-10
Payer: COMMERCIAL

## 2020-09-10 ENCOUNTER — ANCILLARY PROCEDURE (OUTPATIENT)
Dept: GENERAL RADIOLOGY | Facility: CLINIC | Age: 70
End: 2020-09-10
Attending: PHYSICIAN ASSISTANT
Payer: COMMERCIAL

## 2020-09-10 DIAGNOSIS — Z96.652 S/P TKR (TOTAL KNEE REPLACEMENT) NOT USING CEMENT, LEFT: ICD-10-CM

## 2020-09-10 DIAGNOSIS — Z47.89 ORTHOPEDIC AFTERCARE: Primary | ICD-10-CM

## 2020-09-10 PROCEDURE — 73562 X-RAY EXAM OF KNEE 3: CPT | Mod: LT | Performed by: ORTHOPAEDIC SURGERY

## 2020-09-10 PROCEDURE — 99024 POSTOP FOLLOW-UP VISIT: CPT | Performed by: PHYSICIAN ASSISTANT

## 2020-09-10 NOTE — PROGRESS NOTES
HISTORY OF PRESENT ILLNESS:    Laure Wood is a 70 year old female who is seen in follow up for Left TKA, DOS 7/27/20, Dr. Mcdonald.  Present symptoms: Pt reports improvement though slow.  Treatments include tylenol PRN, ice at night PRN, completed aspirin, PT continues..  Using walker for ambulation out of home and cane at home.  Main issue is swelling and pain at lateral knee at night.  No new complaints.  Denies Chest pain, Calve pain, Fever, Chills.    PHYSICAL EXAM:  There were no vitals taken for this visit.  There is no height or weight on file to calculate BMI.   GENERAL APPEARANCE: healthy, alert and no distress   PSYCH:  mentation appears normal and affect normal/bright    MSK:  Left: Knee.  Ambulates: SSlowly with walker, stable without walker but limp.  Incision clean and dry, well healed. g.  Appropriate incisional erythema.   No Ecchymosis.  No calve pain on palpation.  Edema Moderate + at knee mild moderate at ankle compared with right..  CMS: manjinder incisional numbness, otherwise grossly intact.  AROM Flexion 0-95.    IMAGING INTERPRETATION:  XR left Knee.    Recent Results (from the past 24 hour(s))   XR Knee Left 3 Views    Narrative    History: Follow-up of left total knee arthroplasty, July 27, 2020.  Impression: 3 views of the left knee demonstrate presence of total knee   arthroplasty components in satisfactory positions.  Patellofemoral   tracking is noted to be also satisfactory.  There is no evidence of   perioperative complications or other acute findings.     Dr. James Mcdonald MD  9/11/2020     ASSESSMENT:  Laure Wood is a 70 year old female S/P  Left TKA, DOS 7/27/20, Dr. Mcdonald.    Improving.  Large effusion at knee.    PLAN:  - All questions were answered at this time.  -  care instructions given and verbally acknowledged.  - Medications: none per ortho.  - more aggressive edema control, tubigrip and icing, elevation.  - Physical Therapy: Physical therapy: continue with current physical  therapy.  - AAT.    Return to clinic 4 weeks.    Jarred Reeves PA-C    Dept. Orthopedic Surgery  [facility]   9/10/2020

## 2020-09-10 NOTE — PATIENT INSTRUCTIONS
Continue to increase walking, cane on opposite side from knee.    Begin a more diligent course of decreasing swelling:  - ice 3x daily for 20 minutes.  - elevate the leg whenever you are not walking or doing exercises.  - May use the elastic tubing during the day (not at night).    Please avoid invasive procedure for 4 months following your surgery.  After the 4 months, based on recommendations by the orthopedic and dental associations, there is no need to use an antibiotic prior to any invasive procedure such as dental work, colonoscopy or surgery unless you are at risk for an infection.  Risks include cancer, immune system diseases, prior joint or chronic infections, and any medication that suppresses or weakens your immune system.  Please notify any providers of your implant prior to invasive procedures and if you have any risk factors or your health has changed since the last procedure. An antibiotic may be prescribed for you.    You may increase your activities as you can tolerate them.  It is recommended that you do not do prolonged kneeling.    It is also recommended that you continue your exercises on your own for several additional months to avoid regressing.    Please follow up in 4 weeks with me, and then with your surgeon 1 year from your surgery date for evaluation.

## 2020-09-11 ENCOUNTER — THERAPY VISIT (OUTPATIENT)
Dept: PHYSICAL THERAPY | Facility: CLINIC | Age: 70
End: 2020-09-11
Payer: COMMERCIAL

## 2020-09-11 DIAGNOSIS — I10 ESSENTIAL HYPERTENSION, BENIGN: ICD-10-CM

## 2020-09-11 DIAGNOSIS — M25.562 ACUTE PAIN OF LEFT KNEE: ICD-10-CM

## 2020-09-11 DIAGNOSIS — Z96.652 S/P TOTAL KNEE REPLACEMENT, LEFT: ICD-10-CM

## 2020-09-11 PROCEDURE — 97112 NEUROMUSCULAR REEDUCATION: CPT | Mod: GP | Performed by: PHYSICAL THERAPIST

## 2020-09-11 PROCEDURE — 97110 THERAPEUTIC EXERCISES: CPT | Mod: GP | Performed by: PHYSICAL THERAPIST

## 2020-09-11 PROCEDURE — 97530 THERAPEUTIC ACTIVITIES: CPT | Mod: GP | Performed by: PHYSICAL THERAPIST

## 2020-09-14 RX ORDER — HYDROCHLOROTHIAZIDE 25 MG/1
25 TABLET ORAL DAILY
Qty: 90 TABLET | Refills: 1 | Status: SHIPPED | OUTPATIENT
Start: 2020-09-14 | End: 2021-03-11

## 2020-09-15 ENCOUNTER — THERAPY VISIT (OUTPATIENT)
Dept: PHYSICAL THERAPY | Facility: CLINIC | Age: 70
End: 2020-09-15
Payer: COMMERCIAL

## 2020-09-15 DIAGNOSIS — Z96.652 S/P TOTAL KNEE REPLACEMENT, LEFT: ICD-10-CM

## 2020-09-15 DIAGNOSIS — M25.562 ACUTE PAIN OF LEFT KNEE: ICD-10-CM

## 2020-09-15 PROCEDURE — 97112 NEUROMUSCULAR REEDUCATION: CPT | Mod: GP | Performed by: PHYSICAL THERAPY ASSISTANT

## 2020-09-15 PROCEDURE — 97530 THERAPEUTIC ACTIVITIES: CPT | Mod: GP | Performed by: PHYSICAL THERAPY ASSISTANT

## 2020-09-15 PROCEDURE — 97110 THERAPEUTIC EXERCISES: CPT | Mod: GP | Performed by: PHYSICAL THERAPY ASSISTANT

## 2020-09-17 ENCOUNTER — TELEPHONE (OUTPATIENT)
Dept: ORTHOPEDICS | Facility: CLINIC | Age: 70
End: 2020-09-17

## 2020-09-18 ENCOUNTER — OFFICE VISIT (OUTPATIENT)
Dept: ORTHOPEDICS | Facility: CLINIC | Age: 70
End: 2020-09-18
Payer: COMMERCIAL

## 2020-09-18 ENCOUNTER — THERAPY VISIT (OUTPATIENT)
Dept: PHYSICAL THERAPY | Facility: CLINIC | Age: 70
End: 2020-09-18
Payer: COMMERCIAL

## 2020-09-18 DIAGNOSIS — M25.562 ACUTE PAIN OF LEFT KNEE: ICD-10-CM

## 2020-09-18 DIAGNOSIS — M76.62 TENDONITIS, ACHILLES, LEFT: Primary | ICD-10-CM

## 2020-09-18 DIAGNOSIS — Z96.652 S/P TOTAL KNEE REPLACEMENT, LEFT: ICD-10-CM

## 2020-09-18 DIAGNOSIS — Z47.89 ORTHOPEDIC AFTERCARE: ICD-10-CM

## 2020-09-18 PROCEDURE — 97530 THERAPEUTIC ACTIVITIES: CPT | Mod: GP | Performed by: PHYSICAL THERAPIST

## 2020-09-18 PROCEDURE — 97140 MANUAL THERAPY 1/> REGIONS: CPT | Mod: GP | Performed by: PHYSICAL THERAPIST

## 2020-09-18 PROCEDURE — 99024 POSTOP FOLLOW-UP VISIT: CPT | Performed by: PHYSICIAN ASSISTANT

## 2020-09-18 PROCEDURE — 97110 THERAPEUTIC EXERCISES: CPT | Mod: GP | Performed by: PHYSICAL THERAPIST

## 2020-09-18 RX ORDER — IBUPROFEN 800 MG/1
800 TABLET, FILM COATED ORAL EVERY 8 HOURS
Qty: 21 TABLET | Refills: 0 | Status: SHIPPED | OUTPATIENT
Start: 2020-09-18 | End: 2020-10-02

## 2020-09-18 NOTE — PROGRESS NOTES
HISTORY OF PRESENT ILLNESS:    Laure Wood is a 70 year old female who is seen in follow up for Left TKA, DOS 7/27/20, Dr. Mcdonald.  Present symptoms: Pt reports new onset of left heel pain after PT session several days  Ago.  States pain at posterior heel that is debilitating.  Improves slightly with wearing shoes.  Denies injury.  Treatments include Diclofenac gel and tylenol without relief.  Using cane for ambulation.  Not sure she can do PT today.  No pain at knee.  Sleeping better.  No new complaints.  Denies Chest pain, Calve pain, Fever, Chills.    PHYSICAL EXAM:  There were no vitals taken for this visit.  There is no height or weight on file to calculate BMI.   GENERAL APPEARANCE: healthy, alert and no distress   PSYCH:  mentation appears normal and affect normal/bright    MSK:  Left: Knee.  Ambulates: Limps severely on left in obvious pain.  Incision clean and dry,healing.  NO incisional erythema.   No Ecchymosis.  No calve pain on palpation.  Edema moderate plus at knee, none below knee.  CMS: manjinder incisional numbness, otherwise grossly intact.  AROM Flexion 0-95.    Heel, Left:  No gross deformities or lesions.  Exquisite Pain on palpation at achilles insertion, increase with foot plantar flexion.        IMAGING INTERPRETATION:  None today.     ASSESSMENT:  Laure Wood is a 70 year old female S/P Left TKA, DOS 7/27/20, Dr. Mcdonald.  .  Knee progressing.  Insidious onset achilles tendonitis, left.    PLAN:  KNEE:  - All questions were answered at this time.  - , care instructions given and verbally acknowledged.  - Physical Therapy: Physical therapy: continue with current physical therapy  - AAT.    HEEL:  - Ice every 2-3 hours 20 min.  - Heel lifts fitted, do not go barefoot.  - Ibuprofen 800 mg TID.  - Avoid achilles loading.      Return to clinic 3 weeks as scheduled.    Jarred Reeves PA-C    Dept. Orthopedic Surgery  [facility]   9/18/2020

## 2020-09-18 NOTE — PATIENT INSTRUCTIONS
Achilles tendonitis    For one week:    1.  Apply ice to the heal, not on bare skin, for 20 minutes every 2-3 hours when awake.  2. Ibuprofen 800 mg every 8 hours.  3.  Wear heel lift in shoes when walking around, do not go barefoot when walking.    Follow up if not improving.

## 2020-09-22 ENCOUNTER — THERAPY VISIT (OUTPATIENT)
Dept: PHYSICAL THERAPY | Facility: CLINIC | Age: 70
End: 2020-09-22
Payer: COMMERCIAL

## 2020-09-22 DIAGNOSIS — Z96.652 S/P TOTAL KNEE REPLACEMENT, LEFT: ICD-10-CM

## 2020-09-22 DIAGNOSIS — M25.562 ACUTE PAIN OF LEFT KNEE: ICD-10-CM

## 2020-09-22 PROCEDURE — 97110 THERAPEUTIC EXERCISES: CPT | Mod: GP | Performed by: PHYSICAL THERAPY ASSISTANT

## 2020-10-02 ENCOUNTER — THERAPY VISIT (OUTPATIENT)
Dept: PHYSICAL THERAPY | Facility: CLINIC | Age: 70
End: 2020-10-02
Payer: COMMERCIAL

## 2020-10-02 DIAGNOSIS — M76.62 TENDONITIS, ACHILLES, LEFT: ICD-10-CM

## 2020-10-02 DIAGNOSIS — Z96.652 S/P TOTAL KNEE REPLACEMENT, LEFT: ICD-10-CM

## 2020-10-02 DIAGNOSIS — M25.562 ACUTE PAIN OF LEFT KNEE: ICD-10-CM

## 2020-10-02 PROCEDURE — 99207 PR NO CHARGE LOS: CPT | Mod: GP | Performed by: PHYSICAL THERAPY ASSISTANT

## 2020-10-02 RX ORDER — IBUPROFEN 800 MG/1
800 TABLET, FILM COATED ORAL EVERY 8 HOURS
Qty: 21 TABLET | Refills: 0 | Status: SHIPPED | OUTPATIENT
Start: 2020-10-02 | End: 2021-02-26

## 2020-10-02 NOTE — PROGRESS NOTES
Uyen from PT asked for me to check on Laure during a PT session.    Generalized pain with WB and pressure and swelling at the knee.    No pain at patella pressure or with unloaded Varus/Valgus stress.  Moderate to severe effusion without warmth.  No drainage or erythema, no fevers.  No hx trauma.  Achilles tendonitis resolved, still wearing heel lift.    Knee aggravations without overt signs of infection or injury.    See pt instructions.    Jarred Reeves PA-C  [facility] - Surgery

## 2020-10-09 ENCOUNTER — THERAPY VISIT (OUTPATIENT)
Dept: PHYSICAL THERAPY | Facility: CLINIC | Age: 70
End: 2020-10-09
Payer: COMMERCIAL

## 2020-10-09 DIAGNOSIS — M25.562 ACUTE PAIN OF LEFT KNEE: ICD-10-CM

## 2020-10-09 DIAGNOSIS — Z96.652 S/P TOTAL KNEE REPLACEMENT, LEFT: ICD-10-CM

## 2020-10-09 PROCEDURE — 97110 THERAPEUTIC EXERCISES: CPT | Mod: GP | Performed by: PHYSICAL THERAPIST

## 2020-10-12 NOTE — PROGRESS NOTES
PROGRESS  REPORT    Progress reporting period is from 10/9/20.       SUBJECTIVE  Laure is 2.5 months s/p L TKA.  Overall the left knee is doing well.  After having some pain last week she discontinued using a heel lift which has helped.  The heel lift was used to treat some achilles tendonitis.  Today she is more greatly limited by right foot pain and swelling she has noticed over the last three days    Current Pain level: 5/10(primarily in right foot).     Initial Pain level: 10/10.   Changes in function:  Yes (See Goal flowsheet attached for changes in current functional level)  Adverse reaction to treatment or activity: None    OBJECTIVE  Left knee:  ROM: 0-0-120.      Able to perform SLR without lag with manual and verbal cuing.      nable to perform typical closed chain exercises due to right foot.      Antalgic gait due to right foot pain.     ASSESSMENT/PLAN  Updated problem list and treatment plan: Diagnosis 1:  S/p L TKA  Pain -  self management, education and home program  Decreased strength - therapeutic exercise and therapeutic activities  Impaired gait - gait training  Impaired muscle performance - neuro re-education  Decreased function - therapeutic activities  STG/LTGs have been met or progress has been made towards goals:  Yes (See Goal flow sheet completed today.)  Assessment of Progress: The patient's condition is improving.  Self Management Plans:  Patient has been instructed in a home treatment program.  I have re-evaluated this patient and find that the nature, scope, duration and intensity of the therapy is appropriate for the medical condition of the patient.  Laure continues to require the following intervention to meet STG and LTG's:  PT    Recommendations:  This patient would benefit from continued therapy.     Frequency:  1 X week, once daily  Duration:  for 3 weeks        Please refer to the daily flowsheet for treatment today, total treatment time and time spent performing 1:1 timed  codes.

## 2020-10-16 ENCOUNTER — THERAPY VISIT (OUTPATIENT)
Dept: PHYSICAL THERAPY | Facility: CLINIC | Age: 70
End: 2020-10-16
Payer: COMMERCIAL

## 2020-10-16 ENCOUNTER — OFFICE VISIT (OUTPATIENT)
Dept: ORTHOPEDICS | Facility: CLINIC | Age: 70
End: 2020-10-16
Payer: COMMERCIAL

## 2020-10-16 ENCOUNTER — ANCILLARY PROCEDURE (OUTPATIENT)
Dept: GENERAL RADIOLOGY | Facility: CLINIC | Age: 70
End: 2020-10-16
Attending: FAMILY MEDICINE
Payer: COMMERCIAL

## 2020-10-16 VITALS
WEIGHT: 190 LBS | HEIGHT: 64 IN | BODY MASS INDEX: 32.44 KG/M2 | DIASTOLIC BLOOD PRESSURE: 80 MMHG | SYSTOLIC BLOOD PRESSURE: 124 MMHG

## 2020-10-16 DIAGNOSIS — M25.571 PAIN IN JOINT INVOLVING RIGHT ANKLE AND FOOT: Primary | ICD-10-CM

## 2020-10-16 DIAGNOSIS — M25.562 ACUTE PAIN OF LEFT KNEE: ICD-10-CM

## 2020-10-16 DIAGNOSIS — M25.579 PAIN IN JOINT, ANKLE AND FOOT: ICD-10-CM

## 2020-10-16 DIAGNOSIS — Z96.652 S/P TOTAL KNEE REPLACEMENT, LEFT: ICD-10-CM

## 2020-10-16 DIAGNOSIS — M19.071 PRIMARY LOCALIZED OSTEOARTHROSIS OF RIGHT ANKLE AND FOOT: ICD-10-CM

## 2020-10-16 PROCEDURE — 99214 OFFICE O/P EST MOD 30 MIN: CPT | Performed by: FAMILY MEDICINE

## 2020-10-16 PROCEDURE — 97112 NEUROMUSCULAR REEDUCATION: CPT | Mod: GP | Performed by: PHYSICAL THERAPY ASSISTANT

## 2020-10-16 PROCEDURE — 97110 THERAPEUTIC EXERCISES: CPT | Mod: GP | Performed by: PHYSICAL THERAPY ASSISTANT

## 2020-10-16 PROCEDURE — 73610 X-RAY EXAM OF ANKLE: CPT | Mod: RT | Performed by: RADIOLOGY

## 2020-10-16 ASSESSMENT — MIFFLIN-ST. JEOR: SCORE: 1366.83

## 2020-10-16 NOTE — PATIENT INSTRUCTIONS
1. Pain in joint involving right ankle and foot    2. Primary localized osteoarthrosis of right ankle and foot      -Patient has right foot and ankle pain and swelling due to aggravation of arthritis  -Patient was placed in a short cam walker boot to support and stabilize the foot and ankle  -Patient will start Voltaren gel to be applied 3 times a day.  Patient may continue with exercise Tylenol as needed.  -Patient will ice and elevate the ankle frequently throughout the day.  -Patient will call us if symptoms do not improve and will likely start formal physical therapy as well.  -Call direct clinic number [[phone]] at any time with questions or concerns.    Albert Yeo MD [facility]  [facility] [place]

## 2020-10-16 NOTE — PROGRESS NOTES
ASSESSMENT & PLAN  Patient Instructions     1. Pain in joint involving right ankle and foot    2. Primary localized osteoarthrosis of right ankle and foot      -Patient has right foot and ankle pain and swelling due to aggravation of arthritis  -Patient was placed in a short cam walker boot to support and stabilize the foot and ankle  -Patient will start Voltaren gel to be applied 3 times a day.  Patient may continue with exercise Tylenol as needed.  -Patient will ice and elevate the ankle frequently throughout the day.  -Patient will call us if symptoms do not improve and will likely start formal physical therapy as well.  -Call direct clinic number [[phone]] at any time with questions or concerns.    Albert Yeo MD [facility]  [facility]            -----    SUBJECTIVE  Laure Wood is a/an 70 year old female who is seen as a self referral for evaluation of right ankle pain. The patient is seen with their daughter.    Onset: 1 1/2 week(s) ago. Reports insidious onset without acute precipitating event.  Location of Pain: right lateral ankle pain that wraps on the top of the foot and has been swelling.   Rating of Pain at worst: 8/10  Rating of Pain Currently: 5/10  Worsened by: walking, weight bearing.   Better with: nothing  Treatments tried: ice, Tylenol and epson salt, OTC icy hot type rub  Associated symptoms: swelling, numbness, warmth, weakness of ankle, locking or catching and feeling of instability  Orthopedic history: NO  Relevant surgical history: YES - Date: Left TKA 7/27/2020, left achilles pain  Social history: social history: retired    Past Medical History:   Diagnosis Date     Gastroesophageal reflux disease      Hyperlipidemia LDL goal <160      Hypertension, essential, benign      Knee osteoarthritis      Social History     Socioeconomic History     Marital status:      Spouse name: Not on file     Number of children: 6     Years  "of education: Not on file     Highest education level: Not on file   Occupational History     Occupation: dietary     Employer: [employer]   Social Needs     Financial resource strain: Not on file     Food insecurity     Worry: Not on file     Inability: Not on file     Transportation needs     Medical: Not on file     Non-medical: Not on file   Tobacco Use     Smoking status: Never Smoker     Smokeless tobacco: Never Used   Substance and Sexual Activity     Alcohol use: No     Drug use: No     Sexual activity: Never   Lifestyle     Physical activity     Days per week: Not on file     Minutes per session: Not on file     Stress: Not on file   Relationships     Social connections     Talks on phone: Not on file     Gets together: Not on file     Attends Jain service: Not on file     Active member of club or organization: Not on file     Attends meetings of clubs or organizations: Not on file     Relationship status: Not on file     Intimate partner violence     Fear of current or ex partner: Not on file     Emotionally abused: Not on file     Physically abused: Not on file     Forced sexual activity: Not on file   Other Topics Concern     Parent/sibling w/ CABG, MI or angioplasty before 65F 55M? Not Asked   Social History Narrative     Not on file          Patient's past medical, surgical, social, and family histories were reviewed today and no changes are noted.    REVIEW OF SYSTEMS:  10 point ROS is negative other than symptoms noted above in HPI, Past Medical History or as stated below  Constitutional: NEGATIVE for fever, chills, change in weight  Skin: NEGATIVE for worrisome rashes, moles or lesions  GI/: NEGATIVE for bowel or bladder changes  Neuro: NEGATIVE for weakness, dizziness or paresthesias    OBJECTIVE:  /80   Ht 1.626 m (5' 4\")   Wt 86.2 kg (190 lb)   BMI 32.61 kg/m     General: healthy, alert and in no distress  HEENT: no scleral icterus or conjunctival " erythema  Skin: no suspicious lesions or rash. No jaundice.  CV:  no pedal edema  Resp: normal respiratory effort without conversational dyspnea   Psych: normal mood and affect  Gait: normal steady gait with appropriate coordination and balance  Neuro: Normal light sensory exam of lower extremity  MSK:  RIGHT ANKLE  Inspection:    No swelling or ecchymosis is observed  Palpation:    Tender about the lateral malleolus and medial malleolus. Remainder of bony and ligamentous landmarks are nontender.  Range of Motion:     Plantarflexion limited slightly by pain limited by tightness / dorsiflexion limited slightly by pain limited by tightness / inversion within normal limits / eversion within normal limits  Strength:    limited slightly by pain  Special Tests:    negative anterior drawer, negative talar tilt, negative valgus stress, negative forced external rotation/eversion, negative Aguilar sign, negative squeeze test. Unable to perform heel raise and Unable to hop.    RIGHT FOOT  Inspection:    no swelling or ecchymosis  Palpation:    Tender about the cuboid, navicular, peroneal tendon at cuboid, posterior tibial tendon at medial malleolus and posterior tibial tendon at navicular. Otherwise remainder of bony/ligamentous landmarks are non-tender.  Range of Motion:     Grossly intact and non-painful  Strength:    Grossly intact in all planes  Special Tests:    Positive: none       Independent visualization of the below image:  Recent Results (from the past 24 hour(s))   XR Ankle Right G/E 3 Views    Narrative    RIGHT ANKLE THREE OR MORE VIEWS 10/16/2020 11:09 AM     HISTORY: Pain in joint, ankle and foot.      Impression    IMPRESSION: Soft tissue swelling. Pes planus. Mild plantar calcaneal  spurring. Chronic ossification at the distal Achilles tendon. Mild  spurring at the dorsal aspect of the talonavicular joint. No fracture  identified.     ALAN LAORR, MD Albert Yeo MD [facility]  [facility]

## 2020-10-16 NOTE — LETTER
10/16/2020         RE: Laure Wood  [address]  [place] [address]        Dear Colleague,    Thank you for referring your patient, Laure Wood, to the [facility] [place]. Please see a copy of my visit note below.    ASSESSMENT & PLAN  Patient Instructions     1. Pain in joint involving right ankle and foot    2. Primary localized osteoarthrosis of right ankle and foot      -Patient has right foot and ankle pain and swelling due to aggravation of arthritis  -Patient was placed in a short cam walker boot to support and stabilize the foot and ankle  -Patient will start Voltaren gel to be applied 3 times a day.  Patient may continue with exercise Tylenol as needed.  -Patient will ice and elevate the ankle frequently throughout the day.  -Patient will call us if symptoms do not improve and will likely start formal physical therapy as well.  -Call direct clinic number [[phone]] at any time with questions or concerns.    Albert Yeo MD [facility]  [facility]            -----    SUBJECTIVE  Laure Wood is a/an 70 year old female who is seen as a self referral for evaluation of right ankle pain. The patient is seen with their daughter.    Onset: 1 1/2 week(s) ago. Reports insidious onset without acute precipitating event.  Location of Pain: right lateral ankle pain that wraps on the top of the foot and has been swelling.   Rating of Pain at worst: 8/10  Rating of Pain Currently: 5/10  Worsened by: walking, weight bearing.   Better with: nothing  Treatments tried: ice, Tylenol and epson salt, OTC icy hot type rub  Associated symptoms: swelling, numbness, warmth, weakness of ankle, locking or catching and feeling of instability  Orthopedic history: NO  Relevant surgical history: YES - Date: Left TKA 7/27/2020, left achilles pain  Social history: social history: retired    Past Medical History:   Diagnosis Date      Gastroesophageal reflux disease      Hyperlipidemia LDL goal <160      Hypertension, essential, benign      Knee osteoarthritis      Social History     Socioeconomic History     Marital status:      Spouse name: Not on file     Number of children: 6     Years of education: Not on file     Highest education level: Not on file   Occupational History     Occupation: dietary     Employer: [employer]   Social Needs     Financial resource strain: Not on file     Food insecurity     Worry: Not on file     Inability: Not on file     Transportation needs     Medical: Not on file     Non-medical: Not on file   Tobacco Use     Smoking status: Never Smoker     Smokeless tobacco: Never Used   Substance and Sexual Activity     Alcohol use: No     Drug use: No     Sexual activity: Never   Lifestyle     Physical activity     Days per week: Not on file     Minutes per session: Not on file     Stress: Not on file   Relationships     Social connections     Talks on phone: Not on file     Gets together: Not on file     Attends Amish service: Not on file     Active member of club or organization: Not on file     Attends meetings of clubs or organizations: Not on file     Relationship status: Not on file     Intimate partner violence     Fear of current or ex partner: Not on file     Emotionally abused: Not on file     Physically abused: Not on file     Forced sexual activity: Not on file   Other Topics Concern     Parent/sibling w/ CABG, MI or angioplasty before 65F 55M? Not Asked   Social History Narrative     Not on file          Patient's past medical, surgical, social, and family histories were reviewed today and no changes are noted.    REVIEW OF SYSTEMS:  10 point ROS is negative other than symptoms noted above in HPI, Past Medical History or as stated below  Constitutional: NEGATIVE for fever, chills, change in weight  Skin: NEGATIVE for worrisome rashes, moles or lesions  GI/: NEGATIVE for bowel  "or bladder changes  Neuro: NEGATIVE for weakness, dizziness or paresthesias    OBJECTIVE:  /80   Ht 1.626 m (5' 4\")   Wt 86.2 kg (190 lb)   BMI 32.61 kg/m     General: healthy, alert and in no distress  HEENT: no scleral icterus or conjunctival erythema  Skin: no suspicious lesions or rash. No jaundice.  CV:  no pedal edema  Resp: normal respiratory effort without conversational dyspnea   Psych: normal mood and affect  Gait: normal steady gait with appropriate coordination and balance  Neuro: Normal light sensory exam of lower extremity  MSK:  RIGHT ANKLE  Inspection:    No swelling or ecchymosis is observed  Palpation:    Tender about the lateral malleolus and medial malleolus. Remainder of bony and ligamentous landmarks are nontender.  Range of Motion:     Plantarflexion limited slightly by pain limited by tightness / dorsiflexion limited slightly by pain limited by tightness / inversion within normal limits / eversion within normal limits  Strength:    limited slightly by pain  Special Tests:    negative anterior drawer, negative talar tilt, negative valgus stress, negative forced external rotation/eversion, negative Aguilar sign, negative squeeze test. Unable to perform heel raise and Unable to hop.    RIGHT FOOT  Inspection:    no swelling or ecchymosis  Palpation:    Tender about the cuboid, navicular, peroneal tendon at cuboid, posterior tibial tendon at medial malleolus and posterior tibial tendon at navicular. Otherwise remainder of bony/ligamentous landmarks are non-tender.  Range of Motion:     Grossly intact and non-painful  Strength:    Grossly intact in all planes  Special Tests:    Positive: none       Independent visualization of the below image:  Recent Results (from the past 24 hour(s))   XR Ankle Right G/E 3 Views    Narrative    RIGHT ANKLE THREE OR MORE VIEWS 10/16/2020 11:09 AM     HISTORY: Pain in joint, ankle and foot.      Impression    IMPRESSION: Soft tissue swelling. Pes planus. " Mild plantar calcaneal  spurring. Chronic ossification at the distal Achilles tendon. Mild  spurring at the dorsal aspect of the talonavicular joint. No fracture  identified.     ALAN LAORR, MD Albert Yeo MD [facility]        Again, thank you for allowing me to participate in the care of your patient.        Sincerely,        Albert Yeo, MD

## 2020-10-23 ENCOUNTER — THERAPY VISIT (OUTPATIENT)
Dept: PHYSICAL THERAPY | Facility: CLINIC | Age: 70
End: 2020-10-23
Payer: COMMERCIAL

## 2020-10-23 DIAGNOSIS — Z96.652 S/P TOTAL KNEE REPLACEMENT, LEFT: ICD-10-CM

## 2020-10-23 PROCEDURE — 97530 THERAPEUTIC ACTIVITIES: CPT | Mod: GP | Performed by: PHYSICAL THERAPY ASSISTANT

## 2020-10-23 PROCEDURE — 97110 THERAPEUTIC EXERCISES: CPT | Mod: GP | Performed by: PHYSICAL THERAPY ASSISTANT

## 2020-10-23 ASSESSMENT — ACTIVITIES OF DAILY LIVING (ADL)
SIT WITH YOUR KNEE BENT: ACTIVITY IS NOT DIFFICULT
STIFFNESS: I HAVE THE SYMPTOM BUT IT DOES NOT AFFECT MY ACTIVITY
KNEE_ACTIVITY_OF_DAILY_LIVING_SUM: 56
GO UP STAIRS: ACTIVITY IS MINIMALLY DIFFICULT
RISE FROM A CHAIR: ACTIVITY IS MINIMALLY DIFFICULT
KNEE_ACTIVITY_OF_DAILY_LIVING_SCORE: 80
SQUAT: ACTIVITY IS SOMEWHAT DIFFICULT
AS_A_RESULT_OF_YOUR_KNEE_INJURY,_HOW_WOULD_YOU_RATE_YOUR_CURRENT_LEVEL_OF_DAILY_ACTIVITY?: NEARLY NORMAL
HOW_WOULD_YOU_RATE_THE_OVERALL_FUNCTION_OF_YOUR_KNEE_DURING_YOUR_USUAL_DAILY_ACTIVITIES?: NEARLY NORMAL
WEAKNESS: I DO NOT HAVE THE SYMPTOM
KNEEL ON THE FRONT OF YOUR KNEE: I AM UNABLE TO DO THE ACTIVITY
PAIN: I HAVE THE SYMPTOM BUT IT DOES NOT AFFECT MY ACTIVITY
WALK: ACTIVITY IS MINIMALLY DIFFICULT
GO DOWN STAIRS: ACTIVITY IS MINIMALLY DIFFICULT
STAND: ACTIVITY IS NOT DIFFICULT
LIMPING: I DO NOT HAVE THE SYMPTOM
GIVING WAY, BUCKLING OR SHIFTING OF KNEE: I DO NOT HAVE THE SYMPTOM
SWELLING: I HAVE THE SYMPTOM BUT IT DOES NOT AFFECT MY ACTIVITY
HOW_WOULD_YOU_RATE_THE_CURRENT_FUNCTION_OF_YOUR_KNEE_DURING_YOUR_USUAL_DAILY_ACTIVITIES_ON_A_SCALE_FROM_0_TO_100_WITH_100_BEING_YOUR_LEVEL_OF_KNEE_FUNCTION_PRIOR_TO_YOUR_INJURY_AND_0_BEING_THE_INABILITY_TO_PERFORM_ANY_OF_YOUR_USUAL_DAILY_ACTIVITIES?: 80
RAW_SCORE: 56

## 2020-10-23 NOTE — PROGRESS NOTES
Subjective:  HPI  Physical Exam       Knee Activity of Daily Living Score: 80            Objective:  System    Physical Exam    General     ROS    Assessment/Plan:    DISCHARGE REPORT    Progress reporting period is from 8/3/20 to 10/22/20.      SUBJECTIVE  Subjective changes noted by patient:  Patient reports that she is feeling good and that she can do most anything around the house.  She states that when she stands too long that she will get sore in the L/E and has to sit and rest it.  .      Current pain level is  0/10    Previous pain level was:   Initial Pain level: 10/10   Changes in function:  Yes (See Goal flowsheet attached for changes in current functional level) Changes in function: Yes, see goal flow sheet for change in function   Adverse reaction to treatment or activity: None     OBJECTIVE  Changes noted in objective findings:  Yes, MMT of the left knee at hamstring 5-/5. Quad- 4+/5.  PROM of the left knee 0-0-120.  Patient is able to perform the stairs with slight use of the U/E.  She has a slight weakness with the eccentric control, but feel that she can manage on her own.  Patient uses the SEC at times, but walks most of the time w/o it.  Not in the clinic.            ASSESSMENT/PLAN  Updated problem list and treatment plan: Diagnosis 1:  S/p L TKA    STG/LTGs have been met or progress has been made towards goals:  Yes (See Goal flow sheet completed today.)  Assessment of Progress: The patient's condition is improving.  Self Management Plans:  Patient is independent in a home treatment program.  I have re-evaluated this patient and find that the nature, scope, duration and intensity of the therapy is appropriate for the medical condition of the patient.  Laure continues to require the following intervention to meet STG and LTG's:  PT intervention is no longer required to meet STG/LTG.    Recommendations:  This patient is ready to be discharged from therapy and continue their home treatment  program.    Please refer to the daily flowsheet for treatment today, total treatment time and time spent performing 1:1 timed codes.

## 2020-11-20 PROBLEM — M25.562 LEFT KNEE PAIN: Status: RESOLVED | Noted: 2020-08-03 | Resolved: 2020-11-20

## 2021-02-03 ENCOUNTER — OFFICE VISIT (OUTPATIENT)
Dept: ORTHOPEDICS | Facility: CLINIC | Age: 71
End: 2021-02-03
Payer: COMMERCIAL

## 2021-02-03 VITALS
BODY MASS INDEX: 30.73 KG/M2 | HEIGHT: 64 IN | WEIGHT: 180 LBS | DIASTOLIC BLOOD PRESSURE: 70 MMHG | SYSTOLIC BLOOD PRESSURE: 120 MMHG

## 2021-02-03 DIAGNOSIS — M17.11 PRIMARY OSTEOARTHRITIS OF RIGHT KNEE: Primary | ICD-10-CM

## 2021-02-03 DIAGNOSIS — Z96.652 STATUS POST TOTAL LEFT KNEE REPLACEMENT: ICD-10-CM

## 2021-02-03 PROCEDURE — 99213 OFFICE O/P EST LOW 20 MIN: CPT | Performed by: ORTHOPAEDIC SURGERY

## 2021-02-03 ASSESSMENT — MIFFLIN-ST. JEOR: SCORE: 1321.47

## 2021-02-03 NOTE — LETTER
"    2/3/2021         RE: Laure Wood  [address] [address] [address]        Dear Colleague,    Thank you for referring your patient, Laure Wood, to the [facility] [place]. Please see a copy of my visit note below.    HISTORY OF PRESENT ILLNESS:    Laure Wood is a 70 year old female who is seen in follow up for Left TKA, DOS 7/27/20.  Patient is ~ 6 months post operative.  Present symptoms: left knee pain, pain is located to the anterior knee, along the medial and lateral aspects. She reports pain with walking and weightbearing, no pain at seated rest. She denies swelling in the knee. No injury or trauma to the knee.   Current pain level: 8/10  Treatments tried to this point: Tylenol, Physical Therapy home exercise program.   Past Medical History: Since undergoing left TKA on 7/27/20 she had left achilles injury. Otherwise her history is unchanged from the visit of 7/19/19. Please refer to that note.     REVIEW OF SYSTEMS:  CONSTITUTIONAL:  NEGATIVE for fever, chills, change in weight  INTEGUMENTARY/SKIN:  NEGATIVE for worrisome rashes, moles or lesions  EYES:  NEGATIVE for vision changes or irritation  ENT/MOUTH:  NEGATIVE for ear, mouth and throat problems  RESP:  NEGATIVE for significant cough or SOB  BREAST:  NEGATIVE for masses, tenderness or discharge  CV:  NEGATIVE for chest pain, palpitations or peripheral edema  GI:  NEGATIVE for nausea, abdominal pain, heartburn, or change in bowel habits  :  Negative   MUSCULOSKELETAL:  See HPI above  NEURO:  NEGATIVE for weakness, dizziness or paresthesias  ENDOCRINE:  NEGATIVE for temperature intolerance, skin/hair changes  HEME/ALLERGY/IMMUNE:  NEGATIVE for bleeding problems  PSYCHIATRIC:  NEGATIVE for changes in mood or affect    PHYSICAL EXAM:  /70 (BP Location: Right arm)   Ht 1.626 m (5' 4\")   Wt 81.6 kg (180 lb)   BMI 30.90 kg/m    Body mass index is 30.9 kg/m .   GENERAL APPEARANCE: healthy, alert and no " distress   SKIN: no suspicious lesions or rashes  NEURO: Normal strength and tone, mentation intact and speech normal  VASCULAR:  good pulses, and cappillary refill   LYMPH: no lymphadenopathy   PSYCH:  mentation appears normal and affect normal/bright    MSK:  Left knee alignment is quite satisfactory.  The surgical incision is healed with keloid formation of mild to moderate degree  Range of motion is excellent with a full range of motion in both directions  Patellofemoral tracking is clinically intact  Moderate effusion is present    Right knee with a severe valgus deformity  Moderate effusion, right knee    She walks with a minimal limp    IMAGING INTERPRETATION: None taken today      ASSESSMENT:  Status post left total knee arthroplasty, overall doing well with a persisting effusion  This was felt to be from over usage of the left knee because of the right knee DJD that needs to be addressed as well as persisting quadriceps weakness    Advanced right knee DJD with a valgus deformity    PLAN:  We went over the x-rays from September 10, 2020 for  Postsurgical evaluation of the left knee.  We also looked at the previous x-rays of the right knee from July 10, 2019.    We felt that she is doing well enough to consider right total knee arthroplasty at any time.    We talked about strengthening exercises of the left quadriceps.  I would like to have her do some backwards walking to strengthen.  We will also use compression to help with the swelling.    She will contact our surgery scheduler in the future if she decided to go over the right knee surgery.           James Mcdonald MD  Dept. Orthopedic Surgery  Health system       Disclaimer: This note consists of symbols derived from keyboarding, dictation and/or voice recognition software. As a result, there may be errors in the script that have gone undetected. Please consider this when interpreting information found in this chart.        Again, thank you for  allowing me to participate in the care of your patient.        Sincerely,        James Mcdonald MD

## 2021-02-03 NOTE — PATIENT INSTRUCTIONS
Elastic compression for controlling the swelling, left  Okay to proceed with total knee replacement on the right.  Please contact our surgery scheduler for that purpose

## 2021-02-03 NOTE — PROGRESS NOTES
"HISTORY OF PRESENT ILLNESS:    Laure Wood is a 70 year old female who is seen in follow up for Left TKA, DOS 7/27/20.  Patient is ~ 6 months post operative.  Present symptoms: left knee pain, pain is located to the anterior knee, along the medial and lateral aspects. She reports pain with walking and weightbearing, no pain at seated rest. She denies swelling in the knee. No injury or trauma to the knee.   Current pain level: 8/10  Treatments tried to this point: Tylenol, Physical Therapy home exercise program.   Past Medical History: Since undergoing left TKA on 7/27/20 she had left achilles injury. Otherwise her history is unchanged from the visit of 7/19/19. Please refer to that note.     REVIEW OF SYSTEMS:  CONSTITUTIONAL:  NEGATIVE for fever, chills, change in weight  INTEGUMENTARY/SKIN:  NEGATIVE for worrisome rashes, moles or lesions  EYES:  NEGATIVE for vision changes or irritation  ENT/MOUTH:  NEGATIVE for ear, mouth and throat problems  RESP:  NEGATIVE for significant cough or SOB  BREAST:  NEGATIVE for masses, tenderness or discharge  CV:  NEGATIVE for chest pain, palpitations or peripheral edema  GI:  NEGATIVE for nausea, abdominal pain, heartburn, or change in bowel habits  :  Negative   MUSCULOSKELETAL:  See HPI above  NEURO:  NEGATIVE for weakness, dizziness or paresthesias  ENDOCRINE:  NEGATIVE for temperature intolerance, skin/hair changes  HEME/ALLERGY/IMMUNE:  NEGATIVE for bleeding problems  PSYCHIATRIC:  NEGATIVE for changes in mood or affect    PHYSICAL EXAM:  /70 (BP Location: Right arm)   Ht 1.626 m (5' 4\")   Wt 81.6 kg (180 lb)   BMI 30.90 kg/m    Body mass index is 30.9 kg/m .   GENERAL APPEARANCE: healthy, alert and no distress   SKIN: no suspicious lesions or rashes  NEURO: Normal strength and tone, mentation intact and speech normal  VASCULAR:  good pulses, and cappillary refill   LYMPH: no lymphadenopathy   PSYCH:  mentation appears normal and affect " normal/bright    MSK:  Left knee alignment is quite satisfactory.  The surgical incision is healed with keloid formation of mild to moderate degree  Range of motion is excellent with a full range of motion in both directions  Patellofemoral tracking is clinically intact  Moderate effusion is present    Right knee with a severe valgus deformity  Moderate effusion, right knee    She walks with a minimal limp    IMAGING INTERPRETATION: None taken today      ASSESSMENT:  Status post left total knee arthroplasty, overall doing well with a persisting effusion  This was felt to be from over usage of the left knee because of the right knee DJD that needs to be addressed as well as persisting quadriceps weakness    Advanced right knee DJD with a valgus deformity    PLAN:  We went over the x-rays from September 10, 2020 for  Postsurgical evaluation of the left knee.  We also looked at the previous x-rays of the right knee from July 10, 2019.    We felt that she is doing well enough to consider right total knee arthroplasty at any time.    We talked about strengthening exercises of the left quadriceps.  I would like to have her do some backwards walking to strengthen.  We will also use compression to help with the swelling.    She will contact our surgery scheduler in the future if she decided to go over the right knee surgery.           James Mcdonald MD  Dept. Orthopedic Surgery  [facility] [place]       Disclaimer: This note consists of symbols derived from keyboarding, dictation and/or voice recognition software. As a result, there may be errors in the script that have gone undetected. Please consider this when interpreting information found in this chart.

## 2021-02-26 ENCOUNTER — OFFICE VISIT (OUTPATIENT)
Dept: INTERNAL MEDICINE | Facility: CLINIC | Age: 71
End: 2021-02-26
Payer: COMMERCIAL

## 2021-02-26 ENCOUNTER — TELEPHONE (OUTPATIENT)
Dept: ORTHOPEDICS | Facility: CLINIC | Age: 71
End: 2021-02-26

## 2021-02-26 VITALS
SYSTOLIC BLOOD PRESSURE: 112 MMHG | BODY MASS INDEX: 32.06 KG/M2 | WEIGHT: 187.8 LBS | TEMPERATURE: 97.2 F | HEART RATE: 84 BPM | HEIGHT: 64 IN | DIASTOLIC BLOOD PRESSURE: 68 MMHG

## 2021-02-26 DIAGNOSIS — E78.2 MIXED HYPERLIPIDEMIA: ICD-10-CM

## 2021-02-26 DIAGNOSIS — M17.0 OSTEOARTHRITIS OF BOTH KNEES, UNSPECIFIED OSTEOARTHRITIS TYPE: ICD-10-CM

## 2021-02-26 DIAGNOSIS — M17.11 PRIMARY OSTEOARTHRITIS OF RIGHT KNEE: Primary | ICD-10-CM

## 2021-02-26 DIAGNOSIS — R40.0 DAYTIME SOMNOLENCE: ICD-10-CM

## 2021-02-26 DIAGNOSIS — E66.01 MORBID OBESITY (H): ICD-10-CM

## 2021-02-26 DIAGNOSIS — R53.83 OTHER FATIGUE: Primary | ICD-10-CM

## 2021-02-26 LAB
ANION GAP SERPL CALCULATED.3IONS-SCNC: 4 MMOL/L (ref 3–14)
BUN SERPL-MCNC: 21 MG/DL (ref 7–30)
CALCIUM SERPL-MCNC: 10.1 MG/DL (ref 8.5–10.1)
CHLORIDE SERPL-SCNC: 106 MMOL/L (ref 94–109)
CHOLEST SERPL-MCNC: 248 MG/DL
CO2 SERPL-SCNC: 31 MMOL/L (ref 20–32)
CREAT SERPL-MCNC: 0.9 MG/DL (ref 0.52–1.04)
ERYTHROCYTE [DISTWIDTH] IN BLOOD BY AUTOMATED COUNT: 16 % (ref 10–15)
GFR SERPL CREATININE-BSD FRML MDRD: 64 ML/MIN/{1.73_M2}
GLUCOSE SERPL-MCNC: 83 MG/DL (ref 70–99)
HCT VFR BLD AUTO: 38.7 % (ref 35–47)
HDLC SERPL-MCNC: 95 MG/DL
HGB BLD-MCNC: 12 G/DL (ref 11.7–15.7)
LDLC SERPL CALC-MCNC: 125 MG/DL
MCH RBC QN AUTO: 27.4 PG (ref 26.5–33)
MCHC RBC AUTO-ENTMCNC: 31 G/DL (ref 31.5–36.5)
MCV RBC AUTO: 88 FL (ref 78–100)
NONHDLC SERPL-MCNC: 153 MG/DL
PLATELET # BLD AUTO: 305 10E9/L (ref 150–450)
POTASSIUM SERPL-SCNC: 3.9 MMOL/L (ref 3.4–5.3)
RBC # BLD AUTO: 4.38 10E12/L (ref 3.8–5.2)
SODIUM SERPL-SCNC: 141 MMOL/L (ref 133–144)
TRIGL SERPL-MCNC: 140 MG/DL
TSH SERPL DL<=0.005 MIU/L-ACNC: 1.13 MU/L (ref 0.4–4)
WBC # BLD AUTO: 5.1 10E9/L (ref 4–11)

## 2021-02-26 PROCEDURE — 84443 ASSAY THYROID STIM HORMONE: CPT | Performed by: INTERNAL MEDICINE

## 2021-02-26 PROCEDURE — 36415 COLL VENOUS BLD VENIPUNCTURE: CPT | Performed by: INTERNAL MEDICINE

## 2021-02-26 PROCEDURE — 85027 COMPLETE CBC AUTOMATED: CPT | Performed by: INTERNAL MEDICINE

## 2021-02-26 PROCEDURE — 99214 OFFICE O/P EST MOD 30 MIN: CPT | Performed by: INTERNAL MEDICINE

## 2021-02-26 PROCEDURE — 80061 LIPID PANEL: CPT | Performed by: INTERNAL MEDICINE

## 2021-02-26 PROCEDURE — 80048 BASIC METABOLIC PNL TOTAL CA: CPT | Performed by: INTERNAL MEDICINE

## 2021-02-26 SDOH — ECONOMIC STABILITY: FOOD INSECURITY: WITHIN THE PAST 12 MONTHS, THE FOOD YOU BOUGHT JUST DIDN'T LAST AND YOU DIDN'T HAVE MONEY TO GET MORE.: NOT ASKED

## 2021-02-26 SDOH — ECONOMIC STABILITY: INCOME INSECURITY: HOW HARD IS IT FOR YOU TO PAY FOR THE VERY BASICS LIKE FOOD, HOUSING, MEDICAL CARE, AND HEATING?: NOT ASKED

## 2021-02-26 SDOH — ECONOMIC STABILITY: TRANSPORTATION INSECURITY
IN THE PAST 12 MONTHS, HAS THE LACK OF TRANSPORTATION KEPT YOU FROM MEDICAL APPOINTMENTS OR FROM GETTING MEDICATIONS?: NOT ASKED

## 2021-02-26 SDOH — ECONOMIC STABILITY: TRANSPORTATION INSECURITY
IN THE PAST 12 MONTHS, HAS LACK OF TRANSPORTATION KEPT YOU FROM MEDICAL APPOINTMENTS OR FROM GETTING MEDICATIONS?: NOT ASKED

## 2021-02-26 SDOH — ECONOMIC STABILITY: FOOD INSECURITY: HOW HARD IS IT FOR YOU TO PAY FOR THE VERY BASICS LIKE FOOD, HOUSING, MEDICAL CARE, AND HEATING?: NOT ASKED

## 2021-02-26 SDOH — ECONOMIC STABILITY: FOOD INSECURITY: WITHIN THE PAST 12 MONTHS, YOU WORRIED THAT YOUR FOOD WOULD RUN OUT BEFORE YOU GOT THE MONEY TO BUY MORE.: NOT ASKED

## 2021-02-26 SDOH — ECONOMIC STABILITY: TRANSPORTATION INSECURITY
IN THE PAST 12 MONTHS, HAS LACK OF TRANSPORTATION KEPT YOU FROM MEETINGS, WORK, OR FROM GETTING THINGS NEEDED FOR DAILY LIVING?: NOT ASKED

## 2021-02-26 SDOH — ECONOMIC STABILITY: FOOD INSECURITY: WITHIN THE PAST 12 MONTHS, YOU WORRIED THAT YOUR FOOD WOULD RUN OUT BEFORE YOU GOT MONEY TO BUY MORE.: NOT ASKED

## 2021-02-26 ASSESSMENT — MIFFLIN-ST. JEOR: SCORE: 1356.86

## 2021-02-26 ASSESSMENT — ACTIVITIES OF DAILY LIVING (ADL): LACK_OF_TRANSPORTATION: NOT ASKED

## 2021-02-26 NOTE — TELEPHONE ENCOUNTER
Patient called with interest to schedule right TKA.      Please place surgery order.       Yaima Nicolas, Surgery Scheduler

## 2021-02-26 NOTE — PROGRESS NOTES
"    Assessment & Plan     Other fatigue  Initial eval negative.   Get sleep study due to self reported snoring, waking feeling tired/poor quality sleep, daytime sonmolence  - CBC with platelets  - TSH with free T4 reflex  - Basic metabolic panel  - SLEEP EVALUATION & MANAGEMENT REFERRAL - [facility] [phone]  (Age 18 and up); Future    Daytime somnolence  - SLEEP EVALUATION & MANAGEMENT REFERRAL - [facility] [phone]  (Age 18 and up); Future    Morbid obesity (H)  - SLEEP EVALUATION & MANAGEMENT REFERRAL - [facility] [phone]  (Age 18 and up); Future    Osteoarthritis of both knees, unspecified osteoarthritis type  - diclofenac (VOLTAREN) 1 % topical gel; Apply 2-4 g topically 4 times daily R Knee, foot as needed    Mixed hyperlipidemia  Restart statin  - Lipid panel reflex to direct LDL Non-fasting  - ALT; Future  - Lipid panel reflex to direct LDL Fasting; Future  - simvastatin (ZOCOR) 40 MG tablet; Take 1 tablet (40 mg) by mouth At Bedtime             BMI:   Estimated body mass index is 32.24 kg/m  as calculated from the following:    Height as of this encounter: 1.626 m (5' 4\").    Weight as of this encounter: 85.2 kg (187 lb 12.8 oz).           Return in about 2 months (around 4/26/2021) for Preventative Visit.    Yoshi Bowen MD  [facility]    Martha Kelsey is a 70 year old who presents for the following health issues     HPI       Concern - fatigue, weakness,  Onset: 1+ month  Description: fatigue and weakness all over, \"tired all the time\"  Intensity: moderate  Progression of Symptoms:  same  Accompanying Signs & Symptoms: pt reports normal sleep pattern  Previous history of similar problem: n/a  Precipitating factors:        Worsened by: n/a  Alleviating factors:        Improved by: n/a  Therapies tried and outcome: None        Review of Systems       " "  Objective    /68   Pulse 84   Temp 97.2  F (36.2  C) (Temporal)   Ht 1.626 m (5' 4\")   Wt 85.2 kg (187 lb 12.8 oz)   BMI 32.24 kg/m    Body mass index is 32.24 kg/m .  Physical Exam   GENERAL APPEARANCE: alert, no distress and over weight  RESP: lungs clear to auscultation - no rales, rhonchi or wheezes  CV: regular rates and rhythm, normal S1 S2, no S3 or S4 and no murmur, click or rub  MS: extremities normal- no gross deformities noted and arthritic changes of the R knee  SKIN: no suspicious lesions or rashes                  "

## 2021-02-26 NOTE — NURSING NOTE
"Chief Complaint   Patient presents with     Fatigue     weakness x 1 month     /68   Temp 97.2  F (36.2  C) (Temporal)   Ht 1.626 m (5' 4\")   Wt 85.2 kg (187 lb 12.8 oz)   BMI 32.24 kg/m   Estimated body mass index is 32.24 kg/m  as calculated from the following:    Height as of this encounter: 1.626 m (5' 4\").    Weight as of this encounter: 85.2 kg (187 lb 12.8 oz).          Hallie Weller CMA  "

## 2021-02-26 NOTE — PATIENT INSTRUCTIONS
The following are resources for the COVID vaccines:    [facility] COVID Vaccine location finder:    Https://vaccineconnector.[place].gov/    [facility] resources:    [email or web address]    [email or web address]-you-need-to-know-about-the-[email or web address]-[email or web address]-vaccines

## 2021-02-26 NOTE — LETTER
March 1, 2021      Laure E [place]  [address] [address] [facility] [address]        Dear ,    We are writing to inform you of your test results.    Your recent lab results are enclosed.  I recommend restarting your cholesterol medication.  I have sent this to your pharmacy.    Using the new American Heart Association risk calculator,  your 10 year risk of global cardiovascular disease (heart attack, stroke) is 11.6%.  At any risk level > 7.5-10% we should consider the use of a statin cholesterol lowering medication.  This has been shown to help prevent heart disease and strokes when risk is at, or above this level.       Resulted Orders   CBC with platelets   Result Value Ref Range    WBC 5.1 4.0 - 11.0 10e9/L    RBC Count 4.38 3.8 - 5.2 10e12/L    Hemoglobin 12.0 11.7 - 15.7 g/dL    Hematocrit 38.7 35.0 - 47.0 %    MCV 88 78 - 100 fl    MCH 27.4 26.5 - 33.0 pg    MCHC 31.0 (L) 31.5 - 36.5 g/dL    RDW 16.0 (H) 10.0 - 15.0 %    Platelet Count 305 150 - 450 10e9/L   TSH with free T4 reflex   Result Value Ref Range    TSH 1.13 0.40 - 4.00 mU/L   Basic metabolic panel   Result Value Ref Range    Sodium 141 133 - 144 mmol/L    Potassium 3.9 3.4 - 5.3 mmol/L    Chloride 106 94 - 109 mmol/L    Carbon Dioxide 31 20 - 32 mmol/L    Anion Gap 4 3 - 14 mmol/L    Glucose 83 70 - 99 mg/dL    Urea Nitrogen 21 7 - 30 mg/dL    Creatinine 0.90 0.52 - 1.04 mg/dL    GFR Estimate 64 >60 mL/min/[1.73_m2]      Comment:      Non  GFR Calc  Starting 12/18/2018, serum creatinine based estimated GFR (eGFR) will be   calculated using the Chronic Kidney Disease Epidemiology Collaboration   (CKD-EPI) equation.      GFR Estimate If Black 75 >60 mL/min/[1.73_m2]      Comment:       GFR Calc  Starting 12/18/2018, serum creatinine based estimated GFR (eGFR) will be   calculated using the Chronic Kidney Disease Epidemiology Collaboration   (CKD-EPI) equation.      Calcium 10.1 8.5 - 10.1 mg/dL   Lipid panel  reflex to direct LDL Non-fasting   Result Value Ref Range    Cholesterol 248 (H) <200 mg/dL      Comment:      Desirable:       <200 mg/dl    Triglycerides 140 <150 mg/dL    HDL Cholesterol 95 >49 mg/dL    LDL Cholesterol Calculated 125 (H) <100 mg/dL      Comment:      Above desirable:  100-129 mg/dl  Borderline High:  130-159 mg/dL  High:             160-189 mg/dL  Very high:       >189 mg/dl      Non HDL Cholesterol 153 (H) <130 mg/dL      Comment:      Above Desirable:  130-159 mg/dl  Borderline high:  160-189 mg/dl  High:             190-219 mg/dl  Very high:       >219 mg/dl         If you have any questions or concerns, please call the clinic at the number listed above.       Sincerely,      Yoshi Bowen MD

## 2021-02-28 RX ORDER — SIMVASTATIN 40 MG
40 TABLET ORAL AT BEDTIME
Qty: 90 TABLET | Refills: 1 | Status: SHIPPED | OUTPATIENT
Start: 2021-02-28 | End: 2021-03-16

## 2021-03-01 ENCOUNTER — PREP FOR PROCEDURE (OUTPATIENT)
Dept: ORTHOPEDICS | Facility: CLINIC | Age: 71
End: 2021-03-01

## 2021-03-01 DIAGNOSIS — M17.11 PRIMARY OSTEOARTHRITIS OF RIGHT KNEE: Primary | ICD-10-CM

## 2021-03-08 ENCOUNTER — TELEPHONE (OUTPATIENT)
Dept: ORTHOPEDICS | Facility: CLINIC | Age: 71
End: 2021-03-08

## 2021-03-08 DIAGNOSIS — Z47.89 ORTHOPEDIC AFTERCARE: Primary | ICD-10-CM

## 2021-03-08 DIAGNOSIS — Z96.659 STATUS POST TOTAL KNEE REPLACEMENT: ICD-10-CM

## 2021-03-08 NOTE — TELEPHONE ENCOUNTER
Called patient and left a voicemail to call back to schedule surgery when interested.    Omer Hong CMA ([facility])

## 2021-03-10 DIAGNOSIS — I10 ESSENTIAL HYPERTENSION, BENIGN: ICD-10-CM

## 2021-03-10 PROBLEM — M17.11 PRIMARY OSTEOARTHRITIS OF RIGHT KNEE: Status: ACTIVE | Noted: 2021-03-10

## 2021-03-10 NOTE — TELEPHONE ENCOUNTER
ATC - please sign PT orders    Type of surgery: R TKA  Location of surgery: [facility]  Date and time of surgery: 4/6/21 @ 730AM  Surgeon: Tamara  Pre-Op Appt Date: pt will berta  Post-Op Appt Date: 4/19/21   Packet sent out: Yes  Pre-cert/Authorization completed:  No   Vendor emailed 3/10  Date: 03/10/21

## 2021-03-11 RX ORDER — HYDROCHLOROTHIAZIDE 25 MG/1
TABLET ORAL
Qty: 90 TABLET | Refills: 3 | Status: SHIPPED | OUTPATIENT
Start: 2021-03-11 | End: 2022-03-17

## 2021-03-16 ENCOUNTER — OFFICE VISIT (OUTPATIENT)
Dept: INTERNAL MEDICINE | Facility: CLINIC | Age: 71
End: 2021-03-16
Payer: COMMERCIAL

## 2021-03-16 VITALS
SYSTOLIC BLOOD PRESSURE: 118 MMHG | TEMPERATURE: 97.7 F | WEIGHT: 186.4 LBS | HEART RATE: 67 BPM | BODY MASS INDEX: 31.82 KG/M2 | HEIGHT: 64 IN | OXYGEN SATURATION: 99 % | DIASTOLIC BLOOD PRESSURE: 80 MMHG

## 2021-03-16 DIAGNOSIS — Z79.1 NSAID LONG-TERM USE: ICD-10-CM

## 2021-03-16 DIAGNOSIS — Z01.818 PREOP GENERAL PHYSICAL EXAM: Primary | ICD-10-CM

## 2021-03-16 DIAGNOSIS — E66.3 OVER WEIGHT: ICD-10-CM

## 2021-03-16 DIAGNOSIS — I10 ESSENTIAL HYPERTENSION, BENIGN: ICD-10-CM

## 2021-03-16 DIAGNOSIS — E78.2 MIXED HYPERLIPIDEMIA: ICD-10-CM

## 2021-03-16 DIAGNOSIS — M17.11 PRIMARY OSTEOARTHRITIS OF RIGHT KNEE: ICD-10-CM

## 2021-03-16 PROBLEM — E66.01 MORBID OBESITY (H): Status: RESOLVED | Noted: 2019-05-01 | Resolved: 2021-03-16

## 2021-03-16 LAB
ANION GAP SERPL CALCULATED.3IONS-SCNC: 2 MMOL/L (ref 3–14)
BUN SERPL-MCNC: 22 MG/DL (ref 7–30)
CALCIUM SERPL-MCNC: 9.9 MG/DL (ref 8.5–10.1)
CHLORIDE SERPL-SCNC: 106 MMOL/L (ref 94–109)
CO2 SERPL-SCNC: 30 MMOL/L (ref 20–32)
CREAT SERPL-MCNC: 0.97 MG/DL (ref 0.52–1.04)
ERYTHROCYTE [DISTWIDTH] IN BLOOD BY AUTOMATED COUNT: 15.3 % (ref 10–15)
GFR SERPL CREATININE-BSD FRML MDRD: 59 ML/MIN/{1.73_M2}
GLUCOSE SERPL-MCNC: 81 MG/DL (ref 70–99)
HCT VFR BLD AUTO: 39.1 % (ref 35–47)
HGB BLD-MCNC: 12.2 G/DL (ref 11.7–15.7)
MCH RBC QN AUTO: 27.7 PG (ref 26.5–33)
MCHC RBC AUTO-ENTMCNC: 31.2 G/DL (ref 31.5–36.5)
MCV RBC AUTO: 89 FL (ref 78–100)
PLATELET # BLD AUTO: 273 10E9/L (ref 150–450)
POTASSIUM SERPL-SCNC: 3.9 MMOL/L (ref 3.4–5.3)
RBC # BLD AUTO: 4.41 10E12/L (ref 3.8–5.2)
SODIUM SERPL-SCNC: 138 MMOL/L (ref 133–144)
WBC # BLD AUTO: 4.5 10E9/L (ref 4–11)

## 2021-03-16 PROCEDURE — 99214 OFFICE O/P EST MOD 30 MIN: CPT | Performed by: INTERNAL MEDICINE

## 2021-03-16 PROCEDURE — 85027 COMPLETE CBC AUTOMATED: CPT | Performed by: INTERNAL MEDICINE

## 2021-03-16 PROCEDURE — 36415 COLL VENOUS BLD VENIPUNCTURE: CPT | Performed by: INTERNAL MEDICINE

## 2021-03-16 PROCEDURE — 80048 BASIC METABOLIC PNL TOTAL CA: CPT | Performed by: INTERNAL MEDICINE

## 2021-03-16 RX ORDER — SIMVASTATIN 40 MG
40 TABLET ORAL AT BEDTIME
Qty: 90 TABLET | Refills: 1 | Status: SHIPPED | OUTPATIENT
Start: 2021-03-16 | End: 2022-05-17

## 2021-03-16 ASSESSMENT — MIFFLIN-ST. JEOR: SCORE: 1350.5

## 2021-03-16 NOTE — H&P (VIEW-ONLY)
[address] [address]  Phone: [phone]  Primary Provider: Monika Bowen  Pre-op Performing Provider: MONIKA BOWEN      PREOPERATIVE EVALUATION:  Today's date: 3/16/2021    Laure Wood is a 70 year old female who presents for a preoperative evaluation.    Surgical Information:  Surgery/Procedure: Right Arthroplasty, Knee, Total  Surgery Location: [facility]  Surgeon: Dr. SCARLET Mcdonald  Surgery Date: 04/06/2021  Time of Surgery: 07:30am  Where patient plans to recover: At home with family  Fax number for surgical facility: Note does not need to be faxed, will be available electronically in Epic.    Type of Anesthesia Anticipated: Choice    Assessment & Plan     The proposed surgical procedure is considered INTERMEDIATE risk.    Preop general physical exam  - CBC with platelets  - Basic metabolic panel  Primary osteoarthritis of right knee  Essential hypertension, benign  Over weight    Risks and Recommendations:  The patient has the following additional risks and recommendations for perioperative complications:   - No identified additional risk factors other than previously addressed    Medication Instructions:   - Diuretics: HOLD on the day of surgery.    RECOMMENDATION:  APPROVAL GIVEN to proceed with proposed procedure, without further diagnostic evaluation.                      Subjective     HPI related to upcoming procedure: longstanding OA R knee      Preop Questions 3/16/2021   1. Have you ever had a heart attack or stroke? No   2. Have you ever had surgery on your heart or blood vessels, such as a stent placement, a coronary artery bypass, or surgery on an artery in your head, neck, heart, or legs? No   3. Do you have chest pain with activity? No   4. Do you have a history of  heart failure? No   5. Do you currently have a cold, bronchitis or symptoms of other infection? No   6. Do you have a cough, shortness of breath, or wheezing? No    7. Do you or anyone in your family have previous history of blood clots? No   8. Do you or does anyone in your family have a serious bleeding problem such as prolonged bleeding following surgeries or cuts? No   9. Have you ever had problems with anemia or been told to take iron pills? No   10. Have you had any abnormal blood loss such as black, tarry or bloody stools, or abnormal vaginal bleeding? No   11. Have you ever had a blood transfusion? No   12. Are you willing to have a blood transfusion if it is medically needed before, during, or after your surgery? Yes   13. Have you or any of your relatives ever had problems with anesthesia? No   14. Do you have sleep apnea, excessive snoring or daytime drowsiness? No   15. Do you have any artifical heart valves or other implanted medical devices like a pacemaker, defibrillator, or continuous glucose monitor? No   16. Do you have artificial joints? No   17. Are you allergic to latex? No     Health Care Directive:  Patient does not have a Health Care Directive or Living Will: Patient states has Advance Directive and will bring in a copy to clinic.    Preoperative Review of :   reviewed - controlled substances prescribed by other outside provider(s).          Review of Systems  Constitutional, neuro, ENT, endocrine, pulmonary, cardiac, gastrointestinal, genitourinary, musculoskeletal, integument and psychiatric systems are negative, except as otherwise noted.    Patient Active Problem List    Diagnosis Date Noted     Esophageal reflux 07/11/2013     Priority: High     Essential hypertension, benign 04/09/2012     Priority: High     Primary osteoarthritis of right knee 03/10/2021     Priority: Medium     Added automatically from request for surgery [id number]       S/P total knee replacement, left 07/28/2020     Priority: Medium     Status post total hip replacement, left 07/27/2020     Priority: Medium     Primary osteoarthritis of left knee 07/20/2020     Priority:  Medium     Added automatically from request for surgery [id number]       Over weight 07/19/2014     Priority: Medium     Knee osteoarthritis 07/18/2014     Priority: Medium     Advanced care planning/counseling discussion 04/19/2013     Priority: Medium     Advance Care Planning:   ACP Review and Resources Provided: patient given information              Cervical pain 01/07/2013     Priority: Medium     Pain in shoulder 01/07/2013     Priority: Medium     Lumbar pain 01/07/2013     Priority: Medium     Fibroid uterus 12/20/2012     Priority: Medium     Thickened endometrium 12/20/2012     Priority: Medium     CARDIOVASCULAR SCREENING; LDL GOAL LESS THAN 130 05/29/2012     Priority: Medium      Past Medical History:   Diagnosis Date     Gastroesophageal reflux disease      Hyperlipidemia LDL goal <160      Hypertension, essential, benign      Knee osteoarthritis      Past Surgical History:   Procedure Laterality Date     ARTHROPLASTY KNEE Left 7/27/2020    Procedure: Left total knee arthroplasty ([employer] #4 PS femur; #3 tibia; 9 mm tibial insert; 32 mm patella);  Surgeon: James Mcdonald MD;  Location: [facility]     CATARACT IOL, RT/LT Bilateral     Cataract IOL RT/LT     COLONOSCOPY Left 7/25/2019    Procedure: COLONOSCOPY;  Surgeon: Jono Egan MD;  Location:  GI     Current Outpatient Medications   Medication Sig Dispense Refill     albuterol 108 (90 Base) MCG/ACT IN inhaler Inhale 2 puffs into the lungs every 6 hours as needed for shortness of breath / dyspnea or wheezing 1 Inhaler 0     diclofenac (VOLTAREN) 1 % topical gel Apply 2-4 g topically 4 times daily R Knee, foot as needed 100 g 11     hydrochlorothiazide (HYDRODIURIL) 25 MG tablet TAKE 1 TABLET(25 MG) BY MOUTH DAILY 90 tablet 3     omeprazole (PRILOSEC) 20 MG DR capsule Take 1 capsule (20 mg) by mouth daily 30 capsule 8     simvastatin (ZOCOR) 40 MG tablet Take 1 tablet (40 mg) by mouth At Bedtime 90 tablet 1       Allergies   Allergen  "Reactions     No Known Drug Allergy         Social History     Tobacco Use     Smoking status: Never Smoker     Smokeless tobacco: Never Used   Substance Use Topics     Alcohol use: No       History   Drug Use No         Objective     /80   Pulse 67   Temp 97.7  F (36.5  C) (Temporal)   Ht 1.626 m (5' 4\")   Wt 84.6 kg (186 lb 6.4 oz)   SpO2 99%   BMI 32.00 kg/m      Physical Exam    GENERAL APPEARANCE: alert, no distress and over weight     EYES: EOMI, PERRL     HENT: nose and mouth without ulcers or lesions and normal cephalic/atraumatic     NECK: no adenopathy, no asymmetry, masses, or scars and thyroid normal to palpation     RESP: lungs clear to auscultation - no rales, rhonchi or wheezes     CV: regular rates and rhythm, normal S1 S2, no S3 or S4 and no murmur, click or rub     ABDOMEN:  soft, nontender, no HSM or masses and bowel sounds normal     MS: extremities normal- no gross deformities noted, no evidence of inflammation in joints, FROM in all extremities.     SKIN: no suspicious lesions or rashes     NEURO: Normal strength and tone, sensory exam grossly normal, mentation intact and speech normal     PSYCH: mentation appears normal. and affect normal/bright     LYMPHATICS: No cervical adenopathy    Recent Labs   Lab Test 02/26/21  1531 07/29/20  0611 07/27/20  1040 07/27/20  1040 07/21/20  1524 05/09/19  0956 05/09/19  0956   HGB 12.0 9.3*   < >  --  13.2  --   --      --   --  253  --   --   --      --   --   --  139   < >  --    POTASSIUM 3.9  --   --   --  4.1   < >  --    CR 0.90  --   --  0.96 0.96   < >  --    A1C  --   --   --   --   --   --  5.5    < > = values in this interval not displayed.        Diagnostics:  Recent Results (from the past 24 hour(s))   CBC with platelets    Collection Time: 03/16/21  2:09 PM   Result Value Ref Range    WBC 4.5 4.0 - 11.0 10e9/L    RBC Count 4.41 3.8 - 5.2 10e12/L    Hemoglobin 12.2 11.7 - 15.7 g/dL    Hematocrit 39.1 35.0 - 47.0 %    " MCV 89 78 - 100 fl    MCH 27.7 26.5 - 33.0 pg    MCHC 31.2 (L) 31.5 - 36.5 g/dL    RDW 15.3 (H) 10.0 - 15.0 %    Platelet Count 273 150 - 450 10e9/L        Revised Cardiac Risk Index (RCRI):  The patient has the following serious cardiovascular risks for perioperative complications:   - No serious cardiac risks = 0 points     RCRI Interpretation: 0 points: Class I (very low risk - 0.4% complication rate)       Signed Electronically by: Yoshi Bowen MD  Copy of this evaluation report is provided to requesting physician.

## 2021-03-16 NOTE — PROGRESS NOTES
[address] [address]  Phone: [phone]  Primary Provider: Monika Bowen  Pre-op Performing Provider: MONIKA BOWEN      PREOPERATIVE EVALUATION:  Today's date: 3/16/2021    Laure Wood is a 70 year old female who presents for a preoperative evaluation.    Surgical Information:  Surgery/Procedure: Right Arthroplasty, Knee, Total  Surgery Location: [facility]  Surgeon: Dr. SCARLET Mcdonald  Surgery Date: 04/06/2021  Time of Surgery: 07:30am  Where patient plans to recover: At home with family  Fax number for surgical facility: Note does not need to be faxed, will be available electronically in Epic.    Type of Anesthesia Anticipated: Choice    Assessment & Plan     The proposed surgical procedure is considered INTERMEDIATE risk.    Preop general physical exam  - CBC with platelets  - Basic metabolic panel  Primary osteoarthritis of right knee  Essential hypertension, benign  Over weight    Risks and Recommendations:  The patient has the following additional risks and recommendations for perioperative complications:   - No identified additional risk factors other than previously addressed    Medication Instructions:   - Diuretics: HOLD on the day of surgery.    RECOMMENDATION:  APPROVAL GIVEN to proceed with proposed procedure, without further diagnostic evaluation.                      Subjective     HPI related to upcoming procedure: longstanding OA R knee      Preop Questions 3/16/2021   1. Have you ever had a heart attack or stroke? No   2. Have you ever had surgery on your heart or blood vessels, such as a stent placement, a coronary artery bypass, or surgery on an artery in your head, neck, heart, or legs? No   3. Do you have chest pain with activity? No   4. Do you have a history of  heart failure? No   5. Do you currently have a cold, bronchitis or symptoms of other infection? No   6. Do you have a cough, shortness of breath, or wheezing? No    7. Do you or anyone in your family have previous history of blood clots? No   8. Do you or does anyone in your family have a serious bleeding problem such as prolonged bleeding following surgeries or cuts? No   9. Have you ever had problems with anemia or been told to take iron pills? No   10. Have you had any abnormal blood loss such as black, tarry or bloody stools, or abnormal vaginal bleeding? No   11. Have you ever had a blood transfusion? No   12. Are you willing to have a blood transfusion if it is medically needed before, during, or after your surgery? Yes   13. Have you or any of your relatives ever had problems with anesthesia? No   14. Do you have sleep apnea, excessive snoring or daytime drowsiness? No   15. Do you have any artifical heart valves or other implanted medical devices like a pacemaker, defibrillator, or continuous glucose monitor? No   16. Do you have artificial joints? No   17. Are you allergic to latex? No     Health Care Directive:  Patient does not have a Health Care Directive or Living Will: Patient states has Advance Directive and will bring in a copy to clinic.    Preoperative Review of :   reviewed - controlled substances prescribed by other outside provider(s).          Review of Systems  Constitutional, neuro, ENT, endocrine, pulmonary, cardiac, gastrointestinal, genitourinary, musculoskeletal, integument and psychiatric systems are negative, except as otherwise noted.    Patient Active Problem List    Diagnosis Date Noted     Esophageal reflux 07/11/2013     Priority: High     Essential hypertension, benign 04/09/2012     Priority: High     Primary osteoarthritis of right knee 03/10/2021     Priority: Medium     Added automatically from request for surgery [id number]       S/P total knee replacement, left 07/28/2020     Priority: Medium     Status post total hip replacement, left 07/27/2020     Priority: Medium     Primary osteoarthritis of left knee 07/20/2020     Priority:  Medium     Added automatically from request for surgery [id number]       Over weight 07/19/2014     Priority: Medium     Knee osteoarthritis 07/18/2014     Priority: Medium     Advanced care planning/counseling discussion 04/19/2013     Priority: Medium     Advance Care Planning:   ACP Review and Resources Provided: patient given information              Cervical pain 01/07/2013     Priority: Medium     Pain in shoulder 01/07/2013     Priority: Medium     Lumbar pain 01/07/2013     Priority: Medium     Fibroid uterus 12/20/2012     Priority: Medium     Thickened endometrium 12/20/2012     Priority: Medium     CARDIOVASCULAR SCREENING; LDL GOAL LESS THAN 130 05/29/2012     Priority: Medium      Past Medical History:   Diagnosis Date     Gastroesophageal reflux disease      Hyperlipidemia LDL goal <160      Hypertension, essential, benign      Knee osteoarthritis      Past Surgical History:   Procedure Laterality Date     ARTHROPLASTY KNEE Left 7/27/2020    Procedure: Left total knee arthroplasty ([employer] #4 PS femur; #3 tibia; 9 mm tibial insert; 32 mm patella);  Surgeon: James Mcdonald MD;  Location: [facility]     CATARACT IOL, RT/LT Bilateral     Cataract IOL RT/LT     COLONOSCOPY Left 7/25/2019    Procedure: COLONOSCOPY;  Surgeon: Jono Egan MD;  Location:  GI     Current Outpatient Medications   Medication Sig Dispense Refill     albuterol 108 (90 Base) MCG/ACT IN inhaler Inhale 2 puffs into the lungs every 6 hours as needed for shortness of breath / dyspnea or wheezing 1 Inhaler 0     diclofenac (VOLTAREN) 1 % topical gel Apply 2-4 g topically 4 times daily R Knee, foot as needed 100 g 11     hydrochlorothiazide (HYDRODIURIL) 25 MG tablet TAKE 1 TABLET(25 MG) BY MOUTH DAILY 90 tablet 3     omeprazole (PRILOSEC) 20 MG DR capsule Take 1 capsule (20 mg) by mouth daily 30 capsule 8     simvastatin (ZOCOR) 40 MG tablet Take 1 tablet (40 mg) by mouth At Bedtime 90 tablet 1       Allergies   Allergen  "Reactions     No Known Drug Allergy         Social History     Tobacco Use     Smoking status: Never Smoker     Smokeless tobacco: Never Used   Substance Use Topics     Alcohol use: No       History   Drug Use No         Objective     /80   Pulse 67   Temp 97.7  F (36.5  C) (Temporal)   Ht 1.626 m (5' 4\")   Wt 84.6 kg (186 lb 6.4 oz)   SpO2 99%   BMI 32.00 kg/m      Physical Exam    GENERAL APPEARANCE: alert, no distress and over weight     EYES: EOMI, PERRL     HENT: nose and mouth without ulcers or lesions and normal cephalic/atraumatic     NECK: no adenopathy, no asymmetry, masses, or scars and thyroid normal to palpation     RESP: lungs clear to auscultation - no rales, rhonchi or wheezes     CV: regular rates and rhythm, normal S1 S2, no S3 or S4 and no murmur, click or rub     ABDOMEN:  soft, nontender, no HSM or masses and bowel sounds normal     MS: extremities normal- no gross deformities noted, no evidence of inflammation in joints, FROM in all extremities.     SKIN: no suspicious lesions or rashes     NEURO: Normal strength and tone, sensory exam grossly normal, mentation intact and speech normal     PSYCH: mentation appears normal. and affect normal/bright     LYMPHATICS: No cervical adenopathy    Recent Labs   Lab Test 02/26/21  1531 07/29/20  0611 07/27/20  1040 07/27/20  1040 07/21/20  1524 05/09/19  0956 05/09/19  0956   HGB 12.0 9.3*   < >  --  13.2  --   --      --   --  253  --   --   --      --   --   --  139   < >  --    POTASSIUM 3.9  --   --   --  4.1   < >  --    CR 0.90  --   --  0.96 0.96   < >  --    A1C  --   --   --   --   --   --  5.5    < > = values in this interval not displayed.        Diagnostics:  Recent Results (from the past 24 hour(s))   CBC with platelets    Collection Time: 03/16/21  2:09 PM   Result Value Ref Range    WBC 4.5 4.0 - 11.0 10e9/L    RBC Count 4.41 3.8 - 5.2 10e12/L    Hemoglobin 12.2 11.7 - 15.7 g/dL    Hematocrit 39.1 35.0 - 47.0 %    " MCV 89 78 - 100 fl    MCH 27.7 26.5 - 33.0 pg    MCHC 31.2 (L) 31.5 - 36.5 g/dL    RDW 15.3 (H) 10.0 - 15.0 %    Platelet Count 273 150 - 450 10e9/L        Revised Cardiac Risk Index (RCRI):  The patient has the following serious cardiovascular risks for perioperative complications:   - No serious cardiac risks = 0 points     RCRI Interpretation: 0 points: Class I (very low risk - 0.4% complication rate)       Signed Electronically by: Yoshi Bowen MD  Copy of this evaluation report is provided to requesting physician.

## 2021-03-16 NOTE — PATIENT INSTRUCTIONS
Preparing for Your Surgery  Getting started    How to Take Your Medication Before Surgery  - HOLD (do not take) your HYDROCHLOROTHIAZIDE on the morning of surgery.   A nurse will call you to review your health history and instructions. They will give you an arrival time based on your scheduled surgery time.  Please be ready to share the following:    Your doctor's clinic name and phone number    Your medical, surgical and anesthesia history    A list of allergies and sensitivities    A list of medicines, including herbal treatments and over-the-counter drugs    Whether the patient has a legal guardian (ask how to send us the papers in advance)  If you have a child who's having surgery, please ask for a copy of Preparing for Your Child's Surgery.    Preparing for surgery    Within 30 days of surgery: Have a pre-op exam (sometimes called an H&P, or History and Physical). This can be done at a clinic or pre-operative center.  ? If you're having a , you may not need this exam. Talk to your care team    At your pre-op exam, talk to your care team about all medicines you take. If you need to stop any medicines before surgery, ask when to start taking them again.  ? We do this for your safety. Many medicines can make you bleed too much during surgery. Some change how well surgery (anesthesia) drugs work.    Call your insurance company to let them know you're having surgery. (If you don't have insurance, call [phone].)    Call your clinic if there's any change in your health. This includes signs of a cold or flu (sore throat, runny nose, cough, rash, fever). It also includes a scrape or scratch near the surgery site.    If you have questions on the day of surgery, call your hospital or surgery center.  Eating and drinking guidelines  For your safety: Unless your surgeon tells you otherwise, follow the guidelines below.    Eat and drink as usual until 8 hours before surgery. After that, no food or  milk.    Drink clear liquids until 2 hours before surgery. These are liquids you can see through, like water, Gatorade and Propel Water. You may also have black coffee and tea (no cream or milk).    Nothing by mouth within 2 hours of surgery. This includes gum, candy and breath mints.    If you drink, stop drinking alcohol the night before surgery.    If your care team tells you to take medicine on the morning of surgery, it's okay to take it with a sip of water.  Preventing infection    Shower or bathe the night before and morning of your surgery. Follow the instructions your clinic gave you. (If no instructions, use regular soap.)    Don't shave or clip hair near your surgery site. We'll remove the hair if needed.    Don't smoke or vape the morning of surgery. You may chew nicotine gum up to 2 hours before surgery. A nicotine patch is okay.  ? Note: Some surgeries require you to completely quit smoking and nicotine. Check with your surgeon.    Your care team will make every effort to keep you safe from infection. We will:  ? Clean our hands often with soap and water (or an alcohol-based hand rub).  ? Clean the skin at your surgery site with a special soap that kills germs.  ? Give you a special gown to keep you warm. (Cold raises the risk of infection.)  ? Wear special hair covers, masks, gowns and gloves during surgery.  ? Give antibiotic medicine, if prescribed. Not all surgeries need antibiotics.  What to bring on the day of surgery    Photo ID and insurance card    Copy of your health care directive, if you have one    Glasses and hearing aides (bring cases)  ? You can't wear contacts during surgery    Inhaler and eye drops, if you use them (tell us about these when you arrive)    CPAP machine or breathing device, if you use them    A few personal items, if spending the night    If you have . . .  ? A pacemaker or ICD (cardiac defibrillator): Bring the ID card.  ? An implanted stimulator: Bring the remote  control.  ? A legal guardian: Bring a copy of the certified (court-stamped) guardianship papers.  Please remove any jewelry, including body piercings. Leave jewelry and other valuables at home.  If you're going home the day of surgery  Important: If you don't follow the rules below, we must cancel your surgery.     Arrange for someone to drive you home after surgery. You may not drive, take a taxi or take public transportation by yourself (unless you'll have local anesthesia only).    Arrange for a responsible adult to stay with you overnight. If you don't, we may keep you in the hospital overnight, and you may need to pay the costs yourself.  Questions?   If you have any questions for your care team, list them here: _________________________________________________________________________________________________________________________________________________________________________________________________________________________________________________________________________________________________________________________  For informational purposes only. Not to replace the advice of your health care provider. Copyright   2003, 2019 [facility]. All rights reserved. Clinically reviewed by Sameera Murguia MD. [employer] 684340 - REV 4/20.

## 2021-03-23 RX ORDER — ACETAMINOPHEN 500 MG
500-1000 TABLET ORAL EVERY 6 HOURS PRN
Status: ON HOLD | COMMUNITY
End: 2021-04-07

## 2021-03-23 ASSESSMENT — MIFFLIN-ST. JEOR: SCORE: 1332.82

## 2021-03-26 ENCOUNTER — IMMUNIZATION (OUTPATIENT)
Dept: NURSING | Facility: CLINIC | Age: 71
End: 2021-03-26
Payer: COMMERCIAL

## 2021-03-26 PROCEDURE — 0001A PR COVID VAC PFIZER DIL RECON 30 MCG/0.3 ML IM: CPT

## 2021-03-26 PROCEDURE — 91300 PR COVID VAC PFIZER DIL RECON 30 MCG/0.3 ML IM: CPT

## 2021-03-30 ENCOUNTER — TELEPHONE (OUTPATIENT)
Dept: INTERNAL MEDICINE | Facility: CLINIC | Age: 71
End: 2021-03-30

## 2021-03-30 NOTE — TELEPHONE ENCOUNTER
Reason for Call:  Form, our goal is to have forms completed with 72 hours, however, some forms may require a visit or additional information.    Type of letter, form or note:  FMLA    Who is the form from?: [employer] (if other please explain)    Where did the form come from: form was faxed in    What clinic location was the form placed at?: Internal Medicine    Where the form was placed: Pcp's Folder    What number is listed as a contact on the form?:        Additional comments:     Call taken on 3/30/2021 at 2:08 PM by Gary Dotson

## 2021-04-01 ENCOUNTER — THERAPY VISIT (OUTPATIENT)
Dept: PHYSICAL THERAPY | Facility: CLINIC | Age: 71
End: 2021-04-01
Payer: COMMERCIAL

## 2021-04-01 ENCOUNTER — TELEPHONE (OUTPATIENT)
Dept: ORTHOPEDICS | Facility: CLINIC | Age: 71
End: 2021-04-01

## 2021-04-01 DIAGNOSIS — M17.11 PRIMARY OSTEOARTHRITIS OF RIGHT KNEE: Primary | ICD-10-CM

## 2021-04-01 DIAGNOSIS — M25.561 RIGHT KNEE PAIN: ICD-10-CM

## 2021-04-01 PROCEDURE — 97161 PT EVAL LOW COMPLEX 20 MIN: CPT | Mod: GP | Performed by: PHYSICAL THERAPIST

## 2021-04-01 PROCEDURE — 97116 GAIT TRAINING THERAPY: CPT | Mod: GP | Performed by: PHYSICAL THERAPIST

## 2021-04-01 NOTE — PROGRESS NOTES
[facility]: Physical Therapy Initial Evaluation   Apr 1, 2021  Comments/Precautions/Restrictions/Physician instructions:   Per Orders/Notes: crutch training    Subjective:   Chief Complaint: pre-operative L TKA   Pain: in left knee joint   Numbness/Tingling: none   Weakness: Left lower extremity weaker than right   Stiffness: left knee feels stiffer    New/Recurrent/Chronic: chronic  DOS: 4/6/2021, previous knee July 2017  Referral Date: 3/10/2021 - James Mcdonald MD  Mechanism of onset: unknown  PMH/surgical history/trauma: see chart   - high blood pressure   - previous R TKA    -previous L JAKE   -overweight   -osteoarthritis  General health as reported by patient: Good   Medications: high blood pressure Occupation: Retired  Previous Treatment (Effect): physical therapy after L TKA  Imaging: Advanced right knee DJD with a valgus deformity  AM/PM: not dependent on time of day  Quality of Pain: aching  Pain: 8/10 at present, 8/10 at best, 8/10 at worst  Worse: standing, walking, ascending/descending stairs  Better: nothing  Progression of Symptoms since onset: getting worse  Hx of Falls: none  Sleeping: wakes her up at night due to pain   Current Functional Status: SEE GOALS FLOWSHEET  - unable to sit without pain  - unable to stand up from chair without pain  - unable to ascend/descend stairs reciprocally and without pain  Previous Functional Status: independent without device  Current HEP/exercise regimen: walks, goes to gym-bicycle  Transportation to Therapy: Independent with transportation, has daughter available to drive as needed  Live with Others: daughter  Red Flags:   - Patient denies the following: Locking, Catching (knee);Fever ;  Numbness/Tingling ; Saddle Anesthesia ;   - Patient reports the following: pain at night, weakness    Patient's goal(s): relief of pain    Objective:    Standing Posture: kyphotic with rounded shoulders    Gait: antalgic gait on right    Functional:   -  Squat: unable to get to 45 degrees depth due to pain in right knee    Swelling:    R L   Joint Line 37 37.5     AROM: (* indicates patient's pain)   PROM:(over pressure)   R  L   Extension -13 -4   Flexion 95 92     Strength:   R L   HIP     Flex 3+ 4   Abd 4 4   KNEE     Ext 3+ 4   Flex 3+ 4       Palpation: tender to palpation along medial/lateral joint line    Other:  - L37.5, R:37    Assessment/Plan:    Patient is a 70 year old female with right side knee complaints.    Patient has the following significant findings with corresponding treatment plan.                Referring Diagnosis: Right knee osteoarthritis, pre-operative R TKA    Pain -  hot/cold therapy, manual therapy and home program  Decreased ROM/flexibility - manual therapy, therapeutic exercise and home program  Decreased strength - therapeutic exercise, therapeutic activities and home program  Impaired gait - gait training and home program  Decreased function - therapeutic activities and home program      Therapy Evaluation Codes:   1) History comprised of:   Personal factors that impact the plan of care:      Age and Past/current experiences.    Comorbidity factors that impact the plan of care are:      High blood pressure, Implanted device, Osteoarthritis, Overweight and Pain at night/rest.     Medications impacting care: High blood pressure.  2) Examination of Body Systems comprised of:   Body structures and functions that impact the plan of care:      Knee.   Activity limitations that impact the plan of care are:      Sitting, Squatting/kneeling, Stairs, Standing and Walking.  3) Clinical presentation characteristics are:   Stable/Uncomplicated.  4) Decision-Making    Low complexity using standardized patient assessment instrument and/or measureable assessment of functional outcome.  Cumulative Therapy Evaluation is: Low complexity.    Previous and current functional limitations:  (See Goal Flow Sheet for this information)    Short term and Long  term goals: (See Goal Flow Sheet for this information)     Communication ability:  Patient appears to be able to clearly communicate and understand verbal and written communication and follow directions correctly.  Treatment Explanation - The following has been discussed with the patient:   RX ordered/plan of care  Anticipated outcomes  Possible risks and side effects  This patient would benefit from PT intervention to resume normal activities.   Rehab potential is good.    Frequency:  1 X week, once daily  Duration:  for 1 weeks  Discharge Plan:  Achieve all LTG.    Please refer to the daily flowsheet for treatment today, total treatment time and time spent performing 1:1 timed codes.

## 2021-04-02 ENCOUNTER — HOSPITAL ENCOUNTER (OUTPATIENT)
Dept: LAB | Facility: CLINIC | Age: 71
Discharge: HOME | End: 2021-04-02
Attending: ORTHOPAEDIC SURGERY | Admitting: ORTHOPAEDIC SURGERY
Payer: COMMERCIAL

## 2021-04-02 DIAGNOSIS — Z01.812 PRE-OPERATIVE LABORATORY EXAMINATION: ICD-10-CM

## 2021-04-02 PROBLEM — M25.561 RIGHT KNEE PAIN: Status: ACTIVE | Noted: 2021-04-02

## 2021-04-02 LAB
SARS-COV-2 RNA RESP QL NAA+PROBE: NORMAL
SPECIMEN SOURCE: NORMAL

## 2021-04-02 PROCEDURE — U0003 INFECTIOUS AGENT DETECTION BY NUCLEIC ACID (DNA OR RNA); SEVERE ACUTE RESPIRATORY SYNDROME CORONAVIRUS 2 (SARS-COV-2) (CORONAVIRUS DISEASE [COVID-19]), AMPLIFIED PROBE TECHNIQUE, MAKING USE OF HIGH THROUGHPUT TECHNOLOGIES AS DESCRIBED BY CMS-2020-01-R: HCPCS | Performed by: ORTHOPAEDIC SURGERY

## 2021-04-02 PROCEDURE — U0005 INFEC AGEN DETEC AMPLI PROBE: HCPCS | Performed by: ORTHOPAEDIC SURGERY

## 2021-04-03 LAB
LABORATORY COMMENT REPORT: NORMAL
SARS-COV-2 RNA RESP QL NAA+PROBE: NEGATIVE
SPECIMEN SOURCE: NORMAL

## 2021-04-04 NOTE — PHARMACY-ADMISSION MEDICATION HISTORY
Admission medication history interview status for this patient is complete. See [facility] admission navigator for allergy information, prior to admission medications and immunization status.     PTA meds completed by pre-admitting nurse Christine Samuel and reviewed by pharmacy       Prior to Admission medications    Medication Sig Last Dose Taking? Auth Provider   acetaminophen (TYLENOL) 500 MG tablet Take 500-1,000 mg by mouth every 6 hours as needed for mild pain  Yes Reported, Patient   hydrochlorothiazide (HYDRODIURIL) 25 MG tablet TAKE 1 TABLET(25 MG) BY MOUTH DAILY  Yes Yoshi Bowen MD   omeprazole (PRILOSEC) 20 MG DR capsule Take 1 capsule (20 mg) by mouth daily  Yes Yoshi Bowen MD   simvastatin (ZOCOR) 40 MG tablet Take 1 tablet (40 mg) by mouth At Bedtime  Yes Yoshi Bowen MD   diclofenac (VOLTAREN) 1 % topical gel Apply 2-4 g topically 4 times daily R Knee, foot as needed   Yoshi Bowne MD

## 2021-04-06 ENCOUNTER — ANESTHESIA (OUTPATIENT)
Dept: SURGERY | Facility: CLINIC | Age: 71
End: 2021-04-06
Payer: COMMERCIAL

## 2021-04-06 ENCOUNTER — HOSPITAL ENCOUNTER (OUTPATIENT)
Facility: CLINIC | Age: 71
Discharge: HOME | End: 2021-04-08
Attending: ORTHOPAEDIC SURGERY | Admitting: ORTHOPAEDIC SURGERY
Payer: COMMERCIAL

## 2021-04-06 ENCOUNTER — ANESTHESIA EVENT (OUTPATIENT)
Dept: SURGERY | Facility: CLINIC | Age: 71
End: 2021-04-06
Payer: COMMERCIAL

## 2021-04-06 DIAGNOSIS — K59.03 DRUG-INDUCED CONSTIPATION: ICD-10-CM

## 2021-04-06 DIAGNOSIS — M17.11 PRIMARY OSTEOARTHRITIS OF RIGHT KNEE: ICD-10-CM

## 2021-04-06 DIAGNOSIS — Z96.651 S/P TKR (TOTAL KNEE REPLACEMENT) USING CEMENT, RIGHT: Primary | ICD-10-CM

## 2021-04-06 DIAGNOSIS — Z78.9 DEEP VEIN THROMBOSIS (DVT) PROPHYLAXIS PRESCRIBED AT DISCHARGE: ICD-10-CM

## 2021-04-06 DIAGNOSIS — R11.0 NAUSEA IN ADULT: ICD-10-CM

## 2021-04-06 LAB
CREAT SERPL-MCNC: 1.01 MG/DL (ref 0.52–1.04)
GFR SERPL CREATININE-BSD FRML MDRD: 56 ML/MIN/{1.73_M2}
PLATELET # BLD AUTO: 291 10E9/L (ref 150–450)

## 2021-04-06 PROCEDURE — 85049 AUTOMATED PLATELET COUNT: CPT | Performed by: PHYSICIAN ASSISTANT

## 2021-04-06 PROCEDURE — 250N000013 HC RX MED GY IP 250 OP 250 PS 637: Performed by: ORTHOPAEDIC SURGERY

## 2021-04-06 PROCEDURE — 370N000017 HC ANESTHESIA TECHNICAL FEE, PER MIN: Performed by: ORTHOPAEDIC SURGERY

## 2021-04-06 PROCEDURE — 27447 TOTAL KNEE ARTHROPLASTY: CPT | Mod: RT | Performed by: ORTHOPAEDIC SURGERY

## 2021-04-06 PROCEDURE — 82565 ASSAY OF CREATININE: CPT | Performed by: PHYSICIAN ASSISTANT

## 2021-04-06 PROCEDURE — 250N000011 HC RX IP 250 OP 636: Performed by: NURSE ANESTHETIST, CERTIFIED REGISTERED

## 2021-04-06 PROCEDURE — 258N000003 HC RX IP 258 OP 636: Performed by: NURSE ANESTHETIST, CERTIFIED REGISTERED

## 2021-04-06 PROCEDURE — 278N000051 HC OR IMPLANT GENERAL: Performed by: ORTHOPAEDIC SURGERY

## 2021-04-06 PROCEDURE — 258N000003 HC RX IP 258 OP 636: Performed by: ANESTHESIOLOGY

## 2021-04-06 PROCEDURE — 36415 COLL VENOUS BLD VENIPUNCTURE: CPT | Performed by: PHYSICIAN ASSISTANT

## 2021-04-06 PROCEDURE — 360N000077 HC SURGERY LEVEL 4, PER MIN: Performed by: ORTHOPAEDIC SURGERY

## 2021-04-06 PROCEDURE — 27447 TOTAL KNEE ARTHROPLASTY: CPT | Mod: AS | Performed by: PHYSICIAN ASSISTANT

## 2021-04-06 PROCEDURE — 250N000011 HC RX IP 250 OP 636: Performed by: ANESTHESIOLOGY

## 2021-04-06 PROCEDURE — 250N000009 HC RX 250: Performed by: NURSE ANESTHETIST, CERTIFIED REGISTERED

## 2021-04-06 PROCEDURE — 258N000001 HC RX 258: Performed by: ORTHOPAEDIC SURGERY

## 2021-04-06 PROCEDURE — 271N000001 HC OR GENERAL SUPPLY NON-STERILE: Performed by: ORTHOPAEDIC SURGERY

## 2021-04-06 PROCEDURE — 272N000001 HC OR GENERAL SUPPLY STERILE: Performed by: ORTHOPAEDIC SURGERY

## 2021-04-06 PROCEDURE — 258N000003 HC RX IP 258 OP 636: Performed by: ORTHOPAEDIC SURGERY

## 2021-04-06 PROCEDURE — 250N000011 HC RX IP 250 OP 636: Performed by: PHYSICIAN ASSISTANT

## 2021-04-06 PROCEDURE — C1776 JOINT DEVICE (IMPLANTABLE): HCPCS | Performed by: ORTHOPAEDIC SURGERY

## 2021-04-06 PROCEDURE — 250N000013 HC RX MED GY IP 250 OP 250 PS 637: Performed by: PHYSICIAN ASSISTANT

## 2021-04-06 PROCEDURE — 93010 ELECTROCARDIOGRAM REPORT: CPT | Performed by: INTERNAL MEDICINE

## 2021-04-06 PROCEDURE — 250N000011 HC RX IP 250 OP 636: Performed by: ORTHOPAEDIC SURGERY

## 2021-04-06 PROCEDURE — 999N000141 HC STATISTIC PRE-PROCEDURE NURSING ASSESSMENT: Performed by: ORTHOPAEDIC SURGERY

## 2021-04-06 PROCEDURE — 710N000009 HC RECOVERY PHASE 1, LEVEL 1, PER MIN: Performed by: ORTHOPAEDIC SURGERY

## 2021-04-06 DEVICE — LEGION POSTERIOR STABILIZED HIGH                                    FLEX HIGHLY CROSS LINKED                                    POLYETHYLENE SIZE 3-4 9MM
Type: IMPLANTABLE DEVICE | Site: KNEE | Status: FUNCTIONAL
Brand: LEGION

## 2021-04-06 DEVICE — BONE CEMENT SIMPLEX FULL DOSE 6191-1-001: Type: IMPLANTABLE DEVICE | Site: KNEE | Status: FUNCTIONAL

## 2021-04-06 DEVICE — GENESIS II RESURFACING PATELLAR                                    PROSTHESIS  32MM
Type: IMPLANTABLE DEVICE | Site: KNEE | Status: FUNCTIONAL
Brand: GENESIS II

## 2021-04-06 DEVICE — LEGION PS NONPOROUS FEMORAL SZ 4 RT
Type: IMPLANTABLE DEVICE | Site: KNEE | Status: FUNCTIONAL
Brand: LEGION

## 2021-04-06 DEVICE — GENESIS II NON-POROUS TIBIAL                                    BASEPLATE SIZE 3 RIGHT
Type: IMPLANTABLE DEVICE | Site: KNEE | Status: FUNCTIONAL
Brand: GENESIS II

## 2021-04-06 RX ORDER — POLYETHYLENE GLYCOL 3350 17 G/17G
17 POWDER, FOR SOLUTION ORAL DAILY
Status: DISCONTINUED | OUTPATIENT
Start: 2021-04-07 | End: 2021-04-08 | Stop reason: HOSPADM

## 2021-04-06 RX ORDER — ONDANSETRON 2 MG/ML
4 INJECTION INTRAMUSCULAR; INTRAVENOUS EVERY 30 MIN PRN
Status: DISCONTINUED | OUTPATIENT
Start: 2021-04-06 | End: 2021-04-06 | Stop reason: HOSPADM

## 2021-04-06 RX ORDER — PROPOFOL 10 MG/ML
INJECTION, EMULSION INTRAVENOUS CONTINUOUS PRN
Status: DISCONTINUED | OUTPATIENT
Start: 2021-04-06 | End: 2021-04-06

## 2021-04-06 RX ORDER — SODIUM CHLORIDE, SODIUM LACTATE, POTASSIUM CHLORIDE, CALCIUM CHLORIDE 600; 310; 30; 20 MG/100ML; MG/100ML; MG/100ML; MG/100ML
INJECTION, SOLUTION INTRAVENOUS CONTINUOUS
Status: DISCONTINUED | OUTPATIENT
Start: 2021-04-06 | End: 2021-04-06 | Stop reason: HOSPADM

## 2021-04-06 RX ORDER — NALOXONE HYDROCHLORIDE 0.4 MG/ML
0.2 INJECTION, SOLUTION INTRAMUSCULAR; INTRAVENOUS; SUBCUTANEOUS
Status: DISCONTINUED | OUTPATIENT
Start: 2021-04-06 | End: 2021-04-06

## 2021-04-06 RX ORDER — DOCUSATE SODIUM 100 MG/1
100 CAPSULE, LIQUID FILLED ORAL 2 TIMES DAILY
Status: DISCONTINUED | OUTPATIENT
Start: 2021-04-06 | End: 2021-04-08 | Stop reason: HOSPADM

## 2021-04-06 RX ORDER — METOCLOPRAMIDE 5 MG/1
5 TABLET ORAL EVERY 6 HOURS PRN
Status: DISCONTINUED | OUTPATIENT
Start: 2021-04-06 | End: 2021-04-06

## 2021-04-06 RX ORDER — FENTANYL CITRATE 50 UG/ML
INJECTION, SOLUTION INTRAMUSCULAR; INTRAVENOUS PRN
Status: DISCONTINUED | OUTPATIENT
Start: 2021-04-06 | End: 2021-04-06

## 2021-04-06 RX ORDER — CEFAZOLIN SODIUM 2 G/100ML
2 INJECTION, SOLUTION INTRAVENOUS
Status: COMPLETED | OUTPATIENT
Start: 2021-04-06 | End: 2021-04-06

## 2021-04-06 RX ORDER — NALOXONE HYDROCHLORIDE 0.4 MG/ML
0.4 INJECTION, SOLUTION INTRAMUSCULAR; INTRAVENOUS; SUBCUTANEOUS
Status: DISCONTINUED | OUTPATIENT
Start: 2021-04-06 | End: 2021-04-06

## 2021-04-06 RX ORDER — TRANEXAMIC ACID 10 MG/ML
1 INJECTION, SOLUTION INTRAVENOUS ONCE
Status: COMPLETED | OUTPATIENT
Start: 2021-04-06 | End: 2021-04-06

## 2021-04-06 RX ORDER — SODIUM CHLORIDE, SODIUM LACTATE, POTASSIUM CHLORIDE, CALCIUM CHLORIDE 600; 310; 30; 20 MG/100ML; MG/100ML; MG/100ML; MG/100ML
INJECTION, SOLUTION INTRAVENOUS CONTINUOUS
Status: DISCONTINUED | OUTPATIENT
Start: 2021-04-06 | End: 2021-04-08 | Stop reason: HOSPADM

## 2021-04-06 RX ORDER — HYDROMORPHONE HYDROCHLORIDE 4 MG/1
4 TABLET ORAL EVERY 4 HOURS PRN
Status: DISCONTINUED | OUTPATIENT
Start: 2021-04-06 | End: 2021-04-08 | Stop reason: HOSPADM

## 2021-04-06 RX ORDER — LIDOCAINE 40 MG/G
CREAM TOPICAL
Status: DISCONTINUED | OUTPATIENT
Start: 2021-04-06 | End: 2021-04-08 | Stop reason: HOSPADM

## 2021-04-06 RX ORDER — CEFAZOLIN SODIUM 2 G/100ML
2 INJECTION, SOLUTION INTRAVENOUS EVERY 8 HOURS
Status: COMPLETED | OUTPATIENT
Start: 2021-04-06 | End: 2021-04-07

## 2021-04-06 RX ORDER — NALOXONE HYDROCHLORIDE 0.4 MG/ML
0.2 INJECTION, SOLUTION INTRAMUSCULAR; INTRAVENOUS; SUBCUTANEOUS
Status: DISCONTINUED | OUTPATIENT
Start: 2021-04-06 | End: 2021-04-08 | Stop reason: HOSPADM

## 2021-04-06 RX ORDER — ONDANSETRON 2 MG/ML
4 INJECTION INTRAMUSCULAR; INTRAVENOUS EVERY 6 HOURS PRN
Status: DISCONTINUED | OUTPATIENT
Start: 2021-04-06 | End: 2021-04-08 | Stop reason: HOSPADM

## 2021-04-06 RX ORDER — DEXAMETHASONE SODIUM PHOSPHATE 4 MG/ML
INJECTION, SOLUTION INTRA-ARTICULAR; INTRALESIONAL; INTRAMUSCULAR; INTRAVENOUS; SOFT TISSUE PRN
Status: DISCONTINUED | OUTPATIENT
Start: 2021-04-06 | End: 2021-04-06

## 2021-04-06 RX ORDER — HYDROMORPHONE HYDROCHLORIDE 2 MG/1
2 TABLET ORAL EVERY 4 HOURS PRN
Status: DISCONTINUED | OUTPATIENT
Start: 2021-04-06 | End: 2021-04-08 | Stop reason: HOSPADM

## 2021-04-06 RX ORDER — NALOXONE HYDROCHLORIDE 0.4 MG/ML
0.4 INJECTION, SOLUTION INTRAMUSCULAR; INTRAVENOUS; SUBCUTANEOUS
Status: DISCONTINUED | OUTPATIENT
Start: 2021-04-06 | End: 2021-04-08 | Stop reason: HOSPADM

## 2021-04-06 RX ORDER — HYDROXYZINE HYDROCHLORIDE 10 MG/1
10 TABLET, FILM COATED ORAL EVERY 6 HOURS PRN
Status: DISCONTINUED | OUTPATIENT
Start: 2021-04-06 | End: 2021-04-08 | Stop reason: HOSPADM

## 2021-04-06 RX ORDER — OXYCODONE HCL 10 MG/1
10 TABLET, FILM COATED, EXTENDED RELEASE ORAL ONCE
Status: COMPLETED | OUTPATIENT
Start: 2021-04-06 | End: 2021-04-06

## 2021-04-06 RX ORDER — ONDANSETRON 4 MG/1
4 TABLET, ORALLY DISINTEGRATING ORAL EVERY 30 MIN PRN
Status: DISCONTINUED | OUTPATIENT
Start: 2021-04-06 | End: 2021-04-06 | Stop reason: HOSPADM

## 2021-04-06 RX ORDER — HYDRALAZINE HYDROCHLORIDE 20 MG/ML
2.5-5 INJECTION INTRAMUSCULAR; INTRAVENOUS EVERY 10 MIN PRN
Status: DISCONTINUED | OUTPATIENT
Start: 2021-04-06 | End: 2021-04-06 | Stop reason: HOSPADM

## 2021-04-06 RX ORDER — BUPIVACAINE HYDROCHLORIDE 7.5 MG/ML
INJECTION, SOLUTION INTRASPINAL
Status: COMPLETED | OUTPATIENT
Start: 2021-04-06 | End: 2021-04-06

## 2021-04-06 RX ORDER — PROCHLORPERAZINE MALEATE 5 MG
5 TABLET ORAL EVERY 6 HOURS PRN
Status: DISCONTINUED | OUTPATIENT
Start: 2021-04-06 | End: 2021-04-08 | Stop reason: HOSPADM

## 2021-04-06 RX ORDER — ONDANSETRON 2 MG/ML
INJECTION INTRAMUSCULAR; INTRAVENOUS PRN
Status: DISCONTINUED | OUTPATIENT
Start: 2021-04-06 | End: 2021-04-06

## 2021-04-06 RX ORDER — KETOROLAC TROMETHAMINE 30 MG/ML
15 INJECTION, SOLUTION INTRAMUSCULAR; INTRAVENOUS
Status: DISCONTINUED | OUTPATIENT
Start: 2021-04-06 | End: 2021-04-06 | Stop reason: HOSPADM

## 2021-04-06 RX ORDER — GLYCOPYRROLATE 0.2 MG/ML
INJECTION, SOLUTION INTRAMUSCULAR; INTRAVENOUS PRN
Status: DISCONTINUED | OUTPATIENT
Start: 2021-04-06 | End: 2021-04-06

## 2021-04-06 RX ORDER — AMOXICILLIN 250 MG
1 CAPSULE ORAL 2 TIMES DAILY
Status: DISCONTINUED | OUTPATIENT
Start: 2021-04-06 | End: 2021-04-08 | Stop reason: HOSPADM

## 2021-04-06 RX ORDER — METOCLOPRAMIDE HYDROCHLORIDE 5 MG/ML
5 INJECTION INTRAMUSCULAR; INTRAVENOUS EVERY 6 HOURS PRN
Status: DISCONTINUED | OUTPATIENT
Start: 2021-04-06 | End: 2021-04-06

## 2021-04-06 RX ORDER — ACETAMINOPHEN 325 MG/1
650 TABLET ORAL EVERY 4 HOURS PRN
Status: DISCONTINUED | OUTPATIENT
Start: 2021-04-09 | End: 2021-04-08 | Stop reason: HOSPADM

## 2021-04-06 RX ORDER — PROPOFOL 10 MG/ML
INJECTION, EMULSION INTRAVENOUS PRN
Status: DISCONTINUED | OUTPATIENT
Start: 2021-04-06 | End: 2021-04-06

## 2021-04-06 RX ORDER — SODIUM CHLORIDE, SODIUM LACTATE, POTASSIUM CHLORIDE, CALCIUM CHLORIDE 600; 310; 30; 20 MG/100ML; MG/100ML; MG/100ML; MG/100ML
INJECTION, SOLUTION INTRAVENOUS CONTINUOUS PRN
Status: DISCONTINUED | OUTPATIENT
Start: 2021-04-06 | End: 2021-04-06

## 2021-04-06 RX ORDER — HYDROCHLOROTHIAZIDE 25 MG/1
25 TABLET ORAL DAILY
Status: DISCONTINUED | OUTPATIENT
Start: 2021-04-07 | End: 2021-04-08 | Stop reason: HOSPADM

## 2021-04-06 RX ORDER — ACETAMINOPHEN 325 MG/1
975 TABLET ORAL EVERY 8 HOURS
Status: DISCONTINUED | OUTPATIENT
Start: 2021-04-06 | End: 2021-04-08 | Stop reason: HOSPADM

## 2021-04-06 RX ORDER — ACETAMINOPHEN 325 MG/1
975 TABLET ORAL ONCE
Status: COMPLETED | OUTPATIENT
Start: 2021-04-06 | End: 2021-04-06

## 2021-04-06 RX ORDER — HYDROMORPHONE HYDROCHLORIDE 1 MG/ML
0.4 INJECTION, SOLUTION INTRAMUSCULAR; INTRAVENOUS; SUBCUTANEOUS
Status: DISCONTINUED | OUTPATIENT
Start: 2021-04-06 | End: 2021-04-08 | Stop reason: HOSPADM

## 2021-04-06 RX ORDER — FENTANYL CITRATE-0.9 % NACL/PF 10 MCG/ML
PLASTIC BAG, INJECTION (ML) INTRAVENOUS CONTINUOUS PRN
Status: DISCONTINUED | OUTPATIENT
Start: 2021-04-06 | End: 2021-04-06

## 2021-04-06 RX ORDER — TRANEXAMIC ACID 650 MG/1
1950 TABLET ORAL ONCE
Status: DISCONTINUED | OUTPATIENT
Start: 2021-04-06 | End: 2021-04-06

## 2021-04-06 RX ORDER — MEPERIDINE HYDROCHLORIDE 25 MG/ML
12.5 INJECTION INTRAMUSCULAR; INTRAVENOUS; SUBCUTANEOUS EVERY 5 MIN PRN
Status: DISCONTINUED | OUTPATIENT
Start: 2021-04-06 | End: 2021-04-06 | Stop reason: HOSPADM

## 2021-04-06 RX ORDER — FENTANYL CITRATE 50 UG/ML
25-50 INJECTION, SOLUTION INTRAMUSCULAR; INTRAVENOUS
Status: DISCONTINUED | OUTPATIENT
Start: 2021-04-06 | End: 2021-04-06 | Stop reason: HOSPADM

## 2021-04-06 RX ORDER — HYDROMORPHONE HCL IN WATER/PF 6 MG/30 ML
0.2 PATIENT CONTROLLED ANALGESIA SYRINGE INTRAVENOUS
Status: DISCONTINUED | OUTPATIENT
Start: 2021-04-06 | End: 2021-04-08 | Stop reason: HOSPADM

## 2021-04-06 RX ORDER — SIMVASTATIN 40 MG
40 TABLET ORAL AT BEDTIME
Status: DISCONTINUED | OUTPATIENT
Start: 2021-04-06 | End: 2021-04-08 | Stop reason: HOSPADM

## 2021-04-06 RX ORDER — CEFAZOLIN SODIUM 2 G/100ML
2 INJECTION, SOLUTION INTRAVENOUS SEE ADMIN INSTRUCTIONS
Status: DISCONTINUED | OUTPATIENT
Start: 2021-04-06 | End: 2021-04-06 | Stop reason: HOSPADM

## 2021-04-06 RX ORDER — BISACODYL 10 MG
10 SUPPOSITORY, RECTAL RECTAL DAILY PRN
Status: DISCONTINUED | OUTPATIENT
Start: 2021-04-06 | End: 2021-04-08 | Stop reason: HOSPADM

## 2021-04-06 RX ORDER — METOPROLOL TARTRATE 1 MG/ML
1-2 INJECTION, SOLUTION INTRAVENOUS EVERY 5 MIN PRN
Status: DISCONTINUED | OUTPATIENT
Start: 2021-04-06 | End: 2021-04-06 | Stop reason: HOSPADM

## 2021-04-06 RX ORDER — LIDOCAINE 40 MG/G
CREAM TOPICAL
Status: DISCONTINUED | OUTPATIENT
Start: 2021-04-06 | End: 2021-04-06 | Stop reason: HOSPADM

## 2021-04-06 RX ORDER — ONDANSETRON 4 MG/1
4 TABLET, ORALLY DISINTEGRATING ORAL EVERY 6 HOURS PRN
Status: DISCONTINUED | OUTPATIENT
Start: 2021-04-06 | End: 2021-04-08 | Stop reason: HOSPADM

## 2021-04-06 RX ADMIN — DOCUSATE SODIUM 100 MG: 100 CAPSULE, LIQUID FILLED ORAL at 19:53

## 2021-04-06 RX ADMIN — DEXAMETHASONE SODIUM PHOSPHATE 4 MG: 4 INJECTION, SOLUTION INTRA-ARTICULAR; INTRALESIONAL; INTRAMUSCULAR; INTRAVENOUS; SOFT TISSUE at 14:30

## 2021-04-06 RX ADMIN — CEFAZOLIN SODIUM 2 G: 2 INJECTION, SOLUTION INTRAVENOUS at 14:33

## 2021-04-06 RX ADMIN — ACETAMINOPHEN 975 MG: 325 TABLET, FILM COATED ORAL at 21:45

## 2021-04-06 RX ADMIN — MIDAZOLAM 1 MG: 1 INJECTION INTRAMUSCULAR; INTRAVENOUS at 14:20

## 2021-04-06 RX ADMIN — SODIUM CHLORIDE, POTASSIUM CHLORIDE, SODIUM LACTATE AND CALCIUM CHLORIDE: 600; 310; 30; 20 INJECTION, SOLUTION INTRAVENOUS at 15:11

## 2021-04-06 RX ADMIN — ONDANSETRON 4 MG: 2 INJECTION INTRAMUSCULAR; INTRAVENOUS at 14:21

## 2021-04-06 RX ADMIN — PHENYLEPHRINE HYDROCHLORIDE 100 MCG: 10 INJECTION INTRAVENOUS at 14:46

## 2021-04-06 RX ADMIN — SODIUM CHLORIDE, POTASSIUM CHLORIDE, SODIUM LACTATE AND CALCIUM CHLORIDE: 600; 310; 30; 20 INJECTION, SOLUTION INTRAVENOUS at 17:15

## 2021-04-06 RX ADMIN — TRANEXAMIC ACID 1 G: 10 INJECTION, SOLUTION INTRAVENOUS at 14:36

## 2021-04-06 RX ADMIN — ACETAMINOPHEN 975 MG: 325 TABLET, FILM COATED ORAL at 13:42

## 2021-04-06 RX ADMIN — PHENYLEPHRINE HYDROCHLORIDE 50 MCG: 10 INJECTION INTRAVENOUS at 14:41

## 2021-04-06 RX ADMIN — SIMVASTATIN 40 MG: 40 TABLET, FILM COATED ORAL at 21:45

## 2021-04-06 RX ADMIN — GLYCOPYRROLATE 0.1 MG: 0.2 INJECTION, SOLUTION INTRAMUSCULAR; INTRAVENOUS at 14:21

## 2021-04-06 RX ADMIN — PROPOFOL 20 MG: 10 INJECTION, EMULSION INTRAVENOUS at 14:21

## 2021-04-06 RX ADMIN — HYDROMORPHONE HYDROCHLORIDE 2 MG: 2 TABLET ORAL at 21:45

## 2021-04-06 RX ADMIN — PHENYLEPHRINE HYDROCHLORIDE 100 MCG: 10 INJECTION INTRAVENOUS at 14:51

## 2021-04-06 RX ADMIN — DOCUSATE SODIUM 50 MG AND SENNOSIDES 8.6 MG 1 TABLET: 8.6; 5 TABLET, FILM COATED ORAL at 19:53

## 2021-04-06 RX ADMIN — CEFAZOLIN SODIUM 2 G: 2 INJECTION, SOLUTION INTRAVENOUS at 21:45

## 2021-04-06 RX ADMIN — PROPOFOL 50 MCG/KG/MIN: 10 INJECTION, EMULSION INTRAVENOUS at 14:21

## 2021-04-06 RX ADMIN — Medication 20 MCG/MIN: at 14:49

## 2021-04-06 RX ADMIN — OXYCODONE HYDROCHLORIDE 10 MG: 10 TABLET, FILM COATED, EXTENDED RELEASE ORAL at 13:42

## 2021-04-06 RX ADMIN — SODIUM CHLORIDE, POTASSIUM CHLORIDE, SODIUM LACTATE AND CALCIUM CHLORIDE: 600; 310; 30; 20 INJECTION, SOLUTION INTRAVENOUS at 14:18

## 2021-04-06 RX ADMIN — BUPIVACAINE HYDROCHLORIDE IN DEXTROSE 2 ML: 7.5 INJECTION, SOLUTION SUBARACHNOID at 14:44

## 2021-04-06 RX ADMIN — FENTANYL CITRATE 50 MCG: 50 INJECTION, SOLUTION INTRAMUSCULAR; INTRAVENOUS at 14:20

## 2021-04-06 RX ADMIN — TRANEXAMIC ACID 1 G: 10 INJECTION, SOLUTION INTRAVENOUS at 15:36

## 2021-04-06 RX ADMIN — OMEPRAZOLE 20 MG: 20 CAPSULE, DELAYED RELEASE ORAL at 19:53

## 2021-04-06 RX ADMIN — PHENYLEPHRINE HYDROCHLORIDE 50 MCG: 10 INJECTION INTRAVENOUS at 14:34

## 2021-04-06 ASSESSMENT — MIFFLIN-ST. JEOR: SCORE: 1323.75

## 2021-04-06 NOTE — ANESTHESIA CARE TRANSFER NOTE
Patient: Laure Wood    Procedure(s):  Right total knee arthroplasty    Diagnosis: Primary osteoarthritis of right knee [M17.11]  Diagnosis Additional Information: No value filed.    Anesthesia Type:   General     Note:    Oropharynx: oropharynx clear of all foreign objects  Level of Consciousness: awake and drowsy  Oxygen Supplementation: face mask  Level of Supplemental Oxygen (L/min / FiO2): 6  Independent Airway: airway patency satisfactory and stable  Dentition: dentition unchanged  Vital Signs Stable: post-procedure vital signs reviewed and stable  Report to RN Given: handoff report given  Patient transferred to: PACU    Handoff Report: Identifed the Patient, Identified the Reponsible Provider, Reviewed the pertinent medical history, Discussed the surgical course, Reviewed Intra-OP anesthesia mangement and issues during anesthesia, Set expectations for post-procedure period and Allowed opportunity for questions and acknowledgement of understanding      Vitals: (Last set prior to Anesthesia Care Transfer)  CRNA VITALS  4/6/2021 1522 - 4/6/2021 1558      4/6/2021             Pulse:  117    SpO2:  (!) 99 %        Electronically Signed By: RUBEN King CRNA  April 6, 2021  3:58 PM

## 2021-04-06 NOTE — ANESTHESIA PROCEDURE NOTES
Intrathecal injection Procedure Note  Pre-Procedure   Staff -        Anesthesiologist:  Rajendra Coles DO       Performed By: anesthesiologist       Location: OR       Procedure Start/Stop Times: 4/6/2021 2:19 PM and 4/6/2021 2:25 PM       Pre-Anesthestic Checklist: patient identified, IV checked, risks and benefits discussed, informed consent, monitors and equipment checked, pre-op evaluation, at physician/surgeon's request and post-op pain management  Timeout:       Correct Patient: Yes        Correct Procedure: Yes        Correct Site: Yes        Correct Position: Yes   Procedure Documentation  Procedure: intrathecal injection       Patient Position: sitting       Patient Prep/Sterile Barriers: sterile gloves, mask       Skin prep: Betadine       Insertion Site: L4-5. (midline approach).       Needle Gauge: 25.        Spinal Needle Type: Pencan       Introducer used      # of attempts: 1 and  # of redirects:     Assessment/Narrative         Paresthesias: No.       CSF fluid: clear.    Medication(s) Administered   0.75% Hyperbaric Bupivacaine (Intrathecal), 2 mL  Comments:  Bupivicaine 15mg    R Coles

## 2021-04-06 NOTE — ANESTHESIA PREPROCEDURE EVALUATION
Anesthesia Pre-Procedure Evaluation    Patient: Laure Wood   MRN: [id number] : 1950        Preoperative Diagnosis: Primary osteoarthritis of right knee [M17.11]   Procedure : Procedure(s):  Right total knee arthroplasty     Past Medical History:   Diagnosis Date     Gastroesophageal reflux disease      Hyperlipidemia LDL goal <160      Hypertension, essential, benign      Knee osteoarthritis     knees, hands     Motion sickness      PONV (postoperative nausea and vomiting)       Past Surgical History:   Procedure Laterality Date     ARTHROPLASTY KNEE Left 2020    Procedure: Left total knee arthroplasty ([employer]hew #4 PS femur; #3 tibia; 9 mm tibial insert; 32 mm patella);  Surgeon: James Mcdonald MD;  Location: [facility] OR     CATARACT IOL, RT/LT Bilateral     Cataract IOL RT/LT     COLONOSCOPY Left 2019    Procedure: COLONOSCOPY;  Surgeon: Jono Egan MD;  Location: [facility] GI      Allergies   Allergen Reactions     No Known Drug Allergy       Social History     Tobacco Use     Smoking status: Never Smoker     Smokeless tobacco: Never Used   Substance Use Topics     Alcohol use: No      Wt Readings from Last 1 Encounters:   21 83.5 kg (184 lb)        Anesthesia Evaluation   Pt has had prior anesthetic. Type: General.    History of anesthetic complications  - PONV.      ROS/MED HX  ENT/Pulmonary:  - neg pulmonary ROS     Neurologic:  - neg neurologic ROS     Cardiovascular:     (+) Dyslipidemia hypertension-----    METS/Exercise Tolerance:     Hematologic: Comments: Lab Test        03/16/21     02/26/21     07/29/20     07/27/20     07/27/20     04/15/13     04/15/13                       1409          1531          0611          1040          1040          2030          2030          WBC          4.5          5.1           --           --           --           --          6.2           HGB          12.2         12.0         9.3*           < >         --            < >        12.7           MCV          89           88            --           --           --           --          84            PLT          273          305           --           --          253           --          334            < > = values in this interval not displayed.                  Lab Test        03/16/21 02/26/21 07/29/20 07/27/20 07/27/20 07/21/20                       1409          1531          0611          1040          1040          1524          NA           138          141           --           --           --          139           POTASSIUM    3.9          3.9           --           --           --          4.1           CHLORIDE     106          106           --           --           --          107           CO2          30           31            --           --           --          26            BUN          22           21            --           --           --          26            CR           0.97         0.90          --           --          0.96         0.96          ANIONGAP     2*           4             --           --           --          6             ALONSO          9.9          10.1          --           --           --          9.9           GLC          81           83           99             < >         --          86             < > = values in this interval not displayed.                       Musculoskeletal:   (+) arthritis,     GI/Hepatic:     (+) GERD, Asymptomatic on medication,     Renal/Genitourinary:  - neg Renal ROS     Endo:  - neg endo ROS     Psychiatric/Substance Use:  - neg psychiatric ROS     Infectious Disease:  - neg infectious disease ROS     Malignancy:  - neg malignancy ROS     Other:  - neg other ROS          Physical Exam    Airway  airway exam normal      Mallampati: II   TM distance: > 3 FB   Neck ROM: full   Mouth opening: > 3 cm    Respiratory Devices and Support         Dental           Cardiovascular   cardiovascular exam normal           Pulmonary   pulmonary exam normal                OUTSIDE LABS:  CBC:   Lab Results   Component Value Date    WBC 4.5 03/16/2021    WBC 5.1 02/26/2021    HGB 12.2 03/16/2021    HGB 12.0 02/26/2021    HCT 39.1 03/16/2021    HCT 38.7 02/26/2021     03/16/2021     02/26/2021     BMP:   Lab Results   Component Value Date     03/16/2021     02/26/2021    POTASSIUM 3.9 03/16/2021    POTASSIUM 3.9 02/26/2021    CHLORIDE 106 03/16/2021    CHLORIDE 106 02/26/2021    CO2 30 03/16/2021    CO2 31 02/26/2021    BUN 22 03/16/2021    BUN 21 02/26/2021    CR 0.97 03/16/2021    CR 0.90 02/26/2021    GLC 81 03/16/2021    GLC 83 02/26/2021     COAGS: No results found for: PTT, INR, FIBR  POC: No results found for: BGM, HCG, HCGS  HEPATIC:   Lab Results   Component Value Date    ALBUMIN 4.3 04/15/2013    PROTTOTAL 7.8 04/15/2013    ALT 20 04/15/2013    AST 27 04/15/2013    ALKPHOS 94 04/15/2013    BILITOTAL 0.5 04/15/2013     OTHER:   Lab Results   Component Value Date    A1C 5.5 05/09/2019    ALONSO 9.9 03/16/2021    LIPASE 120 04/15/2013    TSH 1.13 02/26/2021       Anesthesia Plan    ASA Status:  2      Anesthesia Type: Spinal.              Consents    Anesthesia Plan(s) and associated risks, benefits, and realistic alternatives discussed. Questions answered and patient/representative(s) expressed understanding.     - Discussed with:  Patient      - Extended Intubation/Ventilatory Support Discussed: No.      - Patient is DNR/DNI Status: No    Use of blood products discussed: Yes.     - Discussed with: Patient.     - Consented: consented to blood products     Postoperative Care    Pain management: IV analgesics, Multi-modal analgesia.   PONV prophylaxis: Ondansetron (or other 5HT-3), Dexamethasone or Solumedrol     Comments:                Alo Devine MD

## 2021-04-06 NOTE — OP NOTE
"April 6, 2021    Pre Operative Diagnosis: right Knee DJD  Post operative Diagnosis:  Knee DJD with severe valgus deformity  Title of the Procedure: right Total Knee Arthroplasty ( Springer [employer] nephew #4 PS femur; #3 tibia' 32 mm patella and 9 mm tibial insert  )  Anesthesia: spinal  Surgeon: James Mcdonald M.D.  Assistant:VERONICA Cullen    Assistance from the PA was necessary for this case due to the complexity of the procedure. PA helped with satisfactory exposure of the bones, protecting vital neurovascular and ligamentous structures. He also helped with maintaining the instruments for bone osteotomy and subsequent implantation of the components. He also performed wound closure and placement of postoperative dressing.    Drain: Hemovac    Indication of the Procedure:  This patient is a 70 y.o. female who has had chronic knee pain with progressively worsening due to advanced osteoarthritis. She has a severe valgus deformity. She has done well from her knee replacement on the left.,  All reasonable and appropriate non-operative treatments have been tried. The knee pain is severe to the point of affecting this patient's day-to-day activities and There has been disturbances of sleeping. With understanding of the available options including further observation, nature of the surgery and potential complications of the surgery, total knee arthroplasty is chosen to be carried out today. As far as potential complications are concerned, emphasis has been placed on infection, deep vein thrombosis, arthrofibrosis, manjinder-prosthetic fractures, loosening of the components, unexpected anesthetic complications as well as persisting pain that cannot be explained.    Description of the procedure:  After satisfactory anesthesia was administered, the correct lower extremity was then prepped and draped in a routine sterile fashion in a supine position. The \"time out\" was then carried out per protocol. At this time administration of " preoperative prophylactic antibiotic was confirmed.    Following exsanguination, the  tourniquet was then inflated to 300 mm Hg. A midline longitudinal incision was made from just above the patella  down to the tibial tubercle. A sharp dissection was carried through the subcutaneous tissue and subsequently,  a medial parapatellar arthrotomy was carried out entering the knee joint. The patella was then displaced laterally and knee was flexed. All osteophytes were removed from the femur,  tibia and patella.    After placement of the intramedullary angélica into the femur distal femoral cut was made using 5  valgus angle. After measurement of the size, the rest of the femoral cuts were made;anterior and posterior as well as chamfer.The trial femoral component was then applied and noted that fitting was satisfactory.  Using the extra medullary alignment guide, a perpendicular osteotomy was made on the proximal tibia. Using the appropriately sized tibial trial components, alignment was assessed using the alignment angélica. It was felt that a normal medical axis from the hip joint to the ankle joint was reestablished when all trial components were placed. Valgus and varus stress were applied and soft tissue tension was evaluated at this time. Because of severe valgus deformity, PS femoral component was chosen. The appropriate center box cut in the distal femur.   It was also confirmed there is a full extension and full flexion with gravity alone. The space for the tibial keel was then punched into the proximal tibia.    Subsequently, the patella was osteotomized after measurement of the thickness. The size was measured and the pegs were drilled. Patellofemoral tracking was then evaluated and noted to be satisfactory.    All osteotomized bone surfaces were thoroughly irrigated with the jet lavage system. The bone cement was mixed and components were placed while the bone cement was doughy.Excess cement was removed from components.  When the bonecement became completely hard, another trial reduction was carried out to determine the thickness of the tibial polyethylene component. The final polyethylene tibial component was then inserted.  With further irrigation of the knee joint a medium Hemovac drain was placed. The wound was closed In layers in a routine fashion.The skin layer was closed with staples.A routine compression soft dressing was applied and the knee immobilizer was applied. Tourniquet was deflated at the end of the procedure.  The needle and sponge counts were correct at the end of the case. Blood loss was minimum. No intraoperative complications were identified at the time of finishing the operation.    James Mcdonald M.D.

## 2021-04-06 NOTE — PROGRESS NOTES
SPIRITUAL HEALTH SERVICES Progress Note  [facility] Surgical    Visited pt per admission pre-surgical pt request. Pt was already in surgery so visit was not accomplished.      Jimmy Dennison   Intern

## 2021-04-06 NOTE — ANESTHESIA POSTPROCEDURE EVALUATION
Patient: Laure Wood    Procedure(s):  Right total knee arthroplasty    Diagnosis:Primary osteoarthritis of right knee [M17.11]  Diagnosis Additional Information: No value filed.    Anesthesia Type:  General    Note:     Postop Pain Control: Uneventful            Sign Out: Well controlled pain   PONV: No   Neuro/Psych: Uneventful            Sign Out: Acceptable/Baseline neuro status   Airway/Respiratory: Uneventful            Sign Out: Acceptable/Baseline resp. status   CV/Hemodynamics: Uneventful            Sign Out: Acceptable CV status   Other NRE: NONE   DID A NON-ROUTINE EVENT OCCUR? No         Last vitals:  Vitals:    04/06/21 1605 04/06/21 1610 04/06/21 1615   BP: 99/54 106/54 110/59   Pulse: 86 86 81   Resp: 19 21 22   Temp:   97.3  F (36.3  C)   SpO2: 100% 100% 100%       Last vitals prior to Anesthesia Care Transfer:  CRNA VITALS  4/6/2021 1522 - 4/6/2021 1622      4/6/2021             Pulse:  117    SpO2:  (!) 81 %          Electronically Signed By: Alo Devine MD  April 6, 2021  4:27 PM

## 2021-04-06 NOTE — BRIEF OP NOTE
[facility]    Brief Operative Note    Pre-operative diagnosis: Primary osteoarthritis of right knee [M17.11]  Post-operative diagnosis right knee DJD with severe valgus deformity    Procedure: Procedure(s):  Right total knee arthroplasty  Surgeon: Surgeon(s) and Role:     * James Mcdonald MD - Primary     * Jarred Reeves PA-C - Assisting  Anesthesia: Spinal   Estimated blood loss: Less than 10 ml  Drains: Hemovac  Specimens: * No specimens in log *  Findings:   None.  Complications: None.  Implants:   Implant Name Type Inv. Item Serial No.  Lot No. LRB No. Used Action   BONE CEMENT SIMPLEX FULL DOSE 6191-1-001 Cement, Bone BONE CEMENT SIMPLEX FULL DOSE 6191-1-001  [employer] [id number] Right 1 Implanted   IMP BASEPLATE TIBIAL ROBBIE II SZ 3 RT TI [id number] Total Joint Component/Insert IMP BASEPLATE TIBIAL ROBBIE II SZ 3 RT TI [id number]  [employer]  [id number] Right 1 Implanted   IMP COMP FEMORAL S&N LEGION PS NP SZ4 RT [id number] Total Joint Component/Insert IMP COMP FEMORAL S&N LEGION PS NP SZ4 RT [id number]  [employer]  [id number] Right 1 Implanted   IMP COMP PATELLA SNR ROBBIE II 9X32MM [id number] Total Joint Component/Insert IMP COMP PATELLA SNR ROBBIE II 9X32MM [id number]  [employer]  [id number] Right 1 Implanted   IMP INSERT TIB S&N LGN PS HI FLEX XLPE SZ3-4 9MM [id number] Total Joint Component/Insert IMP INSERT TIB S&N LGN PS HI FLEX XLPE SZ3-4 9MM [id number]  [employer]  [id number] Right 1 Implanted

## 2021-04-07 ENCOUNTER — APPOINTMENT (OUTPATIENT)
Dept: PHYSICAL THERAPY | Facility: CLINIC | Age: 71
End: 2021-04-07
Attending: ORTHOPAEDIC SURGERY
Payer: COMMERCIAL

## 2021-04-07 LAB
GLUCOSE SERPL-MCNC: 109 MG/DL (ref 70–99)
HGB BLD-MCNC: 10.6 G/DL (ref 11.7–15.7)

## 2021-04-07 PROCEDURE — 250N000011 HC RX IP 250 OP 636: Performed by: PHYSICIAN ASSISTANT

## 2021-04-07 PROCEDURE — 97161 PT EVAL LOW COMPLEX 20 MIN: CPT | Mod: GP | Performed by: PHYSICAL THERAPIST

## 2021-04-07 PROCEDURE — 250N000013 HC RX MED GY IP 250 OP 250 PS 637: Performed by: ORTHOPAEDIC SURGERY

## 2021-04-07 PROCEDURE — 97110 THERAPEUTIC EXERCISES: CPT | Mod: GP | Performed by: PHYSICAL THERAPIST

## 2021-04-07 PROCEDURE — 82947 ASSAY GLUCOSE BLOOD QUANT: CPT | Performed by: PHYSICIAN ASSISTANT

## 2021-04-07 PROCEDURE — 36415 COLL VENOUS BLD VENIPUNCTURE: CPT | Performed by: PHYSICIAN ASSISTANT

## 2021-04-07 PROCEDURE — 85018 HEMOGLOBIN: CPT | Performed by: PHYSICIAN ASSISTANT

## 2021-04-07 PROCEDURE — 97530 THERAPEUTIC ACTIVITIES: CPT | Mod: GP | Performed by: PHYSICAL THERAPIST

## 2021-04-07 PROCEDURE — 97116 GAIT TRAINING THERAPY: CPT | Mod: GP | Performed by: PHYSICAL THERAPIST

## 2021-04-07 PROCEDURE — 250N000013 HC RX MED GY IP 250 OP 250 PS 637: Performed by: PHYSICIAN ASSISTANT

## 2021-04-07 PROCEDURE — 96372 THER/PROPH/DIAG INJ SC/IM: CPT | Performed by: PHYSICIAN ASSISTANT

## 2021-04-07 RX ORDER — CALCIUM CARBONATE 500 MG/1
1000 TABLET, CHEWABLE ORAL EVERY 4 HOURS PRN
Status: DISCONTINUED | OUTPATIENT
Start: 2021-04-07 | End: 2021-04-08 | Stop reason: HOSPADM

## 2021-04-07 RX ORDER — ASPIRIN 325 MG
325 TABLET ORAL DAILY
Qty: 30 TABLET | Refills: 0 | Status: SHIPPED | OUTPATIENT
Start: 2021-04-07 | End: 2022-03-16

## 2021-04-07 RX ORDER — KETOROLAC TROMETHAMINE 15 MG/ML
15 INJECTION, SOLUTION INTRAMUSCULAR; INTRAVENOUS EVERY 6 HOURS PRN
Status: DISCONTINUED | OUTPATIENT
Start: 2021-04-07 | End: 2021-04-08 | Stop reason: HOSPADM

## 2021-04-07 RX ORDER — ONDANSETRON 4 MG/1
4 TABLET, ORALLY DISINTEGRATING ORAL EVERY 6 HOURS PRN
Qty: 12 TABLET | Refills: 0 | Status: SHIPPED | OUTPATIENT
Start: 2021-04-07 | End: 2022-03-16

## 2021-04-07 RX ORDER — HYDROMORPHONE HYDROCHLORIDE 2 MG/1
2-4 TABLET ORAL EVERY 4 HOURS PRN
Qty: 50 TABLET | Refills: 0 | Status: SHIPPED | OUTPATIENT
Start: 2021-04-07 | End: 2022-03-16

## 2021-04-07 RX ORDER — ACETAMINOPHEN 325 MG/1
650 TABLET ORAL EVERY 6 HOURS
Qty: 100 TABLET | Refills: 0 | Status: SHIPPED | OUTPATIENT
Start: 2021-04-07 | End: 2022-03-16

## 2021-04-07 RX ORDER — AMOXICILLIN 250 MG
1 CAPSULE ORAL 2 TIMES DAILY PRN
Qty: 20 TABLET | Refills: 0 | Status: SHIPPED | OUTPATIENT
Start: 2021-04-07 | End: 2022-03-16

## 2021-04-07 RX ADMIN — HYDROMORPHONE HYDROCHLORIDE 4 MG: 4 TABLET ORAL at 12:37

## 2021-04-07 RX ADMIN — HYDROMORPHONE HYDROCHLORIDE 4 MG: 4 TABLET ORAL at 06:04

## 2021-04-07 RX ADMIN — KETOROLAC TROMETHAMINE 15 MG: 15 INJECTION, SOLUTION INTRAMUSCULAR; INTRAVENOUS at 16:08

## 2021-04-07 RX ADMIN — DOCUSATE SODIUM 50 MG AND SENNOSIDES 8.6 MG 1 TABLET: 8.6; 5 TABLET, FILM COATED ORAL at 07:55

## 2021-04-07 RX ADMIN — HYDROMORPHONE HYDROCHLORIDE 4 MG: 4 TABLET ORAL at 20:24

## 2021-04-07 RX ADMIN — ONDANSETRON 4 MG: 4 TABLET, ORALLY DISINTEGRATING ORAL at 08:53

## 2021-04-07 RX ADMIN — HYDROCHLOROTHIAZIDE 25 MG: 25 TABLET ORAL at 07:55

## 2021-04-07 RX ADMIN — ENOXAPARIN SODIUM 40 MG: 40 INJECTION SUBCUTANEOUS at 09:44

## 2021-04-07 RX ADMIN — DOCUSATE SODIUM 50 MG AND SENNOSIDES 8.6 MG 1 TABLET: 8.6; 5 TABLET, FILM COATED ORAL at 20:24

## 2021-04-07 RX ADMIN — HYDROMORPHONE HYDROCHLORIDE 4 MG: 4 TABLET ORAL at 01:36

## 2021-04-07 RX ADMIN — ONDANSETRON 4 MG: 4 TABLET, ORALLY DISINTEGRATING ORAL at 20:34

## 2021-04-07 RX ADMIN — PROCHLORPERAZINE MALEATE 5 MG: 5 TABLET ORAL at 09:44

## 2021-04-07 RX ADMIN — HYDROXYZINE HYDROCHLORIDE 10 MG: 10 TABLET, FILM COATED ORAL at 07:55

## 2021-04-07 RX ADMIN — ACETAMINOPHEN 975 MG: 325 TABLET, FILM COATED ORAL at 14:17

## 2021-04-07 RX ADMIN — POLYETHYLENE GLYCOL 3350 17 G: 17 POWDER, FOR SOLUTION ORAL at 07:55

## 2021-04-07 RX ADMIN — ANTACID TABLETS 1000 MG: 500 TABLET, CHEWABLE ORAL at 16:22

## 2021-04-07 RX ADMIN — ACETAMINOPHEN 975 MG: 325 TABLET, FILM COATED ORAL at 06:03

## 2021-04-07 RX ADMIN — DOCUSATE SODIUM 100 MG: 100 CAPSULE, LIQUID FILLED ORAL at 20:24

## 2021-04-07 RX ADMIN — CEFAZOLIN SODIUM 2 G: 2 INJECTION, SOLUTION INTRAVENOUS at 06:14

## 2021-04-07 RX ADMIN — SIMVASTATIN 40 MG: 40 TABLET, FILM COATED ORAL at 20:24

## 2021-04-07 RX ADMIN — DOCUSATE SODIUM 100 MG: 100 CAPSULE, LIQUID FILLED ORAL at 07:55

## 2021-04-07 RX ADMIN — OMEPRAZOLE 20 MG: 20 CAPSULE, DELAYED RELEASE ORAL at 07:55

## 2021-04-07 RX ADMIN — ONDANSETRON 4 MG: 4 TABLET, ORALLY DISINTEGRATING ORAL at 14:42

## 2021-04-07 RX ADMIN — ACETAMINOPHEN 975 MG: 325 TABLET, FILM COATED ORAL at 20:24

## 2021-04-07 ASSESSMENT — ACTIVITIES OF DAILY LIVING (ADL)
DRESSING/BATHING_DIFFICULTY: YES
DIFFICULTY_EATING/SWALLOWING: NO
WHICH_OF_THE_ABOVE_FUNCTIONAL_RISKS_HAD_A_RECENT_ONSET_OR_CHANGE?: AMBULATION;TRANSFERRING;TOILETING;BATHING;DRESSING
DRESSING/BATHING: BATHING DIFFICULTY, ASSISTANCE 1 PERSON
TOILETING_ISSUES: YES
DOING_ERRANDS_INDEPENDENTLY_DIFFICULTY: YES
TOILETING_ASSISTANCE: TOILETING DIFFICULTY, ASSISTANCE 1 PERSON
DIFFICULTY_COMMUNICATING: NO
CONCENTRATING,_REMEMBERING_OR_MAKING_DECISIONS_DIFFICULTY: YES
FALL_HISTORY_WITHIN_LAST_SIX_MONTHS: NO
WEAR_GLASSES_OR_BLIND: YES
EQUIPMENT_CURRENTLY_USED_AT_HOME: CANE, STRAIGHT;SHOWER CHAIR;WALKER, ROLLING
HEARING_DIFFICULTY_OR_DEAF: NO
WALKING_OR_CLIMBING_STAIRS_DIFFICULTY: NO

## 2021-04-07 NOTE — PROGRESS NOTES
Patient vital signs are at baseline: Yes  Patient able to ambulate as they were prior to admission or with assist devices provided by therapies during their stay:  No,  Reason:  Patient requiring more assistance due to high pain levels, slow moving and very stiff  Patient MUST void prior to discharge:  Yes  Patient able to tolerate oral intake:  No,  Reason:  PO intake poor due to ongoing nausea throughout the day despite anti-emitics  Pain has adequate pain control using Oral analgesics:  No,  Reason:  Patient still reporting high pain levels despite medications. IV torodol added to MAR after speaking with Primary team, will monitor results/

## 2021-04-07 NOTE — PLAN OF CARE
Patient vital signs are at baseline: Yes  Patient able to ambulate as they were prior to admission or with assist devices provided by therapies during their stay:  No,  Reason:  Only to [facility] at this time.  Patient MUST void prior to discharge:  Yes  Patient able to tolerate oral intake:  Yes  Pain has adequate pain control using Oral analgesics:  Yes      A&O x4. VSS on RA. LS CTA all fields. BS active. Tolerating regular diet. Voided this AM. Up with A1 and walker to [facility]. Dressing is CDI. Mild numbness to R foot otherwise CMS intact. Dilaudid 4mg and scheduled tylenol managing pain. Hemovac in place. Plans to discharge home today.

## 2021-04-07 NOTE — PROGRESS NOTES
Patient vital signs are at baseline: Yes  Patient able to ambulate as they were prior to admission or with assist devices provided by therapies during their stay:  No,  Reason:  Requiring more assistance with mobility due to high pain levels, slow moving and stiff  Patient MUST void prior to discharge:  Yes  Patient able to tolerate oral intake:  No,  Reason:  Patient nauseous throughout the day, PO intake very poor  Pain has adequate pain control using Oral analgesics:  No,  Reason:  Pain levels still high despite medication

## 2021-04-07 NOTE — PROGRESS NOTES
"[facility]  Orthopedics Progress Note             Interval History:     70 year old female admitted postoperatively for elective Right TKA  with Dr. James Mcdonald due to DJD unresponsive to non operative treatment.  There were no intraoperative complications.  Patient currently Post op day #1.    Patient reports some nausea and \"eye tinging\".  Took 4 mg dilaudid on empty stomach.  Knee is sore.  Otherwise says she walked, slept ok despite waking for cares.    Pain: tolerable.   Nausea: some without emesis.  Light headedness : none  Chest pain: none  Shortness of breath: none       Addendum:  Later in the evening nursing reporting, Ambulation had not improved, needing significant assistance.  Pain reported as not controlled and limiting ambulation, also along with poor intake and nausea.          Assessment and Plan:     S/P TKA day #1.  VSS, hemodynamically asymptomatic, pain management adequate.    PLAN:  We discussed she has to have medical need to stay overnight and currently if she passed PT she will not qualify.  One step to get into home.  Family support available.    - Zofran and encourage eating, drinking.  - encourage ambulation.  - PT, nursing.  - remove drain prior to discharge.    Hopeful Discharge to home later today.    Addendum:   Due to elderly deconditioned status, poor intake, pain control, nausea, needing high levels of assistance for ambulation, pt was held overnight.                    Physical Exam:     Patient Vitals for the past 12 hrs:   BP Temp Temp src Pulse Resp SpO2   04/07/21 0743 (!) 142/72 97.8  F (36.6  C) Temporal 59 18 99 %   04/07/21 0457 137/72 97.5  F (36.4  C) Temporal 72 20 100 %   04/07/21 0016 134/71 97.3  F (36.3  C) Temporal 56 16 99 %     Hemoglobin   Date Value Ref Range Status   04/07/2021 10.6 (L) 11.7 - 15.7 g/dL Final   03/16/2021 12.2 11.7 - 15.7 g/dL Final       Patient is alert and oriented.  Neurovascular status: Grossly intact to RLE.  Dressing: clean " and dry  Calves: soft and non tender          Jarred Reeves PA-C  [facility] - Surgery    4/7/2021

## 2021-04-07 NOTE — PLAN OF CARE
Initially spinal working for pain control- now feeling pain and has return of leg function & feeling. Scheduled tylenol & po dilaudid started. Up with 2 assist to BSC with walker & immobilizer. Able to take a few steps. Unable to void- straight cathed for 550ml. Majo regular diet. Cms intact. HV patent. Ace removed for skin check & replaced. Drsg CD&I. VSS- afeb. Requiring O2 at 1L per NC for sats >92%.

## 2021-04-07 NOTE — DISCHARGE INSTRUCTIONS
POST OP TOTAL KNEE INSTRUCTIONS:    Medication:     Pain medication:  You will be discharged from the hospital with prescription opioid pain medication(s); Dilaudid/hydromorphone. In most cases, consistent use of this type of pain medication can be limited to a few days. After this period, the medication should be weaned off by decreasing the dose and/or increasing time between doses, as soon as possible to avoid potential complications of constipation, nausea, or rash, etc.   As you taper off the pain pills, you may find using them only at nighttime and then controlling the pain with over-the-counter medication(s) such as Ibuprofen OR Naproxen sodium (but not bother) and Tylenol during the day, would be a good way of managing the pain.  NOTE:  if you were prescribed Celebrex/Celecoxhib, Do NOT start Ibuprofen (advil/Motrin) or Naproxen (Naprosyn/Alleve) until done taking it.    Due to the nausea associated with pain medications, it may help to eat at minimum a small snack at the same time you take the Dilaudid/Hydromorphone.  If needed you may also use the Ondansetron/Zofran.    In order to minimize the potential problem of blood clot:  If you were on Coumadin or other anticoagulation therapies before the surgery, you will resume these after the surgery.  If you were not on an anticoagulant/blood thinner prior to surgery, you will be asked to do one of the following.  1.Take aspirin (low risk patients) or 2. take a prescription anticoagulant or undergo blood thinner injections (High risk patients.)      In order to prevent constipation:  You may also be sent home with stool softener, it is recommended you take this until your bowel movements become normal for you.  You may also need to add a laxative to the routine if you have not had a bowel movement for several days following surgery.  Also, Increase water and fiber intake while on pain medication to help prevent constipation.       Other medications prior to  surgery:  consult the medication list in your discharge instructions for any changes.    Aspirin 325 mg - daily until complete to prevent blood clot.  Acetaminophen /tylenol - 650 mg (2 pills) every 6 hours for pain.  Dilaudid 2 mg - take 1tablet if possible, only 2 if very painful, every 4 hours as needed for pain.  Try to taper off the medicine if possible.  Ketorolac mg - take 1 tablet every 6 hours for the first 4 days, DO NOT use after 4/12/2021.  Celebrex 100 mg - (Start after completing Ketorolac, DO NOT take at same time as celebrex), take 1 tablet every 12 hours until done for pain.  Senna-Mt - take 1 table 2 times daily until bowel movement to prevent constipation.    Activity: You will be using a walker or crutches until you feel confident. You also need to use the knee immobilizer until you are able to straight leg raise 10-15 times. Gradual rather than sudden increase of walking distance is recommended as the comfort level dictates. A cane instead of walker or crutches can be used when your strength and balance are improved.    Dressing: *(see below as well).  Typically, the first dressing change will be done in the hospital before discharge.   If not, please perform this at home on the 3rd day after surgery.  Showers can be taken with plastic covering over the a non-water proof post surgical dressing during this period. After that, the incision site can be wet for showering but should not be soaked. A light gauze dressing along with paper tape should be placed over the wound after each shower. The staples will be removed 10-14 days from the operation. During that time it is best to cover the incision site to protect the staples from being pulled or snagged.    *If you were discharged with an aquacell dressing, (flesh colored rubber like bandage), you may leave this dressing in place until your follow up appointment in the clinic, unless the dressing; becomes completely saturated, is leaking, gets  water inside as evident by moisture appearing on the inside, or you have a skin reaction.  In this case you should remove it and use dressings like what is mentioned in the paragraph above.    Exercises: It is recommended that you do the flexibility exercises (range of motion) and strengthening exercises in a small amounts frequently throughout the day rather than infrequent long sessions. Being overly aggressive with the exercises can hinder rather than help. We are looking for a gradual steady improvement even though the general goal for range of motion post-operatively is 0-90 degrees of bending within the first 2 weeks. During this time, gaining full Extension (straightening) is far more important than gaining flexion. Generally, you be working with a physical therapist 2-3 times per week for the first 2-4 weeks.    5 keys to the knee,- to be done throughout the day:  1. Knee Bending: Dangle- in a seated position where foot can swing, let the leg from the knee down dangle off the edge of bed or table. You may use the other leg/foot to gently push the affected leg into more bending.  2. Knee straightening: Heel blocking - While sitting or laying, place pillow, ottoman or some other support under the heel with toes pointing up but ankle relaxed.  Rest there, working up to 10 minutes relaxing the leg to induce knee straightening.  Once relaxed, push/squeeze the knee towards the floor, hold for 2 seconds, and relax. Repeat x 10.    NOTE:  Alternate 1 and 2 every other hour.  3. Straight leg raises 3x daily: from a laying or sitting position, while keeping the knee locked straight, slowly lift the foot up 12-16 inches and hold for 2 seconds and relax.  Repeat working up to 20 repetitions.    4. Ice and elevate: whenever you are not up and about.  No standing around or sitting with the knee bend for more than very short periods.  5. Walk:  2-5 minutes of short laps every couple hours.  Use assistive devices as needed  such as walker or cane to promote safety.  Walk focusing more on good form than getting somewhere.  Lead with the knee and then straighten the knee trying to make the surgical leg move like normal knee.    Soft tissue: It is not unusual to have swelling in the leg; thigh down to through the toes for several months from the surgery.  Frequent ankle pumping, elevation of the leg above the heart level and gentle compression support with ace wrap should help with swelling. If you wrap the leg, start at the toes and end in the thigh.  Icing regularly, for 20 minutes every hour, will also help with swelling and pain.  Flushing (pink) of the tissue is also often noted due to excess blood flow for healing.   You may also experience bruising.  This may occur anywhere from your toes, through the leg and even onto your torso.  It may show up even 1 week after surgery.     Progressively worsening redness along with increasing pain or increasing drainage from the wound should be a reason to alert us immediately.     Follow up in clinic within 14 days after surgery.  May call [phone] to schedule.    Jarred Reeves PA-C  [facility] - Surgery  [facility]  [address] [place]  [address]  [phone]

## 2021-04-07 NOTE — PROGRESS NOTES
04/07/21 0849   Quick Adds   Type of Visit Initial PT Evaluation   Living Environment   People in home child(tomy), adult   Current Living Arrangements house   Home Accessibility stairs to enter home;stairs within home   Number of Stairs, Main Entrance 1   Number of Stairs, Within Home, Primary 10   Stair Railings, Within Home, Primary railing on left side (ascending)   Transportation Anticipated family or friend will provide   Self-Care   Usual Activity Tolerance good   Current Activity Tolerance fair   Equipment Currently Used at Home cane, straight;walker, rolling;grab bar, toilet;grab bar, tub/shower;raised toilet seat   Activity/Exercise/Self-Care Comment walk in shower   Disability/Function   Hearing Difficulty or Deaf no   Wear Glasses or Blind yes   Vision Management glasses   Fall history within last six months no   General Information   Onset of Illness/Injury or Date of Surgery 04/06/21   Referring Physician Jarred Reeves, REAT   Patient/Family Therapy Goals Statement (PT) increase mobility   Pertinent History of Current Problem (include personal factors and/or comorbidities that impact the POC) Pt is a 71 y/o female POD1 s/p R TKA.   Existing Precautions/Restrictions fall   Weight-Bearing Status - LLE full weight-bearing   Weight-Bearing Status - RLE weight-bearing as tolerated   Cognition   Orientation Status (Cognition) oriented x 4   Affect/Mental Status (Cognition) WFL   Follows Commands (Cognition) WFL   Pain Assessment   Patient Currently in Pain Yes, see Vital Sign flowsheet  (8/10)   Range of Motion (ROM)   ROM Comment decreased R knee ROM, WNL L LE   Strength   Strength Comments decreased R LE strength, unable to perform SLR, >3/5 functional strength L LE, able to perform SLR   Bed Mobility   Comment (Bed Mobility) Vinicio supine <> sit   Transfers   Transfer Safety Comments CGA sit <> standing FWW   Gait/Stairs (Locomotion)   Distance in Feet (Required for LE Total Joints)  20  (Simultaneous filing. User may not have seen previous data.)   Comment (Gait/Stairs) CGA with FWW   Balance   Balance Comments good sitting balance with B UE support, fair standing balance with FWW   Clinical Impression   Criteria for Skilled Therapeutic Intervention yes, treatment indicated   PT Diagnosis (PT) decreased mobility   Influenced by the following impairments pain, decreased R LE strength, ROM   Functional limitations due to impairments decreased bed mobility, transfers, gait, stairs   Clinical Presentation Stable/Uncomplicated   Clinical Presentation Rationale Pt is medically stable   Clinical Decision Making (Complexity) low complexity   Therapy Frequency (PT) 2x/day   Predicted Duration of Therapy Intervention (days/wks) 2 days   Planned Therapy Interventions (PT) balance training;bed mobility training;cryotherapy;gait training;home exercise program;neuromuscular re-education;patient/family education;stair training;strengthening;transfer training   Anticipated Equipment Needs at Discharge (PT) walker, rolling   Risk & Benefits of therapy have been explained evaluation/treatment results reviewed;care plan/treatment goals reviewed;risks/benefits reviewed;current/potential barriers reviewed;participants voiced agreement with care plan;participants included;patient   PT Discharge Planning    PT Discharge Recommendation (DC Rec)   (defer to ortho)   PT Rationale for DC Rec Anticipate pt will be Vinicio with bed mobility, stairs and SBA with transfers, gait   PT Brief overview of current status  A x 1 FWW with knee immobilizer   Total Evaluation Time   Total Evaluation Time (Minutes) 8

## 2021-04-07 NOTE — PLAN OF CARE
OT: Orders received. Chart reviewed and discussed with care team.  OT not indicated as patient underwent TKA 7/20, resides with dtr who can A with ADls and IADLs and as needed AE.  Defer discharge recommendations to ortho team.  Will complete orders.

## 2021-04-07 NOTE — DISCHARGE SUMMARY
[facility] [place]  Discharge Summary            Interval History:     70 year old female admitted postoperatively for elective Right TKA  with Dr. James Mcdonald due to DJD unresponsive to non operative treatment.  There were no notable intraoperative complications.  Patient being discharged post op Day #2.  Laure Wood received skilled nursing, PT, OT, SW inquiry.  There was no Hospitalist care during the stay.  Due to elderly deconditioned status, poor intake, pain control, nausea, needing high levels of assistance for ambulation, pt was held overnight to post op day #2.     Today, Laure Wood has progressed satisfactorily with ambulation and pain management and without medical complication.  Nausea has resolved, pain control improved.  Patient is confident in their discharge and has met goals with PT and OT. Pt lives at home with family and will be discharged to home based on home living conditions and level of support.  Laure Wood leaves the hospital in stable condition with good chance for recovery.  Today: DOing well.  Happy with current condition, addition of Toradol believes helped.  Eating, ambulating, voiding, no BM but flatus.  No new complaints.      Pain: tolerable.   Nausea: improved.  Light headedness : none  Chest pain: none  Shortness of breath: none              Assessment and Plan:     S/P R TKA Day #2.  Final Diagnosis: Same  VSS, Hemodynamically asymptomatic, pain management adequate.    PLAN:  DVT prophylaxis with daily aspirin, as patient has no history of VTE.  Pain management with Dilaudid, tylenol, Ketorolac then Celebrex.  OP PT.  RICE  Bowel regimen.  Patient instructions attached to discharge.      Discharge to home.  Follow up in clinic as previously scheduled, approx 10-14 days post op.    [facility]  [address] [place]  [address]  [phone]  [phone] fax  [phone] for scheduling                      Physical Exam:     Patient Vitals for  the past 12 hrs:   BP Temp Temp src Pulse Resp SpO2   04/07/21 0743 (!) 142/72 97.8  F (36.6  C) Temporal 59 18 99 %   04/07/21 0457 137/72 97.5  F (36.4  C) Temporal 72 20 100 %   04/07/21 0016 134/71 97.3  F (36.3  C) Temporal 56 16 99 %     Hemoglobin   Date Value Ref Range Status   04/07/2021 10.6 (L) 11.7 - 15.7 g/dL Final   03/16/2021 12.2 11.7 - 15.7 g/dL Final       Patient is alert and oriented.  Vitals: stable  Neurovascular status: Grossly intact to RLE.  Dressing: clean and dry  Calves: soft and non tender          Jarred Reeves PA-C  [facility] - Surgery    4/7/2021

## 2021-04-07 NOTE — PLAN OF CARE
Patient vital signs are at baseline: Yes  Patient able to ambulate as they were prior to admission or with assist devices provided by therapies during their stay:  No,  Reason:  Patient requiring more assistance due to high pain levels, slow moving and very stiff  Patient MUST void prior to discharge:  Yes  Patient able to tolerate oral intake:  No,  Reason:  PO intake poor due to ongoing nausea throughout the day despite anti-emitics. PRN TUMs given with some relief.   Pain has adequate pain control using Oral analgesics:  No,  Reason:  Patient still reporting high pain levels despite medications. IV torodol given, patient still reporting 8/10 pain.

## 2021-04-08 ENCOUNTER — APPOINTMENT (OUTPATIENT)
Dept: PHYSICAL THERAPY | Facility: CLINIC | Age: 71
End: 2021-04-08
Attending: ORTHOPAEDIC SURGERY
Payer: COMMERCIAL

## 2021-04-08 VITALS
TEMPERATURE: 98.7 F | SYSTOLIC BLOOD PRESSURE: 146 MMHG | WEIGHT: 184 LBS | HEIGHT: 63 IN | DIASTOLIC BLOOD PRESSURE: 76 MMHG | OXYGEN SATURATION: 91 % | BODY MASS INDEX: 32.6 KG/M2 | RESPIRATION RATE: 16 BRPM | HEART RATE: 70 BPM

## 2021-04-08 LAB
GLUCOSE SERPL-MCNC: 93 MG/DL (ref 70–99)
HGB BLD-MCNC: 10 G/DL (ref 11.7–15.7)
INTERPRETATION ECG - MUSE: NORMAL

## 2021-04-08 PROCEDURE — 250N000013 HC RX MED GY IP 250 OP 250 PS 637: Performed by: PHYSICIAN ASSISTANT

## 2021-04-08 PROCEDURE — 250N000011 HC RX IP 250 OP 636: Performed by: PHYSICIAN ASSISTANT

## 2021-04-08 PROCEDURE — 97116 GAIT TRAINING THERAPY: CPT | Mod: GP | Performed by: PHYSICAL THERAPIST

## 2021-04-08 PROCEDURE — 85018 HEMOGLOBIN: CPT | Performed by: PHYSICIAN ASSISTANT

## 2021-04-08 PROCEDURE — 97530 THERAPEUTIC ACTIVITIES: CPT | Mod: GP | Performed by: PHYSICAL THERAPIST

## 2021-04-08 PROCEDURE — 82947 ASSAY GLUCOSE BLOOD QUANT: CPT | Performed by: PHYSICIAN ASSISTANT

## 2021-04-08 PROCEDURE — 36415 COLL VENOUS BLD VENIPUNCTURE: CPT | Performed by: PHYSICIAN ASSISTANT

## 2021-04-08 RX ORDER — CELECOXIB 100 MG/1
100 CAPSULE ORAL 2 TIMES DAILY
Qty: 10 CAPSULE | Refills: 0 | Status: SHIPPED | OUTPATIENT
Start: 2021-04-12 | End: 2022-03-16

## 2021-04-08 RX ORDER — KETOROLAC TROMETHAMINE 10 MG/1
10 TABLET, FILM COATED ORAL EVERY 6 HOURS PRN
Qty: 16 TABLET | Refills: 0 | Status: SHIPPED | OUTPATIENT
Start: 2021-04-08 | End: 2021-04-12

## 2021-04-08 RX ADMIN — HYDROXYZINE HYDROCHLORIDE 10 MG: 10 TABLET, FILM COATED ORAL at 00:03

## 2021-04-08 RX ADMIN — HYDROMORPHONE HYDROCHLORIDE 4 MG: 4 TABLET ORAL at 05:03

## 2021-04-08 RX ADMIN — DOCUSATE SODIUM 100 MG: 100 CAPSULE, LIQUID FILLED ORAL at 07:50

## 2021-04-08 RX ADMIN — OMEPRAZOLE 20 MG: 20 CAPSULE, DELAYED RELEASE ORAL at 07:50

## 2021-04-08 RX ADMIN — ONDANSETRON 4 MG: 4 TABLET, ORALLY DISINTEGRATING ORAL at 05:03

## 2021-04-08 RX ADMIN — ACETAMINOPHEN 975 MG: 325 TABLET, FILM COATED ORAL at 05:02

## 2021-04-08 RX ADMIN — HYDROCHLOROTHIAZIDE 25 MG: 25 TABLET ORAL at 07:50

## 2021-04-08 RX ADMIN — DOCUSATE SODIUM 50 MG AND SENNOSIDES 8.6 MG 1 TABLET: 8.6; 5 TABLET, FILM COATED ORAL at 07:50

## 2021-04-08 RX ADMIN — KETOROLAC TROMETHAMINE 15 MG: 15 INJECTION, SOLUTION INTRAMUSCULAR; INTRAVENOUS at 00:03

## 2021-04-08 NOTE — PLAN OF CARE
Patient vital signs are at baseline: Yes  Patient able to ambulate as they were prior to admission or with assist devices provided by therapies during their stay:  Yes, with assist of 1 walker and gait belt.  Patient MUST void prior to discharge:  Yes  Patient able to tolerate oral intake:  Yes, nausea improving with PO Zofran  Pain has adequate pain control using Oral analgesics:  PO Dilaudid and IV Toradol given once PRN overnight.    Pt slept comfortably over night. Hemovac drain removed this morning. Plan to discharge to home today

## 2021-04-08 NOTE — PLAN OF CARE
Physical Therapy Discharge Summary    Reason for therapy discharge:    All goals and outcomes met, no further needs identified.    Progress towards therapy goal(s). See goals on Care Plan in [facility] electronic health record for goal details.  Goals met    Therapy recommendation(s):    Defer to ortho

## 2021-04-16 ENCOUNTER — IMMUNIZATION (OUTPATIENT)
Dept: NURSING | Facility: CLINIC | Age: 71
End: 2021-04-16
Attending: INTERNAL MEDICINE
Payer: COMMERCIAL

## 2021-04-16 PROCEDURE — 0002A PR COVID VAC PFIZER DIL RECON 30 MCG/0.3 ML IM: CPT

## 2021-04-16 PROCEDURE — 91300 PR COVID VAC PFIZER DIL RECON 30 MCG/0.3 ML IM: CPT

## 2021-04-19 ENCOUNTER — OFFICE VISIT (OUTPATIENT)
Dept: ORTHOPEDICS | Facility: CLINIC | Age: 71
End: 2021-04-19
Payer: COMMERCIAL

## 2021-04-19 DIAGNOSIS — Z47.89 ORTHOPEDIC AFTERCARE: Primary | ICD-10-CM

## 2021-04-19 DIAGNOSIS — Z79.1 NSAID LONG-TERM USE: ICD-10-CM

## 2021-04-19 DIAGNOSIS — Z78.9 DEEP VEIN THROMBOSIS (DVT) PROPHYLAXIS PRESCRIBED AT DISCHARGE: ICD-10-CM

## 2021-04-19 PROCEDURE — 99024 POSTOP FOLLOW-UP VISIT: CPT | Performed by: PHYSICIAN ASSISTANT

## 2021-04-19 NOTE — PATIENT INSTRUCTIONS
Incision care instructions:  Keep dry 24 hours.  Showering is ok after that time, however no soaking or scrubbing of incision for 2 weeks.  Tape-strips will most likely fall off on their own, however they may be removed after 2 weeks with rubbing alcohol if they have not.    If draining or bleeding stops the tape-strips are enough coverage unless you were instructed otherwise or you would like to cover for comfort.  If drainage or bleeding continues please cover with clean dressings.     Schedule Physical therapy, call [phone].    Medications: daily aspirin until gone, roughly 4 weeks after surgery.    It is recommended that you avoid invasive procedures such as dental work, colonoscopy or other surgery for 4 months after surgery unless it is an emergency.  In the case of an emergency, please notify the provider, and an antibiotic may be prescribed for you.    You may discontinue the aspirin or return to a dose recommended by your Primary care provider 6 weeks after surgery.    You may increase your activities as you can tolerate them.  It is recommended that you do not do prolonged kneeling.    It is also recommended that you continue your exercises on your own for several additional months to avoid regressing.    Please follow up in 4 weeks for an X ray of your knee.

## 2021-04-20 NOTE — PROGRESS NOTES
Sleep Consultation:    Date on this visit: 4/21/2021    Laure Wood is sent by Yoshi Bowen for a sleep consultation regarding daytime sleepiness and morbid obesity.    Primary Physician: Yoshi Bowen     Laure Wood reports difficulty sleeping for several years. Her medical history is significant for HTN, GERD, BMI 32, knee pain.     Laure goes to sleep at 12:00-1:00 AM during the week. She wakes up at 6:00 AM without an alarm. She falls asleep in 60+ minutes.  Laure has difficulty falling asleep.  She wakes up 3 times a night for 60-90 minutes before falling back to sleep.  Laure wakes up to go to the bathroom and from pain.  On weekends, her sleep is the same.  Patient gets an average of 3-4 hours of sleep per night. She sometimes takes Tylenol PM and that helps a little. She does not remember when she started having trouble with sleep, but possibly around menopause.    Patient does watch TV in bed (in the day) and read in bed and does not use electronics in bed and worry in bed about anything.     Laure does not work currently. 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. She lives with her daughter.  She moved here in 1997. She was sleeping fine when she moved here.    Laure does snore. She does not know how often. She does not think the snoring is that bad. Her daughter will wake her when she dozes during the day saying she is snoring. Patient does not have a regular bed partner. There is no report of gasping, snorting or witnessed apneas.  Patient sleeps on her back since her surgery. She would normally prefer to sleep on her sides. She has occasional morning dry mouth and morning headaches, denies snort arousals. She may have restlessness in her legs. She says she has some urge to move, day and night. It is hard to tell if she is really experiencing restlessness of just an urge to more from pain.  Laure denies any bruxism (clenches in the day but not aware at night), sleep walking, sleep talking, dream  enactment, sleep paralysis, cataplexy and hypnogogic/hypnopompic hallucinations.    Laure has reflux at night and heartburn (sometimes wakes to take omeprazole), denies difficulty breathing through her nose, claustrophobia and depression.  She takes 4 mg hydromorphone every 4 hours. She had a knee surgery about 2 weeks ago.    Laure has gained 5-10 pounds in 10 years.  Patient describes themself as a night person.  She used to go to sleep at 10:00 PM and wake up at 6:30 AM.  Patient's [place] Sleepiness score 11/24 consistent with mild daytime sleepiness.      Laure denies taking naps. She takes frequent inadvertant naps after taking her pain medication. She denies dozing while driving. She does not usually drive. Usually her daughter gives her a ride. She has not driven since her knee surgery.  Patient was counseled on the importance of driving while alert, to pull over if drowsy, or nap before getting into the vehicle if sleepy.  She uses no caffeine. If she has caffeine, she does not sleep at all.    Allergies:    Allergies   Allergen Reactions     No Known Drug Allergy        Medications:    Current Outpatient Medications   Medication Sig Dispense Refill     acetaminophen (TYLENOL) 325 MG tablet Take 2 tablets (650 mg) by mouth every 6 hours 100 tablet 0     aspirin (ASA) 325 MG tablet Take 1 tablet (325 mg) by mouth daily 30 tablet 0     aspirin (ASA) 81 MG EC tablet Take 2 tablets (162 mg) by mouth daily 40 tablet 0     celecoxib (CELEBREX) 100 MG capsule Take 1 capsule (100 mg) by mouth 2 times daily 10 capsule 0     diclofenac (VOLTAREN) 1 % topical gel Apply 2-4 g topically 4 times daily R Knee, foot as needed 100 g 11     hydrochlorothiazide (HYDRODIURIL) 25 MG tablet TAKE 1 TABLET(25 MG) BY MOUTH DAILY 90 tablet 3     HYDROmorphone (DILAUDID) 2 MG tablet Take 1-2 tablets (2-4 mg) by mouth every 4 hours as needed for severe pain 50 tablet 0     omeprazole (PRILOSEC) 20 MG DR capsule Take 1 capsule (20 mg) by  mouth daily 30 capsule 0     ondansetron (ZOFRAN-ODT) 4 MG ODT tab Take 1 tablet (4 mg) by mouth every 6 hours as needed for nausea or vomiting 12 tablet 0     senna-docusate (SENOKOT-S/PERICOLACE) 8.6-50 MG tablet Take 1 tablet by mouth 2 times daily as needed for constipation 20 tablet 0     simvastatin (ZOCOR) 40 MG tablet Take 1 tablet (40 mg) by mouth At Bedtime 90 tablet 1       Problem List:  Patient Active Problem List    Diagnosis Date Noted     Esophageal reflux 07/11/2013     Priority: High     Essential hypertension, benign 04/09/2012     Priority: High     Right knee pain 04/02/2021     Priority: Medium     Primary osteoarthritis of right knee 03/10/2021     Priority: Medium     Added automatically from request for surgery [id number]       S/P total knee replacement, left 07/28/2020     Priority: Medium     Status post total hip replacement, left 07/27/2020     Priority: Medium     Primary osteoarthritis of left knee 07/20/2020     Priority: Medium     Added automatically from request for surgery [id number]       Over weight 07/19/2014     Priority: Medium     Knee osteoarthritis 07/18/2014     Priority: Medium     Advanced care planning/counseling discussion 04/19/2013     Priority: Medium     Advance Care Planning:   ACP Review and Resources Provided: patient given information              Cervical pain 01/07/2013     Priority: Medium     Pain in shoulder 01/07/2013     Priority: Medium     Lumbar pain 01/07/2013     Priority: Medium     Fibroid uterus 12/20/2012     Priority: Medium     Thickened endometrium 12/20/2012     Priority: Medium     CARDIOVASCULAR SCREENING; LDL GOAL LESS THAN 130 05/29/2012     Priority: Medium        Past Medical/Surgical History:  Past Medical History:   Diagnosis Date     Gastroesophageal reflux disease      Hyperlipidemia LDL goal <160      Hypertension, essential, benign      Knee osteoarthritis     knees, hands     Motion sickness      PONV (postoperative nausea and  vomiting)      Past Surgical History:   Procedure Laterality Date     ARTHROPLASTY KNEE Left 7/27/2020    Procedure: Left total knee arthroplasty (Springer and Nephew #4 PS femur; #3 tibia; 9 mm tibial insert; 32 mm patella);  Surgeon: James Mcdonald MD;  Location: [facility] OR     ARTHROPLASTY KNEE Right 4/6/2021    Procedure: Right total knee arthroplasty (Springer and nephew #4 PS femur; #3 tibia' 32 mm patella and 9 mm tibial insert);  Surgeon: James Mcdonald MD;  Location: [facility] OR     CATARACT IOL, RT/LT Bilateral     Cataract IOL RT/LT     COLONOSCOPY Left 7/25/2019    Procedure: COLONOSCOPY;  Surgeon: Jono Egan MD;  Location:  [facility]       Social History:  Social History     Socioeconomic History     Marital status:      Spouse name: Not on file     Number of children: 6     Years of education: Not on file     Highest education level: Not on file   Occupational History     Occupation: retired   Social Needs     Financial resource strain: Not on file     Food insecurity     Worry: Not on file     Inability: Not on file     Transportation needs     Medical: Not on file     Non-medical: Not on file   Tobacco Use     Smoking status: Never Smoker     Smokeless tobacco: Never Used   Substance and Sexual Activity     Alcohol use: No     Drug use: No     Sexual activity: Never   Lifestyle     Physical activity     Days per week: Not on file     Minutes per session: Not on file     Stress: Not on file   Relationships     Social connections     Talks on phone: Not on file     Gets together: Not on file     Attends Voodoo service: Not on file     Active member of club or organization: Not on file     Attends meetings of clubs or organizations: Not on file     Relationship status: Not on file     Intimate partner violence     Fear of current or ex partner: Not on file     Emotionally abused: Not on file     Physically abused: Not on file     Forced sexual activity: Not on file   Other Topics Concern      "Parent/sibling w/ CABG, MI or angioplasty before 65F 55M? Not Asked   Social History Narrative     Not on file       Family History:  Family History   Family history unknown: Yes       Review of Systems:  A complete review of systems reviewed by me is negative with the exeption of what has been mentioned in the history of present illness.  CONSTITUTIONAL: NEGATIVE for weight gain/loss, fever, chills, sweats or night sweats, drug allergies.  CONSTITUTIONAL:  POSITIVE for  Hot flashes  EYES: NEGATIVE for changes in vision, blind spots, double vision.  ENT: NEGATIVE for ear pain, sore throat, sinus pain, post-nasal drip, runny nose, bloody nose  CARDIAC: NEGATIVE for fast heartbeats or fluttering in chest, chest pain or pressure, breathlessness when lying flat, swollen legs or swollen feet.  NEUROLOGIC: NEGATIVE headaches, weakness or numbness in the arms or legs.  PULMONARY: NEGATIVE SOB at rest, SOB with activity, dry cough, productive cough, coughing up blood, wheezing or whistling when breathing.    GASTROINTESTINAL: NEGATIVE for nausea or vomitting, loose or watery stools, fat or grease in stools, constipation, abdominal pain, bowel movements black in color or blood noted.  GENITOURINARY: NEGATIVE for pain during urination, blood in urine, urinating more frequently than usual, irregular menstrual periods.  MUSCULOSKELETAL: NEGATIVE for muscle pain, bone pain, swollen joints.  MUSCULOSKELETAL:  POSITIVE for  Knee pain  ENDOCRINE: NEGATIVE for increased thirst or urination, diabetes.  LYMPHATIC: NEGATIVE for swollen lymph nodes, lumps or bumps in the breasts or nipple discharge.    Physical Examination:  Patient reported vitals: Ht: 5'3\". Wt: 183 pounds. BMI 32.4.        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 She has had difficulty sleeping prior to that for many years. She is not sure exactly when it started, but may relate to menopause. She says it takes an hour or more to fall asleep and she wakes up a few times per night to urinate and from pain. She states she is in bed 12-1 AM to 6 AM. She dozes off frequently in the daytime. I briefly spoke with her daughter who said she has been like this since coming here from [place].   Plan: We did not get an opportunity to discuss treatment.    (R53.83) Other fatigue, (E66.01) Morbid obesity (H), (R40.0) Daytime somnolence  Comment: Laure was advised to have a sleep evaluation due to daytime sleepiness and obesity. In my conversation with Laure, she states she is sometimes told that she snores mildly when she dozes off during the day. She does not have a bed partner at night. She has not been told that she stops breathing at night. She does have some risk for apnea given her history of snoring, HTN, age >50. Her BMI is elevated. I've seen her height in her chart listed between 5'0\" and 5'5\", so I am not certain what her actual BMI is. At the end of the visit, I asked to speak with her daughter to get some observational report about her sleep. She said there must be some misunderstanding and that Laure does not have any problem, she does not need a sleep study. I asked about if there is any snoring observed and she said no. I pressed the issue a little more and her daughter started to sound upset and said she would talk to her primary again, and then the call was ended.  Plan: No further evaluation was desired by Laure or her daughter        No follow up.         Phone visit duration: 37 min  Bennett Goltz, PA-C     CC: Yoshi Bowen        "

## 2021-04-20 NOTE — PROGRESS NOTES
"Laure Wood is a 70 year old female being evaluated via a billable telephone visit.     \"This telephone visit will be conducted via a call between you and your physician/provider. We have found that certain health care needs can be provided without the need for an in-person visit or physical exam.  This service lets us provide the care you need with a telephone conversation.  If a prescription is necessary we can send it directly to your pharmacy.  If lab work is needed we can place an order for that and you can then stop by our lab to have the test done at a later time.\"    Telephone visits are billed at different rates depending on your insurance coverage.  Please reach out to your insurance provider with any questions.    Patient has given verbal consent for  a Telephone visit? Yes    What telephone number would you like your provider to contact at at:  [phone]     How would you like to obtain your AVS? Mail a copy    Mali Lyon MA    Telephone Visit Details:     Telephone Visit Start Time: 2:33 PM    Telephone Visit End Time: 3:10 PM        "

## 2021-04-21 ENCOUNTER — VIRTUAL VISIT (OUTPATIENT)
Dept: SLEEP MEDICINE | Facility: CLINIC | Age: 71
End: 2021-04-21
Attending: INTERNAL MEDICINE
Payer: COMMERCIAL

## 2021-04-21 DIAGNOSIS — E66.01 MORBID OBESITY (H): ICD-10-CM

## 2021-04-21 DIAGNOSIS — G47.01 INSOMNIA DUE TO MEDICAL CONDITION: Primary | ICD-10-CM

## 2021-04-21 DIAGNOSIS — R40.0 DAYTIME SOMNOLENCE: ICD-10-CM

## 2021-04-21 DIAGNOSIS — R53.83 OTHER FATIGUE: ICD-10-CM

## 2021-04-21 PROCEDURE — 99203 OFFICE O/P NEW LOW 30 MIN: CPT | Mod: 95 | Performed by: PHYSICIAN ASSISTANT

## 2021-04-21 NOTE — PATIENT INSTRUCTIONS

## 2021-04-21 NOTE — Clinical Note
I spoke briefly with Laure's daughter at the end of our visit and she was quite adamant that Laure does not have a problem and does not need any observation. She did not want to entertain a conversation about it.  Chuck

## 2021-04-22 NOTE — PROGRESS NOTES
HISTORY OF PRESENT ILLNESS:    Laure Wood is a 70 year old female who is seen in follow up for R TKA, DOS 04/06/2021, Dr. Mcdonald.  Present symptoms: Daughter present, states doing much better than last TKA.  Walking stairs, using walker, minimal pain medication.  Does state not taking aspirin, lost meds at home.  No new complaints.   Denies Chest pain, Calve pain, Fever, Chills.    Current Treatment: postop.    PHYSICAL EXAM:  There were no vitals taken for this visit.  There is no height or weight on file to calculate BMI.   GENERAL APPEARANCE: healthy, alert and no distress   PSYCH:  mentation appears normal and affect normal/bright    MSK:  Right: \Knee .  Ambulates: slight limp with walker, lack of flexion at knee.  Incision clean and dry, staples under aquacell present, healing.  Appropriate incisional erythema.   No Ecchymosis.  No calve pain on palpation.  Edema moderate at knee, min BK.  CMS: manjinder incisional numbness, otherwise grossly intact.  AROM 5-80.      IMAGING INTERPRETATION:  None today.     ASSESSMENT:  Laure Wood is a 70 year old female S/P  R TKA, DOS 04/06/2021, Dr. Mcdonald.  .  Doing well.  Emphasized importance of aspirin.    PLAN:  - Surgery discussed, images reviewed if applicable, and all questions were answered at this time.  - Staple removed with sterile technique, steri-strips applied in usual fashion, care instructions given and verbally acknowledged.  - Medications: refill aspirin, prilosec.  - Physical therapy: continue with current physical therapy, ok to delay start 1-2 weeks.  Given phone number for scheduling  - AAT.    Return to clinic 4, weeks for x rays    Jarred Reeves PA-C    Dept. Orthopedic Surgery  [facility]   4/21/2021

## 2021-04-28 ENCOUNTER — TELEPHONE (OUTPATIENT)
Dept: ORTHOPEDICS | Facility: CLINIC | Age: 71
End: 2021-04-28
Payer: COMMERCIAL

## 2021-04-30 NOTE — TELEPHONE ENCOUNTER
Consent to communicate on file to speak to daughterAlivia.     Call back number is not listed on chart. Left voicemail asking for a return call. Asked that if she got voicemail to let us know what the call was regarding and a good time and number to reach her.     TONYA Ceron RN

## 2021-05-03 ENCOUNTER — THERAPY VISIT (OUTPATIENT)
Dept: PHYSICAL THERAPY | Facility: CLINIC | Age: 71
End: 2021-05-03
Payer: COMMERCIAL

## 2021-05-03 DIAGNOSIS — M25.561 RIGHT KNEE PAIN: ICD-10-CM

## 2021-05-03 DIAGNOSIS — Z96.651 STATUS POST TOTAL RIGHT KNEE REPLACEMENT: ICD-10-CM

## 2021-05-03 DIAGNOSIS — Z47.89 ORTHOPEDIC AFTERCARE: ICD-10-CM

## 2021-05-03 DIAGNOSIS — Z96.659 STATUS POST TOTAL KNEE REPLACEMENT: ICD-10-CM

## 2021-05-03 DIAGNOSIS — Z96.652 STATUS POST TOTAL LEFT KNEE REPLACEMENT: Primary | ICD-10-CM

## 2021-05-03 PROCEDURE — 97161 PT EVAL LOW COMPLEX 20 MIN: CPT | Mod: GP | Performed by: PHYSICAL THERAPIST

## 2021-05-03 PROCEDURE — 97110 THERAPEUTIC EXERCISES: CPT | Mod: GP | Performed by: PHYSICAL THERAPIST

## 2021-05-03 ASSESSMENT — ACTIVITIES OF DAILY LIVING (ADL)
HOW_WOULD_YOU_RATE_THE_CURRENT_FUNCTION_OF_YOUR_KNEE_DURING_YOUR_USUAL_DAILY_ACTIVITIES_ON_A_SCALE_FROM_0_TO_100_WITH_100_BEING_YOUR_LEVEL_OF_KNEE_FUNCTION_PRIOR_TO_YOUR_INJURY_AND_0_BEING_THE_INABILITY_TO_PERFORM_ANY_OF_YOUR_USUAL_DAILY_ACTIVITIES?: 80
RISE FROM A CHAIR: ACTIVITY IS NOT DIFFICULT
SIT WITH YOUR KNEE BENT: ACTIVITY IS NOT DIFFICULT
PAIN: I HAVE THE SYMPTOM BUT IT DOES NOT AFFECT MY ACTIVITY
AS_A_RESULT_OF_YOUR_KNEE_INJURY,_HOW_WOULD_YOU_RATE_YOUR_CURRENT_LEVEL_OF_DAILY_ACTIVITY?: NEARLY NORMAL
KNEE_ACTIVITY_OF_DAILY_LIVING_SCORE: 86.16
WEAKNESS: I DO NOT HAVE THE SYMPTOM
GIVING WAY, BUCKLING OR SHIFTING OF KNEE: NOT ANSWERED
RAW_SCORE: 60.31
KNEE_ACTIVITY_OF_DAILY_LIVING_SUM: 56
SQUAT: ACTIVITY IS NOT DIFFICULT
GO DOWN STAIRS: ACTIVITY IS MINIMALLY DIFFICULT
STIFFNESS: I HAVE THE SYMPTOM BUT IT DOES NOT AFFECT MY ACTIVITY
GO UP STAIRS: ACTIVITY IS MINIMALLY DIFFICULT
LIMPING: I HAVE THE SYMPTOM BUT IT DOES NOT AFFECT MY ACTIVITY
WALK: ACTIVITY IS MINIMALLY DIFFICULT
SWELLING: THE SYMPTOM AFFECTS MY ACTIVITY SLIGHTLY
STAND: ACTIVITY IS MINIMALLY DIFFICULT
HOW_WOULD_YOU_RATE_THE_OVERALL_FUNCTION_OF_YOUR_KNEE_DURING_YOUR_USUAL_DAILY_ACTIVITIES?: NEARLY NORMAL
KNEEL ON THE FRONT OF YOUR KNEE: ACTIVITY IS NOT DIFFICULT

## 2021-05-03 NOTE — PROGRESS NOTES
[facility]: Physical Therapy Initial Evaluation   Apr 1, 2021    Subjective:   Chief Complaint: S/P R TKA   Pain: localized to knee   Numbness/Tingling: localized in knee   Weakness: none   Stiffness: hard to move  New/Recurrent/Chronic: chronic  DOS: 4/6/2021  Referral Date: 3/10/2021 - James Mcdonald MD  Mechanism of onset: unknown  PMH/surgical history/trauma: see chart   - high blood pressure   - previous L TKA (7/27/2020)   -overweight   -osteoarthritis  General health as reported by patient: Good   Medications: hydrochlorothiazide, aspirin, acetaminophen, diclofenac, omeprazole, simvastatin, hydromorphone   Occupation: Retired  Previous Treatment (Effect): physical therapy after L TKA, 1 pre-op visit for gait training prior to R TKA  Imaging: Advanced right knee DJD with a valgus deformity  AM/PM:   Quality of Pain: aching  Pain: 7/10 at present, 5/10 at best, 8/10 at worst-at bed  Worse: sitting down, standing up (8/10 pain), stairs  Better: nothing makes it better  Progression of Symptoms since onset: getting better  Hx of Falls: none  Sleeping: wakes her up at night due to pain   Current Functional Status: SEE GOALS FLOWSHEET  - unable to stand/sit without 8/10 pain  - unable to get in/out of bed without assistance from daugther  - unable to get dressed without assistance from daughter  -unable to ambulate without gait deviation and 2ww  Previous Functional Status: independent without device  Current HEP/exercise regimen: walks, goes to gym-bicycle  Transportation to Therapy: daughter, granddaughter for transportation  Live with Others: daughter  Red Flags:   - Patient denies the following: weakness  -Patient reports following: numbness/tingling around knee    Patient's goal(s): get better and walk easier      Objective:    Stairs: not assessed today (stated uses hand rail and cane at home with SBA from daughter)    Bed Transfers: min assist to get R LE into bed, min assist with  trunk support and guiding R LE getting out    Skin/Surgical Site: intact, moderate edema (40.7 cm at inferior pole patella). Patient had put ointment/oil on anterior and posterior, unable to identify what type    Gait/AD: 3 point gait with 2ww walker      ROM:   L R   KNEE     Ext 0 4   Flex 114 91     Uriel's: negative    Quad Set: mod cues initially to active quad                Assessment/Plan:    Patient is a 70 year old female with right side knee complaints.    Patient has the following significant findings with corresponding treatment plan.                Referring Diagnosis: S/P R TKA    Pain -  hot/cold therapy, manual therapy, education and home program  Decreased ROM/flexibility - manual therapy, therapeutic exercise, therapeutic activity and home program  Decreased strength - therapeutic exercise, therapeutic activities and home program  Impaired balance - neuro re-education, gait training and therapeutic activities  Edema - cold therapy and cryocuff  Impaired gait - gait training, assistive devices and home program  Decreased function - therapeutic activities and home program            Therapy Evaluation Codes:   1) History comprised of:   Personal factors that impact the plan of care:      Social history/culture and Time since onset of symptoms.    Comorbidity factors that impact the plan of care are:      Osteoarthritis and Overweight.     Medications impacting care: High blood pressure and Pain.  2) Examination of Body Systems comprised of:   Body structures and functions that impact the plan of care:      Knee.   Activity limitations that impact the plan of care are:      Bathing, Dressing, Sitting, Stairs, Standing and Walking.  3) Clinical presentation characteristics are:   Stable/Uncomplicated.  4) Decision-Making    Low complexity using standardized patient assessment instrument and/or measureable assessment of functional outcome.  Cumulative Therapy Evaluation is: Low complexity.    Previous  and current functional limitations:  (See Goal Flow Sheet for this information)    Short term and Long term goals: (See Goal Flow Sheet for this information)     Communication ability:  Patient appears to be able to clearly communicate and understand verbal and written communication and follow directions correctly.  Treatment Explanation - The following has been discussed with the patient:   RX ordered/plan of care  Anticipated outcomes  Possible risks and side effects  This patient would benefit from PT intervention to resume normal activities.   Rehab potential is good.    Frequency:  1 X week, once daily  Duration:  for 8 weeks tapering to 2 X a month over 4 weeks  Discharge Plan:  Achieve all LTG.  Independent in home treatment program.  Reach maximal therapeutic benefit.    Please refer to the daily flowsheet for treatment today, total treatment time and time spent performing 1:1 timed codes.

## 2021-05-10 ENCOUNTER — THERAPY VISIT (OUTPATIENT)
Dept: PHYSICAL THERAPY | Facility: CLINIC | Age: 71
End: 2021-05-10
Payer: COMMERCIAL

## 2021-05-10 DIAGNOSIS — M25.561 ACUTE PAIN OF RIGHT KNEE: ICD-10-CM

## 2021-05-10 DIAGNOSIS — Z96.651 STATUS POST TOTAL RIGHT KNEE REPLACEMENT: ICD-10-CM

## 2021-05-10 DIAGNOSIS — Z47.89 ORTHOPEDIC AFTERCARE: ICD-10-CM

## 2021-05-10 PROCEDURE — 97110 THERAPEUTIC EXERCISES: CPT | Mod: GP | Performed by: PHYSICAL THERAPY ASSISTANT

## 2021-05-17 ENCOUNTER — THERAPY VISIT (OUTPATIENT)
Dept: PHYSICAL THERAPY | Facility: CLINIC | Age: 71
End: 2021-05-17
Payer: COMMERCIAL

## 2021-05-17 DIAGNOSIS — M25.561 ACUTE PAIN OF RIGHT KNEE: ICD-10-CM

## 2021-05-17 DIAGNOSIS — Z96.651 STATUS POST TOTAL RIGHT KNEE REPLACEMENT: ICD-10-CM

## 2021-05-17 DIAGNOSIS — Z47.89 ORTHOPEDIC AFTERCARE: ICD-10-CM

## 2021-05-17 PROCEDURE — 97110 THERAPEUTIC EXERCISES: CPT | Mod: GP | Performed by: PHYSICAL THERAPY ASSISTANT

## 2021-05-17 PROCEDURE — 97530 THERAPEUTIC ACTIVITIES: CPT | Mod: GP | Performed by: PHYSICAL THERAPY ASSISTANT

## 2021-05-24 ENCOUNTER — THERAPY VISIT (OUTPATIENT)
Dept: PHYSICAL THERAPY | Facility: CLINIC | Age: 71
End: 2021-05-24
Payer: COMMERCIAL

## 2021-05-24 DIAGNOSIS — Z47.89 ORTHOPEDIC AFTERCARE: ICD-10-CM

## 2021-05-24 DIAGNOSIS — Z96.651 STATUS POST TOTAL RIGHT KNEE REPLACEMENT: ICD-10-CM

## 2021-05-24 DIAGNOSIS — M25.561 RIGHT KNEE PAIN: ICD-10-CM

## 2021-05-24 PROCEDURE — 97110 THERAPEUTIC EXERCISES: CPT | Mod: GP | Performed by: PHYSICAL THERAPIST

## 2021-06-01 ENCOUNTER — THERAPY VISIT (OUTPATIENT)
Dept: PHYSICAL THERAPY | Facility: CLINIC | Age: 71
End: 2021-06-01
Payer: COMMERCIAL

## 2021-06-01 DIAGNOSIS — Z96.651 STATUS POST TOTAL RIGHT KNEE REPLACEMENT: ICD-10-CM

## 2021-06-01 DIAGNOSIS — Z47.89 ORTHOPEDIC AFTERCARE: ICD-10-CM

## 2021-06-01 DIAGNOSIS — M25.561 RIGHT KNEE PAIN, UNSPECIFIED CHRONICITY: ICD-10-CM

## 2021-06-01 PROCEDURE — 97110 THERAPEUTIC EXERCISES: CPT | Mod: GP | Performed by: PHYSICAL THERAPIST

## 2021-06-01 PROCEDURE — 97530 THERAPEUTIC ACTIVITIES: CPT | Mod: GP | Performed by: PHYSICAL THERAPIST

## 2021-06-08 ENCOUNTER — THERAPY VISIT (OUTPATIENT)
Dept: PHYSICAL THERAPY | Facility: CLINIC | Age: 71
End: 2021-06-08
Payer: COMMERCIAL

## 2021-06-08 DIAGNOSIS — Z47.89 ORTHOPEDIC AFTERCARE: ICD-10-CM

## 2021-06-08 DIAGNOSIS — M25.561 RIGHT KNEE PAIN, UNSPECIFIED CHRONICITY: ICD-10-CM

## 2021-06-08 DIAGNOSIS — Z96.651 STATUS POST TOTAL RIGHT KNEE REPLACEMENT: ICD-10-CM

## 2021-06-08 PROCEDURE — 97110 THERAPEUTIC EXERCISES: CPT | Mod: GP | Performed by: PHYSICAL THERAPY ASSISTANT

## 2021-06-08 PROCEDURE — 97530 THERAPEUTIC ACTIVITIES: CPT | Mod: GP | Performed by: PHYSICAL THERAPY ASSISTANT

## 2021-06-15 ENCOUNTER — THERAPY VISIT (OUTPATIENT)
Dept: PHYSICAL THERAPY | Facility: CLINIC | Age: 71
End: 2021-06-15
Payer: COMMERCIAL

## 2021-06-15 DIAGNOSIS — Z47.89 ORTHOPEDIC AFTERCARE: ICD-10-CM

## 2021-06-15 DIAGNOSIS — Z96.651 STATUS POST TOTAL RIGHT KNEE REPLACEMENT: ICD-10-CM

## 2021-06-15 DIAGNOSIS — M25.561 RIGHT KNEE PAIN, UNSPECIFIED CHRONICITY: ICD-10-CM

## 2021-06-15 PROCEDURE — 97112 NEUROMUSCULAR REEDUCATION: CPT | Mod: GP | Performed by: PHYSICAL THERAPY ASSISTANT

## 2021-06-15 PROCEDURE — 97530 THERAPEUTIC ACTIVITIES: CPT | Mod: GP | Performed by: PHYSICAL THERAPY ASSISTANT

## 2021-06-15 PROCEDURE — 97110 THERAPEUTIC EXERCISES: CPT | Mod: GP | Performed by: PHYSICAL THERAPY ASSISTANT

## 2021-06-22 ENCOUNTER — THERAPY VISIT (OUTPATIENT)
Dept: PHYSICAL THERAPY | Facility: CLINIC | Age: 71
End: 2021-06-22
Payer: COMMERCIAL

## 2021-06-22 DIAGNOSIS — M25.561 RIGHT KNEE PAIN, UNSPECIFIED CHRONICITY: ICD-10-CM

## 2021-06-22 DIAGNOSIS — Z96.651 STATUS POST TOTAL RIGHT KNEE REPLACEMENT: ICD-10-CM

## 2021-06-22 DIAGNOSIS — Z47.89 ORTHOPEDIC AFTERCARE: ICD-10-CM

## 2021-06-22 PROCEDURE — 97110 THERAPEUTIC EXERCISES: CPT | Mod: GP | Performed by: PHYSICAL THERAPY ASSISTANT

## 2021-06-22 PROCEDURE — 97112 NEUROMUSCULAR REEDUCATION: CPT | Mod: GP | Performed by: PHYSICAL THERAPY ASSISTANT

## 2021-07-01 ENCOUNTER — THERAPY VISIT (OUTPATIENT)
Dept: PHYSICAL THERAPY | Facility: CLINIC | Age: 71
End: 2021-07-01
Payer: COMMERCIAL

## 2021-07-01 DIAGNOSIS — M25.561 RIGHT KNEE PAIN, UNSPECIFIED CHRONICITY: ICD-10-CM

## 2021-07-01 DIAGNOSIS — Z47.89 ORTHOPEDIC AFTERCARE: ICD-10-CM

## 2021-07-01 DIAGNOSIS — Z96.651 STATUS POST TOTAL RIGHT KNEE REPLACEMENT: ICD-10-CM

## 2021-07-01 PROCEDURE — 97110 THERAPEUTIC EXERCISES: CPT | Mod: GP | Performed by: PHYSICAL THERAPY ASSISTANT

## 2021-07-01 ASSESSMENT — ACTIVITIES OF DAILY LIVING (ADL)
RISE FROM A CHAIR: ACTIVITY IS NOT DIFFICULT
WEAKNESS: I HAVE THE SYMPTOM BUT IT DOES NOT AFFECT MY ACTIVITY
HOW_WOULD_YOU_RATE_THE_OVERALL_FUNCTION_OF_YOUR_KNEE_DURING_YOUR_USUAL_DAILY_ACTIVITIES?: NORMAL
GO UP STAIRS: ACTIVITY IS NOT DIFFICULT
AS_A_RESULT_OF_YOUR_KNEE_INJURY,_HOW_WOULD_YOU_RATE_YOUR_CURRENT_LEVEL_OF_DAILY_ACTIVITY?: NORMAL
STAND: ACTIVITY IS NOT DIFFICULT
GO DOWN STAIRS: ACTIVITY IS NOT DIFFICULT
SIT WITH YOUR KNEE BENT: ACTIVITY IS NOT DIFFICULT
HOW_WOULD_YOU_RATE_THE_CURRENT_FUNCTION_OF_YOUR_KNEE_DURING_YOUR_USUAL_DAILY_ACTIVITIES_ON_A_SCALE_FROM_0_TO_100_WITH_100_BEING_YOUR_LEVEL_OF_KNEE_FUNCTION_PRIOR_TO_YOUR_INJURY_AND_0_BEING_THE_INABILITY_TO_PERFORM_ANY_OF_YOUR_USUAL_DAILY_ACTIVITIES?: 100
SWELLING: I DO NOT HAVE THE SYMPTOM
KNEE_ACTIVITY_OF_DAILY_LIVING_SCORE: 92.31
KNEE_ACTIVITY_OF_DAILY_LIVING_SUM: 60
RAW_SCORE: 64.62
PAIN: I DO NOT HAVE THE SYMPTOM
STIFFNESS: I DO NOT HAVE THE SYMPTOM
KNEEL ON THE FRONT OF YOUR KNEE: NOT ANSWERED
GIVING WAY, BUCKLING OR SHIFTING OF KNEE: I DO NOT HAVE THE SYMPTOM
SQUAT: ACTIVITY IS VERY DIFFICULT
WALK: ACTIVITY IS NOT DIFFICULT
LIMPING: I DO NOT HAVE THE SYMPTOM

## 2021-07-01 NOTE — PROGRESS NOTES
Subjective:  HPI  Physical Exam       Knee Activity of Daily Living Score: 92.31            Objective:  System    Physical Exam    General     ROS    Assessment/Plan:    DISCHARGE REPORT    Progress reporting period is from 4/1/21 to 7/1/21.      SUBJECTIVE  Subjective changes noted by patient:  Everything is OK.  Walking is OK and back to driving her car.  Walking distance also not problems. Patient has [facility] to [facility] [employer] [facility].  Current pain level is 0/10.       Previous pain level was:   Initial Pain level: 8/10   Changes in function:  Yes (See Goal flowsheet attached for changes in current functional level)     Adverse reaction to treatment or activity: None     OBJECTIVE  Changes noted in objective findings:  Yes, right knee PROM 0-0-115, AROM 0-0-100.  MMT of the right knee is 5/5.  Patient does use the SEC when coming into the clinic, but when in the clinic does not use it.  Ambulates with reciprocal pattern with the stairs both ascending and descending Patient has a good HEP.

## 2021-07-15 ENCOUNTER — ANCILLARY PROCEDURE (OUTPATIENT)
Dept: GENERAL RADIOLOGY | Facility: CLINIC | Age: 71
End: 2021-07-15
Attending: PHYSICIAN ASSISTANT
Payer: COMMERCIAL

## 2021-07-15 ENCOUNTER — OFFICE VISIT (OUTPATIENT)
Dept: ORTHOPEDICS | Facility: CLINIC | Age: 71
End: 2021-07-15
Payer: COMMERCIAL

## 2021-07-15 DIAGNOSIS — Z96.651 STATUS POST TOTAL RIGHT KNEE REPLACEMENT: ICD-10-CM

## 2021-07-15 DIAGNOSIS — Z96.652 STATUS POST TOTAL LEFT KNEE REPLACEMENT: ICD-10-CM

## 2021-07-15 DIAGNOSIS — Z47.89 ORTHOPEDIC AFTERCARE: Primary | ICD-10-CM

## 2021-07-15 PROCEDURE — 99213 OFFICE O/P EST LOW 20 MIN: CPT | Performed by: PHYSICIAN ASSISTANT

## 2021-07-15 PROCEDURE — 73562 X-RAY EXAM OF KNEE 3: CPT | Mod: RT | Performed by: ORTHOPAEDIC SURGERY

## 2021-07-15 NOTE — PROGRESS NOTES
HISTORY OF PRESENT ILLNESS:    Laure Wood is a 70 year old female who is seen in follow up for Right TKA, DOS 4/6/2021, Dr. Mcdonald.    LEft TKA, DOS 7/27/2020, DR. Mcdonald - 1 year follow up.  Present symptoms: doing well.  Walking daily.  Tylenol PRN. Sleeping ok.  Alternating stairs.  Graduated PT.  No new injuries.  Denies Chest pain, Calve pain, Fever, Chills.    Current Treatment: postpop    PHYSICAL EXAM:  There were no vitals taken for this visit.  There is no height or weight on file to calculate BMI.   GENERAL APPEARANCE: healthy, alert and no distress   PSYCH:  mentation appears normal and affect normal/bright    MSK:  Bilateral : Knee. .  Ambulates: WNG no assistives..  Incisions clean and dry, well healed, mild keloids.   Appropriate incisional erythema.   No Ecchymosis.  No calve pain on palpation.  Edema Right mild to moderate at knee, mild below knee. Left, mild bulk at knee, scar tissue.  CMS: manjinder incisional numbness, otherwise grossly intact.  AROM RIght flexion 0-120, Left 0-120.      IMAGING INTERPRETATION:  X Ray bilateral knees.    Recent Results (from the past 24 hour(s))   XR Knee Bilateral 3 vw    Narrative    History: Follow-up of bilateral total knee arthroplasties, April 6, 2021   for the right side and July 27, 2020 for the left side.  Impression: Right and left knee x-rays demonstrate well-maintained total   knee arthroplasty components with a satisfactory alignment and   satisfactory patellofemoral tracking.  No evidence of loosening of the   components is noted.  No acute fractures or other osseous pathology noted.     Dr. James Mcdonald MD  7/16/2021       ASSESSMENT:  Laure Wood is a 70 year old female S/P Right TKA, DOS 4/6/2021, Dr. Mcdonald.    LEft TKA, DOS 7/27/2020, DR. Mcdonald - 1 year follow up..    Doing well.     PLAN:  - Surgery discussed, images reviewed if applicable, and all questions were answered at this time.  - long term care instructions given and verbally acknowledged.  -  Medications: none per ortho.  - home exercises.    Return to clinic PRN and 1 year postop.    Jarred Reeves PA-C    Dept. Orthopedic Surgery  [facility]   7/15/2021

## 2021-07-15 NOTE — PATIENT INSTRUCTIONS
It is recommended that you avoid invasive procedures such as dental work, colonoscopy or other surgery for 4 months after surgery unless it is an emergency.  In the case of an emergency, please notify the provider, and an antibiotic may be prescribed for you.    You may discontinue the aspirin or return to a dose recommended by your Primary care provider 6 weeks after surgery.    You may increase your activities as you can tolerate them.  It is recommended that you do not do prolonged kneeling.    It is also recommended that you continue your exercises on your own for several additional months to avoid regressing.    Please follow up in 4 weeks and then in 1 year from surgery.

## 2021-07-15 NOTE — LETTER
7/15/2021         RE: Laure Wood  [address] [facility] [address]        Dear Colleague,    Thank you for referring your patient, Laure Wood, to the [facility] [place]. Please see a copy of my visit note below.    HISTORY OF PRESENT ILLNESS:    Laure Wood is a 70 year old female who is seen in follow up for Right TKA, DOS 4/6/2021, Dr. Mcdonald.    LEft TKA, DOS 7/27/2020, DR. Mcdonald - 1 year follow up.  Present symptoms: doing well.  Walking daily.  Tylenol PRN. Sleeping ok.  Alternating stairs.  Graduated PT.  No new injuries.  Denies Chest pain, Calve pain, Fever, Chills.    Current Treatment: postpop    PHYSICAL EXAM:  There were no vitals taken for this visit.  There is no height or weight on file to calculate BMI.   GENERAL APPEARANCE: healthy, alert and no distress   PSYCH:  mentation appears normal and affect normal/bright    MSK:  Bilateral : Knee. .  Ambulates: WNG no assistives..  Incisions clean and dry, well healed, mild keloids.   Appropriate incisional erythema.   No Ecchymosis.  No calve pain on palpation.  Edema Right mild to moderate at knee, mild below knee. Left, mild bulk at knee, scar tissue.  CMS: manjinder incisional numbness, otherwise grossly intact.  AROM RIght flexion 0-120, Left 0-120.      IMAGING INTERPRETATION:  X Ray bilateral knees.    Recent Results (from the past 24 hour(s))   XR Knee Bilateral 3 vw    Narrative    History: Follow-up of bilateral total knee arthroplasties, April 6, 2021   for the right side and July 27, 2020 for the left side.  Impression: Right and left knee x-rays demonstrate well-maintained total   knee arthroplasty components with a satisfactory alignment and   satisfactory patellofemoral tracking.  No evidence of loosening of the   components is noted.  No acute fractures or other osseous pathology noted.     Dr. James Mcdonald MD  7/16/2021       ASSESSMENT:  Laure Wood is a 70 year old female S/P Right TKA, DOS 4/6/2021,  Dr. Mcdonald.    LEft TKA, DOS 7/27/2020, DR. Mcdonald - 1 year follow up..    Doing well.     PLAN:  - Surgery discussed, images reviewed if applicable, and all questions were answered at this time.  - long term care instructions given and verbally acknowledged.  - Medications: none per ortho.  - home exercises.    Return to clinic PRN and 1 year postop.    Jarred Reeves PA-C    Dept. Orthopedic Surgery  [facility]   7/15/2021          Again, thank you for allowing me to participate in the care of your patient.        Sincerely,        Jarred Reeves PA-C

## 2021-07-18 PROBLEM — Z96.651 STATUS POST TOTAL RIGHT KNEE REPLACEMENT: Status: RESOLVED | Noted: 2021-05-03 | Resolved: 2021-07-18

## 2021-07-18 PROBLEM — Z47.89 ORTHOPEDIC AFTERCARE: Status: RESOLVED | Noted: 2021-05-03 | Resolved: 2021-07-18

## 2021-07-18 PROBLEM — M25.561 RIGHT KNEE PAIN: Status: RESOLVED | Noted: 2021-04-02 | Resolved: 2021-07-18

## 2021-07-18 NOTE — PROGRESS NOTES
Assessment/Plan:    ASSESSMENT/PLAN  Updated problem list and treatment plan: Diagnosis 1:  S/P Right Total Knee Arthroplasty    STG/LTGs have been met:  Yes (See Goal flow sheet completed today.)  Progress toward STG/LTGs have been made:  Yes (See Goal flow sheet completed today.)  Assessment of Progress: The patient's condition is improving.  The patient has met all of their long term goals.  Self Management Plans:  Patient is independent in a home treatment program.  Patient is independent in self management of symptoms.  Patient continues to require the following intervention to meet STG and LT's:  PT intervention is no longer required to meet STG/LTG.    Recommendations:  This patient is ready to be discharged from therapy and continue their home treatment program.    Please refer to the daily flowsheet for treatment today, total treatment time and time spent performing 1:1 timed codes.

## 2021-09-22 ENCOUNTER — OFFICE VISIT (OUTPATIENT)
Dept: ORTHOPEDICS | Facility: CLINIC | Age: 71
End: 2021-09-22
Payer: COMMERCIAL

## 2021-09-22 VITALS
BODY MASS INDEX: 33.17 KG/M2 | DIASTOLIC BLOOD PRESSURE: 84 MMHG | SYSTOLIC BLOOD PRESSURE: 140 MMHG | HEIGHT: 63 IN | WEIGHT: 187.2 LBS

## 2021-09-22 DIAGNOSIS — Z96.651 S/P TKR (TOTAL KNEE REPLACEMENT) USING CEMENT, RIGHT: Primary | ICD-10-CM

## 2021-09-22 DIAGNOSIS — Z47.89 ORTHOPEDIC AFTERCARE: ICD-10-CM

## 2021-09-22 PROCEDURE — 99212 OFFICE O/P EST SF 10 MIN: CPT | Performed by: PHYSICIAN ASSISTANT

## 2021-09-22 ASSESSMENT — MIFFLIN-ST. JEOR: SCORE: 1333.26

## 2021-09-22 NOTE — PATIENT INSTRUCTIONS
Tylenol for pain at night 1000 mg every 8 hours.    You may try applying the diclofenac gel.    Massage the area with lotion and your hand OR with a soft wash cloth or hand towel for 5 minutes 4 times daily until symptoms resolve.    Follow up in 2-3 weeks if not improving.  And at 1 year following surgery with DR. Mcdonald.

## 2021-09-22 NOTE — Clinical Note
"    9/22/2021         RE: Laure Wood  [address] [facility] [address]        Dear Colleague,    Thank you for referring your patient, Laure Wood, to the [facility] [place]. Please see a copy of my visit note below.    HISTORY OF PRESENT ILLNESS:    Laure Wood is a 71 year old female who is seen in follow up for Right TKA, DOS 4/6/21, Dr. Mcdonald.  Present symptoms: Pt reports ongoing pain along incision site. She notes pain with knee flexion. She denies acute trauma or injury such as a fall or twist.  Treatments include Tylenol PRN.  Using *** for ambulation.  ***.  *** for DVT prophylaxis.  No new complaints.  Denies Chest pain, Calve pain, Fever, Chills.    PHYSICAL EXAM:  There were no vitals taken for this visit.  There is no height or weight on file to calculate BMI.   GENERAL APPEARANCE: {[facility] GENERAL APPEARANCE:50::\"healthy\",\"alert\",\"no distress\"}   PSYCH:  {[facility] EXAM CPE - PSYCH:182266::\"mentation appears normal\",\"affect normal/bright\"}    MSK:  {RIGHT:651385} Knee.  Ambulates: ***.  Incision clean and dry, *** present, healing.  Appropriate incisional erythema.   {YES/NO -default:020239::\"No\"} Ecchymosis ***.  {YES/NO -default:502069::\"No\"} calve pain on palpation.  Edema *** at knee ***.  CMS: manjinder incisional numbness, otherwise grossly intact.  AROM Flexion ***.  SLR: Able with *** knee extension.    IMAGING INTERPRETATION:  None today.     ASSESSMENT:  Laure Wood is a 71 year old female S/P ***.  ***    PLAN:  - Surgery discussed, images reviewed if applicable, and all questions were answered at this time.  - *** removed with sterile technique, steri-strips applied in usual fashion, care instructions given and verbally acknowledged.  - Medications: ***  - Physical Therapy: {REHAB :687291}  - AAT.    Return to clinic 4 weeks for x ray.    Jarred Reeves PA-C    Dept. Orthopedic Surgery  [facility]   9/22/2021            Again, thank you for " allowing me to participate in the care of your patient.        Sincerely,        Jarred Reeves PA-C

## 2021-09-22 NOTE — PROGRESS NOTES
HISTORY OF PRESENT ILLNESS:    Laure Wood is a 71 year old female who is seen in follow up for Right TKA, DOS 4/6/21, Dr. Mcdonald.  Present symptoms: Pt reports new onset of pain just along incision site. She notes pain with knee flexion and more so at night.  She denies acute trauma or injury such as a fall or twist.  Treatments include Tylenol PRN with good results..  Using nothing for ambulation.  Some swelling compared to other knee, but overall functionally improving.  [facility]  nothing for DVT prophylaxis.  No new complaints.  Denies Chest pain, Calve pain, Fever, Chills.    PHYSICAL EXAM:  There were no vitals taken for this visit.  There is no height or weight on file to calculate BMI.   GENERAL APPEARANCE: healthy, alert and no distress   PSYCH:  mentation appears normal and affect normal/bright    MSK:  Right: Knee.  Ambulates: WNG after first couple steps out of chair after sitting..  Incision clean and dry, well healed scar present, healing.  Appropriate incisional erythema.   No Ecchymosis.  No calve pain on palpation.  Edema mild to moderate at knee none BK..  CMS: manjinder incisional numbness, otherwise grossly intact.  AROM Flexion 0-110+.  With good patella tracking.  SLR: Able with SLR knee extension.  Palpation:  Mild sensitivity and pain at incision only, no crepitis with ROM.  Strength: flex and ext 5/5.  Ligamentous: normal lateral laxity with endfeel otherwise normal for TKA.    IMAGING INTERPRETATION:  None today.     ASSESSMENT:  Laure Wood is a 71 year old female S/P Right TKA, DOS.    Incisional sensitivity.  No overt signs of infection, hardware failure or issues.    PLAN:  - Discussed doing desensitization of incision by rubbing 5 min 4x daily.  - tylenol for pain.  - may try diclofenac gel at incision.    Return to clinic as needed, follow up at 1 year postop.    Jarred Reeves PA-C    I spent a total of 10 minutes on history, exam, discussing condition and  treatment with the patient.    Dept. Orthopedic Surgery  [facility]   9/22/2021

## 2021-12-20 ENCOUNTER — ANCILLARY PROCEDURE (OUTPATIENT)
Dept: MAMMOGRAPHY | Facility: CLINIC | Age: 71
End: 2021-12-20
Attending: INTERNAL MEDICINE
Payer: COMMERCIAL

## 2021-12-20 DIAGNOSIS — Z12.31 VISIT FOR SCREENING MAMMOGRAM: ICD-10-CM

## 2021-12-20 PROCEDURE — 77063 BREAST TOMOSYNTHESIS BI: CPT | Mod: TC | Performed by: RADIOLOGY

## 2021-12-20 PROCEDURE — 77067 SCR MAMMO BI INCL CAD: CPT | Mod: TC | Performed by: RADIOLOGY

## 2022-03-16 ENCOUNTER — OFFICE VISIT (OUTPATIENT)
Dept: INTERNAL MEDICINE | Facility: CLINIC | Age: 72
End: 2022-03-16
Payer: COMMERCIAL

## 2022-03-16 VITALS
TEMPERATURE: 98.2 F | SYSTOLIC BLOOD PRESSURE: 130 MMHG | HEART RATE: 66 BPM | HEIGHT: 63 IN | BODY MASS INDEX: 32.73 KG/M2 | DIASTOLIC BLOOD PRESSURE: 78 MMHG | WEIGHT: 184.7 LBS | OXYGEN SATURATION: 99 % | RESPIRATION RATE: 16 BRPM

## 2022-03-16 DIAGNOSIS — I10 ESSENTIAL HYPERTENSION, BENIGN: ICD-10-CM

## 2022-03-16 DIAGNOSIS — N81.10 FEMALE BLADDER PROLAPSE: ICD-10-CM

## 2022-03-16 DIAGNOSIS — M25.572 PAIN IN JOINTS OF BOTH FEET: ICD-10-CM

## 2022-03-16 DIAGNOSIS — M25.571 PAIN IN JOINTS OF BOTH FEET: ICD-10-CM

## 2022-03-16 DIAGNOSIS — M25.532 PAIN OF BOTH WRIST JOINTS: ICD-10-CM

## 2022-03-16 DIAGNOSIS — R68.2 DRY MOUTH: Primary | ICD-10-CM

## 2022-03-16 DIAGNOSIS — Z96.651 S/P TKR (TOTAL KNEE REPLACEMENT) USING CEMENT, RIGHT: ICD-10-CM

## 2022-03-16 DIAGNOSIS — M25.531 PAIN OF BOTH WRIST JOINTS: ICD-10-CM

## 2022-03-16 LAB — HGB BLD-MCNC: 12.2 G/DL (ref 11.7–15.7)

## 2022-03-16 PROCEDURE — 84550 ASSAY OF BLOOD/URIC ACID: CPT | Performed by: INTERNAL MEDICINE

## 2022-03-16 PROCEDURE — 36415 COLL VENOUS BLD VENIPUNCTURE: CPT | Performed by: INTERNAL MEDICINE

## 2022-03-16 PROCEDURE — 80048 BASIC METABOLIC PNL TOTAL CA: CPT | Performed by: INTERNAL MEDICINE

## 2022-03-16 PROCEDURE — 85018 HEMOGLOBIN: CPT | Performed by: INTERNAL MEDICINE

## 2022-03-16 PROCEDURE — 99214 OFFICE O/P EST MOD 30 MIN: CPT | Performed by: INTERNAL MEDICINE

## 2022-03-16 RX ORDER — ACETAMINOPHEN 325 MG/1
650 TABLET ORAL EVERY 6 HOURS PRN
Status: ON HOLD | COMMUNITY
Start: 2022-03-16 | End: 2023-01-23

## 2022-03-16 NOTE — PROGRESS NOTES
"  Assessment & Plan     Dry mouth  Only at night - add humidity. No indication of sicca symptoms     Pain of both wrist joints  Pain in joints of both feet  - likely OA bilat.   Uric acid; Future  - Uric acid    Essential hypertension, benign  Ok- could be better. Focus on weight loss  - Basic metabolic panel  (Ca, Cl, CO2, Creat, Gluc, K, Na, BUN); Future  - Basic metabolic panel  (Ca, Cl, CO2, Creat, Gluc, K, Na, BUN)    S/P TKR (total knee replacement) using cement, right  - acetaminophen (TYLENOL) 325 MG tablet; Take 2 tablets (650 mg) by mouth every 6 hours as needed for mild pain  - Hemoglobin; Future  - Hemoglobin    Female bladder prolapse  - Adult Urology Referral; Future    Ordering of each unique test  Prescription drug management         BMI:   Estimated body mass index is 32.72 kg/m  as calculated from the following:    Height as of this encounter: 1.6 m (5' 3\").    Weight as of this encounter: 83.8 kg (184 lb 11.2 oz).           No follow-ups on file.    Yoshi Bowen MD  [facility]    Martha Kelsey is a 71 year old who presents for the following health issues     HPI     Patient is here today because she has a really dry mouth at night and throat at night. This has been going on for about a month.           Review of Systems   Constitutional, HEENT, cardiovascular, pulmonary, gi and gu systems are negative, except as otherwise noted.      Objective    /78   Pulse 66   Temp 98.2  F (36.8  C) (Tympanic)   Resp 16   Ht 1.6 m (5' 3\")   Wt 83.8 kg (184 lb 11.2 oz)   SpO2 99%   BMI 32.72 kg/m    Body mass index is 32.72 kg/m .  Physical Exam   GENERAL: healthy, alert and no distress  EYES: Eyes grossly normal to inspection, PERRL and conjunctivae and sclerae normal  HENT: normal cephalic/atraumatic, nose and mouth without ulcers or lesions, oropharynx clear and oral mucous membranes moist  NECK: no adenopathy, no asymmetry, masses, or scars and thyroid " normal to palpation  Ext: no synovitis noted. Wrists and feet normal.

## 2022-03-17 LAB
ANION GAP SERPL CALCULATED.3IONS-SCNC: 12 MMOL/L (ref 3–14)
BUN SERPL-MCNC: 29 MG/DL (ref 7–30)
CALCIUM SERPL-MCNC: 10.9 MG/DL (ref 8.5–10.1)
CHLORIDE BLD-SCNC: 109 MMOL/L (ref 94–109)
CO2 SERPL-SCNC: 24 MMOL/L (ref 20–32)
CREAT SERPL-MCNC: 1.07 MG/DL (ref 0.52–1.04)
GFR SERPL CREATININE-BSD FRML MDRD: 55 ML/MIN/1.73M2
GLUCOSE BLD-MCNC: 97 MG/DL (ref 70–99)
POTASSIUM BLD-SCNC: 4.5 MMOL/L (ref 3.4–5.3)
SODIUM SERPL-SCNC: 145 MMOL/L (ref 133–144)
URATE SERPL-MCNC: 10.4 MG/DL (ref 2.6–6)

## 2022-03-17 RX ORDER — HYDROCHLOROTHIAZIDE 25 MG/1
TABLET ORAL
Qty: 90 TABLET | Refills: 0 | Status: SHIPPED | OUTPATIENT
Start: 2022-03-17 | End: 2022-06-15

## 2022-03-17 NOTE — TELEPHONE ENCOUNTER
Prescription approved per [facility] Refill Protocol.  Kelly Thomas, RN  [facility] Triage Nurse

## 2022-05-13 ENCOUNTER — PATIENT OUTREACH (OUTPATIENT)
Dept: GERIATRIC MEDICINE | Facility: CLINIC | Age: 72
End: 2022-05-13

## 2022-05-13 NOTE — TELEPHONE ENCOUNTER
[place] Care Coordination Contact    Member became effective with [employer] on 05/01/2022 with [employer].  [facility]  Previous Care System: [facility] care coordinators name and number: Viola Donovan Care Coordination - Primary Care [place] [facility]  Waiver Type: EW  Last MMIS Entry: Date 04/07/2022 and Type reassessment  MMIS visit date (and type) if different from above: 06/11/2021 pers reassessment  Services Listed in MMIS:   18 F PERSONAL 35 F CASE MGMT 46 F EXT PCA  52 F EMERG RSP 06 F HOMEMAKER 01 F GROC SHOP  62 F LAUNDRY 25 F SUPPLIES  UTF received: Yes: Received and saved to member file     Vy Raman  Care Management Specialist  [facility]  [phone]

## 2022-05-13 NOTE — LETTER
May 13, 2022    Important [employer] Information    LAURE LOPEZ  [address]  [facility] [address]    Your New Care Coordinator  Dear Laure,  My name is Roney Harrison RN, PHN and I am your new Care Coordinator. You may reach me by calling [phone]. I will be in touch with you shortly to address any questions you may have.   I have also been in contact with Viola Donovan, your previous care coordinator, to ensure a smooth transition.  Questions?  Call me at [phone] Monday-Friday between 8am and 5pm. TTY: [phone]. I look forward to working with you as a [employer]  member.  Sincerely,    Roney Harrison RN, PHN    E-mail: [email or web address]  Phone: [phone]      [employer]    cc: member records                                                                                                                        CB5 ([facility]) (5-2020)    Civil Rights Notice  Discrimination is against the law. [employer] does not discriminate on the basis of any of the following:    Race    Color    National Origin    Creed    Confucianism    Age    Public Assistance Status    Receipt of Health Care Services    Disability (including physical or mental impairment)    Sex (including sex stereotypes and gender identity)    Marital Status    Political Beliefs    Medical Condition    Genetic Information    Sexual Orientation    Claims Experience    Medical History    Health Status    Auxiliary Aids and Services:  [employer] provides auxiliary aids and services, like qualified interpreters or information in accessible formats, free of charge and in a timely manner, to ensure an equal opportunity to participate in our health care programs. Contact [employer] at [employer]/contact medicaid or call [phone] (toll free); TTY:711 or at [employer]/contactmedicaid.    Language Assistance Services:  [employer] provides translated documents and spoken language interpreting, free of charge and in a timely manner, when  language assistance services are necessary to ensure limited English speakers have meaningful access to our information and services. Contact [employer] at [phone] (toll free); TTY: 711 or [email or web address].     Civil Rights Complaints  You have the right to file a discrimination complaint if you believe you were treated in a discriminatory way by [employer]. You may contact any of the following four agencies directly to file a discrimination complaint.        U.S. Department of Health and Human Services  Office for Civil Rights (OCR)  You have the right to file a complaint with the OCR, a federal agency, if you believe you have been discriminated against because of any of the following:    Race    Disability    Color    Sex    National Origin    Age    Lutheran (in some cases)    Contact the OCR directly to file a complaint:         Director         U.S. Department of Health and Human Services  Office for Civil Rights         [address] [place] [address]         Customer Response Center: Toll-free: [phone]          TDD: [phone]         Email: ocrmail@[email or web address].gov    [place] Department of Human Rights (MDHR)  In [place], you have the right to file a complaint with the MDHR if you believe you have been discriminated against because of any of the following:      Race    Color    National Origin    Lutheran    Creed    Sex    Sexual Orientation    Marital Status    Public Assistance Status    Disability    Contact the MDHR directly to file a complaint:         [place] Department of Human Rights         [address] [place] [address]         [phone] (voice)          [phone] (toll free)         711 or [phone] ([place] Relay)         [phone] (Fax)         [email or web address] (Email)     [place] Department of Human Services (DHS)  You have the right to file a complaint with DHS if  you believe you have been discriminated against in our health care programs because of any of the following:    Race    Color    National Origin    Creed    Moravian    Age    Public Assistance Status    Receipt of Health Care Services    Disability (including physical or mental impairment)    Sex (including sex stereotypes and gender identity)    Marital Status    Political Beliefs    Medical Condition    Genetic Information    Sexual Orientation    Claims Experience    Medical History    Health Status    Complaints must be in writing and filed within 180 days of the date you discovered the alleged discrimination. The complaint must contain your name and address and describe the discrimination you are complaining about. After we get your complaint, we will review it and notify you in writing about whether we have authority to investigate. If we do, we will investigate the complaint.      [facility] will notify you in writing of the investigation s outcome. You have a right to appeal the outcome if you disagree with the decision. To appeal, you must send a written request to have [facility] review the investigation outcome. Be brief and state why you disagree with the decision. Include additional information you think is important.      If you file a complaint in this way, the people who work for the agency named in the complaint cannot retaliate against you. This means they cannot punish you in any way for filing a complaint. Filing a complaint in this way does not stop you from seeking out other legal or administration actions.     Contact [facility] directly to file a discrimination complaint:        Civil Rights Coordinator        [place] Department of Human Services        Equal Opportunity and Access Division        [address] [address]        [place] [address] [phone] (voice) or use your preferred relay service     [employer] Complaint Notice   You have the right to file a complaint with [employer] if you believe you have  been discriminated against because of any of the following:       Medical condition    Health status    Receipt of health care services    Claims experience    Medical history    Genetic information    Disability (including mental or physical impairment)    Marital status    Age    Sex (including sex stereotypes and gender identity)    Sexual orientation    National origin    Race    Color    Jainism    Creed    Public assistance status    Political beliefs    You can file a complaint and ask for help in filing a complaint in person or by mail, phone, fax, or email at:     [employer] Civil Rights Coordinator  [facility]  [address], [place] [address] [phone] (voice and fax) or TTP:750  Email: [email or web address]    American Indians can begin or continue to use [facility] and [facility] ([facility]) clinics. We will not require prior approval or impose any conditions for you to get services at these clinics. For elders age 65 years and older this includes Elderly Waiver (EW) services accessed through the [place]. If a doctor or other provider in a [facility] or [facility] clinic refers you to a provider in our network, we will not require you to see your primary care provider prior to the referral.

## 2022-05-15 PROCEDURE — 20605 DRAIN/INJ JOINT/BURSA W/O US: CPT | Mod: RT

## 2022-05-15 PROCEDURE — 99285 EMERGENCY DEPT VISIT HI MDM: CPT | Mod: 25

## 2022-05-16 ENCOUNTER — HOSPITAL ENCOUNTER (EMERGENCY)
Facility: CLINIC | Age: 72
Discharge: HOME | End: 2022-05-16
Attending: EMERGENCY MEDICINE | Admitting: EMERGENCY MEDICINE
Payer: COMMERCIAL

## 2022-05-16 ENCOUNTER — APPOINTMENT (OUTPATIENT)
Dept: GENERAL RADIOLOGY | Facility: CLINIC | Age: 72
End: 2022-05-16
Attending: EMERGENCY MEDICINE
Payer: COMMERCIAL

## 2022-05-16 VITALS
DIASTOLIC BLOOD PRESSURE: 100 MMHG | TEMPERATURE: 98.4 F | RESPIRATION RATE: 16 BRPM | HEART RATE: 83 BPM | SYSTOLIC BLOOD PRESSURE: 174 MMHG | OXYGEN SATURATION: 99 %

## 2022-05-16 DIAGNOSIS — M10.9 ACUTE GOUTY ARTHRITIS: ICD-10-CM

## 2022-05-16 DIAGNOSIS — M25.439 SWELLING OF WRIST, UNSPECIFIED LATERALITY: ICD-10-CM

## 2022-05-16 PROCEDURE — 250N000013 HC RX MED GY IP 250 OP 250 PS 637: Performed by: EMERGENCY MEDICINE

## 2022-05-16 PROCEDURE — 73110 X-RAY EXAM OF WRIST: CPT | Mod: RT

## 2022-05-16 RX ORDER — IBUPROFEN 200 MG
600 TABLET ORAL EVERY 8 HOURS PRN
Qty: 20 TABLET | Refills: 0 | Status: SHIPPED | OUTPATIENT
Start: 2022-05-16 | End: 2022-05-17 | Stop reason: ALTCHOICE

## 2022-05-16 RX ORDER — COLCHICINE 0.6 MG/1
1.2 TABLET ORAL DAILY
Status: DISCONTINUED | OUTPATIENT
Start: 2022-05-16 | End: 2022-05-16

## 2022-05-16 RX ORDER — COLCHICINE 0.6 MG/1
1.2 TABLET ORAL ONCE
Status: COMPLETED | OUTPATIENT
Start: 2022-05-16 | End: 2022-05-16

## 2022-05-16 RX ORDER — OXYCODONE AND ACETAMINOPHEN 5; 325 MG/1; MG/1
1 TABLET ORAL ONCE
Status: COMPLETED | OUTPATIENT
Start: 2022-05-16 | End: 2022-05-16

## 2022-05-16 RX ORDER — LIDOCAINE HYDROCHLORIDE AND EPINEPHRINE 10; 10 MG/ML; UG/ML
INJECTION, SOLUTION INFILTRATION; PERINEURAL
Status: DISCONTINUED
Start: 2022-05-16 | End: 2022-05-16 | Stop reason: HOSPADM

## 2022-05-16 RX ORDER — OXYCODONE AND ACETAMINOPHEN 5; 325 MG/1; MG/1
1 TABLET ORAL EVERY 6 HOURS PRN
Qty: 6 TABLET | Refills: 0 | Status: SHIPPED | OUTPATIENT
Start: 2022-05-16 | End: 2022-08-18

## 2022-05-16 RX ADMIN — OXYCODONE HYDROCHLORIDE AND ACETAMINOPHEN 1 TABLET: 5; 325 TABLET ORAL at 00:57

## 2022-05-16 RX ADMIN — COLCHICINE 1.2 MG: 0.6 TABLET, FILM COATED ORAL at 02:39

## 2022-05-16 ASSESSMENT — ENCOUNTER SYMPTOMS
ARTHRALGIAS: 1
FEVER: 0
JOINT SWELLING: 1

## 2022-05-16 NOTE — ED TRIAGE NOTES
Pt states right wrist pain and swelling for over 2 days. Pt denies trauma. Pt states currently being evaluated by PCP for gout. ABCs intact GCS 15     Triage Assessment     Row Name 05/15/22 9347       Triage Assessment (Adult)    Airway WDL WDL       Respiratory WDL    Respiratory WDL WDL       Skin Circulation/Temperature WDL    Skin Circulation/Temperature WDL WDL       Cardiac WDL    Cardiac WDL WDL       Peripheral/Neurovascular WDL    Peripheral Neurovascular WDL WDL       Cognitive/Neuro/Behavioral WDL    Cognitive/Neuro/Behavioral WDL WDL

## 2022-05-16 NOTE — PROGRESS NOTES
[facility] Care Coordination Contact      [facility] System Change (Transfer)    Member is new enrollee to [facility] effective 5/1/22 with [email or web address]in [facility][note 3]. Member transferred from Pending sale to [facility].    No home visit required because this care coordinator (CC) has received all required documentation from the previous CC.    Writer t/c to member, introduced self as member's new CC. Confirmed with member that the welcome letter with writer's name and contact information has been received.  Reviewed LTCC/Health Risk Assessment (HRA) and POC with member. No changes noted.  Transitional HRA completed. Care Plan Summary updated and reflects current services.  Required referral authorization information communicated to CMS: Yes    Writer reviewed the following with member:    ER visits: Yes -  [facility] 5/16/22 d/t wrist pain.   Hospitalizations: No  TCU stays: No  Significant health status changes: No  Falls/Injuries: No  ADL/IADL changes: No  Changes in services: No    Follow-Up Plan: Member informed of future contact, plan to f/u with member with at next regularly scheduled contact.  Contact information shared with member and family, encouraged member to call with any questions or concerns.    Roney Harrison RN BSN PHN  [facility] Care Coordinator  [phone]  Fax:[phone]

## 2022-05-16 NOTE — ED PROVIDER NOTES
History   Chief Complaint:  Wrist Pain       The history is provided by the patient.      Laure Wood is a 71 year old female who presents for evaluation of right wrist swelling and pain. She reports symptoms becoming worse over the past three days. The patient has been taking Tylenol at home with minimal relief. She applied a wrap around the area at home as well. The patient notes that her PCP suspected gout and she has an appointment later this week. She does not take any medications for gout. The patient denies any injury to the wrist, pain higher up the arm, or fevers today. She denies history of kidney issues or other medical problems.    Review of Systems   Constitutional: Negative for fever.   Musculoskeletal: Positive for arthralgias (right wrist) and joint swelling.   All other systems reviewed and are negative.        Allergies:  The patient has no known allergies.     Medications:  Hydrodiuril  Zocor    Past Medical History:     GERD  Hyperlipidemia  Hypertension  Knee osteoarthritis  Motion sickness  PONV    Past Surgical History:    Arthroplasty knee x2  Cataract IOL  Colonoscopy     Social History:  The patient presents with her daughter.  She has established PCP, reports appointment on 17th.     Physical Exam     Patient Vitals for the past 24 hrs:   BP Temp Temp src Pulse Resp SpO2   05/16/22 0246 (!) 174/100 -- -- 83 16 99 %   05/15/22 2219 (!) 186/112 98.4  F (36.9  C) Oral 92 18 97 %       Physical Exam  Constitutional:  Oriented to person, place, and time. Appears well-developed.      Minor distress due to pain.   HENT:   Head:    Normocephalic.   Mouth/Throat:   Oropharynx is clear and moist.   Eyes:    EOM are normal. Pupils are equal, round, and reactive to light.   Neck:    Neck supple.   Cardiovascular:  Normal rate, regular rhythm and normal heart sounds.      Exam reveals no gallop and no friction rub.       No murmur heard.  Pulmonary/Chest:  Effort normal and breath sounds normal.       No respiratory distress. No wheezes. No rales.   Abdominal:   Soft. No distension. No tenderness. No rebound and no guarding.   Musculoskeletal:  Swollen radial dorsal right wrist with warmth. Mild erythema. Pain with ROM noted.   Neurological:   Alert and oriented to person, place, and time.           Moves all 4 extremities spontaneously       Strength and sensation intact to RUE.  Skin:    Mild erythema and warmth noted to right radial dorsal wrist.    Emergency Department Course     Imaging:  XR Wrist Right G/E 3 Views   Final Result   IMPRESSION: There are no acute bony finding such as fracture or dislocation. There are mild degenerative changes seen most marked involving the first CMC joint. There are findings of chondrocalcinosis which is nonspecific, but can be associated with    CPPD.      Report per radiology       Procedures     Arthrocentesis     Procedure: Arthrocentesis    Indication: Joint Pain and Joint Swelling    Consent: Written from Patient    Universal Protocol: Universal protocol was followed and time out conducted just prior to starting procedure, confirming patient identity, site/side, procedure, patient position, and availability of correct equipment and implants.     Location: Right Wrist    Preparation: Alcohol and Povidone-iodine    Anesthesia: Lidocaine with Epinephrine - 1%   Anxiolysis: None    Procedure Detail: The site was prepped and draped in the usual fashion.  A 22 gauge needle was used via the medial approach.  0.5 mL appearing joint fluid was withdrawn. The fluid was pink and bloody.  of  was injected after draining the above fluid.    Patient Status: The patient tolerated the procedure well: Yes. Procedure was unfortunately unsuccessful after three attempts made.       Emergency Department Course:     Reviewed:  I reviewed nursing notes, vitals, past medical history and Care Everywhere    Assessments:  0040 I obtained history and examined the patient as noted above.    0225 I rechecked the patient and explained findings.   0235 Velcro wrist splint placed.    Interventions:  0057 Percocet 1 tablet Oral  0239 Colcyrs 1.2 mg Oral    Disposition:  The patient was discharged to home.     Impression & Plan     Medical Decision Making:  Laure Wood is a 71 year old female who came in complaining of wrist swelling for the past 1 to 2 weeks. A gout patient workup for gout. Elevated uric acid. Differential includes osteoarthritis, septic arthritis, gout, pseudogout, RA, or other causes. X-ray was nonspecific. I did get consent and attempt for arthrocentesis. Unfortunately after 3 attempts made with good approach, I was only able to obtain 0.5 ml of pink bloody serous fluid. It is not a large enough amount to test. My suspicion is for gout, although this remains to be seen. Colchicine has been given here. She has been placed in a Velcro wrist. Has been told to ice, elevate, continue with ibuprofen, and will follow up with orthopedics or primary doctor for re-evaluations in the next few days. She was told to return for any expanding redness, swelling, fever, and any symptoms of concern. She has no extreme pain out of proportion. She is afebrile here. I would highly doubt cellulitis, and/or septic arthritis and a need for antibiotics at this time.    Diagnosis:    ICD-10-CM    1. Swelling of wrist, unspecified laterality  M25.439    2. Acute gouty arthritis  M10.9        Discharge Medications:  Discharge Medication List as of 5/16/2022  2:33 AM      START taking these medications    Details   ibuprofen (ADVIL/MOTRIN) 200 MG tablet Take 3 tablets (600 mg) by mouth every 8 hours as needed for mild pain, Disp-20 tablet, R-0, Local Print      oxyCODONE-acetaminophen (PERCOCET) 5-325 MG tablet Take 1 tablet by mouth every 6 hours as needed for severe pain, Disp-6 tablet, R-0, Local Print             Scribe Disclosure:  I, ERLIN MAURO, am serving as a scribe at 12:37 AM on 5/16/2022 to  document services personally performed by Jose Painter MD based on my observations and the provider's statements to me.   I, Parul Mendieta, am serving as a scribe  at 2:42 AM on 5/16/2022 to document services personally performed by Jose Painter MD based on my observations and the provider's statements to me.        Jose Painter MD  05/16/22 0502

## 2022-05-16 NOTE — PROGRESS NOTES
CC received notification of Emergency Room visit.  ER visit occurred on 5/16/22 at [facility] with Dx of wrist pain.    CC contacted member and reviewed discharge summary.  Member has a follow-up appointment with PCP: Yes: scheduled on 5/17/22  Member has had a change in condition: No  New referrals placed: No  Home Visit Needed: No  Care plan reviewed and updated.  PCP notified of ED visit via EMR.    Roney Harrison RN BSN PHN  [facility] Care Coordinator  [phone]  Fax:[phone]

## 2022-05-17 ENCOUNTER — OFFICE VISIT (OUTPATIENT)
Dept: INTERNAL MEDICINE | Facility: CLINIC | Age: 72
End: 2022-05-17
Payer: COMMERCIAL

## 2022-05-17 VITALS
DIASTOLIC BLOOD PRESSURE: 76 MMHG | HEART RATE: 75 BPM | OXYGEN SATURATION: 100 % | TEMPERATURE: 98.6 F | SYSTOLIC BLOOD PRESSURE: 138 MMHG | BODY MASS INDEX: 33.17 KG/M2 | WEIGHT: 187.2 LBS | HEIGHT: 63 IN

## 2022-05-17 DIAGNOSIS — N18.31 CHRONIC KIDNEY DISEASE, STAGE 3A (H): ICD-10-CM

## 2022-05-17 DIAGNOSIS — M13.131 INFLAMMATORY MONOARTHRITIS OF RIGHT WRIST: Primary | ICD-10-CM

## 2022-05-17 PROCEDURE — 99214 OFFICE O/P EST MOD 30 MIN: CPT | Performed by: INTERNAL MEDICINE

## 2022-05-17 RX ORDER — COLCHICINE 0.6 MG/1
TABLET ORAL
Qty: 60 TABLET | Refills: 0 | Status: SHIPPED | OUTPATIENT
Start: 2022-05-17 | End: 2022-07-12

## 2022-05-17 RX ORDER — PREDNISONE 10 MG/1
TABLET ORAL
Qty: 30 TABLET | Refills: 0 | Status: SHIPPED | OUTPATIENT
Start: 2022-05-17 | End: 2022-08-18

## 2022-06-02 ENCOUNTER — MEDICAL CORRESPONDENCE (OUTPATIENT)
Dept: HEALTH INFORMATION MANAGEMENT | Facility: CLINIC | Age: 72
End: 2022-06-02

## 2022-06-14 DIAGNOSIS — I10 ESSENTIAL HYPERTENSION, BENIGN: ICD-10-CM

## 2022-06-15 NOTE — TELEPHONE ENCOUNTER
Routing refill request to provider for review/approval because:      LOV: 5/17/22- PCP recommended F/U around 6/28/22- unable to send patient message via [facility] [facility] can you please call to schedule patient for follow up appointment?     Syd Mckeon RN  [facility]

## 2022-06-16 RX ORDER — HYDROCHLOROTHIAZIDE 25 MG/1
TABLET ORAL
Qty: 90 TABLET | Refills: 1 | Status: SHIPPED | OUTPATIENT
Start: 2022-06-16 | End: 2022-12-14

## 2022-08-18 ENCOUNTER — OFFICE VISIT (OUTPATIENT)
Dept: INTERNAL MEDICINE | Facility: CLINIC | Age: 72
End: 2022-08-18
Payer: COMMERCIAL

## 2022-08-18 ENCOUNTER — PATIENT OUTREACH (OUTPATIENT)
Dept: GERIATRIC MEDICINE | Facility: CLINIC | Age: 72
End: 2022-08-18

## 2022-08-18 VITALS
BODY MASS INDEX: 33.66 KG/M2 | HEART RATE: 59 BPM | WEIGHT: 190 LBS | SYSTOLIC BLOOD PRESSURE: 126 MMHG | DIASTOLIC BLOOD PRESSURE: 79 MMHG | TEMPERATURE: 98.9 F | HEIGHT: 63 IN | OXYGEN SATURATION: 100 %

## 2022-08-18 DIAGNOSIS — N18.31 CHRONIC KIDNEY DISEASE, STAGE 3A (H): ICD-10-CM

## 2022-08-18 DIAGNOSIS — K02.9 DENTAL CARIES: ICD-10-CM

## 2022-08-18 DIAGNOSIS — Z13.220 SCREENING FOR HYPERLIPIDEMIA: ICD-10-CM

## 2022-08-18 DIAGNOSIS — M1A.09X0 IDIOPATHIC CHRONIC GOUT OF MULTIPLE SITES WITHOUT TOPHUS: Primary | ICD-10-CM

## 2022-08-18 DIAGNOSIS — R12 HEARTBURN: ICD-10-CM

## 2022-08-18 LAB
CHOLEST SERPL-MCNC: 262 MG/DL
CREAT UR-MCNC: 103 MG/DL
FASTING STATUS PATIENT QL REPORTED: YES
HDLC SERPL-MCNC: 101 MG/DL
LDLC SERPL CALC-MCNC: 147 MG/DL
MICROALBUMIN UR-MCNC: 8 MG/L
MICROALBUMIN/CREAT UR: 7.77 MG/G CR (ref 0–25)
NONHDLC SERPL-MCNC: 161 MG/DL
TRIGL SERPL-MCNC: 70 MG/DL
URATE SERPL-MCNC: 10.5 MG/DL (ref 2.6–6)

## 2022-08-18 PROCEDURE — G0009 ADMIN PNEUMOCOCCAL VACCINE: HCPCS | Performed by: INTERNAL MEDICINE

## 2022-08-18 PROCEDURE — 84550 ASSAY OF BLOOD/URIC ACID: CPT | Performed by: INTERNAL MEDICINE

## 2022-08-18 PROCEDURE — 36415 COLL VENOUS BLD VENIPUNCTURE: CPT | Performed by: INTERNAL MEDICINE

## 2022-08-18 PROCEDURE — 0054A COVID-19,PF,PFIZER (12+ YRS): CPT | Performed by: INTERNAL MEDICINE

## 2022-08-18 PROCEDURE — 90677 PCV20 VACCINE IM: CPT | Performed by: INTERNAL MEDICINE

## 2022-08-18 PROCEDURE — 82043 UR ALBUMIN QUANTITATIVE: CPT | Performed by: INTERNAL MEDICINE

## 2022-08-18 PROCEDURE — 80061 LIPID PANEL: CPT | Performed by: INTERNAL MEDICINE

## 2022-08-18 PROCEDURE — 99214 OFFICE O/P EST MOD 30 MIN: CPT | Mod: 25 | Performed by: INTERNAL MEDICINE

## 2022-08-18 PROCEDURE — 91305 COVID-19,PF,PFIZER (12+ YRS): CPT | Performed by: INTERNAL MEDICINE

## 2022-08-18 RX ORDER — COLCHICINE 0.6 MG/1
0.6 TABLET ORAL DAILY
Qty: 90 TABLET | Refills: 3 | Status: ON HOLD | OUTPATIENT
Start: 2022-08-18 | End: 2023-01-23

## 2022-08-18 RX ORDER — FAMOTIDINE 20 MG/1
20 TABLET, FILM COATED ORAL 2 TIMES DAILY PRN
Qty: 60 TABLET | Refills: 11 | Status: SHIPPED | OUTPATIENT
Start: 2022-08-18 | End: 2022-12-20

## 2022-08-18 RX ORDER — ALLOPURINOL 100 MG/1
100 TABLET ORAL DAILY
Qty: 90 TABLET | Refills: 3 | Status: SHIPPED | OUTPATIENT
Start: 2022-08-18 | End: 2022-12-20

## 2022-08-18 NOTE — PROGRESS NOTES
"  Assessment & Plan     Idiopathic chronic gout of multiple sites without tophus  Add allopurinol  Cont colchicine daily for now, taper in 3 mo or so as uric acid gets to goal.  - Uric acid; Future  - allopurinol (ZYLOPRIM) 100 MG tablet; Take 1 tablet (100 mg) by mouth daily  - colchicine (COLCYRS) 0.6 MG tablet; Take 1 tablet (0.6 mg) by mouth daily  - Uric acid    Chronic kidney disease, stage 3a (H)  - Albumin Random Urine Quantitative with Creat Ratio; Future    Screening for hyperlipidemia  - Lipid panel reflex to direct LDL Non-fasting; Future    Heartburn  - famotidine (PEPCID) 20 MG tablet; Take 1 tablet (20 mg) by mouth 2 times daily as needed (heartburn)               BMI:   Estimated body mass index is 33.66 kg/m  as calculated from the following:    Height as of this encounter: 1.6 m (5' 3\").    Weight as of this encounter: 86.2 kg (190 lb).           Return in about 3 months (around 11/18/2022) for Non-fasting lab only visit, Follow up visit.    Yoshi Bowen MD  [facility]    Martha Kelsey is a 72 year old, presenting for the following health issues:  Hand Pain      Hand Pain  This is a new problem. The current episode started 1 to 4 weeks ago. The problem occurs daily. The problem has been unchanged. The symptoms are aggravated by stress and eating. She has tried NSAIDs and ice for the symptoms. The treatment provided mild relief.      rx for presumed gout  W/colchicine. Seems to have improved sx and she notes if she takes this daily sx resolved.   Uric acid > 10           Review of Systems   Constitutional, HEENT, cardiovascular, pulmonary, gi and gu systems are negative, except as otherwise noted.      Objective    /79   Pulse 59   Temp 98.9  F (37.2  C)   Ht 1.6 m (5' 3\")   Wt 86.2 kg (190 lb)   SpO2 100%   BMI 33.66 kg/m    Body mass index is 33.66 kg/m .  Physical Exam   GENERAL: alert and no distress  MS: R wrist non-tender. No swelling.  " Some symmetric limitation in F/E bilat                  .  ..

## 2022-08-18 NOTE — PATIENT INSTRUCTIONS
Call this # for bladder evaluation:    [facility]   [address]   [address]   [facility] [address]   Phone: [phone]

## 2022-08-18 NOTE — PROGRESS NOTES
No member call. [facility] Regulatory Update.    Roney HarrisonRN BSN PHN  [facility] Coordinator  [phone]  Fax:[phone]

## 2022-08-18 NOTE — LETTER
August 24, 2022      Laure Wood  [address] [place] [facility] [address]        Dear Ms.Israel,    We are writing to inform you of your test results.    Using the new American Heart Association risk calculator your 10 yr risk of global cardiovascular disease (heart attack, stroke) is 18%.  At any risk level > 7.5-10% we should consider the use of a statin cholesterol lowering medication.  This has been shown to help prevent heart disease and strokes when risk is at or above this level.    You should consider taking a cholesterol lowering medication to lower this risk by 40% or more.   Schedule a visit to discuss this if it interests you.     Resulted Orders   Albumin Random Urine Quantitative with Creat Ratio   Result Value Ref Range    Creatinine Urine mg/dL 103 mg/dL    Albumin Urine mg/L 8 mg/L    Albumin Urine mg/g Cr 7.77 0.00 - 25.00 mg/g Cr   Lipid panel reflex to direct LDL Non-fasting   Result Value Ref Range    Cholesterol 262 (H) <200 mg/dL    Triglycerides 70 <150 mg/dL    Direct Measure  >=50 mg/dL    LDL Cholesterol Calculated 147 (H) <=100 mg/dL    Non HDL Cholesterol 161 (H) <130 mg/dL    Patient Fasting > 8hrs? Yes     Narrative    Cholesterol  Desirable:  <200 mg/dL    Triglycerides  Normal:  Less than 150 mg/dL  Borderline High:  150-199 mg/dL  High:  200-499 mg/dL  Very High:  Greater than or equal to 500 mg/dL    Direct Measure HDL  Female:  Greater than or equal to 50 mg/dL   Male:  Greater than or equal to 40 mg/dL    LDL Cholesterol  Desirable:  <100mg/dL  Above Desirable:  100-129 mg/dL   Borderline High:  130-159 mg/dL   High:  160-189 mg/dL   Very High:  >= 190 mg/dL    Non HDL Cholesterol  Desirable:  130 mg/dL  Above Desirable:  130-159 mg/dL  Borderline High:  160-189 mg/dL  High:  190-219 mg/dL  Very High:  Greater than or equal to 220 mg/dL   Uric acid   Result Value Ref Range    Uric Acid 10.5 (H) 2.6 - 6.0 mg/dL       If you have any questions or concerns, please call the  [facility] at the number listed above.       Sincerely,      Yoshi Bowen M.D.  Dept of Internal Medicine- [facility]

## 2022-09-08 ENCOUNTER — OFFICE VISIT (OUTPATIENT)
Dept: ORTHOPEDICS | Facility: CLINIC | Age: 72
End: 2022-09-08

## 2022-09-08 ENCOUNTER — ANCILLARY PROCEDURE (OUTPATIENT)
Dept: GENERAL RADIOLOGY | Facility: CLINIC | Age: 72
End: 2022-09-08
Attending: PHYSICIAN ASSISTANT
Payer: COMMERCIAL

## 2022-09-08 DIAGNOSIS — Z96.651 S/P TKR (TOTAL KNEE REPLACEMENT) USING CEMENT, RIGHT: Primary | ICD-10-CM
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
## 2022-09-08 DIAGNOSIS — Z96.651 S/P TKR (TOTAL KNEE REPLACEMENT) USING CEMENT, RIGHT: ICD-10-CM

## 2022-09-08 DIAGNOSIS — S83.421A SPRAIN OF LATERAL COLLATERAL LIGAMENT OF RIGHT KNEE, INITIAL ENCOUNTER: ICD-10-CM

## 2022-09-08 PROCEDURE — 73562 X-RAY EXAM OF KNEE 3: CPT | Mod: RT | Performed by: ORTHOPAEDIC SURGERY

## 2022-09-08 PROCEDURE — 99213 OFFICE O/P EST LOW 20 MIN: CPT | Performed by: PHYSICIAN ASSISTANT

## 2022-09-08 NOTE — PROGRESS NOTES
ASSESSMENT & PLAN    Laure was seen today for surgical followup.    Diagnoses and all orders for this visit:    S/P TKR (total knee replacement) using cement, right  -     XR Knee Right 3 Views; Future  -     [facility] Orth[facility]; Knee Brace (hinged knee brace); Right    Fall (on) (from) other stairs and steps, initial encounter  -     XR Knee Right 3 Views; Future  -     [facility] Orthotic; Knee Brace (hinged knee brace); Right    Sprain of lateral collateral ligament of right knee, initial encounter  -     [facility] Orthotic; Knee Brace (hinged knee brace); Right      Patient presents for follow-up of 1 year total knee, recent injury, fell on the stairs about 2 weeks ago with onset of lateral knee pain.    Right knee LCL sprain grade 1.  Right TKA, DOS 4/6/21, Dr. Mcdonald-1 year follow-up, doing well other than new injury.    We tried to fit her with the off-the-shelf hinged knee brace but it did not fit her contours very well, so we referred her to orthotics for a hinged knee brace to protect her LCL line is healing and aid in ambulation and stability.    Follow-up in 4 to 6 weeks if not improved.    Jarred Reeves PA-C  [facility] [place]    SUBJECTIVE- Interim History September 8, 2022    Chief Complaint   Patient presents with     Surgical Followup     Right TKA, DOS 4/6/2021, Dr. Mcdonald.       Laure Wood is a 72 year old female who is seen in f/u up for    S/P TKR (total knee replacement) using cement, right  Fall (on) (from) other stairs and steps, initial encounter  Sprain of lateral collateral ligament of right knee, initial encounter.     Since last visit on 5/15/21 patient had been doing well with the right knee.  Approximately 2 weeks ago she slipped on the stairs and suffered an injury to the right knee.  She denies bruising however does note some increased swelling to the knee.  Currently has pain at the lateral  knee, increased with use and weightbearing.  Using a cane for stability and relief.  Denies feeling of locking or giving way however feels unsteady on that leg.  Denies any other systemic changes such as fever malaise, fatigue..  - Now ~2 weeks from initial injury    Worsened by: Weightbearing and use  Better with: Rest rest, ice.  Treatments tried: Cane, ice, rest, Tylenol.  Associated symptoms:  no distal numbness or tingling; denies swelling or warmth and locking or catching    The patient is seen by themselves.    Orthopedic/Surgical history: Right and left total knee arthroplasty.  Social History/Occupation: Retired.    No family history pertinent to patient's problem today.     REVIEW OF SYSTEMS:  Review of Systems no new changes to the following:  Constitutional, HEENT, cardiovascular, pulmonary, gi and gu systems are negative, except as otherwise noted.    OBJECTIVE:  There were no vitals taken for this visit.     GENERAL APPEARANCE: healthy, alert and no distress   GAIT: NORMAL  SKIN: no suspicious lesions or rashes  HEENT: Sclera clear, anicteric  CV: no lower extremity edema, good peripheral pulses  RESP: Breathing not labored  NEURO: Normal strength and tone, mentation intact and speech normal  PSYCH:  mentation appears normal and affect normal/bright    MSK exam:  Right knee:  Gross: Mild effusion, well-healed midline scar, no ecchymosis erythema or deformity.  Palpation: No excessive warmth or crepitus.  Patella tracks well.  No pain at the patella or medial knee.  Positive for pain at the lateral LCL insertion distally.  Ligamentous: Mild laxity on varus stress however has hard end feel with pain.  MCL intact, no gross anterior or posterior tibial translation.  Neurovascular: Grossly intact with warmth to the lower extremity and light touch.  Strength: Knee flexion, extension, 5 out of 5.  AROM: Flexion: 0- 110+  Special tests: None.    CONTRALATERAL JOINT: Well healed midline scar otherwise no gross  no overlying skin change, observable deformity, or effusion; range of motion as noted above; strength and function are within normal limits; no obvious ligamentous instability; reflexes, circulation and sensation grossly intact.       RADIOLOGY:  Final results and radiologist's interpretation, available in the [facility] health record.  Images were reviewed with the patient in the office today.  My personal interpretation of the performed imaging:     -Images demonstrate the postoperative changes of a right total knee arthroplasty.  Components are well aligned and seated with no signs of loosening.  No other osseous pathology noted, no interim changes from previous x-rays.    See also radiology report.    A total of 20 minutes spent with patient on care and care coordinating activities.

## 2022-09-08 NOTE — PATIENT INSTRUCTIONS
[facility] LOC[facility]  [facility]  [address]  [place] [address]  Phone: [phone]  Fax: [phone] [facility]  [address]  [place] [address]  Phone: [phone]   Fax: [phone]   [facility]  [address]  [place] [address]  Phone: [phone]  Fax: [phone] [facility]  [address]  [place] [address]  Phone: [phone]   Fax: [phone]   [facility]   [address]  [place] [address]  Phone: [phone]  Fax: [phone] * Please call any location listed to make an appointment for a casting/fitting. Your referral was sent to their central office and they will all have the order on file.        I recommend you use the brace when you are up and moving until symptoms improve.  Ice applied to the area for 20 minutes 2-3 times per day and especially after activities should help.    Follow-up in 6 weeks if not improving.

## 2022-10-10 ENCOUNTER — NURSE TRIAGE (OUTPATIENT)
Dept: INTERNAL MEDICINE | Facility: CLINIC | Age: 72
End: 2022-10-10

## 2022-10-10 NOTE — TELEPHONE ENCOUNTER
Nurse Triage SBAR    Is this a 2nd Level Triage? YES, LICENSED PRACTITIONER REVIEW IS REQUIRED    Situation: Patient reports numbness in both of her legs that started last Thursday. States that the numbness is from the waist down. Also reports 10/10 lower back pain and pain in her upper right shoulder.     Background: total knee replacement 1 year ago    Assessment: new bilateral numbness with lower back pain.     Protocol Recommended Disposition:   See in Office Today    Recommendation: No appointments available today. Patient refused UC. States that she would like to see Dr. Bowen in clinic tomorrow.      Routed to provider    Does the patient meet one of the following criteria for ADS visit consideration? 16+ years old, with an [facility] PCP     TIP  Providers, please consider if this condition is appropriate for management at one of our Acute and Diagnostic Services sites.     If patient is a good candidate, please use dotphrase <dot>triageresponse and select Refer to ADS to document.    Reason for Disposition    Back pain (with neurologic deficit)    Additional Information    Negative: Difficult to awaken or acting confused (e.g., disoriented, slurred speech)    Negative: New neurologic deficit that is present NOW, sudden onset of ANY of the following: * Weakness of the face, arm, or leg on one side of the body* Numbness of the face, arm, or leg on one side of the body* Loss of speech or garbled speech    Negative: Sounds like a life-threatening emergency to the triager    Negative: Confusion, disorientation, or hallucinations is main symptom    Negative: Dizziness is main symptom    Negative: Followed a head injury within last 3 days    Negative: Headache (with neurologic deficit)    Negative: Unable to urinate (or only a few drops) and bladder feels very full    Negative: Loss of bladder or bowel control (urine or bowel incontinence; wetting self, leaking stool) of new-onset    Negative: Back pain with numbness  "(loss of sensation) in groin or rectal area    Negative: Neurologic deficit that was brief (now gone), ANY of the following: * Weakness of the face, arm, or leg on one side of the body * Numbness of the face, arm, or leg on one side of the body * Loss of speech or garbled speech    Negative: Patient sounds very sick or weak to the triager    Negative: Neck pain (with neurologic deficit)    Answer Assessment - Initial Assessment Questions  1. SYMPTOM: \"What is the main symptom you are concerned about?\" (e.g., weakness, numbness)      Both legs numb from the waist down.   2. ONSET: \"When did this start?\" (minutes, hours, days; while sleeping)      Started Friday evening  3. LAST NORMAL: \"When was the last time you (the patient) were normal (no symptoms)?\"      Friday afternoon.   4. PATTERN \"Does this come and go, or has it been constant since it started?\"  \"Is it present now?\"      constant  5. CARDIAC SYMPTOMS: \"Have you had any of the following symptoms: chest pain, difficulty breathing, palpitations?\"      no  6. NEUROLOGIC SYMPTOMS: \"Have you had any of the following symptoms: headache, dizziness, vision loss, double vision, changes in speech, unsteady on your feet?\"      Little headache on right side once in while. Can't stand wee on feet. Just the numbness. Very heavy!  7. OTHER SYMPTOMS: \"Do you have any other symptoms?\"      no  8. PREGNANCY: \"Is there any chance you are pregnant?\" \"When was your last menstrual period?\"      NA    Protocols used: NEUROLOGIC DEFICIT-A-[place]    Brianna Archuleta RN    "

## 2022-10-10 NOTE — TELEPHONE ENCOUNTER
Spoke to patient to relay providers message. Patient agrees and is scheduled 10/11 dbl book per providers approval.

## 2022-10-11 ENCOUNTER — OFFICE VISIT (OUTPATIENT)
Dept: INTERNAL MEDICINE | Facility: CLINIC | Age: 72
End: 2022-10-11
Payer: COMMERCIAL

## 2022-10-11 VITALS
OXYGEN SATURATION: 97 % | HEART RATE: 83 BPM | DIASTOLIC BLOOD PRESSURE: 71 MMHG | WEIGHT: 196 LBS | TEMPERATURE: 98.1 F | HEIGHT: 63 IN | SYSTOLIC BLOOD PRESSURE: 106 MMHG | BODY MASS INDEX: 34.73 KG/M2

## 2022-10-11 DIAGNOSIS — M54.16 LUMBAR RADICULOPATHY: Primary | ICD-10-CM

## 2022-10-11 DIAGNOSIS — N81.10 BLADDER PROLAPSE, FEMALE, ACQUIRED: ICD-10-CM

## 2022-10-11 PROCEDURE — 90662 IIV NO PRSV INCREASED AG IM: CPT | Performed by: INTERNAL MEDICINE

## 2022-10-11 PROCEDURE — G0008 ADMIN INFLUENZA VIRUS VAC: HCPCS | Performed by: INTERNAL MEDICINE

## 2022-10-11 PROCEDURE — 99214 OFFICE O/P EST MOD 30 MIN: CPT | Mod: 25 | Performed by: INTERNAL MEDICINE

## 2022-10-11 RX ORDER — PREDNISONE 20 MG/1
TABLET ORAL
Qty: 28 TABLET | Refills: 0 | Status: SHIPPED | OUTPATIENT
Start: 2022-10-11 | End: 2022-12-20

## 2022-10-11 NOTE — PROGRESS NOTES
"  Assessment & Plan     Lumbar radiculopathy  Try short course of steroids  Get into PT   - Physical Therapy Referral; Future  - predniSONE (DELTASONE) 20 MG tablet; Take 3 tabs by mouth daily x 5 days, then 2 tabs daily x 3 days, then 1 tab daily x 3 days, then 1/2 tab daily x 3 days.    Bladder prolapse, female, acquired  - Adult Urology  Referral; Future    Prescription drug management         BMI:   Estimated body mass index is 34.72 kg/m  as calculated from the following:    Height as of this encounter: 1.6 m (5' 3\").    Weight as of this encounter: 88.9 kg (196 lb).           No follow-ups on file.    Ysohi Bowen MD  [facility]    Martha Kelsey is a 72 year old, presenting for the following health issues:  Consult (Leg numbness)      HPI     Situation: Patient reports numbness in both of her legs that started last Thursday. States that the numbness is from the waist down. Also reports 10/10 lower back pain and pain in her upper right shoulder.      Background: total knee replacement 1 year ago     Assessment: new bilateral numbness with lower back pain.     Concern - Back pain and leg numbness  Onset: Last thursday  Description: Gotten worse over the weekend, Sharp pain constantly   Intensity: severe  Progression of Symptoms:  worsening  Accompanying Signs & Symptoms: Radiating numbness down to her toes  Previous history of similar problem: No  Precipitating factors:        Worsened by: No  Alleviating factors:        Improved by: Icing   Therapies tried and outcome: Tylenol        Review of Systems         Objective    /71   Pulse 83   Temp 98.1  F (36.7  C)   Ht 1.6 m (5' 3\")   Wt 88.9 kg (196 lb)   SpO2 97%   BMI 34.72 kg/m    Body mass index is 34.72 kg/m .  Physical Exam   GENERAL: alert,  obese  RESP: lungs clear to auscultation - no rales, rhonchi or wheezes  CV: regular rate and rhythm, normal S1 S2, no S3 or S4, no murmur, click or rub, " no peripheral edema and peripheral pulses strong  Comprehensive back pain exam:  Tenderness of lower back , Pain limits the following motions: flexion, Lower extremity strength functional and equal on both sides, no patellar reflexes bilat, achilles normal on R, absent on L, Lower extremity sensation normal and equal on both sides and Straight leg raise negative bilaterally

## 2022-10-19 ENCOUNTER — THERAPY VISIT (OUTPATIENT)
Dept: PHYSICAL THERAPY | Facility: CLINIC | Age: 72
End: 2022-10-19
Attending: INTERNAL MEDICINE
Payer: COMMERCIAL

## 2022-10-19 DIAGNOSIS — M54.41 ACUTE BILATERAL LOW BACK PAIN WITH RIGHT-SIDED SCIATICA: ICD-10-CM

## 2022-10-19 DIAGNOSIS — M54.16 LUMBAR RADICULOPATHY: ICD-10-CM

## 2022-10-19 PROCEDURE — 97161 PT EVAL LOW COMPLEX 20 MIN: CPT | Mod: GP | Performed by: PHYSICAL THERAPIST

## 2022-10-19 PROCEDURE — 97112 NEUROMUSCULAR REEDUCATION: CPT | Mod: GP | Performed by: PHYSICAL THERAPIST

## 2022-10-19 PROCEDURE — 97110 THERAPEUTIC EXERCISES: CPT | Mod: GP | Performed by: PHYSICAL THERAPIST

## 2022-10-19 NOTE — PROGRESS NOTES
[facility]    OUTPATIENT Physical Therapy ORTHOPEDIC EVALUATION  PLAN OF TREATMENT FOR OUTPATIENT REHABILITATION  (COMPLETE FOR INITIAL CLAIMS ONLY)  Patient's Last Name, First Name, M.I.  YOB: 1950  Laure Wood    Provider s Name:  [facility]   Medical Record No.  [id number]   Start of Care Date:  10/19/22   Onset Date:   10/05/22   Treatment Diagnosis:  LBP, R radiculopathy to foot Medical Diagnosis:     Lumbar radiculopathy  Acute bilateral low back pain with right-sided sciatica       Goals:     10/19/22 0500   Body Part   Goals listed below are for LB, R LE   Goal #1   Goal #1 sleeping   Previous Functional Level No restrictions   Current Functional Level 2-3 hours without sleep per night   STG Target Performance Less than 1 hour without sleep per night   Rationale to establish restorative sleep pattern   Due Date 11/09/22   LTG Target Performance Sleep through the night w/o meds   Rationale to establish restorative sleep pattern   Due Date 11/30/22       Therapy Frequency:  1x/week  Predicted Duration of Therapy Intervention:  6 weeks    Lucero Flores, PT                 I CERTIFY THE NEED FOR THESE SERVICES FURNISHED UNDER        THIS PLAN OF TREATMENT AND WHILE UNDER MY CARE     (Physician attestation of this document indicates review and certification of the therapy plan).                     Certification Date From:  10/19/22   Certification Date To:  11/30/22    Referring Provider:  Yoshi Bowen    Initial Assessment        See Epic Evaluation SOC Date: 10/19/22

## 2022-10-19 NOTE — PROGRESS NOTES
"[facility] Initial Evaluation -- Lumbar    Date: October 19, 2022  Laure Wood is a 72 year old female with a LB and R LE L toes condition.   Referral: IM  Work mechanical stresses:  none  Employment status:  none  Leisure mechanical stresses: exercising, biking, cooking  Functional disability score (LENORE/STarT Back):    VAS score (0-10): 7/10  Patient goals/expectations:  To not have pain, feel my toes    HISTORY:    Present symptoms: B LBP, R LE pain to toes, B toe numbness--all toes  Pain quality (sharp/shooting/stabbing/aching/burning/cramping):  Aching, sharp, numbness   Paresthesia (yes/no):  Numbness all toes B     Present since (onset date): 10/05/2022.     Symptoms (improving/unchanging/worsening):  worsening.     Symptoms commenced as a result of: unknown   Condition occurred in the following environment:   unknown     Symptoms at onset (back/thigh/leg): R LB, LE to toes  Constant symptoms (back/thigh/leg): R LB and LE to toes, B toes numbness  Intermittent symptoms (back/thigh/leg): none    Symptoms are made worse with the following: sitting, standing 10 minutes   Symptoms are made better with the following: walking, moving    Disturbed sleep (yes/no):  Yes, loses 2-3 hours/night Sleeping postures (prone/sup/side R/L): sides, supine    Previous episodes (0/1-5/6-10/11+): 0 Year of first episode: na    Previous history: none  Previous treatments: dose pack--mild benefit      Specific Questions:  Cough/Sneeze/Strain (pos/neg): neg  Bowel/Bladder (normal/abnormal): abnormal--unclear from patient but describing some urinary retention \"bladder falls out--?pressure\"  Gait (normal/abnormal): normal  Medications (nil/NSAIDS/analg/steroids/anticoag/other):  Dose pack, meds for gouts, urinary meds  Medical allergies:  no  General health (excellent/good/fair/poor):  good  Pertinent medical history:  None  Imaging (None/Xray/MRI/Other):  none  Recent or major surgery (yes/no):  no  Night pain " (yes/no): no  Accidents (yes/no): no  Unexplained weight loss (yes/no): no  Barriers at home: no  Other red flags: no    EXAMINATION    Posture:   Sitting (good/fair/poor): poor  Standing (good/fair/poor):good  Lordosis (red/acc/normal): red  Correction of posture (better/worse/no effect): better    Lateral Shift (right/left/nil): nil  Relevant (yes/no):  na  Other Observations: na    Neurological:    Motor deficit:  R PF 3-/5, all other myotomes normal and symmetrical   Reflexes:  Not assessed  Sensory deficit:  Decreased light touch R L4 dermatome, all others normal and symmetrical  Dural signs:  Not assessed    Movement Loss:   Aleksander Mod Min Nil Pain   Flexion    x NE   Extension  x   Increases B LBP   Side Gliding R    x NE   Side Gliding L    x NE     Test Movements:   During: produces, abolishes, increases, decreases, no effect, centralizing, peripheralizing   After: better, worse, no better, no worse, no effect, centralized, peripheralized    Pretest symptoms standing:    Symptoms During Symptoms After ROM increased ROM decreased No Effect   FIS        Rep FIS        EIS        Rep EIS          Pretest symptoms lying: prone lying, no pain    Symptoms During Symptoms After ROM increased ROM decreased No Effect   JAI        Rep JAI        EIL Increases    No Worse         Rep EIL Decreases    Better--decr LB, decr numbness B toes    x       If required, pretest symptoms:    Symptoms During Symptoms After ROM increased ROM decreased No Effect   SGIS - R        Rep SGIS - R        SGIS - L        Rep SGIS - L          Static Tests:  Sitting slouched:     Sitting erect:    Standing slouched:   Standing erect:    Lying prone in extension:   Long sitting:      Other Tests:     Provisional Classification:  Derangement - Asymmetrical, unilateral, symptoms below knee    Principle of Management:  Education:  Posture--use of lumbar roll in sitting, importance of posture; POC, treatment rationale, expected  response   Equipment provided:  Purchased firm lumbar roll  Mechanical therapy (Y/N):  y   Extension principle:  REIL x10 reps, every 2 hours  Lateral Principle:    Flexion principle:    Other:      ASSESSMENT/PLAN:    Patient is a 72 year old female with lumbar and R LE complaints.  Provisional classification of derangement with directional preference for extension.  She had decreased pain and improved ROM after repeated lumbar extension exercises in lying.  Numbness in B toes was also decreased.  She should make progress with treatment focusing on a lumbar extension program in addition to posture to improve mechanics, decrease pain, and improve overall function.       Patient has the following significant findings with corresponding treatment plan.                Diagnosis 1:  LBP, R radiculopathy  Pain -  self management, education, directional preference exercise and home program  Decreased ROM/flexibility - manual therapy, therapeutic exercise and home program  Decreased function - therapeutic activities and home program  Impaired posture - neuro re-education and home program    Cumulative Therapy Evaluation is: Low complexity.    Previous and current functional limitations:  (See Goal Flow Sheet for this information)    Short term and Long term goals: (See Goal Flow Sheet for this information)     Communication ability:  Patient appears to be able to clearly communicate and understand verbal and written communication and follow directions correctly.  Treatment Explanation - The following has been discussed with the patient:   RX ordered/plan of care  Anticipated outcomes  Possible risks and side effects  This patient would benefit from PT intervention to resume normal activities.   Rehab potential is good.    Frequency:  1 X week, once daily  Duration:  for 6 weeks  Discharge Plan:  Achieve all LTG.  Independent in home treatment program.  Reach maximal therapeutic benefit.    Please refer to the daily  flowsheet for treatment today, total treatment time and time spent performing 1:1 timed codes.

## 2022-10-31 ENCOUNTER — OFFICE VISIT (OUTPATIENT)
Dept: ORTHOPEDICS | Facility: CLINIC | Age: 72
End: 2022-10-31
Payer: COMMERCIAL

## 2022-10-31 DIAGNOSIS — R20.0 NUMBNESS OF RIGHT LOWER EXTREMITY: ICD-10-CM

## 2022-10-31 DIAGNOSIS — N39.3 STRESS INCONTINENCE, FEMALE: ICD-10-CM

## 2022-10-31 DIAGNOSIS — S83.421A SPRAIN OF LATERAL COLLATERAL LIGAMENT OF RIGHT KNEE, INITIAL ENCOUNTER: Primary | ICD-10-CM

## 2022-10-31 DIAGNOSIS — Z96.651 S/P TKR (TOTAL KNEE REPLACEMENT) USING CEMENT, RIGHT: ICD-10-CM

## 2022-10-31 PROCEDURE — 99213 OFFICE O/P EST LOW 20 MIN: CPT | Performed by: PHYSICIAN ASSISTANT

## 2022-10-31 NOTE — Clinical Note
"    10/31/2022         RE: Laure Wood  [address] [facility] [address]        Dear Colleague,    Thank you for referring your patient, Laure Wood, to the [facility] [place]. Please see a copy of my visit note below.    HISTORY OF PRESENT ILLNESS:    Laure Wood is a 72 year old female who is seen in follow up for ***.  Present symptoms: Pt reports ***.  Treatments include ***.  Using *** for ambulation.  ***.  No new complaints.  Denies Chest pain, Calve pain, Fever, Chills.    PHYSICAL EXAM:  There were no vitals taken for this visit.  There is no height or weight on file to calculate BMI.   GENERAL APPEARANCE: {LEXA GENERAL APPEARANCE:50::\"healthy\",\"alert\",\"no distress\"}   PSYCH:  {LEXA EXAM CPE - PSYCH:464297::\"mentation appears normal\",\"affect normal/bright\"}    MSK:  {RIGHT:304614} Knee.  Ambulates: ***.  Incision clean and dry, *** present, healing.  Appropriate incisional erythema.   {YES/NO -default:769420::\"No\"} Ecchymosis ***.  {YES/NO -default:884499::\"No\"} calve pain on palpation.  Edema *** at knee ***.  CMS: manjinder incisional numbness, otherwise grossly intact.  AROM Flexion ***.    IMAGING INTERPRETATION:  None today.     ASSESSMENT:  Laure Wood is a 72 year old female S/P ***.  ***    PLAN:  - Surgery discussed, images reviewed if applicable, and all questions were answered at this time.  - *** removed with sterile technique, steri-strips applied in usual fashion, care instructions given and verbally acknowledged.  - Medications: ***  - Physical Therapy: {REHAB :233147}  - AAT.    Return to clinic PRN    Jarred Reeves PA-C    Dept. Orthopedic Surgery  [facility]   10/31/2022            Again, thank you for allowing me to participate in the care of your patient.        Sincerely,        Jarred Reeves PA-C  "

## 2022-10-31 NOTE — PROGRESS NOTES
ASSESSMENT & PLAN    Laure was seen today for surgical followup, recheck and numbness.    Diagnoses and all orders for this visit:    Sprain of lateral collateral ligament of right knee, initial encounter    Stress incontinence, female  -     Adult Urology  Referral; Future    S/P TKR (total knee replacement) using cement, right    Numbness of right lower extremity      Right knee: Numbness appears to be from pressure caused by the hinged knee brace on the cutaneous peroneal/sural nerve.  LCL injury seems to have improved.  Since she likes the brace, we were able to have orthotics see her on an impromptu basis and make some adjustments that will hopefully improve her comfort; she does note the numbness has improved.      Follow-up as needed.    Jarred Reeves PA-C  [facility] [place]    SUBJECTIVE- Interim History November 3, 2022    Chief Complaint   Patient presents with     Right Knee - Surgical Followup, RECHECK     Status post right TKA with subsequent treated injury.     Numbness       Laure Wood is a 72 year old female who is seen in f/u up for    Stress incontinence, female  S/P TKR (total knee replacement) using cement, right  Sprain of lateral collateral ligament of right knee, initial encounter  Numbness of right lower extremity.     Since last visit on 9/08/22 patient has noted that she now has some loss of sensation at the lateral and bottom foot.  She did receive the hinged knee brace per orthotics and does like the stability that it provides, and she feels she has been more mobile since using it.  She denies any changes to the left lower extremity, or other neurologic related diagnoses.  She does admit that she wears the knee brace constantly throughout the day and that she does place it fairly tight.  She was given a prescription for prednisone by her primary care on 10/11/2022 for lumbar radiculopathy, however, this has not helped.  Has not had a fall  since last visit.  Overall, other than the numbness she is doing quite well with the right knee.    Orthopedic/Surgical history: See previous notes  Social History/Occupation: Is not currently working.    No family history pertinent to patient's problem today.     REVIEW OF SYSTEMS:  Review of Systems: No new changes to the following  Constitutional, HEENT, cardiovascular, pulmonary, gi and gu systems are negative, except as otherwise noted.    OBJECTIVE:  There were no vitals taken for this visit.     GENERAL APPEARANCE: healthy, alert and no distress   GAIT: NORMAL  SKIN: no suspicious lesions or rashes  HEENT: Sclera clear, anicteric  CV: no lower extremity edema, good peripheral pulses  RESP: Breathing not labored  NEURO: Normal strength and tone, mentation intact and speech normal  PSYCH:  mentation appears normal and affect normal/bright    MSK: Right knee/lower extremity.  Gross: Well-healed midline scar, scar tissue versus edema at the knee, none below the knee.  No posturing or deformities noted.  Ligamentous: Intact as usual for total knee arthroplasty.  Active range of motion: Flexion 0/120.  Circulation grossly intact.  Palpation: Severe pain about the peroneal head and posteriorly that reproduces some radicular pain.  Sensation: Using a diabetic filament, she was unable to detect pressure at the plantar of digits, distal foot and lateral foot border.  However after removing brace and sitting for 20 to 30 minutes this did improve mildly and she was able to detect feeling of pressure at these regions.    RADIOLOGY:  No new images today.      A total of 20 minutes spent with patient on care and care coordinating activities.

## 2022-11-03 NOTE — PATIENT INSTRUCTIONS
You may continue to use your brace however you are not active, remove or at least loosened significantly.    Follow-up as needed.

## 2022-11-08 ENCOUNTER — PATIENT OUTREACH (OUTPATIENT)
Dept: GERIATRIC MEDICINE | Facility: CLINIC | Age: 72
End: 2022-11-08

## 2022-11-08 NOTE — PROGRESS NOTES
[facility] Care Coordination Contact      [facility] Six-Month Telephone Assessment    6 month telephone assessment completed on 11/8/22.    ER visits: Yes -  [facility] 5/16/22 d/t gouty arthritis.   Hospitalizations: No  TCU stays: No  Significant health status changes: No  Falls/Injuries: Yes: Reported tipped over shopping cart about 2 months ago. Adolfo any injuries. Has PERS.  ADL/IADL changes: No  Changes in services: No    Caregiver Assessment follow up:  N/A    Goals: See POC in chart for goal progress documentation.      Will see member in 6 months for an annual health risk assessment.   Encouraged member to call CC with any questions or concerns in the meantime.     Roney Harrison RN BSN PHN  [facility] Care Coordinator  [phone]  Fax:[phone]

## 2022-11-09 ENCOUNTER — THERAPY VISIT (OUTPATIENT)
Dept: PHYSICAL THERAPY | Facility: CLINIC | Age: 72
End: 2022-11-09
Payer: COMMERCIAL

## 2022-11-09 DIAGNOSIS — M54.41 ACUTE BILATERAL LOW BACK PAIN WITH RIGHT-SIDED SCIATICA: Primary | ICD-10-CM

## 2022-11-09 PROCEDURE — 97140 MANUAL THERAPY 1/> REGIONS: CPT | Mod: GP | Performed by: PHYSICAL THERAPIST

## 2022-11-09 PROCEDURE — 97110 THERAPEUTIC EXERCISES: CPT | Mod: GP | Performed by: PHYSICAL THERAPIST

## 2022-11-16 ENCOUNTER — THERAPY VISIT (OUTPATIENT)
Dept: PHYSICAL THERAPY | Facility: CLINIC | Age: 72
End: 2022-11-16
Payer: COMMERCIAL

## 2022-11-16 DIAGNOSIS — M54.41 ACUTE BILATERAL LOW BACK PAIN WITH RIGHT-SIDED SCIATICA: Primary | ICD-10-CM

## 2022-11-16 PROCEDURE — 97140 MANUAL THERAPY 1/> REGIONS: CPT | Mod: GP | Performed by: PHYSICAL THERAPIST

## 2022-11-16 PROCEDURE — 97110 THERAPEUTIC EXERCISES: CPT | Mod: GP | Performed by: PHYSICAL THERAPIST

## 2022-11-23 ENCOUNTER — NURSE TRIAGE (OUTPATIENT)
Dept: INTERNAL MEDICINE | Facility: CLINIC | Age: 72
End: 2022-11-23

## 2022-11-23 DIAGNOSIS — M54.16 LUMBAR RADICULOPATHY: Primary | ICD-10-CM

## 2022-11-23 NOTE — TELEPHONE ENCOUNTER
Received call back form patient. Relayed message from PCP. Patient verbalized understanding to go to UC if pain is severe and believes she needs to be seen today.    Pt reports that pain is still 10/10 and that she is able to do her daily tasks (cooking, washing dishes) but that it is just slower due to the pain. Patient reports the pain is improved when she is sitting.    Patient stated the prednisone did not help. Pt agreed to continue with Physical therapy as schedule (next apt next Wednesday). Pt would like to move forward with imaging.     Routing to provider for review and imaging recommendations.     Katya Muse RN

## 2022-11-23 NOTE — TELEPHONE ENCOUNTER
"Patient calling as she continues to have numbness and tingling in her waist, hips and feet. She has had this for about a month but seems like it may be getting worse. Does not have pain in her lower extremities but has back pain of 10/10. Able to walk to the bathroom but can not put on shoes. Denies swelling of LE.    Per patient, has been seen for this in the past but would like to be evaluated again.     Routing to clinic to review and schedule as appropriate.      Reason for Disposition    SEVERE pain (e.g., excruciating, unable to do any normal activities)    Answer Assessment - Initial Assessment Questions  1. ONSET: \"When did the pain start?\"       Numbness started a month ago  2. LOCATION: \"Where is the pain located?\"       Numbness in waist, hips and legs  3. PAIN: \"How bad is the pain?\"    (Scale 1-10; or mild, moderate, severe)    -  MILD (1-3): doesn't interfere with normal activities     -  MODERATE (4-7): interferes with normal activities (e.g., work or school) or awakens from sleep, limping     -  SEVERE (8-10): excruciating pain, unable to do any normal activities, unable to walk      10/10  4. WORK OR EXERCISE: \"Has there been any recent work or exercise that involved this part of the body?\"       no  5. CAUSE: \"What do you think is causing the leg pain?\"      dont know  6. OTHER SYMPTOMS: \"Do you have any other symptoms?\" (e.g., chest pain, back pain, breathing difficulty, swelling, rash, fever, numbness, weakness)      Back pain, numbness in waist/hips/legs, no swelling  7. PREGNANCY: \"Is there any chance you are pregnant?\" \"When was your last menstrual period?\"      no    Protocols used: LEG PAIN-A-OH      "

## 2022-11-23 NOTE — TELEPHONE ENCOUNTER
On call back, please relay provider message.     Patient Contact    Attempt # 1    Was call answered?  No.  Left message on voicemail with information to call me back.

## 2022-11-25 ENCOUNTER — TELEPHONE (OUTPATIENT)
Dept: INTERNAL MEDICINE | Facility: CLINIC | Age: 72
End: 2022-11-25

## 2022-12-01 ENCOUNTER — OFFICE VISIT (OUTPATIENT)
Dept: UROLOGY | Facility: CLINIC | Age: 72
End: 2022-12-01
Attending: INTERNAL MEDICINE
Payer: COMMERCIAL

## 2022-12-01 VITALS
WEIGHT: 194 LBS | BODY MASS INDEX: 34.38 KG/M2 | DIASTOLIC BLOOD PRESSURE: 80 MMHG | HEART RATE: 80 BPM | HEIGHT: 63 IN | SYSTOLIC BLOOD PRESSURE: 166 MMHG

## 2022-12-01 DIAGNOSIS — N39.3 STRESS INCONTINENCE, FEMALE: ICD-10-CM

## 2022-12-01 DIAGNOSIS — N81.6 RECTOCELE: ICD-10-CM

## 2022-12-01 DIAGNOSIS — N81.10 BLADDER PROLAPSE, FEMALE, ACQUIRED: ICD-10-CM

## 2022-12-01 DIAGNOSIS — N81.4 PROLAPSE OF UTERUS: Primary | ICD-10-CM

## 2022-12-01 PROCEDURE — 57160 INSERT PESSARY/OTHER DEVICE: CPT | Performed by: OBSTETRICS & GYNECOLOGY

## 2022-12-01 PROCEDURE — G0463 HOSPITAL OUTPT CLINIC VISIT: HCPCS

## 2022-12-01 RX ORDER — ACETAMINOPHEN 325 MG/1
975 TABLET ORAL ONCE
Status: CANCELLED | OUTPATIENT
Start: 2022-12-01 | End: 2022-12-01

## 2022-12-01 RX ORDER — PHENAZOPYRIDINE HYDROCHLORIDE 100 MG/1
200 TABLET, FILM COATED ORAL ONCE
Status: CANCELLED | OUTPATIENT
Start: 2022-12-01 | End: 2022-12-01

## 2022-12-01 ASSESSMENT — PAIN SCALES - GENERAL: PAINLEVEL: WORST PAIN (10)

## 2022-12-01 NOTE — PROGRESS NOTES
Chief Complaint   Patient presents with     Establish Care     Uterine prolaspe   Stephanie Cuellar LPN

## 2022-12-01 NOTE — LETTER
"2022       RE: Laure Wood  [address] [place] [facility] [address]     Dear Colleague,    Thank you for referring your patient, Laure Wood, to the [facility] [place] at [facility]. Please see a copy of my visit note below.    Chief Complaint   Patient presents with     Establish Care     Uterine prolaspe   Stephaniejeffery Cuellar , LPN    December 3, 2022    Referring Provider: Yoshi Bowen MD  [address]   [address]  [place] [address]    Primary Care Provider: Yoshi Bowen    CC: \"bladder coming out\"    HPI:  Laure Wood is a 72 year old female who presents for evaluation of her pelvic floor symptoms.  About 1 month ago she noticed a bulge coming out of her vagina when she was standing and eventually staying out even when sitting.  With this bulge she feels like she is unable to completely empty her bladder until she pushes it back up in.  When she does push her bladder back in her urine drains without effort.  She denies any hematuria or dysuria.  Endorses constipation but does not have any stool incontinence.  When she does push the bulge back up in she notices some lower back pain.    Prior to the symptoms starting 1 month ago she has never had any urinary leakage or prolapse symptoms previously.    OB history    -  x 8, 2 stillbirth delivered by forceps     GYN History:   Menopause at age 45  No history of abnormal pap    Social History:  , lives with her daughter   Not sexually active      Past Medical History:   Diagnosis Date     Gastroesophageal reflux disease      Hyperlipidemia LDL goal <160      Hypertension, essential, benign      Knee osteoarthritis     knees, hands     Motion sickness      PONV (postoperative nausea and vomiting)        Past Surgical History:   Procedure Laterality Date     ARTHROPLASTY KNEE Left 2020    Procedure: Left total knee arthroplasty (Springer and Nephew " "#4 PS femur; #3 tibia; 9 mm tibial insert; 32 mm patella);  Surgeon: James Mcdonald MD;  Location: [facility] OR     ARTHROPLASTY KNEE Right 4/6/2021    Procedure: Right total knee arthroplasty (Springer and nephew #4 PS femur; #3 tibia' 32 mm patella and 9 mm tibial insert);  Surgeon: James Mcdonald MD;  Location: [facility] OR     CATARACT IOL, RT/LT Bilateral     Cataract IOL RT/LT     COLONOSCOPY Left 7/25/2019    Procedure: COLONOSCOPY;  Surgeon: Jono Egan MD;  Location: [facility] GI       Family History   Family history unknown: Yes       ROS    Allergies   Allergen Reactions     No Known Drug Allergy        Current Outpatient Medications   Medication     acetaminophen (TYLENOL) 325 MG tablet     allopurinol (ZYLOPRIM) 100 MG tablet     colchicine (COLCYRS) 0.6 MG tablet     diclofenac (VOLTAREN) 1 % topical gel     famotidine (PEPCID) 20 MG tablet     hydrochlorothiazide (HYDRODIURIL) 25 MG tablet     predniSONE (DELTASONE) 20 MG tablet     No current facility-administered medications for this visit.       BP (!) 166/80   Pulse 80   Ht 1.6 m (5' 3\")   Wt 88 kg (194 lb)   BMI 34.37 kg/m   No LMP recorded. Patient is postmenopausal. Body mass index is 34.37 kg/m .  Laure is alert, comfortable in no acute distress, non-labored breathing.   Abdomen is soft, non-tender, non-distended.     Normal external female genitalia with stage IV prolapse evident on initial exam. Prolapse reducible.     POPQ  Aa +3   Ba +5  C +5  D +4  GH 4  PB 2  TVL 6  Ap +3  Bp +4        A/P: Laure Wood is a 72 year old F with stage IV uterovaginal prolapse. We reviewed management options, including expectant management, pessary fitting, and surgical options.  Given the patient is not sexually active her surgical options include a colpocleisis, supracervical hysterectomy with sacrocolpopexy, or vaginal hysterectomy with uterosacral ligament suspension and anterior/posterior repair.  Given her occult incontinence e a mid urethral sling would " also be advised at the time of surgery to correct the prolapse.These options were discussed with the patient's daughter present. After a thorough discussion with the patient and her daughter she opted for a pessary fitting. She will schedule that.      Jenny Liang MD  Obstetrics & Gynecology Resident, PGY-4  12/03/2022 5:08 PM    Patient was seen with Dr. Kory Rodriguez.     I attest that I was present for the history and personally performed the physical exam and medical decision making and that I agree with the findings and treatment plan outlined above.     Kory Rodriguez MD  Professor, OB/GYN  Urogynecologist      CC  Patient Care Team:  Yoshi Bowen MD as PCP - General (Internal Medicine)  Yoshi Bowen MD as Assigned PCP  Jarred Reeves PA-C as Assigned Musculoskeletal Provider  Roney Harrison, RN as Lead Care Coordinator (Primary Care - CC)  Edel Bean MD as MD (OB/Gyn)  Kory Rodriguez MD as MD (OB/Gyn)  YOSHI BOWEN

## 2022-12-03 NOTE — PROGRESS NOTES
"December 3, 2022    Referring Provider: Yoshi Bowen MD  [address]  [address]  [place] [address]    Primary Care Provider: Yoshi Bowen    CC: \"bladder coming out\"    HPI:  Laure Wood is a 72 year old female who presents for evaluation of her pelvic floor symptoms.  About 1 month ago she noticed a bulge coming out of her vagina when she was standing and eventually staying out even when sitting.  With this bulge she feels like she is unable to completely empty her bladder until she pushes it back up in.  When she does push her bladder back in her urine drains without effort.  She denies any hematuria or dysuria.  Endorses constipation but does not have any stool incontinence.  When she does push the bulge back up in she notices some lower back pain.    Prior to the symptoms starting 1 month ago she has never had any urinary leakage or prolapse symptoms previously.    OB history    -  x 8, 2 stillbirth delivered by forceps     GYN History:   Menopause at age 45  No history of abnormal pap    Social History:  , lives with her daughter   Not sexually active      Past Medical History:   Diagnosis Date     Gastroesophageal reflux disease      Hyperlipidemia LDL goal <160      Hypertension, essential, benign      Knee osteoarthritis     knees, hands     Motion sickness      PONV (postoperative nausea and vomiting)        Past Surgical History:   Procedure Laterality Date     ARTHROPLASTY KNEE Left 2020    Procedure: Left total knee arthroplasty (Springer and Nephew #4 PS femur; #3 tibia; 9 mm tibial insert; 32 mm patella);  Surgeon: James Mcdonald MD;  Location: [facility] OR     ARTHROPLASTY KNEE Right 2021    Procedure: Right total knee arthroplasty (Springer and nephew #4 PS femur; #3 tibia' 32 mm patella and 9 mm tibial insert);  Surgeon: James Mcdonald MD;  Location: [facility] OR     CATARACT IOL, RT/LT Bilateral     Cataract IOL RT/LT     COLONOSCOPY Left 2019    " "Procedure: COLONOSCOPY;  Surgeon: Jono Egan MD;  Location:  GI       Family History   Family history unknown: Yes       ROS    Allergies   Allergen Reactions     No Known Drug Allergy        Current Outpatient Medications   Medication     acetaminophen (TYLENOL) 325 MG tablet     allopurinol (ZYLOPRIM) 100 MG tablet     colchicine (COLCYRS) 0.6 MG tablet     diclofenac (VOLTAREN) 1 % topical gel     famotidine (PEPCID) 20 MG tablet     hydrochlorothiazide (HYDRODIURIL) 25 MG tablet     predniSONE (DELTASONE) 20 MG tablet     No current facility-administered medications for this visit.       BP (!) 166/80   Pulse 80   Ht 1.6 m (5' 3\")   Wt 88 kg (194 lb)   BMI 34.37 kg/m   No LMP recorded. Patient is postmenopausal. Body mass index is 34.37 kg/m .  Laure is alert, comfortable in no acute distress, non-labored breathing.   Abdomen is soft, non-tender, non-distended.     Normal external female genitalia with stage IV prolapse evident on initial exam. Prolapse reducible.     POPQ  Aa +3   Ba +5  C +5  D +4  GH 4  PB 2  TVL 6  Ap +3  Bp +4        A/P: Laure Wood is a 72 year old F with stage IV uterovaginal prolapse. We reviewed management options, including expectant management, pessary fitting, and surgical options.  Given the patient is not sexually active her surgical options include a colpocleisis, supracervical hysterectomy with sacrocolpopexy, or vaginal hysterectomy with uterosacral ligament suspension and anterior/posterior repair.  Given her occult incontinence e a mid urethral sling would also be advised at the time of surgery to correct the prolapse.These options were discussed with the patient's daughter present. After a thorough discussion with the patient and her daughter she opted for a pessary fitting. She will schedule that.      Jenny Liang MD  Obstetrics & Gynecology Resident, PGY-4  12/03/2022 5:08 PM    Patient was seen with Dr. Kory Rodriguez.     I attest that I was present " for the history and personally performed the physical exam and medical decision making and that I agree with the findings and treatment plan outlined above.     Kory Rodriguez MD  Professor, OB/GYN  Urogynecologist      CC  Patient Care Team:  Yoshi Bowen MD as PCP - General (Internal Medicine)  Yoshi Bowen MD as Assigned PCP  Jarred Reeves PA-C as Assigned Musculoskeletal Provider  Roney Harrison RN as Lead Care Coordinator (Primary Care - CC)  Edel Bean MD as MD (OB/Gyn)  Kory Rodriguez MD as MD (OB/Gyn)  YOSHI BOWEN

## 2022-12-05 ENCOUNTER — TELEPHONE (OUTPATIENT)
Dept: UROLOGY | Facility: CLINIC | Age: 72
End: 2022-12-05

## 2022-12-05 ENCOUNTER — OFFICE VISIT (OUTPATIENT)
Dept: ORTHOPEDICS | Facility: CLINIC | Age: 72
End: 2022-12-05
Payer: COMMERCIAL

## 2022-12-05 ENCOUNTER — ANCILLARY PROCEDURE (OUTPATIENT)
Dept: GENERAL RADIOLOGY | Facility: CLINIC | Age: 72
End: 2022-12-05
Attending: PHYSICIAN ASSISTANT
Payer: COMMERCIAL

## 2022-12-05 VITALS — BODY MASS INDEX: 34.01 KG/M2 | WEIGHT: 192 LBS

## 2022-12-05 DIAGNOSIS — M54.42 ACUTE BILATERAL LOW BACK PAIN WITH BILATERAL SCIATICA: ICD-10-CM

## 2022-12-05 DIAGNOSIS — M54.42 ACUTE BILATERAL LOW BACK PAIN WITH BILATERAL SCIATICA: Primary | ICD-10-CM

## 2022-12-05 DIAGNOSIS — M54.41 ACUTE BILATERAL LOW BACK PAIN WITH BILATERAL SCIATICA: Primary | ICD-10-CM

## 2022-12-05 DIAGNOSIS — M54.41 ACUTE BILATERAL LOW BACK PAIN WITH BILATERAL SCIATICA: ICD-10-CM

## 2022-12-05 DIAGNOSIS — R29.898 WEAKNESS OF RIGHT LOWER EXTREMITY: ICD-10-CM

## 2022-12-05 DIAGNOSIS — R20.0 NUMBNESS OF BOTH LOWER EXTREMITIES: ICD-10-CM

## 2022-12-05 PROCEDURE — 99214 OFFICE O/P EST MOD 30 MIN: CPT | Performed by: PHYSICIAN ASSISTANT

## 2022-12-05 PROCEDURE — 72100 X-RAY EXAM L-S SPINE 2/3 VWS: CPT | Mod: TC | Performed by: RADIOLOGY

## 2022-12-05 PROCEDURE — 72070 X-RAY EXAM THORAC SPINE 2VWS: CPT | Mod: TC | Performed by: RADIOLOGY

## 2022-12-05 NOTE — Clinical Note
"    2022         RE: Laure Wood  [address] [facility] [address]        Dear Colleague,    Thank you for referring your patient, Laure Wood, to the [facility]. Please see a copy of my visit note below.    ASSESSMENT & PLAN    Laure was seen today for pain and follow up.    Diagnoses and all orders for this visit:    Acute bilateral low back pain with bilateral sciatica  -     XR Lumbar Spine 2/3 Views; Future  -     XR Thoracic Spine 2 Views; Future  -     MR Thoracic Spine w/o Contrast; Future      This issue is {ACUTE/CHRONIC:949746} and {IMPROVING WORSENIN}.      {FOLLOW UP PLANS (Optional):214019}    Jarred Reeves PA-C  [facility]    SUBJECTIVE- Interim History 2022    Chief Complaint   Patient presents with     Right Knee - Pain, Follow Up       Laure Wood is a 72 year old female who is seen in f/u up for Acute bilateral low back pain with bilateral sciatica. Since last visit on *** patient has ***.  - Now ~ *** from initial onset    Worsened by: ***  Better with: ***  Treatments tried: {sjatreatmentoptions:279201}  Associated symptoms:  {thassociatedsx:231629}    The patient is seen {sjaparent:254668}.  The patient is {HANDDOMINANCE:130271} handed    Orthopedic/Surgical history: {YES - DATE/NO:757794::\"NO\"}  Social History/Occupation: ***    No family history pertinent to patient's problem today. ***    REVIEW OF SYSTEMS:  Review of Systems  {ROS COMP (Optional):344534}    OBJECTIVE:  Wt 87.1 kg (192 lb)   BMI 34.01 kg/m       GENERAL APPEARANCE: {LEXA GENERAL APPEARANCE:50::\"healthy\",\"alert\",\"no distress\"}   GAIT: {FSOC GAIT:648801::\"NORMAL\"}  SKIN: {LEXA EXAM CPE - SKIN:166524::\"no suspicious lesions or rashes\"}  HEENT: {skheent:046724::\"Sclera clear, anicteric\"}  CV: {SKCV:037577::\"no lower extremity edema\",\"good peripheral pulses\"}  RESP: {skresp:788508::\"Breathing not " "labored\"}  NEURO: {LEXA EXAM CPE - NEURO:686188::\"Normal strength and tone\",\"mentation intact\",\"speech normal\"}  PSYCH:  {LEXA EXAM CPE - PSYCH:849834::\"mentation appears normal\",\"affect normal/bright\"}    RADIOLOGY:  Final results and radiologist's interpretation, available in the [facility] health record.  Images were reviewed with the patient in the office today.  My personal interpretation of the performed imaging: ***      {[facility] 2021 Documentation (Optional):667614}  {2021 E&M time (Optional):839577}  {Provider  Link to [facility] Help Grid :560081}           Again, thank you for allowing me to participate in the care of your patient.        Sincerely,        Jarred Reeves PA-C  "

## 2022-12-05 NOTE — TELEPHONE ENCOUNTER
[facility] Call Center    Phone Message    May a detailed message be left on voicemail: yes     Reason for Call: The patient called stating she was returning a call from Dr. Rodriguez. She says to schedule to look at her bladder. Writer did not see any notes in the chart. Please advise. Thank you.    Action Taken: Message routed to:  Other: [facility]    Travel Screening: Not Applicable

## 2022-12-05 NOTE — PROGRESS NOTES
ASSESSMENT & PLAN    Laure was seen today for pain, follow up and extremity weakness.    Diagnoses and all orders for this visit:    Acute bilateral low back pain with bilateral sciatica  -     XR Lumbar Spine 2/3 Views; Future  -     XR Thoracic Spine 2 Views; Future  -     MR Thoracic Spine w/o Contrast; Future    Weakness of right lower extremity    Numbness of both lower extremities      Multifocal right LE weakness and stocking type paresthesia bilaterally to above the knees.  This issue is acute and Worsening.  MRI lumbar and T spine given areas affected on exam.    Need to rule out spinal pathology, then will consider other sources of nerve pathology.  Most likely will need EMG pending imaging.  Will plan after imaging.    Jarred Reeves PA-C  [facility] [place]    SUBJECTIVE- Interim History December 5, 2022    Chief Complaint   Patient presents with    Right Knee - Pain, Follow Up    Extremity Weakness     With paresthesia to bilateral LE       Laure Wood is a 72 year old female who is seen in f/u up for    Acute bilateral low back pain with bilateral sciatica  Weakness of right lower extremity  Numbness of both lower extremities.     Since last visit on 10/31/2022 patient has noticed increased pain and numbness at the lower extremities..    She states the the focal numbness at there right lower leg has now progressed to both legs, from the waist down bilaterally.  Holds her lower abdomen and states cannot feel it anteriorly.  Also notes progressive weakness to the right leg.  Having difficulty walking, transitions, standing for more than a few minutes, ,stairs.  Of note this is different than issue at previous visit, see notes from 10.31.22.    Denies valsalva or positional pain.  No recent fevers, infections or trauma noted.  No recent travel out of country.  No issues above waist line, or symptoms of cranial nerve involvement.  No hx of issues, changes to  medications, loss of bowel or bladder function, some urinary incontinence believed to be due to pelvic issues, followed by urology.    Worsened by: time.  Better with: nothing.  Treatments tried: Steroids, cane, brace, tylenol.  Associated symptoms:  Denies true giving way of extremities.    The patient is seen with their daughterAustin Ignacio.  The patient is Right handed    Orthopedic/Surgical history: bilateral TKA.  Social History/Occupation: nothing appears to pertain to current condition.    No family history pertinent to patient's problem today.    REVIEW OF SYSTEMS:  Review of Systems  Constitutional, HEENT, cardiovascular, pulmonary, gi and gu systems are negative, except as otherwise noted.healthy, alert and no distress.  -uterine prolapse.    OBJECTIVE:  Wt 87.1 kg (192 lb)   BMI 34.01 kg/m       GENERAL APPEARANCE:    GAIT: , cane and slow to stand, must lead with left leg for on and off table.  No toe catch or drag.  SKIN: no suspicious lesions or rashes  HEENT: Sclera clear, anicteric  CV: no lower extremity edema, good peripheral pulses  RESP: Breathing not labored  NEURO: Normal strength and tone, mentation intact and speech normal  PSYCH:  mentation appears normal and affect normal/bright    MSk:  BIlateral LE.  GRoss:  walks with cane.  Difficulty getting on table with step, limited to left leg, no foot drop or drag noted.  No skin changes, bilaterall well healed midline TKA scars.  No pitting edema.  Palpation:  no pain at hip, knee or ankle joints.  No severe pain on palpation of spine.  ROM:  Hip and knee on right limited actively due to strength, but passively hips, knees, ankles and toes symmetric and WNL.  Strength:  RIght: knee flexion and ext 4/5, right ankle D and P flexion 4/5, ext great toe 4/5, Hip flexion 4/5.  Hip abd 4/5.  Left leg 5/5.  Sensation:  filament light touch absent circumferentially, toes to above knee.  Subjectively states lower abdomen anteriorly is decreased  sensation.  Reflexes:  right patella and achilles 0, left patella 1, achilles 2.  Circulation: limbs are warm to touch.    GROSS upper body exam:  coordinated movement, good strength, no facial deformities, slurred speech, issues breathing.    RADIOLOGY:  Final results and radiologist's interpretation, available in the [facility] health record.  Images were reviewed with the patient in the office today.  My personal interpretation of the performed imaging:         XR thoracic and lumbar spine views.  Images demonstrate mild scoliosis, mild anterolisthesis at L3-4 and mild to moderate degenerative changes at the facets and enplates most noteable at lumbar area   No signs of catastrophic fracture, mass or collapse.    See also radiology reading.    A total of 30 minutes spent with patient on care and care coordinating activities.

## 2022-12-05 NOTE — PATIENT INSTRUCTIONS
You have been ordered an MRI of your spine, we will review the results and contact you.    Based on the results of the imaging you may need further testing to look for the causes of your symptoms.    Jarred Reeves PA-C  [facility] - Surgery  [facility]  [address] [place]  [address]  [phone] fax  [phone] for scheduling

## 2022-12-07 ENCOUNTER — OFFICE VISIT (OUTPATIENT)
Dept: UROLOGY | Facility: CLINIC | Age: 72
End: 2022-12-07
Attending: OBSTETRICS & GYNECOLOGY
Payer: COMMERCIAL

## 2022-12-07 VITALS — DIASTOLIC BLOOD PRESSURE: 82 MMHG | SYSTOLIC BLOOD PRESSURE: 141 MMHG | HEART RATE: 81 BPM

## 2022-12-07 DIAGNOSIS — N81.4 PROLAPSE OF UTERUS: Primary | ICD-10-CM

## 2022-12-07 DIAGNOSIS — N81.6 RECTOCELE: ICD-10-CM

## 2022-12-07 DIAGNOSIS — N81.10 BLADDER PROLAPSE, FEMALE, ACQUIRED: ICD-10-CM

## 2022-12-07 PROCEDURE — G0463 HOSPITAL OUTPT CLINIC VISIT: HCPCS

## 2022-12-07 PROCEDURE — 57160 INSERT PESSARY/OTHER DEVICE: CPT | Performed by: OBSTETRICS & GYNECOLOGY

## 2022-12-07 NOTE — PROGRESS NOTES
December 7, 2022    Referring Provider: No referring provider defined for this encounter.    Primary Care Provider: Yoshi Bowen    Pt returns for pessary fitting. Found to have uterine prolapse on previous exam.    Pt fit with a # 6 ring with support. Demonstrated ability to insert and remove without difficulty. Pt counseled to insert and remove pessary and clean with warm water and soap on daily basis. F/U in 2 weeks for a pessary check or sooner for vaginal bleeding or discharge.    Kory Rodriguez MD  Professor, OB/GYN  Urogynecologist  CC  Patient Care Team:  Yoshi Bowen MD as PCP - General (Internal Medicine)  Yoshi Bowen MD as Assigned PCP  Jarred Reeves PA-C as Assigned Musculoskeletal Provider  Roney Harrison RN as Lead Care Coordinator (Primary Care - CC)  Edel Bean MD as MD (OB/Gyn)  Kory Rodriguez MD as MD (OB/Gyn)       Additional Notes: Patient with adequate response to isotretinoin. Patient notes some possible cystic lesions surfacing. Recommended patient to complete one additional month of isotretinoin. Will send in prescription pending results of lab work. Detail Level: Generalized

## 2022-12-07 NOTE — TELEPHONE ENCOUNTER
Returned patients call, reached VM but unable to leave message as VM box full. Called patient's daughter, reached VM. Left message about scheduling appointment (per Dr. Rodriguez note from 12/1 looks to be for pessary fitting). Gave number to call clinic back to schedule.

## 2022-12-07 NOTE — LETTER
12/7/2022       RE: Laure Wood  [address]  [facility] [address]     Dear Colleague,    Thank you for referring your patient, Laure Wood, to the [facility] [place] at [facility]. Please see a copy of my visit note below.    December 7, 2022    Referring Provider: No referring provider defined for this encounter.    Primary Care Provider: Yoshi Bowen    Pt returns for pessary fitting. Found to have uterine prolapse on previous exam.    Pt fit with a # 6 ring with support. Demonstrated ability to insert and remove without difficulty. Pt counseled to insert and remove pessary and clean with warm water and soap on daily basis. F/U in 2 weeks for a pessary check or sooner for vaginal bleeding or discharge.    Kory Rodriguez MD  Professor, OB/GYN  Urogynecologist  CC  Patient Care Team:  Yoshi Bowen MD as PCP - General (Internal Medicine)  Yoshi Bowen MD as Assigned PCP  Jarred Reeves PA-C as Assigned Musculoskeletal Provider  Roney Harrison RN as Lead Care Coordinator (Primary Care - CC)  Edel Bean MD as MD (OB/Gyn)  Koyr Rodriguez MD as MD (OB/Gyn)

## 2022-12-11 DIAGNOSIS — I10 ESSENTIAL HYPERTENSION, BENIGN: ICD-10-CM

## 2022-12-12 ENCOUNTER — HOSPITAL ENCOUNTER (OUTPATIENT)
Dept: MRI IMAGING | Facility: CLINIC | Age: 72
Discharge: HOME | End: 2022-12-12
Attending: INTERNAL MEDICINE | Admitting: INTERNAL MEDICINE
Payer: COMMERCIAL

## 2022-12-12 DIAGNOSIS — M54.16 LUMBAR RADICULOPATHY: ICD-10-CM

## 2022-12-12 PROCEDURE — 72148 MRI LUMBAR SPINE W/O DYE: CPT

## 2022-12-14 RX ORDER — HYDROCHLOROTHIAZIDE 25 MG/1
TABLET ORAL
Qty: 90 TABLET | Refills: 0 | Status: ON HOLD | OUTPATIENT
Start: 2022-12-14 | End: 2023-01-23

## 2022-12-20 ENCOUNTER — OFFICE VISIT (OUTPATIENT)
Dept: INTERNAL MEDICINE | Facility: CLINIC | Age: 72
End: 2022-12-20
Payer: COMMERCIAL

## 2022-12-20 ENCOUNTER — HOSPITAL ENCOUNTER (OUTPATIENT)
Dept: MRI IMAGING | Facility: CLINIC | Age: 72
Discharge: HOME | End: 2022-12-20
Attending: PHYSICIAN ASSISTANT | Admitting: PHYSICIAN ASSISTANT
Payer: COMMERCIAL

## 2022-12-20 VITALS
DIASTOLIC BLOOD PRESSURE: 79 MMHG | HEIGHT: 63 IN | TEMPERATURE: 97.4 F | SYSTOLIC BLOOD PRESSURE: 126 MMHG | HEART RATE: 69 BPM | BODY MASS INDEX: 34.41 KG/M2 | RESPIRATION RATE: 14 BRPM | OXYGEN SATURATION: 100 % | WEIGHT: 194.2 LBS

## 2022-12-20 DIAGNOSIS — R20.2 NUMBNESS AND TINGLING OF BOTH LEGS: Primary | ICD-10-CM

## 2022-12-20 DIAGNOSIS — R20.0 NUMBNESS AND TINGLING OF BOTH LEGS: Primary | ICD-10-CM

## 2022-12-20 DIAGNOSIS — E78.2 MIXED HYPERLIPIDEMIA: ICD-10-CM

## 2022-12-20 DIAGNOSIS — M54.41 ACUTE BILATERAL LOW BACK PAIN WITH BILATERAL SCIATICA: ICD-10-CM

## 2022-12-20 DIAGNOSIS — M54.42 ACUTE BILATERAL LOW BACK PAIN WITH BILATERAL SCIATICA: ICD-10-CM

## 2022-12-20 DIAGNOSIS — N18.31 CHRONIC KIDNEY DISEASE, STAGE 3A (H): ICD-10-CM

## 2022-12-20 DIAGNOSIS — M1A.09X0 IDIOPATHIC CHRONIC GOUT OF MULTIPLE SITES WITHOUT TOPHUS: ICD-10-CM

## 2022-12-20 LAB
ANION GAP SERPL CALCULATED.3IONS-SCNC: 11 MMOL/L (ref 7–15)
BUN SERPL-MCNC: 35.8 MG/DL (ref 8–23)
CALCIUM SERPL-MCNC: 10.1 MG/DL (ref 8.8–10.2)
CHLORIDE SERPL-SCNC: 107 MMOL/L (ref 98–107)
CREAT SERPL-MCNC: 1.11 MG/DL (ref 0.51–0.95)
DEPRECATED HCO3 PLAS-SCNC: 25 MMOL/L (ref 22–29)
FOLATE SERPL-MCNC: 14.2 NG/ML (ref 4.6–34.8)
GFR SERPL CREATININE-BSD FRML MDRD: 53 ML/MIN/1.73M2
GLUCOSE SERPL-MCNC: 89 MG/DL (ref 70–99)
POTASSIUM SERPL-SCNC: 3.8 MMOL/L (ref 3.4–5.3)
SODIUM SERPL-SCNC: 143 MMOL/L (ref 136–145)
TOTAL PROTEIN SERUM FOR ELP: 6.9 G/DL (ref 6.4–8.3)
TSH SERPL DL<=0.005 MIU/L-ACNC: 2.11 UIU/ML (ref 0.3–4.2)
URATE SERPL-MCNC: 8.3 MG/DL (ref 2.4–5.7)
VIT B12 SERPL-MCNC: 1640 PG/ML (ref 232–1245)

## 2022-12-20 PROCEDURE — 84165 PROTEIN E-PHORESIS SERUM: CPT

## 2022-12-20 PROCEDURE — 80048 BASIC METABOLIC PNL TOTAL CA: CPT | Performed by: INTERNAL MEDICINE

## 2022-12-20 PROCEDURE — 84550 ASSAY OF BLOOD/URIC ACID: CPT | Performed by: INTERNAL MEDICINE

## 2022-12-20 PROCEDURE — 99214 OFFICE O/P EST MOD 30 MIN: CPT | Performed by: INTERNAL MEDICINE

## 2022-12-20 PROCEDURE — 36415 COLL VENOUS BLD VENIPUNCTURE: CPT | Performed by: INTERNAL MEDICINE

## 2022-12-20 PROCEDURE — 72157 MRI CHEST SPINE W/O & W/DYE: CPT

## 2022-12-20 PROCEDURE — A9585 GADOBUTROL INJECTION: HCPCS | Performed by: PHYSICIAN ASSISTANT

## 2022-12-20 PROCEDURE — 82607 VITAMIN B-12: CPT | Performed by: INTERNAL MEDICINE

## 2022-12-20 PROCEDURE — 82746 ASSAY OF FOLIC ACID SERUM: CPT | Performed by: INTERNAL MEDICINE

## 2022-12-20 PROCEDURE — 86334 IMMUNOFIX E-PHORESIS SERUM: CPT

## 2022-12-20 PROCEDURE — 255N000002 HC RX 255 OP 636: Performed by: PHYSICIAN ASSISTANT

## 2022-12-20 PROCEDURE — 84155 ASSAY OF PROTEIN SERUM: CPT | Performed by: INTERNAL MEDICINE

## 2022-12-20 PROCEDURE — 84443 ASSAY THYROID STIM HORMONE: CPT | Performed by: INTERNAL MEDICINE

## 2022-12-20 RX ORDER — GADOBUTROL 604.72 MG/ML
8.5 INJECTION INTRAVENOUS ONCE
Status: COMPLETED | OUTPATIENT
Start: 2022-12-20 | End: 2022-12-20

## 2022-12-20 RX ORDER — SIMVASTATIN 20 MG
20 TABLET ORAL AT BEDTIME
Qty: 90 TABLET | Refills: 3 | Status: ON HOLD | OUTPATIENT
Start: 2022-12-20 | End: 2023-01-23

## 2022-12-20 RX ORDER — ALLOPURINOL 100 MG/1
200 TABLET ORAL DAILY
Qty: 180 TABLET | Refills: 3 | Status: ON HOLD | OUTPATIENT
Start: 2022-12-20 | End: 2023-01-23

## 2022-12-20 RX ADMIN — GADOBUTROL 8.5 ML: 604.72 INJECTION INTRAVENOUS at 16:09

## 2022-12-20 ASSESSMENT — PAIN SCALES - GENERAL: PAINLEVEL: WORST PAIN (10)

## 2022-12-20 NOTE — LETTER
January 5, 2023      [address]  [address] [address] [facility] [address]        Dear ,    We are writing to inform you of your test results.    We have tried to reach you by phone unsuccessfully.  Dr. Bowen wants you to increase your allopurinol to 2 per day.  Please call the clinic to discuss this if you have any questions.    Resulted Orders   Uric acid   Result Value Ref Range    Uric Acid 8.3 (H) 2.4 - 5.7 mg/dL   Basic metabolic panel  (Ca, Cl, CO2, Creat, Gluc, K, Na, BUN)   Result Value Ref Range    Sodium 143 136 - 145 mmol/L    Potassium 3.8 3.4 - 5.3 mmol/L    Chloride 107 98 - 107 mmol/L    Carbon Dioxide (CO2) 25 22 - 29 mmol/L    Anion Gap 11 7 - 15 mmol/L    Urea Nitrogen 35.8 (H) 8.0 - 23.0 mg/dL    Creatinine 1.11 (H) 0.51 - 0.95 mg/dL    Calcium 10.1 8.8 - 10.2 mg/dL    Glucose 89 70 - 99 mg/dL    GFR Estimate 53 (L) >60 mL/min/1.73m2      Comment:      Effective December 21, 2021 eGFRcr in adults is calculated using the 2021 CKD-EPI creatinine equation which includes age and gender (Chaparro et al., NE, DOI: 10.1056/NISLgr8968449)   Vitamin B12   Result Value Ref Range    Vitamin B12 1,640 (H) 232 - 1,245 pg/mL   Folate   Result Value Ref Range    Folic Acid 14.2 4.6 - 34.8 ng/mL   TSH   Result Value Ref Range    TSH 2.11 0.30 - 4.20 uIU/mL   Total Protein, Serum for ELP   Result Value Ref Range    Total Protein Serum for ELP 6.9 6.4 - 8.3 g/dL   Protein Electrophoresis, Serum   Result Value Ref Range    Albumin 4.1 3.7 - 5.1 g/dL    Alpha 1 0.4 0.2 - 0.4 g/dL    Alpha 2 0.8 0.5 - 0.9 g/dL    Beta Globulin 0.7 0.6 - 1.0 g/dL    Gamma Globulin 0.9 0.7 - 1.6 g/dL    Monoclonal Peak 0.0 <=0.0 g/dL    ELP Interpretation       Essentially normal electrophoretic pattern. No obvious monoclonal proteins seen. Pathologic significance requires clinical correlation. Priti Wright M.D., Ph.D.       If you have any questions or concerns, please call the clinic at the number listed above.        Sincerely,      Yoshi Bowen MD

## 2022-12-20 NOTE — PROGRESS NOTES
"  Assessment & Plan     Numbness and tingling of both legs  Continue w/u- rec'd EMG and labs given negative spinal imaging   - Basic metabolic panel  (Ca, Cl, CO2, Creat, Gluc, K, Na, BUN); Future  - Vitamin B12; Future  - Folate; Future  - TSH; Future  - Protein electrophoresis; Future  - Protein Immunofixation Serum; Future  - EMG; Future  - Basic metabolic panel  (Ca, Cl, CO2, Creat, Gluc, K, Na, BUN)  - Vitamin B12  - Folate  - TSH  - Protein electrophoresis  - Protein Immunofixation Serum    Chronic kidney disease, stage 3a (H)  Idiopathic chronic gout of multiple sites without tophus  GFR stable. Cont titrate allopurinol - go to 200 mg   - Uric acid; Future  - Uric acid    Mixed hyperlipidemia  - simvastatin (ZOCOR) 20 MG tablet; Take 1 tablet (20 mg) by mouth At Bedtime    Review of the result(s) of each unique test - labs  Ordering of each unique test  Prescription drug management         BMI:   Estimated body mass index is 34.4 kg/m  as calculated from the following:    Height as of this encounter: 1.6 m (5' 3\").    Weight as of this encounter: 88.1 kg (194 lb 3.2 oz).   Weight management plan: Discussed healthy diet and exercise guidelines    See Patient Instructions    Return in about 3 months (around 3/20/2023) for Follow up visit.    Yoshi Bowen MD  [facility] Kelsey is a 72 year old, presenting for the following health issues:  Pain      HPI     Pain History:  When did you first notice your pain? - Acute Pain   Have you seen anyone else for your pain? No  Where in your body do you have pain? Musculoskeletal problem/pain  Onset/Duration: ongoing  Description  Location: leg and hip - bilateral  Joint Swelling: YES  Redness: No  Pain: YES  Warmth: No  Intensity:  10/10  Progression of Symptoms:  worsening  Accompanying signs and symptoms:   Fevers: No  Numbness/tingling/weakness: YES  History  Trauma to the area: No  Recent illness:  No  Previous " "similar problem: YES  Previous evaluation:  YES  Precipitating or alleviating factors:  Aggravating factors include: standing and walking  Therapies tried and outcome: rest/inactivity and tylenol          Review of Systems         Objective    /79   Pulse 69   Temp 97.4  F (36.3  C) (Temporal)   Resp 14   Ht 1.6 m (5' 3\")   Wt 88.1 kg (194 lb 3.2 oz)   SpO2 100%   BMI 34.40 kg/m    Body mass index is 34.4 kg/m .  Physical Exam   GENERAL: alert, no distress and over weight  RESP: lungs clear to auscultation - no rales, rhonchi or wheezes  CV: regular rate and rhythm, normal S1 S2, no S3 or S4, no murmur, click or rub, no peripheral edema and peripheral pulses strong  ABDOMEN: soft, nontender, no hepatosplenomegaly, no masses and bowel sounds normal  SKIN: no suspicious lesions or rashes  NEURO: LE strength 4/5 bilat. Gait normal   PSYCH: mentation appears normal, affect normal/bright    Results for orders placed or performed in visit on 12/20/22 (from the past 24 hour(s))   Uric acid   Result Value Ref Range    Uric Acid 8.3 (H) 2.4 - 5.7 mg/dL   Basic metabolic panel  (Ca, Cl, CO2, Creat, Gluc, K, Na, BUN)   Result Value Ref Range    Sodium 143 136 - 145 mmol/L    Potassium 3.8 3.4 - 5.3 mmol/L    Chloride 107 98 - 107 mmol/L    Carbon Dioxide (CO2) 25 22 - 29 mmol/L    Anion Gap 11 7 - 15 mmol/L    Urea Nitrogen 35.8 (H) 8.0 - 23.0 mg/dL    Creatinine 1.11 (H) 0.51 - 0.95 mg/dL    Calcium 10.1 8.8 - 10.2 mg/dL    Glucose 89 70 - 99 mg/dL    GFR Estimate 53 (L) >60 mL/min/1.73m2   Vitamin B12   Result Value Ref Range    Vitamin B12 1,640 (H) 232 - 1,245 pg/mL   Folate   Result Value Ref Range    Folic Acid 14.2 4.6 - 34.8 ng/mL   TSH   Result Value Ref Range    TSH 2.11 0.30 - 4.20 uIU/mL   Protein electrophoresis    Narrative    The following orders were created for panel order Protein electrophoresis.  Procedure                               Abnormality         Status                     ---------     "                           -----------         ------                     Total Protein, Serum for...[[id number]]  Normal              Final result               Protein Electrophoresis,...[[id number]]                      In process                   Please view results for these tests on the individual orders.   Total Protein, Serum for ELP   Result Value Ref Range    Total Protein Serum for ELP 6.9 6.4 - 8.3 g/dL

## 2022-12-21 ENCOUNTER — TELEPHONE (OUTPATIENT)
Dept: NEUROSURGERY | Facility: CLINIC | Age: 72
End: 2022-12-21

## 2022-12-21 ENCOUNTER — TELEPHONE (OUTPATIENT)
Dept: ORTHOPEDICS | Facility: CLINIC | Age: 72
End: 2022-12-21

## 2022-12-21 DIAGNOSIS — R29.898 WEAKNESS OF RIGHT LOWER EXTREMITY: Primary | ICD-10-CM

## 2022-12-21 DIAGNOSIS — M54.41 ACUTE BILATERAL LOW BACK PAIN WITH BILATERAL SCIATICA: ICD-10-CM

## 2022-12-21 DIAGNOSIS — R20.0 NUMBNESS OF BOTH LOWER EXTREMITIES: ICD-10-CM

## 2022-12-21 DIAGNOSIS — M54.42 ACUTE BILATERAL LOW BACK PAIN WITH BILATERAL SCIATICA: ICD-10-CM

## 2022-12-21 LAB — RADIOLOGIST FLAGS: ABNORMAL

## 2022-12-21 NOTE — TELEPHONE ENCOUNTER
Call from Emerson Reeves PA-C regarding need for Urgent/ Emergent Spine Consult due to findings of recent Thoracic MRI.       Spine Referral place.     Will discuss with Neurosurgery Team urgency of consultation, and how to present findings to patient.     Breann Messer, ATC

## 2022-12-21 NOTE — TELEPHONE ENCOUNTER
Asked by Ortho RETA Reeves regarding patient     Please see note from 12/5 documenting symptoms and concerns as well as exam   Reviewed recent imaging with Dr. Arechiga     RECOMMENDATIONS include urgent/emergent referral to [facility] Spine Team for further evaluation and surgical planning. HOWEVER, if symptoms are continuing to worsen and progress rapidly, patient NEEDS to go to Emergency Room for further evaluation.    Plans reviewed with Dr. Arechiga and Emerson Reeves PA-C with Ortho     Sravanthi Barkley PA-C  [facility] Neurosurgery  [place] [id number]    Tel [phone]  Pager [phone]

## 2022-12-21 NOTE — TELEPHONE ENCOUNTER
"Virtual huddle with Emerson Reeves PA-C.   Emerson spoke with Neurosurgery staff regarding findings and recommendations. Please see Telephone Encounter dated 12/21/22 by Sravanthi Barkley Pa-C    Provider was able to speak with patient, and patient's daughter Nisha in a separate phone conversation regarding MRI findings and recommendations.   \"if symptoms are progressing quickly, patient should present to [facility] or [facility] ED for care. If symptoms remain stable, Urgent/Emergent consult should be scheduled with [facility]. Both patient and daughter verbalized understanding of plan and recommendations.\"   - Referral for consultation was placed this morning.    Patient will contact Ortho Clinic if they do not hear from Neurosurgery as anticipated.     Breann Messer, ATC    "

## 2022-12-22 LAB
ALBUMIN SERPL ELPH-MCNC: 4.1 G/DL (ref 3.7–5.1)
ALPHA1 GLOB SERPL ELPH-MCNC: 0.4 G/DL (ref 0.2–0.4)
ALPHA2 GLOB SERPL ELPH-MCNC: 0.8 G/DL (ref 0.5–0.9)
B-GLOBULIN SERPL ELPH-MCNC: 0.7 G/DL (ref 0.6–1)
GAMMA GLOB SERPL ELPH-MCNC: 0.9 G/DL (ref 0.7–1.6)
M PROTEIN SERPL ELPH-MCNC: 0 G/DL
PROT PATTERN SERPL ELPH-IMP: NORMAL
PROT PATTERN SERPL IFE-IMP: NORMAL

## 2022-12-29 NOTE — TELEPHONE ENCOUNTER
Hi, I see Laure is not scheduled until feb 21, and no one has answered the scheduling pool about whether she needs more urgent appt.    Just wanted to follow up:    1.  if she should be seen earlier, her symptoms has progressed significantly in the 6 wks interim I saw her, Feb 21 would be longer.    2.  Does she still need the EMG scheduled by PCP?    Jarred Reeves PA-C  [facility] - Surgery

## 2022-12-30 NOTE — TELEPHONE ENCOUNTER
SPINE PATIENTS - NEW PROTOCOL PREVISIT    RECORDS RECEIVED FROM: Internal   REASON FOR VISIT: Tumor   Date of Appt: 1.3.23   NOTES (FOR ALL VISITS) STATUS DETAILS   OFFICE NOTE from referring provider Internal [facility] Emerson Reeves PA-C  12.21.22 telephone encounter   OFFICE NOTE from other specialist Internal 12.20.22 Dr. Yoshi Bowen, [facility]    DISCHARGE SUMMARY from hospital     DISCHARGE REPORT from ER     EMG REPORT     MEDICATION LIST Internal    IMAGING  (FOR ALL VISITS)     MRI (HEAD, NECK, SPINE) Internal 12.20.22 MR thoracic spine  12.12.22 MR lumbar spine   XRAY (SPINE) *NEUROSURGERY* Internal 12.5.22 XR thoracic spine  12.5.22 XR lumbar spine   CT (HEAD, NECK, SPINE) Internal 7.21.11 CT head

## 2023-01-03 ENCOUNTER — PRE VISIT (OUTPATIENT)
Dept: NEUROSURGERY | Facility: CLINIC | Age: 73
End: 2023-01-03

## 2023-01-04 ENCOUNTER — TELEPHONE (OUTPATIENT)
Dept: OBGYN | Facility: CLINIC | Age: 73
End: 2023-01-04

## 2023-01-04 NOTE — TELEPHONE ENCOUNTER
Spoke with patient about scheduling surgery with Dr. Rodriguez - no longer wishes to proceed with surgery and will continue to use the pessary. Marking request as done.    Ely Jacob  Clinical Services Assistant     for 2 days

## 2023-01-06 ENCOUNTER — TELEPHONE (OUTPATIENT)
Dept: NEUROSURGERY | Facility: CLINIC | Age: 73
End: 2023-01-06

## 2023-01-06 ENCOUNTER — OFFICE VISIT (OUTPATIENT)
Dept: NEUROSURGERY | Facility: CLINIC | Age: 73
End: 2023-01-06
Attending: PHYSICIAN ASSISTANT
Payer: COMMERCIAL

## 2023-01-06 ENCOUNTER — PREP FOR PROCEDURE (OUTPATIENT)
Dept: NEUROLOGY | Facility: CLINIC | Age: 73
End: 2023-01-06

## 2023-01-06 VITALS
WEIGHT: 195 LBS | SYSTOLIC BLOOD PRESSURE: 144 MMHG | OXYGEN SATURATION: 100 % | DIASTOLIC BLOOD PRESSURE: 81 MMHG | RESPIRATION RATE: 16 BRPM | BODY MASS INDEX: 34.54 KG/M2 | HEART RATE: 67 BPM

## 2023-01-06 DIAGNOSIS — D32.9 MENINGIOMA (H): Primary | ICD-10-CM

## 2023-01-06 DIAGNOSIS — M54.42 ACUTE BILATERAL LOW BACK PAIN WITH BILATERAL SCIATICA: ICD-10-CM

## 2023-01-06 DIAGNOSIS — D49.7 INTRADURAL EXTRAMEDULLARY THORACIC TUMOR: Primary | ICD-10-CM

## 2023-01-06 DIAGNOSIS — M54.41 ACUTE BILATERAL LOW BACK PAIN WITH BILATERAL SCIATICA: ICD-10-CM

## 2023-01-06 DIAGNOSIS — M47.14 THORACIC MYELOPATHY: ICD-10-CM

## 2023-01-06 DIAGNOSIS — R29.898 WEAKNESS OF RIGHT LOWER EXTREMITY: ICD-10-CM

## 2023-01-06 DIAGNOSIS — R20.0 NUMBNESS OF BOTH LOWER EXTREMITIES: ICD-10-CM

## 2023-01-06 PROCEDURE — 99204 OFFICE O/P NEW MOD 45 MIN: CPT | Performed by: NEUROLOGICAL SURGERY

## 2023-01-06 ASSESSMENT — PAIN SCALES - GENERAL: PAINLEVEL: WORST PAIN (10)

## 2023-01-06 NOTE — LETTER
1/6/2023       RE: Laure Wood  [address] [place] [id number]     Dear Colleague,    Thank you for referring your patient, Laure Wood, to the [facility] [place] at [facility]. Please see a copy of my visit note below.        Neurosurgery Clinic Note    Chief Complaint: Thoracic tumor    History of Present Illness:  It was a pleasure to evaluate Laure Wood in clinic today at the kind referral of Jarred Reeves PA-C  [address] [facility] [address]  [place] [phone].    Laure Wood is a 72 year old female with past medical history of bilateral knee replacements, hypertension, BMI 34, left hip replacement, CKD stage III, low back pain with sciatica, uterine prolapse, who was found to have a T8-9 tumor concerning for meningioma and spinal cord compression.    The patient noted about 2 months ago she started to have numbness in her right leg which progressed to both legs and numbness from the waist down.  She describes a burning sensation in these areas with light touch.  She has had difficulty with ambulating and is now using a cane.  Prior to 2 months ago she states that she was walking well without any assistive devices.  She denies new bowel or bladder incontinence, but does endorse saddle anesthesia.  She is accompanied today by her granddaughter and has her daughter on the phone.      Her ortho notes indicate she had a fall down the stairs at the end of August with injury to her right knee for which she was provided a hinged knee brace.  In September she had noted a loss of sensation in the bottoms of her feet.  In October she was given a script for prednisone by her PCP (which she reported was not helpful).  In December, she reported to ortho that the pain and numbness had spread to her waist and both legs.  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  Imaging also revealed left thyroid nodule.        Past Medical History:   Diagnosis Date     Gastroesophageal reflux disease      Hyperlipidemia LDL goal <160      Hypertension, essential, benign      Knee osteoarthritis     knees, hands     Motion sickness      PONV (postoperative nausea and vomiting)        Past Surgical History:   Procedure Laterality Date     ARTHROPLASTY KNEE Left 7/27/2020    Procedure: Left total knee arthroplasty (Springer and Nephew #4 PS femur; #3 tibia; 9 mm tibial insert; 32 mm patella);  Surgeon: James Mcdonald MD;  Location: [facility] OR     ARTHROPLASTY KNEE Right 4/6/2021    Procedure: Right total knee arthroplasty (Springer and nephew #4 PS femur; #3 tibia' 32 mm patella and 9 mm tibial insert);  Surgeon: James Mcdonald MD;  Location: [facility] OR     CATARACT IOL, RT/LT Bilateral     Cataract IOL RT/LT     COLONOSCOPY Left 7/25/2019    Procedure: COLONOSCOPY;  Surgeon: Jono Egan MD;  Location: [facility] GI       Social History     Socioeconomic History     Marital status:      Number of children: 6   Occupational History     Occupation: retired   Tobacco Use     Smoking status: Never     Smokeless tobacco: Never   Vaping Use     Vaping Use: Never used   Substance and Sexual Activity     Alcohol use: No     Drug use: No     Sexual activity: Never       Family history is unknown by patient.       IMAGING   Imaging independently reviewed.      MRI thoracic  - Significant cord compression from T8/9 tumor.    \"IMPRESSION:  1. Homogeneously enhancing intradural extramedullary mass at the level  of T8-T9 causing severe focal compression of the spinal cord. The mass  is nonspecific, but may represent a meningioma or schwannoma.  Recommend spinal surgery consultation.  2. Multilevel degenerative changes, as described.  3. Left thyroid nodule measuring 18 mm. Recommend follow-up thyroid  Ultrasound.\"          MRI lumbar " "12/12/22 - severe DDD w/ CCS at L3/4.  \"IMPRESSION:    1. 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  2. Degenerative change with multiple stenoses as detailed including  severe spinal canal stenosis at L3-L4.  3. Findings have worsened compared to the prior MRI.\"    Other Resulted Imaging/Labs:    XR Lumbar Spine 2/3 Views  Result Date: 12/5/2022  IMPRESSION: In keeping with the nomenclature used on previous MR lumbar spine report of 1/16/2013 and thoracic spine radiographs of same day, there appear to be 12 rib-bearing thoracic vertebral bodies with hypoplastic ribs at T12 and four non-rib-bearing lumbar vertebral bodies, with the lumbosacral junction designated L4-S1. No gross vertebral body height loss is identified. There is grade 1 degenerative anterolisthesis of L3 on L4 measuring approximately 5 mm, new compared to the previous remote MRI. Sagittal alignment otherwise appears normal. There is mild dextroconvex curvature with apex at L2. Minimal degenerative left lateral listhesis of L1 on L2 and mild degenerative right lateral listhesis of L2 on L3 and possible minimal degenerative right lateral listhesis of L3 on L4. Mild and moderate degrees of multilevel degenerative disc space narrowing with marginal endplate osteophytes. Multilevel degenerative facet disease, most pronounced in the lower lumbar spine. SABAS GAFFNEY MD   SYSTEM ID:  [id number]    XR Thoracic Spine 2 Views  Result Date: 12/5/2022  IMPRESSION: Nomenclature is based on 12 thoracic vertebral bodies with hypoplastic ribs at T12. Mild anterior wedging of a few mid to lower thoracic vertebral bodies, most pronounced at T10, and seen to lesser degree at T7, T8 and T9. Age-indeterminant compression deformity is not excluded. Recommend correlation for any point tenderness in those areas. Sagittal alignment of the thoracic spine appears within normal limits. There is " "minimal levoconvex curvature of the lower thoracic spine and mild to moderate dextroconvex curvature of the lumbar spine. Mild multilevel degenerative disc space narrowing with associated degenerative endplate irregularity and small marginal endplate osteophytes. There appears to be some elevation of the left hemidiaphragm. Low lung volumes. Please see separate report from lumbar spine radiographs of same day for additional details. SABAS GAFFNEY MD   SYSTEM ID:  [id number]    Vitamin D:  Vitamin D Deficiency Screening Results:  No results found for: VITDT  25 OH Vit D total   Date Value Ref Range Status   04/09/2012 30 30 - 75 ug/L Final     Comment:     Season, race, dietary intake, and treatment affect the concentration of   25-hydroxy-Vitamin D. Values may decrease during winter months and increase   during summer months. Values less than 30 ug/L may indicate Vitamin D   deficiency.  Vitamin D determination is routinely performed by an immunoassay specific for   25   hydroxyvitamin D3.  If an individual is on vitamin D2 (ergocalciferol)   supplementation, please specify 25 OH vitamin D2 and D3 level determination   by   LCMSMS.  For questions, please contact the laboratory at [phone].         Nutritional Status:  Estimated body mass index is 34.54 kg/m  as calculated from the following:    Height as of 12/20/22: 1.6 m (5' 3\").    Weight as of this encounter: 88.5 kg (195 lb).    Lab Results   Component Value Date    ALBUMIN 4.3 04/15/2013       Diabetes Screening:  Lab Results   Component Value Date    A1C 5.5 05/09/2019        Physical Exam   BP (!) 144/81   Pulse 67   Resp 16   Wt 88.5 kg (195 lb)   SpO2 100%   BMI 34.54 kg/m      Constitutional: Appears well-developed and well-nourished. Cooperative. No acute distress.   HENT:   Head: Normocephalic and atraumatic.   Eyes: Conjunctivae are normal.  Neck: Neck supple. No tracheal deviation present.  Cardiovascular: Normal rate and regular rhythm.  "   Pulmonary/Chest: Effort normal and breath sounds normal.  Abdominal: Exhibits no obvious distension.   Musculoskeletal: Able to move all extremities.  No involuntary motor movements.   Skin: Skin is warm, dry and intact.   Psychiatric: Normal mood and affect. Speech is normal and behavior is normal.    Neurological:  Alert, NAD, and oriented to person, place, and time.   No cranial nerve deficit   Gait: Exhibits difficulty with standing and ambulating, favors right leg.     Strength (L/R)  5/5 BUE    5/4 Hip Flexion  5/4+ Knee Extension  5/5 Ankle Dorsiflexion  5/5 Extensor Hallucis Longus  5/5 Plantar Flexion    Reflexes (L/R)  0/0 Patellar  1+/1+ Ankle    No Babinski  No ankle clonus    Sensation: retained sensation above naval line, notes numbness and burning below.         ASSESSMENT:  Laure Wood is a 72 year old female who was found to have a T8-9 mass consistent with meningioma causing spinal cord compression at that level.  Her symptoms had rapid onset over the last couple months.  She now has T9 and below sensory deficit with paresthesias.  She is having increased trouble with ambulating.  She additionally has severe central canal stenosis at L3-4, which may may also be causing issues in her lower extremities.  However the more pressing symptoms are due to the presumed T8-9 meningioma and spinal cord compression.  We recommend urgent surgery so that she does not progress to the point of losing her ability to walk on a permanent basis.  Dr. Antunez met with the patient today and did discuss that surgery is the only treatment and has its risks.  She discussed the surgery in detail and discussed the risks and benefits.  The patient's granddaughter and her   daughter were involved in the conversation.     Additionally the patient's imaging revealed a left thyroid nodule.  This will need to be evaluated further with ultrasound but again the thoracic meningioma takes precedence.    PLAN:    Schedule patient for  surgery as soon as possible for decompressive laminectomy and tumor resection.    She will need to be seen in the PAC clinic for preop.    Patient seen and discussed with Dr. Figueredo.     I spent 45 minutes in patient care with greater than 50% spent in counseling and/or coordination of care.    I performed independent visualization of radiographic imaging and entered my own interpretation, reviewed and summarized old records, and discussed this case with another health care provider.       Ally Luna PA-C  Department of Neurosurgery  Office: [phone]        Sincerely,    Ambar Antunez MD

## 2023-01-06 NOTE — TELEPHONE ENCOUNTER
FUTURE VISIT INFORMATION      SURGERY INFORMATION:    Date: 23    Location: [facility] or    Surgeon:  Ambar Antunez MD    Anesthesia Type:  general    Procedure: LAMINECTOMY, SPINE, THORACIC, WITH NEOPLASM EXCISION  Thoracic  laminectomy with resection of intradural mass     Consult: ov     RECORDS REQUESTED FROM:       Primary Care Provider: Yoshi Bowen MD- [facility]    Pertinent Medical History: hypertension    Most recent EKG+ Tracin21    Most recent ECHO: 23    Most recent Cardiac Stress Test: 13

## 2023-01-06 NOTE — TELEPHONE ENCOUNTER
I spoke with patient in person regarding scheduling surgery with Dr. Antunez. Patient does not need covid test unless requested by provider and pre op has been taken care of with PAC in person. Patient is aware that the nursing team will be reaching out within the next few days. I did tell patient that a nurse will reach out within 2-3 days prior to surgery with arrival time and instructions.    Surgeon: Dr. Antunez  Date of Surgery: 1/16/2023  Location of surgery: [facility]  Pre-Op H&P: 1/09/2023  Post-Op Appt Date: 1/30/2023   Imaging needed:  No  Discussed COVID-19 testing:  Yes  Pre-cert/Authorization completed:  Yes  Patient aware that pre-op RN will call 2-3 days prior to surgery with arrival time and instructions Yes          Kyle Blake on 1/6/2023 at 3:30 PM

## 2023-01-06 NOTE — PROGRESS NOTES
Neurosurgery Clinic Note    Chief Complaint: Thoracic tumor    History of Present Illness:  It was a pleasure to evaluate Laure Wood in clinic today at the kind referral of Jarred Reeves PA-C  [address] [place] [phone].    Laure Wood is a 72 year old female with past medical history of bilateral knee replacements, hypertension, BMI 34, left hip replacement, CKD stage III, low back pain with sciatica, uterine prolapse, who was found to have a T8-9 tumor concerning for meningioma and spinal cord compression.    The patient noted about 2 months ago she started to have numbness in her right leg which progressed to both legs and numbness from the waist down.  She describes a burning sensation in these areas with light touch.  She has had difficulty with ambulating and is now using a cane.  Prior to 2 months ago she states that she was walking well without any assistive devices.  She denies new bowel or bladder incontinence, but does endorse saddle anesthesia.  She is accompanied today by her granddaughter and has her daughter on the phone.      Her ortho notes indicate she had a fall down the stairs at the end of August with injury to her right knee for which she was provided a hinged knee brace.  In September she had noted a loss of sensation in the bottoms of her feet.  In October she was given a script for prednisone by her PCP (which she reported was not helpful).  In December, she reported to ortho that the pain and numbness had spread to her waist and both legs.  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  Imaging also revealed left thyroid nodule.        Past Medical History:   Diagnosis Date    Gastroesophageal reflux disease     Hyperlipidemia LDL goal <160     Hypertension, essential, benign     Knee osteoarthritis     knees, hands     "Motion sickness     PONV (postoperative nausea and vomiting)        Past Surgical History:   Procedure Laterality Date    ARTHROPLASTY KNEE Left 7/27/2020    Procedure: Left total knee arthroplasty (Springer and Nephew #4 PS femur; #3 tibia; 9 mm tibial insert; 32 mm patella);  Surgeon: James Mcdonald MD;  Location: [facility] OR    ARTHROPLASTY KNEE Right 4/6/2021    Procedure: Right total knee arthroplasty (Springer and nephew #4 PS femur; #3 tibia' 32 mm patella and 9 mm tibial insert);  Surgeon: James Mcdonald MD;  Location: [facility] OR    CATARACT IOL, RT/LT Bilateral     Cataract IOL RT/LT    COLONOSCOPY Left 7/25/2019    Procedure: COLONOSCOPY;  Surgeon: Jono Egan MD;  Location:  GI       Social History     Socioeconomic History    Marital status:     Number of children: 6   Occupational History    Occupation: retired   Tobacco Use    Smoking status: Never    Smokeless tobacco: Never   Vaping Use    Vaping Use: Never used   Substance and Sexual Activity    Alcohol use: No    Drug use: No    Sexual activity: Never       Family history is unknown by patient.       IMAGING   Imaging independently reviewed.      MRI thoracic  - Significant cord compression from T8/9 tumor.    \"IMPRESSION:  1. Homogeneously enhancing intradural extramedullary mass at the level  of T8-T9 causing severe focal compression of the spinal cord. The mass  is nonspecific, but may represent a meningioma or schwannoma.  Recommend spinal surgery consultation.  2. Multilevel degenerative changes, as described.  3. Left thyroid nodule measuring 18 mm. Recommend follow-up thyroid  Ultrasound.\"          MRI lumbar 12/12/22 - severe DDD w/ CCS at L3/4.  \"IMPRESSION:    1. 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  2. Degenerative change with multiple stenoses as detailed including  severe spinal canal stenosis at L3-L4.  3. Findings have worsened compared to " "the prior MRI.\"    Other Resulted Imaging/Labs:    XR Lumbar Spine 2/3 Views  Result Date: 12/5/2022  IMPRESSION: In keeping with the nomenclature used on previous MR lumbar spine report of 1/16/2013 and thoracic spine radiographs of same day, there appear to be 12 rib-bearing thoracic vertebral bodies with hypoplastic ribs at T12 and four non-rib-bearing lumbar vertebral bodies, with the lumbosacral junction designated L4-S1. No gross vertebral body height loss is identified. There is grade 1 degenerative anterolisthesis of L3 on L4 measuring approximately 5 mm, new compared to the previous remote MRI. Sagittal alignment otherwise appears normal. There is mild dextroconvex curvature with apex at L2. Minimal degenerative left lateral listhesis of L1 on L2 and mild degenerative right lateral listhesis of L2 on L3 and possible minimal degenerative right lateral listhesis of L3 on L4. Mild and moderate degrees of multilevel degenerative disc space narrowing with marginal endplate osteophytes. Multilevel degenerative facet disease, most pronounced in the lower lumbar spine. SABAS GAFFNEY MD   SYSTEM ID:  [id number]    XR Thoracic Spine 2 Views  Result Date: 12/5/2022  IMPRESSION: Nomenclature is based on 12 thoracic vertebral bodies with hypoplastic ribs at T12. Mild anterior wedging of a few mid to lower thoracic vertebral bodies, most pronounced at T10, and seen to lesser degree at T7, T8 and T9. Age-indeterminant compression deformity is not excluded. Recommend correlation for any point tenderness in those areas. Sagittal alignment of the thoracic spine appears within normal limits. There is minimal levoconvex curvature of the lower thoracic spine and mild to moderate dextroconvex curvature of the lumbar spine. Mild multilevel degenerative disc space narrowing with associated degenerative endplate irregularity and small marginal endplate osteophytes. There appears to be some elevation of the left hemidiaphragm. Low " "lung volumes. Please see separate report from lumbar spine radiographs of same day for additional details. SABAS GAFFNEY MD   SYSTEM ID:  [id number]    Vitamin D:  Vitamin D Deficiency Screening Results:  No results found for: VITDT  25 OH Vit D total   Date Value Ref Range Status   04/09/2012 30 30 - 75 ug/L Final     Comment:     Season, race, dietary intake, and treatment affect the concentration of   25-hydroxy-Vitamin D. Values may decrease during winter months and increase   during summer months. Values less than 30 ug/L may indicate Vitamin D   deficiency.  Vitamin D determination is routinely performed by an immunoassay specific for   25   hydroxyvitamin D3.  If an individual is on vitamin D2 (ergocalciferol)   supplementation, please specify 25 OH vitamin D2 and D3 level determination   by   LCMSMS.  For questions, please contact the laboratory at [phone].         Nutritional Status:  Estimated body mass index is 34.54 kg/m  as calculated from the following:    Height as of 12/20/22: 1.6 m (5' 3\").    Weight as of this encounter: 88.5 kg (195 lb).    Lab Results   Component Value Date    ALBUMIN 4.3 04/15/2013       Diabetes Screening:  Lab Results   Component Value Date    A1C 5.5 05/09/2019        Physical Exam   BP (!) 144/81   Pulse 67   Resp 16   Wt 88.5 kg (195 lb)   SpO2 100%   BMI 34.54 kg/m      Constitutional: Appears well-developed and well-nourished. Cooperative. No acute distress.   HENT:   Head: Normocephalic and atraumatic.   Eyes: Conjunctivae are normal.  Neck: Neck supple. No tracheal deviation present.  Cardiovascular: Normal rate and regular rhythm.    Pulmonary/Chest: Effort normal and breath sounds normal.  Abdominal: Exhibits no obvious distension.   Musculoskeletal: Able to move all extremities.  No involuntary motor movements.   Skin: Skin is warm, dry and intact.   Psychiatric: Normal mood and affect. Speech is normal and behavior is normal.    Neurological:  Alert, NAD, and " oriented to person, place, and time.   No cranial nerve deficit   Gait: Exhibits difficulty with standing and ambulating, favors right leg.     Strength (L/R)  5/5 BUE    5/4 Hip Flexion  5/4+ Knee Extension  5/5 Ankle Dorsiflexion  5/5 Extensor Hallucis Longus  5/5 Plantar Flexion    Reflexes (L/R)  0/0 Patellar  1+/1+ Ankle    No Babinski  No ankle clonus    Sensation: retained sensation above naval line, notes numbness and burning below.         ASSESSMENT:  Laure Wood is a 72 year old female who was found to have a T8-9 mass consistent with meningioma causing spinal cord compression at that level.  Her symptoms had rapid onset over the last couple months.  She now has T9 and below sensory deficit with paresthesias.  She is having increased trouble with ambulating.  She additionally has severe central canal stenosis at L3-4, which may may also be causing issues in her lower extremities.  However the more pressing symptoms are due to the presumed T8-9 meningioma and spinal cord compression.  We recommend urgent surgery so that she does not progress to the point of losing her ability to walk on a permanent basis.  Dr. Antunez met with the patient today and did discuss that surgery is the only treatment and has its risks.  She discussed the surgery in detail and discussed the risks and benefits.  The patient's granddaughter and her   daughter were involved in the conversation.     Additionally the patient's imaging revealed a left thyroid nodule.  This will need to be evaluated further with ultrasound but again the thoracic meningioma takes precedence.    PLAN:    Schedule patient for surgery as soon as possible for decompressive laminectomy and tumor resection.    She will need to be seen in the PAC clinic for preop.    Patient seen and discussed with Dr. Figueredo.     I spent 45 minutes in patient care with greater than 50% spent in counseling and/or coordination of care.    I performed independent visualization of  radiographic imaging and entered my own interpretation, reviewed and summarized old records, and discussed this case with another health care provider.       Ally Luna PA-C  Department of Neurosurgery  Office: [phone]  I have seen this patient with the PA and formulated a plan and agree with this note.  AMP

## 2023-01-08 LAB
ABO/RH(D): NORMAL
ANTIBODY SCREEN: NEGATIVE
SPECIMEN EXPIRATION DATE: NORMAL

## 2023-01-09 ENCOUNTER — ANESTHESIA EVENT (OUTPATIENT)
Dept: SURGERY | Facility: CLINIC | Age: 73
DRG: 029 | End: 2023-01-09
Payer: COMMERCIAL

## 2023-01-09 ENCOUNTER — LAB (OUTPATIENT)
Dept: LAB | Facility: CLINIC | Age: 73
End: 2023-01-09
Payer: COMMERCIAL

## 2023-01-09 ENCOUNTER — OFFICE VISIT (OUTPATIENT)
Dept: SURGERY | Facility: CLINIC | Age: 73
End: 2023-01-09
Payer: COMMERCIAL

## 2023-01-09 ENCOUNTER — PRE VISIT (OUTPATIENT)
Dept: SURGERY | Facility: CLINIC | Age: 73
End: 2023-01-09

## 2023-01-09 VITALS
HEIGHT: 63 IN | BODY MASS INDEX: 35.31 KG/M2 | RESPIRATION RATE: 16 BRPM | DIASTOLIC BLOOD PRESSURE: 75 MMHG | HEART RATE: 66 BPM | WEIGHT: 199.3 LBS | SYSTOLIC BLOOD PRESSURE: 126 MMHG | TEMPERATURE: 97.5 F | OXYGEN SATURATION: 100 %

## 2023-01-09 DIAGNOSIS — Z01.818 PREOP EXAMINATION: Primary | ICD-10-CM

## 2023-01-09 DIAGNOSIS — D32.9 MENINGIOMA (H): ICD-10-CM

## 2023-01-09 DIAGNOSIS — Z01.818 PREOP EXAMINATION: ICD-10-CM

## 2023-01-09 DIAGNOSIS — N18.31 CHRONIC KIDNEY DISEASE, STAGE 3A (H): Primary | ICD-10-CM

## 2023-01-09 LAB
ANION GAP SERPL CALCULATED.3IONS-SCNC: 10 MMOL/L (ref 7–15)
BUN SERPL-MCNC: 21.9 MG/DL (ref 8–23)
CALCIUM SERPL-MCNC: 10.1 MG/DL (ref 8.8–10.2)
CHLORIDE SERPL-SCNC: 105 MMOL/L (ref 98–107)
CREAT SERPL-MCNC: 1.05 MG/DL (ref 0.51–0.95)
DEPRECATED HCO3 PLAS-SCNC: 28 MMOL/L (ref 22–29)
ERYTHROCYTE [DISTWIDTH] IN BLOOD BY AUTOMATED COUNT: 14.7 % (ref 10–15)
GFR SERPL CREATININE-BSD FRML MDRD: 56 ML/MIN/1.73M2
GLUCOSE SERPL-MCNC: 87 MG/DL (ref 70–99)
HCT VFR BLD AUTO: 41 % (ref 35–47)
HGB BLD-MCNC: 12.5 G/DL (ref 11.7–15.7)
MCH RBC QN AUTO: 27.8 PG (ref 26.5–33)
MCHC RBC AUTO-ENTMCNC: 30.5 G/DL (ref 31.5–36.5)
MCV RBC AUTO: 91 FL (ref 78–100)
PLATELET # BLD AUTO: 264 10E3/UL (ref 150–450)
POTASSIUM SERPL-SCNC: 4 MMOL/L (ref 3.4–5.3)
RBC # BLD AUTO: 4.5 10E6/UL (ref 3.8–5.2)
SODIUM SERPL-SCNC: 143 MMOL/L (ref 136–145)
WBC # BLD AUTO: 5.2 10E3/UL (ref 4–11)

## 2023-01-09 PROCEDURE — 99203 OFFICE O/P NEW LOW 30 MIN: CPT | Performed by: PHYSICIAN ASSISTANT

## 2023-01-09 PROCEDURE — 80048 BASIC METABOLIC PNL TOTAL CA: CPT | Performed by: PATHOLOGY

## 2023-01-09 PROCEDURE — 86901 BLOOD TYPING SEROLOGIC RH(D): CPT | Performed by: PHYSICIAN ASSISTANT

## 2023-01-09 PROCEDURE — 36415 COLL VENOUS BLD VENIPUNCTURE: CPT | Performed by: PATHOLOGY

## 2023-01-09 PROCEDURE — 85027 COMPLETE CBC AUTOMATED: CPT | Performed by: PATHOLOGY

## 2023-01-09 ASSESSMENT — PAIN SCALES - GENERAL: PAINLEVEL: WORST PAIN (10)

## 2023-01-09 ASSESSMENT — LIFESTYLE VARIABLES: TOBACCO_USE: 0

## 2023-01-09 ASSESSMENT — ENCOUNTER SYMPTOMS: SEIZURES: 0

## 2023-01-09 NOTE — H&P
Pre-Operative H & P     CC:  Preoperative exam to assess for increased cardiopulmonary risk while undergoing surgery and anesthesia.    Date of Encounter: 1/9/2023  Primary Care Physician:  Yoshi Bowen     Reason for visit:   Encounter Diagnoses   Name Primary?     Preop examination Yes     Meningioma (H)        HPI  Laure Wood is a 72 year old female who presents for pre-operative H & P in preparation for  Procedure Information     Case: [id number] Date/Time: 01/16/23 1300    Procedure: Thoracic 8/9 laminectomy with resection of intradural mass (Spine)    Anesthesia type: General    Diagnosis: Meningioma (H) [D32.9]    Pre-op diagnosis: Meningioma (H) [D32.9]    Location: [facility] /  OR    Providers: Ambar Antunez MD          Ms. Laure Wood is a 72 year old female with PMH significant for hypertension, dyslipidemia, postoperative nausea and vomiting, obesity, chronic renal insufficiency, and gout.  Approximately 2 months ago patient started having numbness in her right leg.  This progressed to bilateral legs and a feeling of numbness from the waist down.  She began to have difficulty walking and is now using a cane.  She denies bowel or bladder issues but does report numbness in her groin.  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  She was recently evaluated by neurosurgery and the above surgery was recommended.  She wishes to proceed.    History is obtained from the patient and chart review    Hx of abnormal bleeding or anti-platelet use: denies    Menstrual history: No LMP recorded (lmp unknown). Patient is postmenopausal.:      Past Medical History  Past Medical History:   Diagnosis Date     Gastroesophageal reflux disease      Hyperlipidemia LDL goal <160      Hypertension, essential, benign      Knee osteoarthritis     knees, hands     Motion sickness      PONV (postoperative nausea and  vomiting)        Past Surgical History  Past Surgical History:   Procedure Laterality Date     ARTHROPLASTY KNEE Left 7/27/2020    Procedure: Left total knee arthroplasty (Springer and Nephew #4 PS femur; #3 tibia; 9 mm tibial insert; 32 mm patella);  Surgeon: James Mcdonald MD;  Location: [facility] OR     ARTHROPLASTY KNEE Right 4/6/2021    Procedure: Right total knee arthroplasty (Springer and nephew #4 PS femur; #3 tibia' 32 mm patella and 9 mm tibial insert);  Surgeon: James Mcdonald MD;  Location: [facility] OR     CATARACT IOL, RT/LT Bilateral     Cataract IOL RT/LT     COLONOSCOPY Left 7/25/2019    Procedure: COLONOSCOPY;  Surgeon: Jono Egan MD;  Location: [facility]       Prior to Admission Medications  Current Outpatient Medications   Medication Sig Dispense Refill     acetaminophen (TYLENOL) 325 MG tablet Take 2 tablets (650 mg) by mouth every 6 hours as needed for mild pain       allopurinol (ZYLOPRIM) 100 MG tablet Take 2 tablets (200 mg) by mouth daily (Patient taking differently: Take 200 mg by mouth 2 times daily) 180 tablet 3     colchicine (COLCYRS) 0.6 MG tablet Take 1 tablet (0.6 mg) by mouth daily (Patient taking differently: Take 0.6 mg by mouth 2 times daily) 90 tablet 3     diclofenac (VOLTAREN) 1 % topical gel Apply 2-4 g topically 4 times daily R Knee, foot as needed 100 g 11     hydrochlorothiazide (HYDRODIURIL) 25 MG tablet TAKE 1 TABLET(25 MG) BY MOUTH DAILY (Patient taking differently: Take 25 mg by mouth every morning) 90 tablet 0     omeprazole (PRILOSEC) 20 MG DR capsule Take 20 mg by mouth daily as needed (pt takes prn for occasional GERD symptoms)       simvastatin (ZOCOR) 20 MG tablet Take 1 tablet (20 mg) by mouth At Bedtime 90 tablet 3       Allergies  Allergies   Allergen Reactions     No Known Drug Allergy        Social History  Social History     Socioeconomic History     Marital status:      Spouse name: Not on file     Number of children: 6     Years of education: Not on file      Highest education level: Not on file   Occupational History     Occupation: retired   Tobacco Use     Smoking status: Never     Smokeless tobacco: Never   Vaping Use     Vaping Use: Never used   Substance and Sexual Activity     Alcohol use: No     Drug use: No     Sexual activity: Never   Other Topics Concern     Parent/sibling w/ CABG, MI or angioplasty before 65F 55M? Not Asked   Social History Narrative     Not on file     Social Determinants of Health     Financial Resource Strain: Not on file   Food Insecurity: Not on file   Transportation Needs: Not on file   Physical Activity: Not on file   Stress: Not on file   Social Connections: Not on file   Intimate Partner Violence: Not on file   Housing Stability: Not on file       Family History  Family History   Problem Relation Age of Onset     Anesthesia Reaction No family hx of      Deep Vein Thrombosis No family hx of      Bleeding Disorder No family hx of        Review of Systems  The complete review of systems is negative other than noted in the HPI or here.     Anesthesia Evaluation   Pt has had prior anesthetic.     History of anesthetic complications  - PONV.      ROS/MED HX  ENT/Pulmonary:     (+) KIRILL risk factors, hypertension, obese,  (-) tobacco use and asthma   Neurologic:  - neg neurologic ROS  (-) no seizures and no CVA   Cardiovascular:     (+) Dyslipidemia hypertension-----Previous cardiac testing   Echo: Date: 2017 Results:  Normal left ventricular size, thickness, systolic function and wall motion. LVEF calculated by biplane Lopez's was 53%. Diastolic function was normal.Normal right ventricular cavity size, systolic function and wall motion.Atria are normal in size.No significant valvular abnormalities.No evidence of pericardial effusion.No prior study available for comparison.  Stress Test: Date: Results:    ECG Reviewed: Date: Results:    Cath: Date: Results:      METS/Exercise Tolerance:  Comment: Up until two months ago, pt had >4  "mets.  She was going to gym and walking on treadmill.  Now due to cord compression she is having difficulty ambulating   Hematologic:    (-) history of blood clots and history of blood transfusion   Musculoskeletal: Comment: gout      GI/Hepatic:     (+) GERD (takes a omeprazole prn),     Renal/Genitourinary:     (+) renal disease, type: CRI,     Endo:  - neg endo ROS     Psychiatric/Substance Use:  - neg psychiatric ROS     Infectious Disease:  - neg infectious disease ROS     Malignancy:  - neg malignancy ROS     Other:  - neg other ROS          /75 (BP Location: Right arm, Patient Position: Sitting, Cuff Size: Adult Large)   Pulse 66   Temp 97.5  F (36.4  C) (Oral)   Resp 16   Ht 1.6 m (5' 3\")   Wt 90.4 kg (199 lb 4.8 oz)   LMP  (LMP Unknown)   SpO2 100%   Breastfeeding No   BMI 35.30 kg/m      Physical Exam   Constitutional: Awake, alert, cooperative, no apparent distress, and appears stated age.  Eyes: Pupils equal, round and reactive to light, extra ocular muscles intact, sclera clear, conjunctiva normal.  HENT: Normocephalic, oral pharynx with moist mucus membranes, fair dentition. No goiter appreciated.   Respiratory: Clear to auscultation bilaterally, no crackles or wheezing.  Cardiovascular: Regular rate and rhythm, normal S1 and S2, and no murmur noted.  Carotids +2, no bruits. No edema. Palpable pulses to radial and PT arteries.   GI: Normal bowel sounds, soft, non-distended, non-tender  Lymph/Hematologic: No cervical lymphadenopathy and no supraclavicular lymphadenopathy.  Genitourinary:  deferred  Skin: Warm and dry.  No rashes at anticipated surgical site.   Musculoskeletal: Full ROM of neck. There is no redness, warmth, or swelling of the joints. Gross motor strength is normal.    Neurologic: Awake, alert, oriented to name, place and time. Cranial nerves II-XII are grossly intact.   Neuropsychiatric: Calm, cooperative. Normal affect.       Prior Labs/Diagnostic Studies   All labs and " imaging personally reviewed     Component      Latest Ref Rng & Units 1/9/2023   WBC      4.0 - 11.0 10e3/uL 5.2   RBC Count      3.80 - 5.20 10e6/uL 4.50   Hemoglobin      11.7 - 15.7 g/dL 12.5   Hematocrit      35.0 - 47.0 % 41.0   MCV      78 - 100 fL 91   MCH      26.5 - 33.0 pg 27.8   MCHC      31.5 - 36.5 g/dL 30.5 (L)   RDW      10.0 - 15.0 % 14.7   Platelet Count      150 - 450 10e3/uL 264     Component      Latest Ref Rng & Units 1/9/2023   Sodium      136 - 145 mmol/L 143   Potassium      3.4 - 5.3 mmol/L 4.0   Chloride      98 - 107 mmol/L 105   Carbon Dioxide (CO2)      22 - 29 mmol/L 28   Anion Gap      7 - 15 mmol/L 10   Urea Nitrogen      8.0 - 23.0 mg/dL 21.9   Creatinine      0.51 - 0.95 mg/dL 1.05 (H)   Calcium      8.8 - 10.2 mg/dL 10.1   Glucose      70 - 99 mg/dL 87   GFR Estimate      >60 mL/min/1.73m2 56 (L)         EKG/ stress test - if available please see in ROS above       The patient's records and results personally reviewed by this provider.     Outside records reviewed from: Care Everywhere        Assessment      Laure Wood is a 72 year old female seen as a PAC referral for risk assessment and optimization for anesthesia.    Plan/Recommendations  Pt will be optimized for the proposed procedure.  See below for details on the assessment, risk, and preoperative recommendations    NEUROLOGY  - No history of TIA, CVA or seizure    - post Op delirium risk factors:  Age    ENT  - No current airway concerns.  Will need to be reassessed day of surgery.  Mallampati: II  TM: > 3    CARDIAC  - HTN, HLD.  Hold hydrochlorothiazide on DOS and continue Zocor  - echo 2017 with EF 53%.  - denies chest pain, SOB, SIEGEL, palpitations, LE edema    - METS (Metabolic Equivalents)  Patient performs 4 or more METS exercise without symptoms            Total Score: 0      RCRI-Very low risk: Class 1 0.4% complication rate            Total Score: 0        PULMONARY    KIRILL Medium Risk            Total Score: 3     "KIRILL: Hypertension    KIRILL: BMI over 35 kg/m2    KIRILL: Over 50 ys old      - Denies asthma or inhaler use  - Tobacco History      History   Smoking Status     Never   Smokeless Tobacco     Never       GI  - occasional GERD symtpoms, prn omeprazole  PONV High Risk  Total Score: 4           1 AN PONV: Pt is Female    1 AN PONV: Patient is not a current smoker    1 AN PONV: Patient has history of PONV    1 AN PONV: Intended Post Op Opioids        /RENAL  - CKD, most recent creatinine 1.11  - recheck today (1.05)    ENDOCRINE    - BMI: Estimated body mass index is 35.3 kg/m  as calculated from the following:    Height as of this encounter: 1.6 m (5' 3\").    Weight as of this encounter: 90.4 kg (199 lb 4.8 oz).  Obesity (BMI >30)  - No history of Diabetes Mellitus    HEME  VTE Low Risk 0.26%            Total Score: 1    VTE: Greater than 59 yrs old      - No history of abnormal bleeding or antiplatelet use.      MSK  - gout, continue allopurinol and colchicine         Different anesthesia methods/types have been discussed with the patient, but they are aware that the final plan will be decided by the assigned anesthesia provider on the date of service.        The patient is optimized for their procedure. AVS with information on surgery time/arrival time, meds and NPO status given by nursing staff. No further diagnostic testing indicated.      On the day of service:     Prep time: 12 minutes  Visit time: 10 minutes  Documentation time: 15 minutes  ------------------------------------------  Total time: 37 minutes      Lucero Allison PA-C  Preoperative Assessment Center  [facility]  Clinic and Surgery Center  Phone: [phone]  Fax: [phone]  "

## 2023-01-09 NOTE — PATIENT INSTRUCTIONS
Preparing for Your Surgery      Name:  Laure Wood   MRN:  [id number]   :  1950   Today's Date:  2023       Arriving for surgery:  Surgery date:  23  Arrival time:  11 am     Surgeries and procedures: Adult patients can have 2 visitors all through the surgery process.     Visiting hours: 8 a.m. to 8:30 p.m.     Hospital: Adult patients and children under age 18 can have 4 visitor at a time     No visitors under the age of 5 are allowed for hospital patients.  Double occupancy rooms: Patients can have only two visitors at a time.     Patients with disabilities: Can have a support person with them (family member, service provider     Or someone well informed about their needs) plus the allowed number of visitors     Patients confirmed or suspected to have symptoms of COVID 19 or flu:     No visitors allowed for adult patients.   Children (under age 18) can have 1 named visitor.     People who are sick or showing symptoms of COVID 19 or flu:    Are not allowed to visit patients--we can only make exceptions in special situations.       Please follow these guidelines for your visit:   Arrive wearing a mask over your mouth and nose; we will give you a medical mask to wear    If you arrive wearing a cloth mask.   Keep it on during your entire visit, even when in patient's room.   If you don't wear a mask we'll ask you to leave.     Clean your hands with alcohol hand . Do this when you arrive at and leave the building and patient room,    And again after you touch your mask or anything in the room.     You can t visit if you have a fever, cough, shortness of breath, muscle aches, headaches, sore throat    Or diarrhea      Stay 6 feet away from others during your visit and between visits     Go directly to and from the room you are visiting.     Stay in the patient s room during your visit. Limit going to other places in the hospital as much as possible     Leave bags and jackets at home or in  the car.     For everyone s health, please don t come and go during your visit. That includes for smoking   during your visit.     Please come to:     [facility] [address]  [address] [place]  [address]    - ? parking is available in front of the hospital for those with mobility difficulties.     -  Parking is also available at the Patient Visitor Ramp on [address] [facility].    -  When entering the hospital, you will be asked some Covid screening questions and directed to Patient Registration. Patient Registration will then direct you to the 3rd floor Surgery Waiting Room.  [phone]?     - ?If you are in need of directions, wheelchair or escort please stop at the Information Desk in the lobby.  Inform the information person that you are here for surgery; a wheelchair and escort to [address] will be provided.?     What can I eat or drink?  -  You may eat and drink normally up to 8 hours prior to arrival time. (Until 3 am)  -  You may have clear liquids until 2 hours prior to arrival time. (Until 9 am)    Examples of clear liquids:  Water  Clear broth  Juices (apple, white grape, white cranberry  and cider) without pulp  Noncarbonated, powder based beverages  (lemonade and Scott-Aid)  Sodas (Sprite, 7-Up, ginger ale and seltzer)  Coffee or tea (without milk or cream)  Gatorade    -  No Alcohol for at least 24 hours before surgery.     Which medicines can I take?  Hold Aspirin for 7 days before surgery.   Hold Multivitamins for 7 days before surgery.  Hold Supplements for 7 days before surgery.    Hold all NSAIDS for 7 days before spine surgery. (Ibuprofen, Naproxen, Celebrex, Indocin, Diclofenac.)    -  DO NOT take these medications the day of surgery:  Colchicine (Colocyrs), Hydrochlorothiazide (Hydrodiuril),    -  PLEASE TAKE these medications the day of surgery:  Allopurinol (Zyloprim), ,  Acetaminophen (Tylenol) if needed    How  do I prepare myself?  - Please take 2 showers before surgery using Scrubcare or Hibiclens soap.    Use this soap only from the neck to your toes.     Leave the soap on your skin for one minute--then rinse thoroughly.      You may use your own shampoo and conditioner. No other hair products.   - Please remove all jewelry and body piercings.  - No lotions, deodorants or fragrance.  - No makeup or fingernail polish.   - Bring your ID and insurance card.    -If you have a Deep Brain Stimulator, Spinal Cord Stimulator, or any Neuro Stimulator device---you must bring the remote control to the hospital.      ALL PATIENTS GOING HOME THE SAME DAY OF SURGERY ARE REQUIRED TO HAVE A RESPONSIBLE ADULT TO DRIVE AND BE IN ATTENDANCE WITH THEM FOR 24 HOURS FOLLOWING SURGERY.    Covid testing policy as of 12/06/2022  Your surgeon will notify and schedule you for a COVID test if one is needed before surgery--please direct any questions or COVID symptoms to your surgeon      Questions or Concerns:    - For any questions regarding the day of surgery or your hospital stay, please contact the Pre Admission Nursing Office at [phone].       - If you have health changes between today and your surgery, please call your surgeon.       - For questions after surgery, please call your surgeons office.

## 2023-01-10 ENCOUNTER — TELEPHONE (OUTPATIENT)
Dept: NEUROSURGERY | Facility: CLINIC | Age: 73
End: 2023-01-10
Payer: COMMERCIAL

## 2023-01-10 DIAGNOSIS — M47.14 THORACIC MYELOPATHY: Primary | ICD-10-CM

## 2023-01-10 DIAGNOSIS — D49.7 INTRADURAL EXTRAMEDULLARY THORACIC TUMOR: ICD-10-CM

## 2023-01-10 NOTE — TELEPHONE ENCOUNTER
I was asked to contact patient to answer surgical questions that arose during her PACs visit.  Patient wanted to know if the numbness and burning that she is experiencing in her hips, legs and feet would improve after surgery.  It told her that it is difficult to know how much improvement she will get, but that I did anticipate she would have some improvement after the spinal cord was decompressed. We discussed at the time of her appointment that the point of surgery was to prevent further decline and preserve her ability to walk.    She had no further questions and was grateful for my call.     Ally Luna PA-C on 1/10/2023 at 2:36 PM

## 2023-01-12 ENCOUNTER — CARE COORDINATION (OUTPATIENT)
Dept: NEUROSURGERY | Facility: CLINIC | Age: 73
End: 2023-01-12

## 2023-01-12 RX ORDER — CEFAZOLIN SODIUM/WATER 2 G/20 ML
2 SYRINGE (ML) INTRAVENOUS SEE ADMIN INSTRUCTIONS
Status: CANCELLED | OUTPATIENT
Start: 2023-01-12

## 2023-01-12 RX ORDER — CEFAZOLIN SODIUM/WATER 2 G/20 ML
2 SYRINGE (ML) INTRAVENOUS
Status: CANCELLED | OUTPATIENT
Start: 2023-01-12

## 2023-01-12 RX ORDER — GABAPENTIN 100 MG/1
100 CAPSULE ORAL
Status: CANCELLED | OUTPATIENT
Start: 2023-01-12

## 2023-01-12 NOTE — PROGRESS NOTES
Pre-op packet sent via US Mail with instructions to call upon receipt.    Procedure: T8-9 Laminectomy; resection Intradural tumor    PAC: 1/6/23  DOS: 1/16/23

## 2023-01-15 RX ORDER — LIDOCAINE HYDROCHLORIDE ANHYDROUS AND DEXTROSE MONOHYDRATE .8; 5 G/100ML; G/100ML
1 INJECTION, SOLUTION INTRAVENOUS CONTINUOUS
Status: CANCELLED | OUTPATIENT
Start: 2023-01-15

## 2023-01-15 ASSESSMENT — ENCOUNTER SYMPTOMS: SEIZURES: 0

## 2023-01-15 ASSESSMENT — LIFESTYLE VARIABLES: TOBACCO_USE: 0

## 2023-01-15 NOTE — ANESTHESIA PREPROCEDURE EVALUATION
Pre-Operative H & P     CC:  Preoperative exam to assess for increased cardiopulmonary risk while undergoing surgery and anesthesia.    Date of Encounter: 1/9/2023  Primary Care Physician:  Yoshi Bowen     Reason for visit:   No diagnosis found.    BOSSMAN Wood is a 72 year old female who presents for pre-operative H & P in preparation for  Procedure Information     Case: [id number] Date/Time: 01/16/23 1300    Procedure: Thoracic 8/9 laminectomy with resection of intradural mass (Spine)    Anesthesia type: General    Diagnosis: Meningioma (H) [D32.9]    Pre-op diagnosis: Meningioma (H) [D32.9]    Location:  OR 01 /  OR    Providers: Ambar Antunez MD          Ms. Laure Wood is a 72 year old female with PMH significant for hypertension, dyslipidemia, postoperative nausea and vomiting, obesity, chronic renal insufficiency, and gout.  Approximately 2 months ago patient started having numbness in her right leg.  This progressed to bilateral legs and a feeling of numbness from the waist down.  She began to have difficulty walking and is now using a cane.  She denies bowel or bladder issues but does report numbness in her groin.  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  She was recently evaluated by neurosurgery and the above surgery was recommended.  She wishes to proceed.    History is obtained from the patient and chart review    Hx of abnormal bleeding or anti-platelet use: denies    Menstrual history: No LMP recorded (lmp unknown). Patient is postmenopausal.:      Past Medical History  Past Medical History:   Diagnosis Date     Gastroesophageal reflux disease      Hyperlipidemia LDL goal <160      Hypertension, essential, benign      Knee osteoarthritis     knees, hands     Motion sickness      PONV (postoperative nausea and vomiting)        Past Surgical History  Past Surgical History:    Procedure Laterality Date     ARTHROPLASTY KNEE Left 7/27/2020    Procedure: Left total knee arthroplasty (Springer and Nephew #4 PS femur; #3 tibia; 9 mm tibial insert; 32 mm patella);  Surgeon: James Mcdonald MD;  Location: [facility] OR     ARTHROPLASTY KNEE Right 4/6/2021    Procedure: Right total knee arthroplasty (Springer and nephew #4 PS femur; #3 tibia' 32 mm patella and 9 mm tibial insert);  Surgeon: James Mcdonald MD;  Location: [facility] OR     CATARACT IOL, RT/LT Bilateral     Cataract IOL RT/LT     COLONOSCOPY Left 7/25/2019    Procedure: COLONOSCOPY;  Surgeon: Jono Egan MD;  Location: [facility] GI       Prior to Admission Medications  Current Outpatient Medications   Medication Sig Dispense Refill     acetaminophen (TYLENOL) 325 MG tablet Take 2 tablets (650 mg) by mouth every 6 hours as needed for mild pain       allopurinol (ZYLOPRIM) 100 MG tablet Take 2 tablets (200 mg) by mouth daily (Patient taking differently: Take 200 mg by mouth 2 times daily) 180 tablet 3     colchicine (COLCYRS) 0.6 MG tablet Take 1 tablet (0.6 mg) by mouth daily (Patient taking differently: Take 0.6 mg by mouth 2 times daily) 90 tablet 3     diclofenac (VOLTAREN) 1 % topical gel Apply 2-4 g topically 4 times daily R Knee, foot as needed 100 g 11     hydrochlorothiazide (HYDRODIURIL) 25 MG tablet TAKE 1 TABLET(25 MG) BY MOUTH DAILY (Patient taking differently: Take 25 mg by mouth every morning) 90 tablet 0     omeprazole (PRILOSEC) 20 MG DR capsule Take 20 mg by mouth daily as needed (pt takes prn for occasional GERD symptoms)       simvastatin (ZOCOR) 20 MG tablet Take 1 tablet (20 mg) by mouth At Bedtime 90 tablet 3       Allergies  Allergies   Allergen Reactions     No Known Drug Allergy        Social History  Social History     Socioeconomic History     Marital status:      Spouse name: Not on file     Number of children: 6     Years of education: Not on file     Highest education level: Not on file   Occupational History      Occupation: retired   Tobacco Use     Smoking status: Never     Smokeless tobacco: Never   Vaping Use     Vaping Use: Never used   Substance and Sexual Activity     Alcohol use: No     Drug use: No     Sexual activity: Never   Other Topics Concern     Parent/sibling w/ CABG, MI or angioplasty before 65F 55M? Not Asked   Social History Narrative     Not on file     Social Determinants of Health     Financial Resource Strain: Not on file   Food Insecurity: Not on file   Transportation Needs: Not on file   Physical Activity: Not on file   Stress: Not on file   Social Connections: Not on file   Intimate Partner Violence: Not on file   Housing Stability: Not on file       Family History  Family History   Problem Relation Age of Onset     Anesthesia Reaction No family hx of      Deep Vein Thrombosis No family hx of      Bleeding Disorder No family hx of        Review of Systems  The complete review of systems is negative other than noted in the HPI or here.     Anesthesia Evaluation   Pt has had prior anesthetic. Type: Regional.    History of anesthetic complications  - PONV.      ROS/MED HX  ENT/Pulmonary:     (+) KIRILL risk factors, hypertension, obese,  (-) tobacco use and asthma   Neurologic: Comment: Thoracic intradural mass--> Possible Meningioma  - neg neurologic ROS  (-) no seizures and no CVA   Cardiovascular:     (+) Dyslipidemia hypertension-----Previous cardiac testing   Echo: Date: 2017 Results:  Normal left ventricular size, thickness, systolic function and wall motion. LVEF calculated by biplane Lopez's was 53%. Diastolic function was normal.Normal right ventricular cavity size, systolic function and wall motion.Atria are normal in size.No significant valvular abnormalities.No evidence of pericardial effusion.No prior study available for comparison.  Stress Test: Date: Results:    ECG Reviewed: Date: Results:    Cath: Date: Results:      METS/Exercise Tolerance:  Comment: Up until two months ago, pt had  >4 mets.  She was going to gym and walking on treadmill.  Now due to cord compression she is having difficulty ambulating   Hematologic: Comments: Hb:12.5   (-) history of blood clots and history of blood transfusion   Musculoskeletal: Comment: gout      GI/Hepatic:     (+) GERD (takes a omeprazole prn),     Renal/Genitourinary:     (+) renal disease (GFR:56), type: CRI, Pt does not require dialysis,     Endo:  - neg endo ROS     Psychiatric/Substance Use:  - neg psychiatric ROS     Infectious Disease:  - neg infectious disease ROS     Malignancy:  - neg malignancy ROS     Other:  - neg other ROS          LMP  (LMP Unknown)     Physical Exam   Constitutional: Awake, alert, cooperative, no apparent distress, and appears stated age.  Eyes: Pupils equal, round and reactive to light, extra ocular muscles intact, sclera clear, conjunctiva normal.  HENT: Normocephalic, oral pharynx with moist mucus membranes, fair dentition. No goiter appreciated.   Respiratory: Clear to auscultation bilaterally, no crackles or wheezing.  Cardiovascular: Regular rate and rhythm, normal S1 and S2, and no murmur noted.  Carotids +2, no bruits. No edema. Palpable pulses to radial and PT arteries.   GI: Normal bowel sounds, soft, non-distended, non-tender  Lymph/Hematologic: No cervical lymphadenopathy and no supraclavicular lymphadenopathy.  Genitourinary:  deferred  Skin: Warm and dry.  No rashes at anticipated surgical site.   Musculoskeletal: Full ROM of neck. There is no redness, warmth, or swelling of the joints. Gross motor strength is normal.    Neurologic: Awake, alert, oriented to name, place and time. Cranial nerves II-XII are grossly intact.   Neuropsychiatric: Calm, cooperative. Normal affect.       Prior Labs/Diagnostic Studies   All labs and imaging personally reviewed     Component      Latest Ref Rng & Units 1/9/2023   WBC      4.0 - 11.0 10e3/uL 5.2   RBC Count      3.80 - 5.20 10e6/uL 4.50   Hemoglobin      11.7 - 15.7 g/dL  12.5   Hematocrit      35.0 - 47.0 % 41.0   MCV      78 - 100 fL 91   MCH      26.5 - 33.0 pg 27.8   MCHC      31.5 - 36.5 g/dL 30.5 (L)   RDW      10.0 - 15.0 % 14.7   Platelet Count      150 - 450 10e3/uL 264     Component      Latest Ref Rng & Units 1/9/2023   Sodium      136 - 145 mmol/L 143   Potassium      3.4 - 5.3 mmol/L 4.0   Chloride      98 - 107 mmol/L 105   Carbon Dioxide (CO2)      22 - 29 mmol/L 28   Anion Gap      7 - 15 mmol/L 10   Urea Nitrogen      8.0 - 23.0 mg/dL 21.9   Creatinine      0.51 - 0.95 mg/dL 1.05 (H)   Calcium      8.8 - 10.2 mg/dL 10.1   Glucose      70 - 99 mg/dL 87   GFR Estimate      >60 mL/min/1.73m2 56 (L)         EKG/ stress test - if available please see in ROS above       The patient's records and results personally reviewed by this provider.     Outside records reviewed from: Care Everywhere        Assessment      Laure Wood is a 72 year old female seen as a PAC referral for risk assessment and optimization for anesthesia.    Plan/Recommendations  Pt will be optimized for the proposed procedure.  See below for details on the assessment, risk, and preoperative recommendations    NEUROLOGY  - No history of TIA, CVA or seizure    - post Op delirium risk factors:  Age    ENT  - No current airway concerns.  Will need to be reassessed day of surgery.  Mallampati: II  TM: > 3    CARDIAC  - HTN, HLD.  Hold hydrochlorothiazide on DOS and continue Zocor  - echo 2017 with EF 53%.  - denies chest pain, SOB, SIEGEL, palpitations, LE edema    - METS (Metabolic Equivalents)  Patient performs 4 or more METS exercise without symptoms            Total Score: 0      RCRI-Very low risk: Class 1 0.4% complication rate            Total Score: 0        PULMONARY    KIRILL Medium Risk            Total Score: 3    KIRILL: Hypertension    KIRILL: BMI over 35 kg/m2    KIRILL: Over 50 ys old      - Denies asthma or inhaler use  - Tobacco History      History   Smoking Status     Never   Smokeless Tobacco      "Never       GI  - occasional GERD symtpoms, prn omeprazole  PONV High Risk  Total Score: 4           1 AN PONV: Pt is Female    1 AN PONV: Patient is not a current smoker    1 AN PONV: Patient has history of PONV    1 AN PONV: Intended Post Op Opioids        /RENAL  - CKD, most recent creatinine 1.11  - recheck today (1.05)    ENDOCRINE    - BMI: Estimated body mass index is 35.3 kg/m  as calculated from the following:    Height as of 1/9/23: 1.6 m (5' 3\").    Weight as of 1/9/23: 90.4 kg (199 lb 4.8 oz).  Obesity (BMI >30)  - No history of Diabetes Mellitus    HEME  VTE Low Risk 0.26%            Total Score: 1    VTE: Greater than 59 yrs old      - No history of abnormal bleeding or antiplatelet use.      MSK  - gout, continue allopurinol and colchicine         Different anesthesia methods/types have been discussed with the patient, but they are aware that the final plan will be decided by the assigned anesthesia provider on the date of service.        The patient is optimized for their procedure. AVS with information on surgery time/arrival time, meds and NPO status given by nursing staff. No further diagnostic testing indicated.      On the day of service:     Prep time: 12 minutes  Visit time: 10 minutes  Documentation time: 15 minutes  ------------------------------------------  Total time: 37 minutes      Lucero Allison PA-C  Preoperative Assessment Center  [facility]  Phone: [phone]  Fax: [phone]    Physical Exam    Airway        Mallampati: II   TM distance: > 3 FB   Neck ROM: full   Mouth opening: > 3 cm    Respiratory Devices and Support         Dental  no notable dental history         Cardiovascular   cardiovascular exam normal          Pulmonary   pulmonary exam normal                  Anesthesia Plan    ASA Status:  3   NPO Status:  NPO Appropriate    Anesthesia Type: General.     - Airway: ETT   Induction: Intravenous.   Maintenance: Balanced. "   Techniques and Equipment:     - Airway: Video-Laryngoscope     - Lines/Monitors: 2nd IV, Arterial Line, BIS     - Blood: T&S     - Drips/Meds: Remifentanil, Phenylephrine     Consents    Anesthesia Plan(s) and associated risks, benefits, and realistic alternatives discussed. Questions answered and patient/representative(s) expressed understanding.     - Discussed: Risks, Benefits and Alternatives for BOTH SEDATION and the PROCEDURE were discussed     - Discussed with:  Patient      - Extended Intubation/Ventilatory Support Discussed: Yes.      - Patient is DNR/DNI Status: No    Use of blood products discussed: Yes.     - Discussed with: Patient.     - Consented: consented to blood products            Reason for refusal: other.     Postoperative Care    Pain management: IV analgesics, Oral pain medications, Multi-modal analgesia.   PONV prophylaxis: Ondansetron (or other 5HT-3), Background Propofol Infusion     Comments:           H&P reviewed: Unable to attach VIRTUAL H&P to encounter due to EHR limitations. Appropriate H&P reviewed. The physical exam performed by anesthesia during this surgical encounter serves as the physical portion of that virtual H&P.  Any significant changes noted within this preop evaluation.

## 2023-01-16 ENCOUNTER — ANESTHESIA (OUTPATIENT)
Dept: SURGERY | Facility: CLINIC | Age: 73
DRG: 029 | End: 2023-01-16
Payer: COMMERCIAL

## 2023-01-16 ENCOUNTER — APPOINTMENT (OUTPATIENT)
Dept: GENERAL RADIOLOGY | Facility: CLINIC | Age: 73
DRG: 029 | End: 2023-01-16
Attending: NEUROLOGICAL SURGERY
Payer: COMMERCIAL

## 2023-01-16 ENCOUNTER — HOSPITAL ENCOUNTER (INPATIENT)
Facility: CLINIC | Age: 73
LOS: 9 days | Discharge: NURSING, REHAB OR LONG-TERM CARE | DRG: 029 | End: 2023-01-25
Attending: NEUROLOGICAL SURGERY | Admitting: NEUROLOGICAL SURGERY
Payer: COMMERCIAL

## 2023-01-16 ENCOUNTER — PATIENT OUTREACH (OUTPATIENT)
Dept: GERIATRIC MEDICINE | Facility: CLINIC | Age: 73
End: 2023-01-16

## 2023-01-16 DIAGNOSIS — E78.2 MIXED HYPERLIPIDEMIA: ICD-10-CM

## 2023-01-16 DIAGNOSIS — M17.0 OSTEOARTHRITIS OF BOTH KNEES, UNSPECIFIED OSTEOARTHRITIS TYPE: ICD-10-CM

## 2023-01-16 DIAGNOSIS — M1A.09X0 IDIOPATHIC CHRONIC GOUT OF MULTIPLE SITES WITHOUT TOPHUS: ICD-10-CM

## 2023-01-16 DIAGNOSIS — I10 ESSENTIAL HYPERTENSION, BENIGN: ICD-10-CM

## 2023-01-16 DIAGNOSIS — Z98.890 S/P LAMINECTOMY: Primary | ICD-10-CM

## 2023-01-16 DIAGNOSIS — Z96.651 S/P TKR (TOTAL KNEE REPLACEMENT) USING CEMENT, RIGHT: ICD-10-CM

## 2023-01-16 PROBLEM — G95.89: Status: ACTIVE | Noted: 2023-01-16

## 2023-01-16 LAB — GLUCOSE BLDC GLUCOMTR-MCNC: 83 MG/DL (ref 70–99)

## 2023-01-16 PROCEDURE — 250N000011 HC RX IP 250 OP 636: Performed by: NURSE ANESTHETIST, CERTIFIED REGISTERED

## 2023-01-16 PROCEDURE — 250N000011 HC RX IP 250 OP 636: Performed by: ANESTHESIOLOGY

## 2023-01-16 PROCEDURE — 250N000011 HC RX IP 250 OP 636: Performed by: STUDENT IN AN ORGANIZED HEALTH CARE EDUCATION/TRAINING PROGRAM

## 2023-01-16 PROCEDURE — 84484 ASSAY OF TROPONIN QUANT: CPT | Performed by: ANESTHESIOLOGY

## 2023-01-16 PROCEDURE — 258N000003 HC RX IP 258 OP 636

## 2023-01-16 PROCEDURE — 999N000141 HC STATISTIC PRE-PROCEDURE NURSING ASSESSMENT: Performed by: NEUROLOGICAL SURGERY

## 2023-01-16 PROCEDURE — 710N000010 HC RECOVERY PHASE 1, LEVEL 2, PER MIN: Performed by: NEUROLOGICAL SURGERY

## 2023-01-16 PROCEDURE — 120N000002 HC R&B MED SURG/OB UMMC

## 2023-01-16 PROCEDURE — 250N000009 HC RX 250: Performed by: STUDENT IN AN ORGANIZED HEALTH CARE EDUCATION/TRAINING PROGRAM

## 2023-01-16 PROCEDURE — 258N000003 HC RX IP 258 OP 636: Performed by: STUDENT IN AN ORGANIZED HEALTH CARE EDUCATION/TRAINING PROGRAM

## 2023-01-16 PROCEDURE — U0003 INFECTIOUS AGENT DETECTION BY NUCLEIC ACID (DNA OR RNA); SEVERE ACUTE RESPIRATORY SYNDROME CORONAVIRUS 2 (SARS-COV-2) (CORONAVIRUS DISEASE [COVID-19]), AMPLIFIED PROBE TECHNIQUE, MAKING USE OF HIGH THROUGHPUT TECHNOLOGIES AS DESCRIBED BY CMS-2020-01-R: HCPCS

## 2023-01-16 PROCEDURE — 370N000017 HC ANESTHESIA TECHNICAL FEE, PER MIN: Performed by: NEUROLOGICAL SURGERY

## 2023-01-16 PROCEDURE — 999N000179 XR SURGERY CARM FLUORO LESS THAN 5 MIN W STILLS: Mod: TC

## 2023-01-16 PROCEDURE — 36415 COLL VENOUS BLD VENIPUNCTURE: CPT | Performed by: ANESTHESIOLOGY

## 2023-01-16 PROCEDURE — 360N000085 HC SURGERY LEVEL 5 W/ FLUORO, PER MIN: Performed by: NEUROLOGICAL SURGERY

## 2023-01-16 PROCEDURE — 88307 TISSUE EXAM BY PATHOLOGIST: CPT | Mod: TC | Performed by: NEUROLOGICAL SURGERY

## 2023-01-16 PROCEDURE — 250N000009 HC RX 250: Performed by: NURSE ANESTHETIST, CERTIFIED REGISTERED

## 2023-01-16 PROCEDURE — 250N000025 HC SEVOFLURANE, PER MIN: Performed by: NEUROLOGICAL SURGERY

## 2023-01-16 PROCEDURE — 250N000009 HC RX 250: Performed by: NEUROLOGICAL SURGERY

## 2023-01-16 PROCEDURE — 93005 ELECTROCARDIOGRAM TRACING: CPT

## 2023-01-16 PROCEDURE — 4A11X4G MONITORING OF PERIPHERAL NERVOUS ELECTRICAL ACTIVITY, INTRAOPERATIVE, EXTERNAL APPROACH: ICD-10-PCS | Performed by: NEUROLOGICAL SURGERY

## 2023-01-16 PROCEDURE — 63281 BX/EXC IDRL SPINE LESN THRC: CPT | Mod: GC | Performed by: NEUROLOGICAL SURGERY

## 2023-01-16 PROCEDURE — 272N000004 HC RX 272: Performed by: NEUROLOGICAL SURGERY

## 2023-01-16 PROCEDURE — 00NX0ZZ RELEASE THORACIC SPINAL CORD, OPEN APPROACH: ICD-10-PCS | Performed by: NEUROLOGICAL SURGERY

## 2023-01-16 PROCEDURE — 69990 MICROSURGERY ADD-ON: CPT | Performed by: NEUROLOGICAL SURGERY

## 2023-01-16 PROCEDURE — 00BT0ZZ EXCISION OF SPINAL MENINGES, OPEN APPROACH: ICD-10-PCS | Performed by: NEUROLOGICAL SURGERY

## 2023-01-16 PROCEDURE — 272N000001 HC OR GENERAL SUPPLY STERILE: Performed by: NEUROLOGICAL SURGERY

## 2023-01-16 PROCEDURE — 93010 ELECTROCARDIOGRAM REPORT: CPT | Performed by: INTERNAL MEDICINE

## 2023-01-16 RX ORDER — SODIUM CHLORIDE, SODIUM LACTATE, POTASSIUM CHLORIDE, CALCIUM CHLORIDE 600; 310; 30; 20 MG/100ML; MG/100ML; MG/100ML; MG/100ML
INJECTION, SOLUTION INTRAVENOUS CONTINUOUS
Status: DISCONTINUED | OUTPATIENT
Start: 2023-01-16 | End: 2023-01-16 | Stop reason: HOSPADM

## 2023-01-16 RX ORDER — HYDROMORPHONE HCL IN WATER/PF 6 MG/30 ML
0.2 PATIENT CONTROLLED ANALGESIA SYRINGE INTRAVENOUS EVERY 5 MIN PRN
Status: DISCONTINUED | OUTPATIENT
Start: 2023-01-16 | End: 2023-01-17 | Stop reason: HOSPADM

## 2023-01-16 RX ORDER — ONDANSETRON 2 MG/ML
INJECTION INTRAMUSCULAR; INTRAVENOUS PRN
Status: DISCONTINUED | OUTPATIENT
Start: 2023-01-16 | End: 2023-01-16

## 2023-01-16 RX ORDER — FENTANYL CITRATE 50 UG/ML
25-50 INJECTION, SOLUTION INTRAMUSCULAR; INTRAVENOUS
Status: DISCONTINUED | OUTPATIENT
Start: 2023-01-16 | End: 2023-01-17 | Stop reason: HOSPADM

## 2023-01-16 RX ORDER — ONDANSETRON 4 MG/1
4 TABLET, ORALLY DISINTEGRATING ORAL EVERY 30 MIN PRN
Status: DISCONTINUED | OUTPATIENT
Start: 2023-01-16 | End: 2023-01-17 | Stop reason: HOSPADM

## 2023-01-16 RX ORDER — FENTANYL CITRATE 50 UG/ML
50 INJECTION, SOLUTION INTRAMUSCULAR; INTRAVENOUS EVERY 5 MIN PRN
Status: DISCONTINUED | OUTPATIENT
Start: 2023-01-16 | End: 2023-01-17 | Stop reason: HOSPADM

## 2023-01-16 RX ORDER — SODIUM CHLORIDE, SODIUM LACTATE, POTASSIUM CHLORIDE, CALCIUM CHLORIDE 600; 310; 30; 20 MG/100ML; MG/100ML; MG/100ML; MG/100ML
INJECTION, SOLUTION INTRAVENOUS CONTINUOUS PRN
Status: DISCONTINUED | OUTPATIENT
Start: 2023-01-16 | End: 2023-01-16

## 2023-01-16 RX ORDER — MEPERIDINE HYDROCHLORIDE 25 MG/ML
12.5 INJECTION INTRAMUSCULAR; INTRAVENOUS; SUBCUTANEOUS
Status: DISCONTINUED | OUTPATIENT
Start: 2023-01-16 | End: 2023-01-17 | Stop reason: HOSPADM

## 2023-01-16 RX ORDER — FENTANYL CITRATE 50 UG/ML
25 INJECTION, SOLUTION INTRAMUSCULAR; INTRAVENOUS EVERY 5 MIN PRN
Status: DISCONTINUED | OUTPATIENT
Start: 2023-01-16 | End: 2023-01-17 | Stop reason: HOSPADM

## 2023-01-16 RX ORDER — PHENYLEPHRINE HCL IN 0.9% NACL 50MG/250ML
.5-1.25 PLASTIC BAG, INJECTION (ML) INTRAVENOUS CONTINUOUS
Status: CANCELLED | OUTPATIENT
Start: 2023-01-16

## 2023-01-16 RX ORDER — HYDROMORPHONE HCL IN WATER/PF 6 MG/30 ML
0.4 PATIENT CONTROLLED ANALGESIA SYRINGE INTRAVENOUS EVERY 5 MIN PRN
Status: DISCONTINUED | OUTPATIENT
Start: 2023-01-16 | End: 2023-01-17 | Stop reason: HOSPADM

## 2023-01-16 RX ORDER — PHENYLEPHRINE HCL IN 0.9% NACL 50MG/250ML
.5-6 PLASTIC BAG, INJECTION (ML) INTRAVENOUS CONTINUOUS
Status: DISCONTINUED | OUTPATIENT
Start: 2023-01-16 | End: 2023-01-16 | Stop reason: HOSPADM

## 2023-01-16 RX ORDER — LIDOCAINE 40 MG/G
CREAM TOPICAL
Status: DISCONTINUED | OUTPATIENT
Start: 2023-01-16 | End: 2023-01-16 | Stop reason: HOSPADM

## 2023-01-16 RX ORDER — FENTANYL CITRATE 50 UG/ML
INJECTION, SOLUTION INTRAMUSCULAR; INTRAVENOUS PRN
Status: DISCONTINUED | OUTPATIENT
Start: 2023-01-16 | End: 2023-01-16

## 2023-01-16 RX ORDER — PROPOFOL 10 MG/ML
INJECTION, EMULSION INTRAVENOUS CONTINUOUS PRN
Status: DISCONTINUED | OUTPATIENT
Start: 2023-01-16 | End: 2023-01-16

## 2023-01-16 RX ORDER — ONDANSETRON 2 MG/ML
4 INJECTION INTRAMUSCULAR; INTRAVENOUS EVERY 30 MIN PRN
Status: DISCONTINUED | OUTPATIENT
Start: 2023-01-16 | End: 2023-01-17 | Stop reason: HOSPADM

## 2023-01-16 RX ORDER — SODIUM CHLORIDE, SODIUM LACTATE, POTASSIUM CHLORIDE, CALCIUM CHLORIDE 600; 310; 30; 20 MG/100ML; MG/100ML; MG/100ML; MG/100ML
INJECTION, SOLUTION INTRAVENOUS CONTINUOUS
Status: DISCONTINUED | OUTPATIENT
Start: 2023-01-16 | End: 2023-01-17 | Stop reason: HOSPADM

## 2023-01-16 RX ORDER — HYDRALAZINE HYDROCHLORIDE 20 MG/ML
2.5-5 INJECTION INTRAMUSCULAR; INTRAVENOUS EVERY 10 MIN PRN
Status: DISCONTINUED | OUTPATIENT
Start: 2023-01-16 | End: 2023-01-17 | Stop reason: HOSPADM

## 2023-01-16 RX ORDER — LABETALOL HYDROCHLORIDE 5 MG/ML
5-10 INJECTION, SOLUTION INTRAVENOUS
Status: DISCONTINUED | OUTPATIENT
Start: 2023-01-16 | End: 2023-01-17 | Stop reason: HOSPADM

## 2023-01-16 RX ORDER — PROPOFOL 10 MG/ML
INJECTION, EMULSION INTRAVENOUS PRN
Status: DISCONTINUED | OUTPATIENT
Start: 2023-01-16 | End: 2023-01-16

## 2023-01-16 RX ORDER — LIDOCAINE HYDROCHLORIDE AND EPINEPHRINE 10; 10 MG/ML; UG/ML
INJECTION, SOLUTION INFILTRATION; PERINEURAL PRN
Status: DISCONTINUED | OUTPATIENT
Start: 2023-01-16 | End: 2023-01-16 | Stop reason: HOSPADM

## 2023-01-16 RX ORDER — SODIUM CHLORIDE 9 MG/ML
INJECTION, SOLUTION INTRAVENOUS CONTINUOUS
Status: DISCONTINUED | OUTPATIENT
Start: 2023-01-16 | End: 2023-01-25

## 2023-01-16 RX ORDER — KETAMINE HYDROCHLORIDE 10 MG/ML
INJECTION INTRAMUSCULAR; INTRAVENOUS PRN
Status: DISCONTINUED | OUTPATIENT
Start: 2023-01-16 | End: 2023-01-16

## 2023-01-16 RX ORDER — DEXAMETHASONE SODIUM PHOSPHATE 4 MG/ML
INJECTION, SOLUTION INTRA-ARTICULAR; INTRALESIONAL; INTRAMUSCULAR; INTRAVENOUS; SOFT TISSUE PRN
Status: DISCONTINUED | OUTPATIENT
Start: 2023-01-16 | End: 2023-01-16

## 2023-01-16 RX ORDER — LIDOCAINE HYDROCHLORIDE 20 MG/ML
INJECTION, SOLUTION INFILTRATION; PERINEURAL PRN
Status: DISCONTINUED | OUTPATIENT
Start: 2023-01-16 | End: 2023-01-16

## 2023-01-16 RX ORDER — CEFAZOLIN SODIUM/WATER 2 G/20 ML
SYRINGE (ML) INTRAVENOUS PRN
Status: DISCONTINUED | OUTPATIENT
Start: 2023-01-16 | End: 2023-01-16

## 2023-01-16 RX ADMIN — HYDROMORPHONE HYDROCHLORIDE 0.5 MG: 1 INJECTION, SOLUTION INTRAMUSCULAR; INTRAVENOUS; SUBCUTANEOUS at 18:03

## 2023-01-16 RX ADMIN — DEXAMETHASONE SODIUM PHOSPHATE 4 MG: 4 INJECTION, SOLUTION INTRA-ARTICULAR; INTRALESIONAL; INTRAMUSCULAR; INTRAVENOUS; SOFT TISSUE at 15:22

## 2023-01-16 RX ADMIN — PROPOFOL 160 MG: 10 INJECTION, EMULSION INTRAVENOUS at 13:33

## 2023-01-16 RX ADMIN — FENTANYL CITRATE 50 MCG: 50 INJECTION, SOLUTION INTRAMUSCULAR; INTRAVENOUS at 19:43

## 2023-01-16 RX ADMIN — Medication 2 G: at 15:05

## 2023-01-16 RX ADMIN — Medication 10 MG: at 17:03

## 2023-01-16 RX ADMIN — Medication 10 MG: at 15:55

## 2023-01-16 RX ADMIN — HYDROMORPHONE HYDROCHLORIDE 0.5 MG: 1 INJECTION, SOLUTION INTRAMUSCULAR; INTRAVENOUS; SUBCUTANEOUS at 19:04

## 2023-01-16 RX ADMIN — FENTANYL CITRATE 50 MCG: 50 INJECTION, SOLUTION INTRAMUSCULAR; INTRAVENOUS at 20:27

## 2023-01-16 RX ADMIN — Medication 20 MG: at 15:05

## 2023-01-16 RX ADMIN — Medication 0.4 MCG/KG/MIN: at 14:08

## 2023-01-16 RX ADMIN — PHENYLEPHRINE HYDROCHLORIDE 100 MCG: 10 INJECTION INTRAVENOUS at 17:39

## 2023-01-16 RX ADMIN — REMIFENTANIL HYDROCHLORIDE 0.08 MCG/KG/MIN: 1 INJECTION, POWDER, LYOPHILIZED, FOR SOLUTION INTRAVENOUS at 14:10

## 2023-01-16 RX ADMIN — PHENYLEPHRINE HYDROCHLORIDE 100 MCG: 10 INJECTION INTRAVENOUS at 13:51

## 2023-01-16 RX ADMIN — FENTANYL CITRATE 50 MCG: 50 INJECTION, SOLUTION INTRAMUSCULAR; INTRAVENOUS at 14:29

## 2023-01-16 RX ADMIN — PHENYLEPHRINE HYDROCHLORIDE 100 MCG: 10 INJECTION INTRAVENOUS at 14:18

## 2023-01-16 RX ADMIN — LIDOCAINE HYDROCHLORIDE 80 MG: 20 INJECTION, SOLUTION INFILTRATION; PERINEURAL at 14:12

## 2023-01-16 RX ADMIN — PHENYLEPHRINE HYDROCHLORIDE 100 MCG: 10 INJECTION INTRAVENOUS at 14:15

## 2023-01-16 RX ADMIN — PHENYLEPHRINE HYDROCHLORIDE 100 MCG: 10 INJECTION INTRAVENOUS at 14:08

## 2023-01-16 RX ADMIN — HYDRALAZINE HYDROCHLORIDE 5 MG: 20 INJECTION INTRAMUSCULAR; INTRAVENOUS at 21:44

## 2023-01-16 RX ADMIN — Medication 2 G: at 19:05

## 2023-01-16 RX ADMIN — HYDROMORPHONE HYDROCHLORIDE 0.4 MG: 0.2 INJECTION, SOLUTION INTRAMUSCULAR; INTRAVENOUS; SUBCUTANEOUS at 21:01

## 2023-01-16 RX ADMIN — HYDRALAZINE HYDROCHLORIDE 5 MG: 20 INJECTION INTRAMUSCULAR; INTRAVENOUS at 21:56

## 2023-01-16 RX ADMIN — SODIUM CHLORIDE, POTASSIUM CHLORIDE, SODIUM LACTATE AND CALCIUM CHLORIDE: 600; 310; 30; 20 INJECTION, SOLUTION INTRAVENOUS at 17:07

## 2023-01-16 RX ADMIN — ONDANSETRON 4 MG: 2 INJECTION INTRAMUSCULAR; INTRAVENOUS at 19:05

## 2023-01-16 RX ADMIN — Medication 2 G: at 13:39

## 2023-01-16 RX ADMIN — SODIUM CHLORIDE: 9 INJECTION, SOLUTION INTRAVENOUS at 21:17

## 2023-01-16 RX ADMIN — Medication 10 MG: at 18:15

## 2023-01-16 RX ADMIN — SODIUM CHLORIDE, POTASSIUM CHLORIDE, SODIUM LACTATE AND CALCIUM CHLORIDE: 600; 310; 30; 20 INJECTION, SOLUTION INTRAVENOUS at 13:25

## 2023-01-16 RX ADMIN — SODIUM CHLORIDE, SODIUM LACTATE, POTASSIUM CHLORIDE, CALCIUM CHLORIDE: 600; 310; 30; 20 INJECTION, SOLUTION INTRAVENOUS at 14:10

## 2023-01-16 RX ADMIN — PHENYLEPHRINE HYDROCHLORIDE 200 MCG: 10 INJECTION INTRAVENOUS at 14:10

## 2023-01-16 RX ADMIN — MIDAZOLAM 2 MG: 1 INJECTION INTRAMUSCULAR; INTRAVENOUS at 14:53

## 2023-01-16 RX ADMIN — LIDOCAINE HYDROCHLORIDE 80 MG: 20 INJECTION, SOLUTION INFILTRATION; PERINEURAL at 13:33

## 2023-01-16 RX ADMIN — PROPOFOL 180 MCG/KG/MIN: 10 INJECTION, EMULSION INTRAVENOUS at 14:29

## 2023-01-16 RX ADMIN — SUCCINYLCHOLINE CHLORIDE 80 MG: 20 INJECTION, SOLUTION INTRAMUSCULAR; INTRAVENOUS; PARENTERAL at 13:34

## 2023-01-16 RX ADMIN — FENTANYL CITRATE 50 MCG: 50 INJECTION, SOLUTION INTRAMUSCULAR; INTRAVENOUS at 13:33

## 2023-01-16 RX ADMIN — PROPOFOL 40 MG: 10 INJECTION, EMULSION INTRAVENOUS at 14:32

## 2023-01-16 ASSESSMENT — ACTIVITIES OF DAILY LIVING (ADL)
ADLS_ACUITY_SCORE: 22
ADLS_ACUITY_SCORE: 22
ADLS_ACUITY_SCORE: 20

## 2023-01-16 NOTE — PROGRESS NOTES
[facility] Care Coordination Contact    [facility]  Ambulatory Care Coordination to Inpatient Care Management   Hand-In Communication    Date:  January 16, 2023  Name: Laure Wood is enrolled in [facility] Care Coordination program and I am the Lead Care Coordinator.  CC Contact Information:.   Payor Source: Payor: MEDICA / Plan: MEDICA DUAL [facility] / Product Type: HMO /   Current services in place:     Please see the CC Snaphot and Care Management Flowsheets for specific details of this Laure Wood care plan.   Additional details/specific concerns r/t this admission:    No additional concerns at this time N/A    I will follow this admission in Epic. Please feel free to contact me with questions or for further collaboration in discharge planning.    Roney Harrison RN BSN PHN  [facility] Care Coordinator  [phone]  Fax:[phone]

## 2023-01-16 NOTE — OR NURSING
Paged IV team    [address] [place] [address] TONYA Wood--Mutliple attempts by anesthesia to place IV without success. Can you please place the IV? Thanks, Roya GARCIA *[phone]

## 2023-01-16 NOTE — ANESTHESIA PROCEDURE NOTES
Arterial Line Procedure Note    Pre-Procedure   Staff -        Anesthesiologist:  Muriel Camejo MD       Resident/Fellow: Gennaro Jesus MD       Performed By: resident       Location: OR       Pre-Anesthestic Checklist: patient identified, IV checked, risks and benefits discussed, informed consent, monitors and equipment checked, pre-op evaluation and at physician/surgeon's request  Timeout:       Correct Patient: Yes        Correct Procedure: Yes        Correct Site: Yes        Correct Position: Yes   Line Placement:   This line was placed Post Induction  Procedure   Procedure: arterial line and new line       Laterality: left       Insertion Site: radial.  Sterile Prep        Standard elements of sterile barrier followed       Skin prep: Chloraprep  Insertion/Injection        Technique: ultrasound guided and Seldinger Technique        1. Ultrasound was used to evaluate the access site.       2. Artery evaluated via ultrasound for patency/adequacy.       3. Using real-time ultrasound the needle/catheter was observed entering the artery/vein.       5. The visualized structures were anatomically normal.       6. There were no apparent abnormal pathologic findings.       Catheter Type/Size: 20 G, 12 cm  Narrative        Tegaderm dressing used.       Complications: None apparent,        Arterial waveform: Yes        IBP within 10% of NIBP: Yes

## 2023-01-16 NOTE — ANESTHESIA PROCEDURE NOTES
Arterial Line Procedure Note    Pre-Procedure   Staff -        Anesthesiologist:  Muriel Camejo MD       Resident/Fellow: Bin Kamara MD       Performed By: resident       Location: OR       Pre-Anesthestic Checklist: patient identified, IV checked, risks and benefits discussed, informed consent, monitors and equipment checked and pre-op evaluation  Timeout:       Correct Patient: Yes        Correct Procedure: Yes        Correct Site: Yes        Correct Position: Yes   Line Placement:   This line was placed Post Induction  Procedure   Procedure: arterial line and new line       Laterality: right       Insertion Site: radial.  Sterile Prep        Standard elements of sterile barrier followed       Skin prep: Chloraprep  Insertion/Injection        Technique: ultrasound guided and Seldinger Technique        1. Ultrasound was used to evaluate the access site.       2. Artery evaluated via ultrasound for patency/adequacy.       3. Using real-time ultrasound the needle/catheter was observed entering the artery/vein.       5. The visualized structures were anatomically normal.       6. There were no apparent abnormal pathologic findings.       Catheter Type/Size: 20 G, 12 cm  Narrative         Secured by: suture       Tegaderm dressing used.       Complications: None apparent,        Arterial waveform: Yes        IBP within 10% of NIBP: Yes   Comments:  Left radial arterial line removed after repositioning and losing waveform.

## 2023-01-16 NOTE — LETTER
Transition Communication Hand-off for Care Transitions to Next Level of Care Provider    Name: Laure Wood  : 1950  MRN #: [id number]  Primary Care Provider: Yoshi Bowen     Primary Clinic: [address]  [address]  [facility] [address]     Reason for Hospitalization:  Meningioma (H) [D32.9]  Intradural mass (H) [G95.89]  Admit Date/Time: 2023 11:08 AM  Discharge Date: 23  Payor Source: Payor: MEDICA / Plan: MEDICA DUAL [employer] [facility] PARTNERS / Product Type: HMO /     Readmission Assessment Measure (RICHMOND) Risk Score/category: 18 %    Plan of Care Goals/Milestone Events:   Patient Concerns:    Patient Goals:   Short-term: Transition to TCU   Long-term: Return home   Medical Goals   Short-term:    Long-term: Return to baseline mobility         Reason for Communication Hand-off Referral: Other PCP to follow up as needed    Discharge Plan:        Concern for non-adherence with plan of care:   Y/N Yes  Discharge Needs Assessment:  Needs    Flowsheet Row Most Recent Value   Equipment Currently Used at Home cane, straight, shower chair, walker, rolling   # of Referrals Placed by [facility] Care Coordination, Senior Linkage Line, Communication hand-offs to next level of Care Providers, Post Acute Facilities   PAS Number --  [[id number]]          Already enrolled in Tele-monitoring program and name of program:  Unknown  Follow-up specialty is recommended: Yes    Follow-up plan:    Future Appointments   Date Time Provider Department Center   2023 11:00 AM Yoon Aburto OTR [facility]   2023  8:00 AM Ally Luna PA-C [facility]   2023  2:30 PM Katina Dominguez MD [facility]       Any outstanding tests or procedures:        Referrals     Future Labs/Procedures    Radiation Therapy Referral     Comments:    Please be aware that coverage of these services is subject to the terms and limitations of your health insurance plan.  Call member services  at your health plan with any benefit or coverage questions.  [facility] - [place]: [phone]    Please call to schedule your appointment      Occupational Therapy Adult Consult     Comments:    Evaluate and treat as clinically indicated.    Reason:  s/p spinal surgery    Physical Therapy Adult Consult     Comments:    Evaluate and treat as clinically indicated.    Reason:  s/p spinal surgery            Key Recommendations:      Laith Bush    AVS/Discharge Summary is the source of truth; this is a helpful guide for improved communication of patient story

## 2023-01-16 NOTE — ANESTHESIA PROCEDURE NOTES
Airway       Patient location during procedure: OR       Procedure Start/Stop Times: 1/16/2023 1:39 PM  Staff -        Anesthesiologist:  Muriel Camejo MD       Resident/Fellow: Gennaro Jesus MD       Performed By: resident and anesthesiologistIndications and Patient Condition       Indications for airway management: manjinder-procedural       Induction type:intravenous       Mask difficulty assessment: 1 - vent by mask    Final Airway Details       Final airway type: endotracheal airway       Successful airway: ETT - single  Endotracheal Airway Details        ETT size (mm): 7.0       Cuffed: yes       Successful intubation technique: video laryngoscopy       VL Blade Size: Glidescope 3       Grade View of Cords: 1       Adjucts: stylet       Position: Right       Measured from: gums/teeth       Secured at (cm): 21       Bite block used: None    Post intubation assessment        Number of attempts at approach: 1       Number of other approaches attempted: 0       Secured with: pink tape       Ease of procedure: easy       Dentition: Intact and Unchanged    Medication(s) Administered   Medication Administration Time: 1/16/2023 1:39 PM

## 2023-01-17 ENCOUNTER — APPOINTMENT (OUTPATIENT)
Dept: CARDIOLOGY | Facility: CLINIC | Age: 73
DRG: 029 | End: 2023-01-17
Attending: NEUROLOGICAL SURGERY
Payer: COMMERCIAL

## 2023-01-17 ENCOUNTER — PATIENT OUTREACH (OUTPATIENT)
Dept: GERIATRIC MEDICINE | Facility: CLINIC | Age: 73
End: 2023-01-17

## 2023-01-17 LAB
ANION GAP SERPL CALCULATED.3IONS-SCNC: 15 MMOL/L (ref 7–15)
BUN SERPL-MCNC: 21.8 MG/DL (ref 8–23)
CALCIUM SERPL-MCNC: 9.3 MG/DL (ref 8.8–10.2)
CHLORIDE SERPL-SCNC: 108 MMOL/L (ref 98–107)
CREAT SERPL-MCNC: 0.75 MG/DL (ref 0.51–0.95)
DEPRECATED HCO3 PLAS-SCNC: 17 MMOL/L (ref 22–29)
ERYTHROCYTE [DISTWIDTH] IN BLOOD BY AUTOMATED COUNT: 14.6 % (ref 10–15)
GFR SERPL CREATININE-BSD FRML MDRD: 84 ML/MIN/1.73M2
GLUCOSE BLDC GLUCOMTR-MCNC: 106 MG/DL (ref 70–99)
GLUCOSE SERPL-MCNC: 107 MG/DL (ref 70–99)
HCT VFR BLD AUTO: 32.4 % (ref 35–47)
HGB BLD-MCNC: 10.6 G/DL (ref 11.7–15.7)
LVEF ECHO: NORMAL
MAGNESIUM SERPL-MCNC: 1.7 MG/DL (ref 1.7–2.3)
MCH RBC QN AUTO: 27.9 PG (ref 26.5–33)
MCHC RBC AUTO-ENTMCNC: 32.7 G/DL (ref 31.5–36.5)
MCV RBC AUTO: 85 FL (ref 78–100)
PHOSPHATE SERPL-MCNC: 4.6 MG/DL (ref 2.5–4.5)
PLATELET # BLD AUTO: 206 10E3/UL (ref 150–450)
POTASSIUM SERPL-SCNC: 5 MMOL/L (ref 3.4–5.3)
RBC # BLD AUTO: 3.8 10E6/UL (ref 3.8–5.2)
SARS-COV-2 RNA RESP QL NAA+PROBE: NEGATIVE
SODIUM SERPL-SCNC: 140 MMOL/L (ref 136–145)
TROPONIN T SERPL HS-MCNC: 18 NG/L
TROPONIN T SERPL HS-MCNC: 19 NG/L
TROPONIN T SERPL HS-MCNC: 22 NG/L
WBC # BLD AUTO: 8.5 10E3/UL (ref 4–11)

## 2023-01-17 PROCEDURE — 250N000013 HC RX MED GY IP 250 OP 250 PS 637

## 2023-01-17 PROCEDURE — 84100 ASSAY OF PHOSPHORUS: CPT

## 2023-01-17 PROCEDURE — 250N000011 HC RX IP 250 OP 636

## 2023-01-17 PROCEDURE — 36415 COLL VENOUS BLD VENIPUNCTURE: CPT

## 2023-01-17 PROCEDURE — 80048 BASIC METABOLIC PNL TOTAL CA: CPT

## 2023-01-17 PROCEDURE — 83735 ASSAY OF MAGNESIUM: CPT

## 2023-01-17 PROCEDURE — 120N000003 HC R&B IMCU UMMC

## 2023-01-17 PROCEDURE — 93306 TTE W/DOPPLER COMPLETE: CPT

## 2023-01-17 PROCEDURE — 93306 TTE W/DOPPLER COMPLETE: CPT | Mod: 26 | Performed by: STUDENT IN AN ORGANIZED HEALTH CARE EDUCATION/TRAINING PROGRAM

## 2023-01-17 PROCEDURE — 84484 ASSAY OF TROPONIN QUANT: CPT

## 2023-01-17 PROCEDURE — 36416 COLLJ CAPILLARY BLOOD SPEC: CPT | Performed by: NURSE PRACTITIONER

## 2023-01-17 PROCEDURE — 84484 ASSAY OF TROPONIN QUANT: CPT | Performed by: NURSE PRACTITIONER

## 2023-01-17 PROCEDURE — 85027 COMPLETE CBC AUTOMATED: CPT

## 2023-01-17 PROCEDURE — 250N000013 HC RX MED GY IP 250 OP 250 PS 637: Performed by: NEUROLOGICAL SURGERY

## 2023-01-17 PROCEDURE — 250N000013 HC RX MED GY IP 250 OP 250 PS 637: Performed by: NURSE PRACTITIONER

## 2023-01-17 PROCEDURE — 36416 COLLJ CAPILLARY BLOOD SPEC: CPT

## 2023-01-17 RX ORDER — ONDANSETRON 4 MG/1
4 TABLET, ORALLY DISINTEGRATING ORAL EVERY 6 HOURS PRN
Status: DISCONTINUED | OUTPATIENT
Start: 2023-01-17 | End: 2023-01-25 | Stop reason: HOSPADM

## 2023-01-17 RX ORDER — ASCORBIC ACID 250 MG
250 TABLET,CHEWABLE ORAL DAILY
Status: DISCONTINUED | OUTPATIENT
Start: 2023-01-17 | End: 2023-01-25 | Stop reason: HOSPADM

## 2023-01-17 RX ORDER — HYDROMORPHONE HCL IN WATER/PF 6 MG/30 ML
0.2 PATIENT CONTROLLED ANALGESIA SYRINGE INTRAVENOUS
Status: DISCONTINUED | OUTPATIENT
Start: 2023-01-17 | End: 2023-01-18 | Stop reason: DRUGHIGH

## 2023-01-17 RX ORDER — HYDROCHLOROTHIAZIDE 25 MG/1
25 TABLET ORAL EVERY MORNING
Status: DISCONTINUED | OUTPATIENT
Start: 2023-01-17 | End: 2023-01-19

## 2023-01-17 RX ORDER — OXYCODONE HYDROCHLORIDE 10 MG/1
10 TABLET ORAL EVERY 4 HOURS PRN
Status: DISCONTINUED | OUTPATIENT
Start: 2023-01-17 | End: 2023-01-19

## 2023-01-17 RX ORDER — ACETAMINOPHEN 325 MG/1
650 TABLET ORAL EVERY 4 HOURS PRN
Status: DISCONTINUED | OUTPATIENT
Start: 2023-01-20 | End: 2023-01-19

## 2023-01-17 RX ORDER — OXYCODONE HYDROCHLORIDE 5 MG/1
5 TABLET ORAL EVERY 4 HOURS PRN
Status: DISCONTINUED | OUTPATIENT
Start: 2023-01-17 | End: 2023-01-19

## 2023-01-17 RX ORDER — MAGNESIUM OXIDE 400 MG/1
400 TABLET ORAL EVERY 4 HOURS
Status: COMPLETED | OUTPATIENT
Start: 2023-01-17 | End: 2023-01-17

## 2023-01-17 RX ORDER — ONDANSETRON 2 MG/ML
4 INJECTION INTRAMUSCULAR; INTRAVENOUS EVERY 6 HOURS PRN
Status: DISCONTINUED | OUTPATIENT
Start: 2023-01-17 | End: 2023-01-25 | Stop reason: HOSPADM

## 2023-01-17 RX ORDER — AMOXICILLIN 250 MG
2 CAPSULE ORAL 2 TIMES DAILY
Status: DISCONTINUED | OUTPATIENT
Start: 2023-01-17 | End: 2023-01-25 | Stop reason: HOSPADM

## 2023-01-17 RX ORDER — ZINC SULFATE 50(220)MG
220 CAPSULE ORAL DAILY
Status: DISCONTINUED | OUTPATIENT
Start: 2023-01-17 | End: 2023-01-25 | Stop reason: HOSPADM

## 2023-01-17 RX ORDER — NALOXONE HYDROCHLORIDE 0.4 MG/ML
0.2 INJECTION, SOLUTION INTRAMUSCULAR; INTRAVENOUS; SUBCUTANEOUS
Status: DISCONTINUED | OUTPATIENT
Start: 2023-01-17 | End: 2023-01-25 | Stop reason: HOSPADM

## 2023-01-17 RX ORDER — PROCHLORPERAZINE MALEATE 5 MG
5 TABLET ORAL EVERY 6 HOURS PRN
Status: DISCONTINUED | OUTPATIENT
Start: 2023-01-17 | End: 2023-01-25 | Stop reason: HOSPADM

## 2023-01-17 RX ORDER — SIMVASTATIN 20 MG
20 TABLET ORAL AT BEDTIME
Status: DISCONTINUED | OUTPATIENT
Start: 2023-01-17 | End: 2023-01-25 | Stop reason: HOSPADM

## 2023-01-17 RX ORDER — AMOXICILLIN 250 MG
1 CAPSULE ORAL 2 TIMES DAILY
Status: DISCONTINUED | OUTPATIENT
Start: 2023-01-17 | End: 2023-01-17

## 2023-01-17 RX ORDER — NALOXONE HYDROCHLORIDE 0.4 MG/ML
0.4 INJECTION, SOLUTION INTRAMUSCULAR; INTRAVENOUS; SUBCUTANEOUS
Status: DISCONTINUED | OUTPATIENT
Start: 2023-01-17 | End: 2023-01-25 | Stop reason: HOSPADM

## 2023-01-17 RX ORDER — BISACODYL 10 MG
10 SUPPOSITORY, RECTAL RECTAL DAILY PRN
Status: DISCONTINUED | OUTPATIENT
Start: 2023-01-17 | End: 2023-01-25 | Stop reason: HOSPADM

## 2023-01-17 RX ORDER — LIDOCAINE 40 MG/G
CREAM TOPICAL
Status: DISCONTINUED | OUTPATIENT
Start: 2023-01-17 | End: 2023-01-25 | Stop reason: HOSPADM

## 2023-01-17 RX ORDER — ALLOPURINOL 100 MG/1
200 TABLET ORAL 2 TIMES DAILY
Status: DISCONTINUED | OUTPATIENT
Start: 2023-01-17 | End: 2023-01-17

## 2023-01-17 RX ORDER — ALLOPURINOL 100 MG/1
200 TABLET ORAL DAILY
Status: DISCONTINUED | OUTPATIENT
Start: 2023-01-18 | End: 2023-01-25 | Stop reason: HOSPADM

## 2023-01-17 RX ORDER — CHOLECALCIFEROL (VITAMIN D3) 125 MCG
10000 CAPSULE ORAL DAILY
Status: DISCONTINUED | OUTPATIENT
Start: 2023-01-17 | End: 2023-01-25 | Stop reason: HOSPADM

## 2023-01-17 RX ORDER — ACETAMINOPHEN 325 MG/1
975 TABLET ORAL EVERY 8 HOURS
Status: DISCONTINUED | OUTPATIENT
Start: 2023-01-17 | End: 2023-01-18

## 2023-01-17 RX ORDER — HYDROMORPHONE HCL IN WATER/PF 6 MG/30 ML
0.4 PATIENT CONTROLLED ANALGESIA SYRINGE INTRAVENOUS
Status: DISCONTINUED | OUTPATIENT
Start: 2023-01-17 | End: 2023-01-18 | Stop reason: DRUGHIGH

## 2023-01-17 RX ORDER — POLYETHYLENE GLYCOL 3350 17 G/17G
17 POWDER, FOR SOLUTION ORAL DAILY
Status: DISCONTINUED | OUTPATIENT
Start: 2023-01-17 | End: 2023-01-17

## 2023-01-17 RX ORDER — COLCHICINE 0.6 MG/1
0.6 TABLET ORAL DAILY
Status: DISCONTINUED | OUTPATIENT
Start: 2023-01-18 | End: 2023-01-25 | Stop reason: HOSPADM

## 2023-01-17 RX ORDER — POLYETHYLENE GLYCOL 3350 17 G/17G
17 POWDER, FOR SOLUTION ORAL 2 TIMES DAILY
Status: DISCONTINUED | OUTPATIENT
Start: 2023-01-17 | End: 2023-01-25 | Stop reason: HOSPADM

## 2023-01-17 RX ORDER — COLCHICINE 0.6 MG/1
0.6 TABLET ORAL 2 TIMES DAILY
Status: DISCONTINUED | OUTPATIENT
Start: 2023-01-17 | End: 2023-01-17

## 2023-01-17 RX ORDER — FAMOTIDINE 20 MG/1
20 TABLET, FILM COATED ORAL 2 TIMES DAILY
Status: DISCONTINUED | OUTPATIENT
Start: 2023-01-17 | End: 2023-01-25 | Stop reason: HOSPADM

## 2023-01-17 RX ADMIN — HYDROMORPHONE HYDROCHLORIDE 0.4 MG: 0.2 INJECTION, SOLUTION INTRAMUSCULAR; INTRAVENOUS; SUBCUTANEOUS at 00:42

## 2023-01-17 RX ADMIN — SENNOSIDES AND DOCUSATE SODIUM 2 TABLET: 50; 8.6 TABLET ORAL at 20:50

## 2023-01-17 RX ADMIN — HYDROMORPHONE HYDROCHLORIDE 0.4 MG: 0.2 INJECTION, SOLUTION INTRAMUSCULAR; INTRAVENOUS; SUBCUTANEOUS at 04:10

## 2023-01-17 RX ADMIN — ACETAMINOPHEN 975 MG: 325 TABLET, FILM COATED ORAL at 09:33

## 2023-01-17 RX ADMIN — Medication 400 MG: at 14:54

## 2023-01-17 RX ADMIN — OXYCODONE HYDROCHLORIDE 10 MG: 10 TABLET ORAL at 13:07

## 2023-01-17 RX ADMIN — FAMOTIDINE 20 MG: 20 TABLET, FILM COATED ORAL at 09:29

## 2023-01-17 RX ADMIN — OXYCODONE HYDROCHLORIDE 10 MG: 10 TABLET ORAL at 17:43

## 2023-01-17 RX ADMIN — Medication 10000 UNITS: at 09:29

## 2023-01-17 RX ADMIN — HYDROMORPHONE HYDROCHLORIDE 0.4 MG: 0.2 INJECTION, SOLUTION INTRAMUSCULAR; INTRAVENOUS; SUBCUTANEOUS at 14:52

## 2023-01-17 RX ADMIN — SIMVASTATIN 20 MG: 20 TABLET, FILM COATED ORAL at 21:00

## 2023-01-17 RX ADMIN — ACETAMINOPHEN 975 MG: 325 TABLET, FILM COATED ORAL at 16:08

## 2023-01-17 RX ADMIN — HYDROMORPHONE HYDROCHLORIDE 0.4 MG: 0.2 INJECTION, SOLUTION INTRAMUSCULAR; INTRAVENOUS; SUBCUTANEOUS at 07:53

## 2023-01-17 RX ADMIN — Medication 400 MG: at 17:43

## 2023-01-17 RX ADMIN — ALLOPURINOL 200 MG: 100 TABLET ORAL at 09:33

## 2023-01-17 RX ADMIN — ZINC SULFATE 220 MG (50 MG) CAPSULE 220 MG: CAPSULE at 09:29

## 2023-01-17 RX ADMIN — OXYCODONE HYDROCHLORIDE 10 MG: 10 TABLET ORAL at 02:48

## 2023-01-17 RX ADMIN — SENNOSIDES AND DOCUSATE SODIUM 2 TABLET: 50; 8.6 TABLET ORAL at 09:29

## 2023-01-17 RX ADMIN — Medication 100 UNITS: at 09:29

## 2023-01-17 RX ADMIN — ACETAMINOPHEN 975 MG: 325 TABLET, FILM COATED ORAL at 00:42

## 2023-01-17 RX ADMIN — HYDROMORPHONE HYDROCHLORIDE 0.4 MG: 0.2 INJECTION, SOLUTION INTRAMUSCULAR; INTRAVENOUS; SUBCUTANEOUS at 20:52

## 2023-01-17 RX ADMIN — OXYCODONE HYDROCHLORIDE 10 MG: 10 TABLET ORAL at 23:23

## 2023-01-17 RX ADMIN — Medication 250 MG: at 09:29

## 2023-01-17 RX ADMIN — OXYCODONE HYDROCHLORIDE 10 MG: 10 TABLET ORAL at 06:14

## 2023-01-17 RX ADMIN — HYDROMORPHONE HYDROCHLORIDE 0.4 MG: 0.2 INJECTION, SOLUTION INTRAMUSCULAR; INTRAVENOUS; SUBCUTANEOUS at 18:32

## 2023-01-17 RX ADMIN — POLYETHYLENE GLYCOL 3350 17 G: 17 POWDER, FOR SOLUTION ORAL at 09:29

## 2023-01-17 RX ADMIN — HYDROMORPHONE HYDROCHLORIDE 0.4 MG: 0.2 INJECTION, SOLUTION INTRAMUSCULAR; INTRAVENOUS; SUBCUTANEOUS at 10:07

## 2023-01-17 RX ADMIN — POLYETHYLENE GLYCOL 3350 17 G: 17 POWDER, FOR SOLUTION ORAL at 20:50

## 2023-01-17 RX ADMIN — FAMOTIDINE 20 MG: 20 TABLET, FILM COATED ORAL at 20:50

## 2023-01-17 RX ADMIN — MAGNESIUM HYDROXIDE 30 ML: 400 SUSPENSION ORAL at 09:30

## 2023-01-17 RX ADMIN — ACETAMINOPHEN 975 MG: 325 TABLET, FILM COATED ORAL at 23:23

## 2023-01-17 ASSESSMENT — ACTIVITIES OF DAILY LIVING (ADL)
ADLS_ACUITY_SCORE: 22
ADLS_ACUITY_SCORE: 22
ADLS_ACUITY_SCORE: 32
ADLS_ACUITY_SCORE: 22

## 2023-01-17 NOTE — OP NOTE
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 T7-9 laminectomy and resection of intradural mass  2. Use of intra-operative C-arm  3. Use of microscope  4. Use of ultrasound  5. Neuromonitoring with SSEPs, MEPs    Resident: Thania Castro MD    Anesthesia: General endotracheal    Complications: none    Estimated blood loss: 50ml    Indications: Laure is a 72 year old woman who presented to neurosurgery clinic with progressive lower extremity weakness and symptoms of myelopathy. 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 She was indicated for abovementioned procedure. We explained the risks of the procedure and she agreed to proceed with surgery after signing informed consent.    Details: Patient was brought to the operating room and underwent intubation under general anesthesia. A william and arterial line were placed. She was then turned prone on the operating table. Her arms were placed in a superwoman position. C-arm was brought for localization at the T7-9 level. The back area was scrubbed then prepped and draped in the standard sterile fashion. Timeout was performed. Local anesthetic was injected into the incision for total of 20cc.    Incision was made with #10 blade. We continued dissection with monopolar cautery to expose the spinous processes and laminae of T7 to T9. Hemostasis was achieved with bipolar cautery. We then brought the C-arm to verify our level at T7-T9. This was our spinal timeout. Afterwards, we performed laminectomies at T7 down to T9 using a combination of leksell rongeurs and high speed drill. Once we exposed the dura, we brought our ultrasound and were able to visualize the tumor and determine our extent of the durotomy. We performed a durotomy at the midline using  #15 blade. The dural edges were tucked with 4-0 neurolon stitches. Microscope was brought into the field. Using microdissectors and biopsy forceps, we were able to separate the tumor from the thoracic cord. The tumor was removed in one piece and sent to permanent pathology. The dura overlying the tumor was then coagulated with bipolar cautery and bleeding was controlled with floseal and patties. We then inspected the remainder of the field for any residual tumor and we did not find any more tumor. We irrigated the field and proceeded to close. We closed the dura primarily with 4-0 neurolon stitches in a running simple fashion. A layer of evaseal was placed on top of that and no CSF was noted. Gelfoam and floseal were then placed for hemostasis. The remainder of the closure was done under loupe magnification. The muscle was approximated with 0 vicryl stitches. The muscular fascia was tightly closed with 0 vicryl stitches in an interrupted inverted fashion. The subcutaneous layer was closed with 2-0 vicryl stitches. The dermal layer was closed with 2-0 vicryl stitches. The skin was closed with 3-0 nylon stitch in an simple running fashion. The wound was cleaned with wet and dry gauze, followed by chloraprep. It was then closed with primapore dressing.     At the end of the procedure, all counts of instruments, needles, and gauzes, were correct x2.  Dr. Antunez scrubbed into the critical portions of the procedure and was immediately available for the remainder.    -----------------------------------  Thania Castro MD, MS  Neurosurgery PGY-6  I was present for the critical portions of the operation and immediately available for the remainder.  AMP

## 2023-01-17 NOTE — PROGRESS NOTES
"[facility], [place]   Neurosurgery Progress Note:    Date of service: 1/17/2023    Assessment: Laure Wood is a 72 year old female s/p Thoracic 7-9 laminectomy with resection of intradural extramedullary mass on 1/16/2023 with Dr. Ambar Antunez.     Clinically Significant Risk Factors Present on Admission                  # Obesity: Estimated body mass index is 34.83 kg/m  as calculated from the following:    Height as of this encounter: 1.6 m (5' 3\").    Weight as of this encounter: 89.2 kg (196 lb 9.6 oz).      Plan:  - Neuro checks/vital signs: Every 4 hours  - SBP: <160  - Pain control: Tylenol, PRN dilaudid and oxycodone   - Activity: Flat bedrest for 24 hours.  - Restrictions/Bracing: None  - Diet: ADAT  - mIVF until tolerating PO  - DVT prophylaxis: SCDs  - PT/OT: once off bedrest  - Nutrition consult for high protein diet  - Wound healing protocol  - Daily dressing changes  - Bowel regimen     RUBEN Bajwa, CNP  Neurosurgery  Pager [phone]      Interval History:  Patient continues to report back pain. Denies chest pain or shortness of breath. Patient unable to report any significant in numbness this AM.  Incision appears intact, dressing changed this AM.  Patient remains on flat bedrest for 24 hours. Okay to leave william in place until off bedrest.  PT/OT ordered to start after bedrest.  Will trend troponin and obtain Echocardiogram today.       Objective:   Temp:  [97.4  F (36.3  C)-99.7  F (37.6  C)] 98.3  F (36.8  C)  Pulse:  [66-95] 93  Resp:  [9-53] 18  BP: (112-181)/(61-97) 112/61  MAP:  [96 mmHg-115 mmHg] 111 mmHg  Arterial Line BP: (143-171)/(69-79) 168/74  SpO2:  [89 %-100 %] 100 %  I/O last 3 completed shifts:  In: 2330.92 [P.O.:360; I.V.:1970.92]  Out: 2225 [Urine:2175; Blood:50]    Gen: Appears comfortable, NAD  Wound: Incision, clean, dry, intact. Dressing changed  Neurologic:  - Alert & Oriented to person, place, time, and situation  - Follows commands briskly  - " Speech fluent, spontaneous. No aphasia or dysarthria.  - No gaze preference. No apparent hemineglect.  - PERRL, EOMI  - Strong eye closure, jaw clench, and cheek puff  - Face symmetric with sensation intact to light touch  - Palate elevates symmetrically, uvula midline, tongue protrudes midline  - Trapezii and sternocleidomastoid muscles 5/5 bilaterally  - No pronator drift     Del Tr Bi WE WF Gr   R 5 5 5 5 5 5   L 5 5 5 5 5 5    HF KE KF DF PF EHL   R 4 4 4 4 4 4   L 4 4 4 4 4 4     Reflexes 2+ throughout    Sensation intact and symmetric to light touch throughout bilateral upper extremities.  Numbness and tingling present in bilateral lower extremities.     LABS  Recent Labs   Lab Test 01/09/23  0951 03/16/22  1218 04/08/21  0614 04/07/21  0719 04/06/21  2151 03/16/21  1409 02/26/21  1531   WBC 5.2  --   --   --   --  4.5 5.1   HGB 12.5 12.2 10.0*   < >  --  12.2 12.0   MCV 91  --   --   --   --  89 88     --   --   --  291 273 305    < > = values in this interval not displayed.       Recent Labs   Lab Test 01/17/23  0739 01/17/23  0226 01/16/23  1150 01/09/23  0951 12/20/22  0855     --   --  143 143   POTASSIUM 5.0  --   --  4.0 3.8   CHLORIDE 108*  --   --  105 107   CO2 17*  --   --  28 25   BUN 21.8  --   --  21.9 35.8*   CR 0.75  --   --  1.05* 1.11*   ANIONGAP 15  --   --  10 11   ALONSO 9.3  --   --  10.1 10.1   * 106* 83 87 89       IMAGING    Recent Results (from the past 24 hour(s))   XR Surgery REMINGTON L/T 5 Min Fluoro w Stills    Narrative    This exam was marked as non-reportable because it will not be read by a   radiologist or a [place] non-radiologist provider.

## 2023-01-17 NOTE — PROGRESS NOTES
CLINICAL NUTRITION SERVICES - ASSESSMENT NOTE     Nutrition Prescription    Malnutrition Status:    Does not meet criteria at this time     Recommendations already ordered by Registered Dietitian (RD):  - Educated on protein goal, high protein foods/snacks/supplements, strategies to meet goal  - Begin Ensure Max protein once daily + additional supplements PRN if requested     Future/Additional Recommendations:  Monitor nutrition-related findings and follow pt per protocol     REASON FOR ASSESSMENT  Laure Wood is a/an 72 year old female assessed by the dietitian for Provider Order - Complex spinal surgery, need for high protein post-op     CLINICAL HISTORY  Pt admitted d/t 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 Other PMH of HTN, esophageal reflux, and CKD.     NUTRITION HISTORY  Pt reports following a regular diet PTA and denies having any nutrition related concern at baseline, aside from some food avoidances that cause her to have gout flare-ups (shellfish, red meat, tomato products). Good appetite. Likes to eat oatmeal and fruit in the morning. Other meals often traditional African Cuisine with intake of rice, meats (chicken, fish, turkey), and veggies. Reports having a stable wt PTA.     Educated on importance of adequate protein post-op; discussed protein goals, high protein foods, and strategies to optimize nutrition. Also discussed protein drinks such as Ensure if needed to meet protein goals. Pt reports taking Ensure in the past, but previously gained weight when taking it. Discussed lower Kcal higher protein options. Pt would like to take this once daily and requested info to help if she wants to purchase after discharge (so provided).     CURRENT NUTRITION ORDERS  Diet: Regular  Intake/Tolerance: No intake documented yet for present admission. Requests 1 Ensure drink to be sent now.     GI    Last BM PTA      LABS    Reviewed     MEDICATIONS    Miralax BID     Senna  "BID     Vit A, 10,000 units daily x 10 days    Vit E 100 units x 10 days    Zn sulfate 220 mg x 10 days    IVF - NS at 85 mL/hr     SKIN    Post-surgical wounds presumed      ANTHROPOMETRICS  Height: 160 cm (5' 3\")  Admit wt 88.3 kg (194 lbs) 1/16  Most Recent Weight: 89.2 kg (196 lb 9.6 oz)  IBW: 52.3 kg  169%IBW   BMI: Obesity Grade I BMI 30-34.9    Weight History:   Stable PTA per below   Wt Readings from Last 15 Encounters:   01/17/23 89.2 kg (196 lb 9.6 oz)   01/09/23 90.4 kg (199 lb 4.8 oz)   01/06/23 88.5 kg (195 lb)   12/20/22 88.1 kg (194 lb 3.2 oz)   12/05/22 87.1 kg (192 lb)   12/01/22 88 kg (194 lb)   10/11/22 88.9 kg (196 lb)   08/18/22 86.2 kg (190 lb)   05/17/22 84.9 kg (187 lb 3.2 oz)   03/16/22 83.8 kg (184 lb 11.2 oz)   09/22/21 84.9 kg (187 lb 3.2 oz)   04/06/21 83.5 kg (184 lb)   03/16/21 84.6 kg (186 lb 6.4 oz)   02/26/21 85.2 kg (187 lb 12.8 oz)   02/03/21 81.6 kg (180 lb)       Dosing Weight: 61 kg (adjusted per admit wt and IBW 52.3 kg)    ASSESSED NUTRITION NEEDS  Estimated Energy Needs: 9084-9280 kcals/day (25 - 30 kcals/kg)  Justification: Maintenance  Estimated Protein Needs: 90+ grams protein/day (1.5+ gm/kg)  Justification: Increased needs post-op   Estimated Fluid Needs: 0895-4057 mL/day (1 mL/kcal)   Justification: Maintenance    PHYSICAL FINDINGS  See malnutrition section below.    MALNUTRITION  % Intake: No decreased intake noted  % Weight Loss: None noted  Subcutaneous Fat Loss: None observed  Muscle Loss: None observed  Fluid Accumulation/Edema: None noted  Malnutrition Diagnosis: Patient does not meet two of the established criteria necessary for diagnosing malnutrition    NUTRITION DIAGNOSIS  Increased nutrient needs (protein) related to post-op status as evidenced by RD consult for high protein education, goal for 1.5 gm/kg protein per day.      INTERVENTIONS  Implementation  Nutrition education for recommended modifications   Medical food supplement therapy     Goals  Patient " to consume % of nutritionally adequate meal trays TID, or the equivalent with supplements/snacks.     Monitoring/Evaluation  Progress toward goals will be monitored and evaluated per protocol.  Thanh Chopra RDN, LD, [facility]  6B RD pager: [phone]   6B work-room RD phone: *[phone]    Weekend/Holiday RD pager [phone]

## 2023-01-17 NOTE — ANESTHESIA POSTPROCEDURE EVALUATION
Patient: Laure Wood    Procedure: Procedure(s):  Thoracic 7/9 laminectomy with resection of intradural mass       Anesthesia Type:  General    Note:  Disposition: Admission   Postop Pain Control: Uneventful            Sign Out: Well controlled pain   PONV: No   Neuro/Psych: Uneventful            Sign Out: Acceptable/Baseline neuro status   Airway/Respiratory: Uneventful            Sign Out: Acceptable/Baseline resp. status   CV/Hemodynamics:    Other NRE: NONE   DID A NON-ROUTINE EVENT OCCUR? YES    Event details/Postop Comments:  Patient with suspected new LBBB in PACU. EKG confirmed. Patient without chest pain, SOB, or nausea. Hemodynamically stable (actually hypertensive). Given hydralazine with SBP in 140s at signout.    This was discussed with neurosurgery. Will order troponins. Further workup in AM for now.    From neurological standpoint, numbness is grossly the same. No issues otherwise.           Last vitals:  Vitals Value Taken Time   /81 01/16/23 2215   Temp 36.3  C (97.4  F) 01/16/23 2130   Pulse 85 01/16/23 2216   Resp 19 01/16/23 2216   SpO2 100 % 01/16/23 2216   Vitals shown include unvalidated device data.    Electronically Signed By: Edwin Patterson MD  January 16, 2023  10:18 PM

## 2023-01-17 NOTE — ANESTHESIA CARE TRANSFER NOTE
Patient: Laure Wood    Procedure: Procedure(s):  Thoracic 7/9 laminectomy with resection of intradural mass       Diagnosis: Meningioma (H) [D32.9]  Diagnosis Additional Information: No value filed.    Anesthesia Type:   General     Note:    Oropharynx: oropharynx clear of all foreign objects and spontaneously breathing  Level of Consciousness: awake  Oxygen Supplementation: face mask    Independent Airway: airway patency satisfactory and stable  Dentition: dentition unchanged  Vital Signs Stable: post-procedure vital signs reviewed and stable  Report to RN Given: handoff report given  Patient transferred to: PACU    Handoff Report: Identifed the Patient, Identified the Reponsible Provider, Reviewed the pertinent medical history, Discussed the surgical course, Reviewed Intra-OP anesthesia mangement and issues during anesthesia, Set expectations for post-procedure period and Allowed opportunity for questions and acknowledgement of understanding      Vitals:  Vitals Value Taken Time   /97    Temp 36.3    Pulse 80    Resp 12    SpO2 100        Electronically Signed By: Charles Patrick Schlatter, APRN CRNA  January 16, 2023  7:20 PM

## 2023-01-17 NOTE — PROGRESS NOTES
Transferred from:   Via: bed  Reason for transfer: For tele monitoring (new BBB)  Family: Aware of transfer  Belongings: Received with pt  Chart: Received with pt  Medications: Meds received from old unit with pt  Code Status verified on armband: yes  2 RN Skin Assessment Completed By: Berny GUZMAN RN and Jacey WASHINGTON RN  Med rec completed: yes  Bed surface reassessed with algorithm and charted: n/a  New bed surface ordered: n/a  Suction/Ambu bag/Flowmeter at bedside: yes    Report received from: Elsy MORRIS RN  Pt status: stable

## 2023-01-17 NOTE — PHARMACY-ADMISSION MEDICATION HISTORY
Admission Medication History Completed by Pharmacy    See Psychiatric Admission Navigator for allergy information, preferred outpatient pharmacy, prior to admission medications and immunization status.     Medication History Sources:     Patient    Surescripts dispense report     Changes made to PTA medication list (reason):    Added: None    Deleted: None    Changed:   o Allopurinol 200 mg BID > 200 mg daily (per pt)  o Colchicine 0.6 mg BID > 0.6 mg daily (per pt and dispense report)    Additional Information:    Pt initially stated she doesn't know her meds and that her daughter manages them, but later told her nurse that she would recognize the drug names. Current doses were confirmed via dispense report, and pt was able to confirm compliance.    Pt has been filling famotidine 20 mg BID since August 2022, but insisted this is not a current medication for her (she takes omeprazole PRN).    Pt confirmed it has been ~1.5 months since she last took prednisone for gout flare.    Pt stated she hasn't taken simvastatin for ~1 month, she told her doctor she wanted to take a break from it.    Pt confirmed NKDA.    Prior to Admission medications    Medication Sig Last Dose Taking? Auth Provider Long Term End Date   acetaminophen (TYLENOL) 325 MG tablet Take 2 tablets (650 mg) by mouth every 6 hours as needed for mild pain Past Month Yes Yoshi Bowen MD     allopurinol (ZYLOPRIM) 100 MG tablet Take 2 tablets (200 mg) by mouth daily 1/15/2023 Yes Yoshi Bowen MD     colchicine (COLCYRS) 0.6 MG tablet Take 1 tablet (0.6 mg) by mouth daily 1/15/2023 Yes Yoshi Bowen MD     diclofenac (VOLTAREN) 1 % topical gel Apply 2-4 g topically 4 times daily R Knee, foot as needed Past Month Yes Yoshi Bowen MD     hydrochlorothiazide (HYDRODIURIL) 25 MG tablet TAKE 1 TABLET(25 MG) BY MOUTH DAILY  Patient taking differently: Take 25 mg by mouth daily 1/15/2023 Yes Yoshi Bowen MD Yes    omeprazole (PRILOSEC)  20 MG DR capsule Take 20 mg by mouth daily as needed (pt takes prn for occasional GERD symptoms) Past Month Yes Reported, Patient     simvastatin (ZOCOR) 20 MG tablet Take 1 tablet (20 mg) by mouth At Bedtime More than a month Yes Yoshi Bowen MD Yes         Date completed: 01/17/23    Medication history completed by:   Moy Morton, LokeshD, BCPS

## 2023-01-17 NOTE — OR NURSING
Leonardo came and examined pt and wanted EKG.  Pt B/P elevated and will admin meds as ordered/ Returned to pt care at 2130

## 2023-01-17 NOTE — PLAN OF CARE
"Vital signs:  Temp: 99.2  F (37.3  C) Temp src: Oral BP: (!) 156/77 Pulse: 95   Resp: 19 SpO2: 97 % O2 Device: Nasal cannula Oxygen Delivery: 2 LPM Height: 160 cm (5' 3\") Weight: 88.3 kg (194 lb 10.7 oz)    Pt arrived from PACU around 0000, Neurosurgery team decided that she would need cardiac monitoring for new-onset BBB, so transfer orders were placed. Neuros intact (A&Ox4), bilateral numbness and tingling in BLE, 4/5 strength. Incision mid-back with covered with primpore, drainage on dressing-marked. Dorsalis pedis and post tib pulses +2 bilaterally. VSS ex HTN. IV dilaudid given x1. Report given to 6B nurse, pt off unit around 0145.      Admitted/transferred from: PACU  2 RN full   skin assessment completed by Elsy Rajput, RN and Eliane Guardado RN.  Skin assessment finding: issues found Incision mid-back covered with primapore, drainage marked.    Interventions/actions: skin interventions encoourage repositioning     Bedside Emergency Equipment Present:  Suction Regulator: Yes  Suction Canister: Yes  Tubing between Regulator and Canister: Yes  O2 Regulator with Tree: Yes  Ambu Bag: Yes        "

## 2023-01-17 NOTE — PROGRESS NOTES
Encounter opened due to Regulatory [employer] Update to open FVP Program.    Vy Raman  Care Management Specialist  [facility]  [phone]

## 2023-01-17 NOTE — BRIEF OP NOTE
[facility]    Brief Operative Note    Pre-operative diagnosis: Meningioma (H) [D32.9]  Post-operative diagnosis Meningioma    Procedure: Procedure(s):  Thoracic 7/9 laminectomy with resection of intradural mass  Surgeon: Surgeon(s) and Role:     * Ambar Antunez MD - Primary     * Thania Castro MD - Resident - Assisting  Anesthesia: General   Estimated Blood Loss: 50ml    Drains: None  Specimens:   ID Type Source Tests Collected by Time Destination   1 : Thoracic spinal tumor Tissue Spine, Thoracic SURGICAL PATHOLOGY EXAM Ambar Antunez MD 1/16/2023  5:38 PM      Findings: T7-9 laminectomy and intradural tumor resected.  Complications: None.  Implants: * No implants in log *

## 2023-01-17 NOTE — PLAN OF CARE
"Status 6405-4075    D: Admitted 1/16/23 for T7-9 laminectomy and intradural mass resection.    /67 (BP Location: Left arm, Cuff Size: Adult Large)   Pulse 94   Temp 99.7  F (37.6  C) (Oral)   Resp 16   Ht 1.6 m (5' 3\")   Wt 89.2 kg (196 lb 9.6 oz)   SpO2 98%   BMI 34.83 kg/m      A/I: A&O x4. PRN oxycodone and dilaudid utilized for back pain with relief. Neuros intact except for baseline numbness/tingling in BLEs. VSS. HR 80's-90's with BBB. Order to lie flat for 24hrs, 30 degrees OK for meals. Elizabeth pulled at 0600, purewick placed and due to void by 1200. Primapore dressing on back incision, drainage outlined. Unable to draw labs this morning due to difficult stick.     P: Continue with plan of care, report any changes to neurosurgery.       Problem: Spinal Surgery  Goal: Absence of Bleeding  Outcome: Progressing  Goal: Fluid and Electrolyte Balance  Outcome: Progressing  Goal: Optimal Functional Ability  Outcome: Progressing  Intervention: Optimize Functional Status  Recent Flowsheet Documentation  Taken 1/17/2023 0400 by Lauren Carbajal, RN  Activity Management: activity adjusted per tolerance  Goal: Optimal Neurologic Function  Outcome: Progressing  Intervention: Optimize Neurologic Function  Recent Flowsheet Documentation  Taken 1/17/2023 0400 by Lauren Carbajal, RN  Body Position: supine  Goal: Anesthesia/Sedation Recovery  Outcome: Progressing  Intervention: Optimize Anesthesia Recovery  Recent Flowsheet Documentation  Taken 1/17/2023 0400 by Lauren Carbajal, RN  Safety Promotion/Fall Prevention:    activity supervised    fall prevention program maintained    nonskid shoes/slippers when out of bed  Administration (IS): self-administered  Level Incentive Spirometer (mL): 750  Incentive Spirometer Predicted Level (mL): 1000  Number of Repetitions (IS): 5  Patient Tolerance (IS): good  Goal: Optimal Pain Control and Function  Outcome: Progressing  Intervention: Prevent or Manage Pain  Recent " Flowsheet Documentation  Taken 1/17/2023 0400 by Lauren Carbajal, RN  Pain Management Interventions: medication (see MAR)  Goal: Effective Oxygenation and Ventilation  Outcome: Progressing  Intervention: Optimize Oxygenation and Ventilation  Recent Flowsheet Documentation  Taken 1/17/2023 0400 by Lauren Carbajal, RN  Head of Bed (HOB) Positioning: HOB flat

## 2023-01-17 NOTE — PLAN OF CARE
Goal Outcome Evaluation:      Plan of Care Reviewed With: patient    Overall Patient Progress: no changeOverall Patient Progress: no change    Outcome Evaluation: Oral nutrition supplement ordered once daily to optimize intake post-op. See RD note for full assessment.    Thanh Chopra RDN, LD, CNSC  6B RD pager: [phone]   6B work-room RD phone: [phone]    Weekend/Holiday RD pager [phone]

## 2023-01-18 ENCOUNTER — APPOINTMENT (OUTPATIENT)
Dept: OCCUPATIONAL THERAPY | Facility: CLINIC | Age: 73
DRG: 029 | End: 2023-01-18
Payer: COMMERCIAL

## 2023-01-18 ENCOUNTER — APPOINTMENT (OUTPATIENT)
Dept: PHYSICAL THERAPY | Facility: CLINIC | Age: 73
DRG: 029 | End: 2023-01-18
Payer: COMMERCIAL

## 2023-01-18 LAB
ANION GAP SERPL CALCULATED.3IONS-SCNC: 10 MMOL/L (ref 7–15)
ATRIAL RATE - MUSE: 70 BPM
BUN SERPL-MCNC: 15.1 MG/DL (ref 8–23)
CALCIUM SERPL-MCNC: 8.8 MG/DL (ref 8.8–10.2)
CHLORIDE SERPL-SCNC: 105 MMOL/L (ref 98–107)
CREAT SERPL-MCNC: 0.95 MG/DL (ref 0.51–0.95)
DEPRECATED HCO3 PLAS-SCNC: 23 MMOL/L (ref 22–29)
DIASTOLIC BLOOD PRESSURE - MUSE: NORMAL MMHG
ERYTHROCYTE [DISTWIDTH] IN BLOOD BY AUTOMATED COUNT: 14.8 % (ref 10–15)
GFR SERPL CREATININE-BSD FRML MDRD: 63 ML/MIN/1.73M2
GLUCOSE SERPL-MCNC: 130 MG/DL (ref 70–99)
HCT VFR BLD AUTO: 30 % (ref 35–47)
HGB BLD-MCNC: 9.4 G/DL (ref 11.7–15.7)
HOLD SPECIMEN: NORMAL
INTERPRETATION ECG - MUSE: NORMAL
MAGNESIUM SERPL-MCNC: 1.9 MG/DL (ref 1.7–2.3)
MCH RBC QN AUTO: 27.7 PG (ref 26.5–33)
MCHC RBC AUTO-ENTMCNC: 31.3 G/DL (ref 31.5–36.5)
MCV RBC AUTO: 89 FL (ref 78–100)
P AXIS - MUSE: 53 DEGREES
PHOSPHATE SERPL-MCNC: 1.5 MG/DL (ref 2.5–4.5)
PHOSPHATE SERPL-MCNC: 1.9 MG/DL (ref 2.5–4.5)
PLATELET # BLD AUTO: 212 10E3/UL (ref 150–450)
POTASSIUM SERPL-SCNC: 3.9 MMOL/L (ref 3.4–5.3)
PR INTERVAL - MUSE: 214 MS
QRS DURATION - MUSE: 130 MS
QT - MUSE: 454 MS
QTC - MUSE: 490 MS
R AXIS - MUSE: -49 DEGREES
RBC # BLD AUTO: 3.39 10E6/UL (ref 3.8–5.2)
SODIUM SERPL-SCNC: 138 MMOL/L (ref 136–145)
SYSTOLIC BLOOD PRESSURE - MUSE: NORMAL MMHG
T AXIS - MUSE: 118 DEGREES
VENTRICULAR RATE- MUSE: 70 BPM
WBC # BLD AUTO: 9.2 10E3/UL (ref 4–11)

## 2023-01-18 PROCEDURE — 250N000011 HC RX IP 250 OP 636: Performed by: NURSE PRACTITIONER

## 2023-01-18 PROCEDURE — 97165 OT EVAL LOW COMPLEX 30 MIN: CPT | Mod: GO

## 2023-01-18 PROCEDURE — 84100 ASSAY OF PHOSPHORUS: CPT | Performed by: NEUROLOGICAL SURGERY

## 2023-01-18 PROCEDURE — 80048 BASIC METABOLIC PNL TOTAL CA: CPT

## 2023-01-18 PROCEDURE — 250N000013 HC RX MED GY IP 250 OP 250 PS 637

## 2023-01-18 PROCEDURE — 250N000013 HC RX MED GY IP 250 OP 250 PS 637: Performed by: NEUROLOGICAL SURGERY

## 2023-01-18 PROCEDURE — 36415 COLL VENOUS BLD VENIPUNCTURE: CPT | Performed by: NEUROLOGICAL SURGERY

## 2023-01-18 PROCEDURE — 97530 THERAPEUTIC ACTIVITIES: CPT | Mod: GP

## 2023-01-18 PROCEDURE — 120N000003 HC R&B IMCU UMMC

## 2023-01-18 PROCEDURE — 97161 PT EVAL LOW COMPLEX 20 MIN: CPT | Mod: GP

## 2023-01-18 PROCEDURE — 85027 COMPLETE CBC AUTOMATED: CPT

## 2023-01-18 PROCEDURE — 83735 ASSAY OF MAGNESIUM: CPT | Performed by: NEUROLOGICAL SURGERY

## 2023-01-18 PROCEDURE — 250N000011 HC RX IP 250 OP 636

## 2023-01-18 PROCEDURE — 250N000013 HC RX MED GY IP 250 OP 250 PS 637: Performed by: NURSE PRACTITIONER

## 2023-01-18 PROCEDURE — 84100 ASSAY OF PHOSPHORUS: CPT

## 2023-01-18 PROCEDURE — 97535 SELF CARE MNGMENT TRAINING: CPT | Mod: GO

## 2023-01-18 RX ORDER — MAGNESIUM OXIDE 400 MG/1
400 TABLET ORAL EVERY 4 HOURS
Status: COMPLETED | OUTPATIENT
Start: 2023-01-18 | End: 2023-01-18

## 2023-01-18 RX ORDER — HYDROMORPHONE HCL IN WATER/PF 6 MG/30 ML
0.2 PATIENT CONTROLLED ANALGESIA SYRINGE INTRAVENOUS EVERY 4 HOURS PRN
Status: COMPLETED | OUTPATIENT
Start: 2023-01-18 | End: 2023-01-19

## 2023-01-18 RX ORDER — METHOCARBAMOL 500 MG/1
500 TABLET, FILM COATED ORAL 4 TIMES DAILY
Status: DISCONTINUED | OUTPATIENT
Start: 2023-01-18 | End: 2023-01-25 | Stop reason: HOSPADM

## 2023-01-18 RX ORDER — ACETAMINOPHEN 325 MG/1
975 TABLET ORAL EVERY 8 HOURS
Status: COMPLETED | OUTPATIENT
Start: 2023-01-18 | End: 2023-01-21

## 2023-01-18 RX ADMIN — HYDROMORPHONE HYDROCHLORIDE 0.4 MG: 0.2 INJECTION, SOLUTION INTRAMUSCULAR; INTRAVENOUS; SUBCUTANEOUS at 04:45

## 2023-01-18 RX ADMIN — ACETAMINOPHEN 975 MG: 325 TABLET, FILM COATED ORAL at 08:35

## 2023-01-18 RX ADMIN — ZINC SULFATE 220 MG (50 MG) CAPSULE 220 MG: CAPSULE at 08:30

## 2023-01-18 RX ADMIN — POTASSIUM & SODIUM PHOSPHATES POWDER PACK 280-160-250 MG 2 PACKET: 280-160-250 PACK at 08:31

## 2023-01-18 RX ADMIN — HYDROMORPHONE HYDROCHLORIDE 0.2 MG: 0.2 INJECTION, SOLUTION INTRAMUSCULAR; INTRAVENOUS; SUBCUTANEOUS at 15:13

## 2023-01-18 RX ADMIN — METHOCARBAMOL 500 MG: 500 TABLET, FILM COATED ORAL at 20:27

## 2023-01-18 RX ADMIN — Medication 400 MG: at 11:51

## 2023-01-18 RX ADMIN — METHOCARBAMOL 500 MG: 500 TABLET, FILM COATED ORAL at 09:55

## 2023-01-18 RX ADMIN — HYDROMORPHONE HYDROCHLORIDE 0.4 MG: 0.2 INJECTION, SOLUTION INTRAMUSCULAR; INTRAVENOUS; SUBCUTANEOUS at 06:47

## 2023-01-18 RX ADMIN — POTASSIUM & SODIUM PHOSPHATES POWDER PACK 280-160-250 MG 2 PACKET: 280-160-250 PACK at 11:51

## 2023-01-18 RX ADMIN — METHOCARBAMOL 500 MG: 500 TABLET, FILM COATED ORAL at 13:16

## 2023-01-18 RX ADMIN — Medication 250 MG: at 08:28

## 2023-01-18 RX ADMIN — HYDROMORPHONE HYDROCHLORIDE 0.4 MG: 0.2 INJECTION, SOLUTION INTRAMUSCULAR; INTRAVENOUS; SUBCUTANEOUS at 02:26

## 2023-01-18 RX ADMIN — Medication 100 UNITS: at 08:31

## 2023-01-18 RX ADMIN — SIMVASTATIN 20 MG: 20 TABLET, FILM COATED ORAL at 22:35

## 2023-01-18 RX ADMIN — FAMOTIDINE 20 MG: 20 TABLET, FILM COATED ORAL at 20:27

## 2023-01-18 RX ADMIN — POLYETHYLENE GLYCOL 3350 17 G: 17 POWDER, FOR SOLUTION ORAL at 08:31

## 2023-01-18 RX ADMIN — OXYCODONE HYDROCHLORIDE 10 MG: 10 TABLET ORAL at 08:26

## 2023-01-18 RX ADMIN — FAMOTIDINE 20 MG: 20 TABLET, FILM COATED ORAL at 08:31

## 2023-01-18 RX ADMIN — HYDROMORPHONE HYDROCHLORIDE 0.2 MG: 0.2 INJECTION, SOLUTION INTRAMUSCULAR; INTRAVENOUS; SUBCUTANEOUS at 22:35

## 2023-01-18 RX ADMIN — METHOCARBAMOL 500 MG: 500 TABLET, FILM COATED ORAL at 15:13

## 2023-01-18 RX ADMIN — COLCHICINE 0.6 MG: 0.6 TABLET, FILM COATED ORAL at 08:24

## 2023-01-18 RX ADMIN — HYDROMORPHONE HYDROCHLORIDE 0.4 MG: 0.2 INJECTION, SOLUTION INTRAMUSCULAR; INTRAVENOUS; SUBCUTANEOUS at 00:19

## 2023-01-18 RX ADMIN — OXYCODONE HYDROCHLORIDE 10 MG: 10 TABLET ORAL at 11:51

## 2023-01-18 RX ADMIN — OXYCODONE HYDROCHLORIDE 10 MG: 10 TABLET ORAL at 03:39

## 2023-01-18 RX ADMIN — OXYCODONE HYDROCHLORIDE 10 MG: 10 TABLET ORAL at 20:27

## 2023-01-18 RX ADMIN — MAGNESIUM HYDROXIDE 30 ML: 400 SUSPENSION ORAL at 08:34

## 2023-01-18 RX ADMIN — ALLOPURINOL 200 MG: 100 TABLET ORAL at 08:29

## 2023-01-18 RX ADMIN — Medication 400 MG: at 09:56

## 2023-01-18 RX ADMIN — Medication 10000 UNITS: at 08:29

## 2023-01-18 RX ADMIN — POTASSIUM & SODIUM PHOSPHATES POWDER PACK 280-160-250 MG 2 PACKET: 280-160-250 PACK at 15:13

## 2023-01-18 RX ADMIN — SENNOSIDES AND DOCUSATE SODIUM 2 TABLET: 50; 8.6 TABLET ORAL at 08:30

## 2023-01-18 RX ADMIN — OXYCODONE HYDROCHLORIDE 10 MG: 10 TABLET ORAL at 16:16

## 2023-01-18 RX ADMIN — ACETAMINOPHEN 975 MG: 325 TABLET, FILM COATED ORAL at 16:15

## 2023-01-18 ASSESSMENT — ACTIVITIES OF DAILY LIVING (ADL)
ADLS_ACUITY_SCORE: 32

## 2023-01-18 NOTE — PROGRESS NOTES
"[facility], [place]   Neurosurgery Progress Note:    Date of service: 1/18/2023    Assessment: Laure Wood is a 72 year old female s/p Thoracic 7-9 laminectomy with resection of intradural extramedullary mass on 1/16/2023 with Dr. Ambar Antunez.     Clinically Significant Risk Factors Present on Admission             # Obesity: Estimated body mass index is 35.85 kg/m  as calculated from the following:    Height as of this encounter: 1.6 m (5' 3\").    Weight as of this encounter: 91.8 kg (202 lb 6.4 oz).      Plan:  - Neuro checks/vital signs: Every 4 hours  - SBP: <160  - Pain control: Tylenol, and oxycodone PRN  - Activity: up as tolerated  - Restrictions/Bracing: None  - Diet: ADAT  - DVT prophylaxis: SCDs  - PT/OT: ordered for today  - Nutrition consult for high protein diet  - Wound healing protocol  - Daily dressing changes  - Bowel regimen     RUBEN Bajwa, CNP  Neurosurgery  Pager [phone]      Interval History:  Patient continues to report back pain. Denies chest pain or shortness of breath. Patient reports on-going numbness in BLEs.  Will increase activity today to up as tolerated.  PT/OT to see patient today.  Will add robaxin to pain management plan.      Objective:   Temp:  [98.5  F (36.9  C)-100.8  F (38.2  C)] 100.1  F (37.8  C)  Pulse:  [] 105  Resp:  [16-20] 20  BP: (108-135)/(50-67) 128/58  SpO2:  [92 %-100 %] 96 %  I/O last 3 completed shifts:  In: 1190 [P.O.:1190]  Out: 1475 [Urine:1475]    Gen: Appears comfortable, NAD  Wound: Incision, clean, dry, intact. Dressing changed  Neurologic:  - Alert & Oriented to person, place, time, and situation  - Follows commands briskly  - Speech fluent, spontaneous. No aphasia or dysarthria.  - No gaze preference. No apparent hemineglect.  - PERRL, EOMI  - Strong eye closure, jaw clench, and cheek puff  - Face symmetric with sensation intact to light touch  - Palate elevates symmetrically, uvula midline, tongue protrudes " midline  - Trapezii and sternocleidomastoid muscles 5/5 bilaterally  - No pronator drift     Del Tr Bi WE WF Gr   R 5 5 5 5 5 5   L 5 5 5 5 5 5    HF KE KF DF PF EHL   R 4 4 4 4 4 4   L 4+ 4+ 4+ 4+ 4+ 4+     Reflexes 2+ throughout    Sensation intact and symmetric to light touch throughout bilateral upper extremities.  Numbness and tingling present in bilateral lower extremities.     LABS  Recent Labs   Lab Test 01/18/23  0545 01/17/23  1130 01/09/23  0951   WBC 9.2 8.5 5.2   HGB 9.4* 10.6* 12.5   MCV 89 85 91    206 264       Recent Labs   Lab Test 01/18/23  0545 01/17/23  0739 01/17/23  0226 01/16/23  1150 01/09/23  0951    140  --   --  143   POTASSIUM 3.9 5.0  --   --  4.0   CHLORIDE 105 108*  --   --  105   CO2 23 17*  --   --  28   BUN 15.1 21.8  --   --  21.9   CR 0.95 0.75  --   --  1.05*   ANIONGAP 10 15  --   --  10   ALONSO 8.8 9.3  --   --  10.1   * 107* 106*   < > 87    < > = values in this interval not displayed.       IMAGING    Recent Results (from the past 24 hour(s))   XR Surgery REMINGTON L/T 5 Min Fluoro w Stills    Narrative    This exam was marked as non-reportable because it will not be read by a   radiologist or a [place] non-radiologist provider.

## 2023-01-18 NOTE — PLAN OF CARE
Neuro: A&Ox4.  Neuro checks q4  Slight fever in the morning encourage IS effective. Currently Afebrile.   Cardiac: VSS.   Respiratory: Sating > 95%  Currently on RA.  GI/: Elizabeth patent adequate urine output. BM X1 loose watery stool.   Diet/appetite: Regular diet   Activity:  A2 w. gait belt, work with PT.   Pain: Unrelieved pain reported notified MD. Gave prn  Oxy, dil q4.   Skin: Spine incision dressing CDI. Changed daily betadine. meplix on sacrum for protection.   LDA's:  L & R PIV saline locked.       -Incision dressing completed   -activity as tolerated   - pain rated 7 out of 10 during shift gave prn medication. Notify MD.     Plan: Continue with POC. Notify primary team with changes.       Pt scheduled for MRI tomorrow. Per [facility] Bentyl 10 mg needed 1 tablet at hs and 10 mg 1 hour before the MRI # 2 tabs. Order pended.  Please sign or advise

## 2023-01-18 NOTE — PROGRESS NOTES
01/18/23 1500   Appointment Info   Signing Clinician's Name / Credentials (OT) Riki Grant OTR/L   Rehab Comments (OT) Spinal precautions.   Living Environment   People in Home child(tomy), adult   Current Living Arrangements house   Home Accessibility stairs to enter home;stairs within home   Number of Stairs, Main Entrance 1   Stair Railings, Main Entrance none   Number of Stairs, Within Home, Primary six   Stair Railings, Within Home, Primary none   Transportation Anticipated car, drives self   Living Environment Comments Pt has a walk in shower with a shower chair.   Self-Care   Usual Activity Tolerance moderate   Current Activity Tolerance poor   Regular Exercise Yes   Activity/Exercise Type other (see comments)  ([employer])   Exercise Amount/Frequency 2 times/wk;1 hr   Equipment Currently Used at Home cane, straight;shower chair;walker, rolling   Fall history within last six months no   Activity/Exercise/Self-Care Comment Pt's daughter is PCA daily x5hrs.   General Information   Onset of Illness/Injury or Date of Surgery 01/16/23   Referring Physician Shalonda Mireles MD   Patient/Family Therapy Goal Statement (OT) Pt would like to get better and return home independently.   Additional Occupational Profile Info/Pertinent History of Current Problem Laure Wood is a 72 year old female s/p Thoracic 7-9 laminectomy with resection of intradural extramedullary mass on 1/16/2023 with Dr. Ambar Antunez.   Existing Precautions/Restrictions fall;spinal   Left Upper Extremity (Weight-bearing Status)   (10lbs)   Right Upper Extremity (Weight-bearing Status)   (10lbs)   Left Lower Extremity (Weight-bearing Status) full weight-bearing (FWB)   Right Lower Extremity (Weight-bearing Status) full weight-bearing (FWB)   Cognitive Status Examination   Orientation Status orientation to person, place and time   Visual Perception   Visual Impairment/Limitations WFL   Sensory   Sensory Comments Pt reports numbness/tingling in BLE's. Pt able  to feel light pressure in all extremities.   Pain Assessment   Patient Currently in Pain Yes, see Vital Sign flowsheet   Range of Motion Comprehensive   Comment, General Range of Motion BUE AROM 0-~150 degrees humeral flexion limited by pain.   Strength Comprehensive (MMT)   Comment, General Manual Muscle Testing (MMT) Assessment MMT not performed due to precautions. Pt presents with weakness throughout BUE's and BLE's.  strength moderately impaired.   Bed Mobility   Comment (Bed Mobility) Min-Mod A and vc's   Activities of Daily Living   BADL Assessment/Intervention bathing;upper body dressing;lower body dressing;grooming;toileting   Bathing Assessment/Intervention   Vina Level (Bathing) set up;verbal cues;dependent (less than 25% patient effort)   Upper Body Dressing Assessment/Training   [place]ina Level (Upper Body Dressing) set up;verbal cues;moderate assist (50% patient effort)   Lower Body Dressing Assessment/Training   Vina Level (Lower Body Dressing) set up;verbal cues;maximum assist (25% patient effort)   Grooming Assessment/Training   Vina Level (Grooming) set up;verbal cues;minimum assist (75% patient effort)   Toileting   Vina Level (Toileting) dependent (less than 25% patient effort)   Clinical Impression   Criteria for Skilled Therapeutic Interventions Met (OT) Yes, treatment indicated   OT Diagnosis Decreased independence with functional transfers and ADLS/IADLS.   OT Problem List-Impairments impacting ADL problems related to;activity tolerance impaired;fear & anxiety;mobility;range of motion (ROM);strength;pain;post-surgical precautions;postural control   Assessment of Occupational Performance 5 or more Performance Deficits   Identified Performance Deficits Decreased independence with functional transfers and ADLS/IADLS.   Planned Therapy Interventions (OT) ADL retraining;IADL retraining;bed mobility training;ROM;strengthening;stretching;transfer training;home  program guidelines;progressive activity/exercise   Clinical Decision Making Complexity (OT) low complexity   Risk & Benefits of therapy have been explained evaluation/treatment results reviewed;care plan/treatment goals reviewed;patient   OT Total Evaluation Time   OT Colin Low Complexity Minutes (20629) 10   OT Goals   Therapy Frequency (OT) 5 times/wk   OT Predicted Duration/Target Date for Goal Attainment 02/01/23   OT Discharge Planning   OT Plan Functional transfers, ADLS, UE ROM, STS.   OT Discharge Recommendation (DC Rec) Transitional Care Facility   OT Rationale for DC Rec Pt is well below baseline function. pt will benefit from continued therapy to maximize functional strength and independence with ADLS/transfers.   OT Brief overview of current status Ax 2   Total Session Time   Total Session Time (sum of timed and untimed services) 10

## 2023-01-18 NOTE — PLAN OF CARE
Neuro: A&Ox4. Neuro checks Q 4 hours-intact.  Continues to complain of numbness/tingling to bilat feet stating it feels slightly improved than prior to surgery.  Cardiac: Telemetry monitored discontinued.  ECHO completed.  Declines chest pain.   Respiratory: Sating above 90% on RA. Lung sounds clear.  GI/: Pt with urinary retention.  Straight cath 1x for 600 ml of clear/yellow urine. No BM.  Bowel sounds present and active.  Scheduled stool softeners given.  Diet/appetite: Tolerating regular diet. Eating fairly.  Adequate liquid oral intake.  IVF stopped.  Activity:  STRICT bedrest with FLAT head of bed.  Continue precautions until tomorrow morning per Neurosurgery.  Pain: At acceptable level on current regimen with PRN dilaudid and oxycodone.  Skin: No new deficits noted.  Dressing changed to mid upper back per order.  LDA's: Left-PIV SL and Right-PIV SL  Transfer orders in place to 6B    Plan: Continue with POC. Notify primary team with changes.

## 2023-01-18 NOTE — PROGRESS NOTES
01/18/23 1100   Appointment Info   Signing Clinician's Name / Credentials (PT) TAY Pretty   Student Supervision Direct Patient Contact Provided;Line of sight supervision provided;Therapy services provided with the co-signing licensed therapist guiding and directing the services, and providing the skilled judgement and assessment throughout the session   Rehab Comments (PT) spinal precautions   Living Environment   People in Home child(tomy), adult   Current Living Arrangements house   Home Accessibility stairs to enter home   Number of Stairs, Main Entrance 6  (6 steps, landing, 6 more steps)   Stair Railings, Main Entrance railing on left side (ascending)   Living Environment Comments Pt lives in house with 6 steps, a landing, then 6 more steps to enter. Pt has a basement however other family members live in the basement and therefore pt only uses first floor. Pt's daughter is home 24/7 and has worked as pt's full-time PCA for the past year. Pt ambulates around house and in the community with a SPC.   Self-Care   Usual Activity Tolerance moderate   Current Activity Tolerance poor   Regular Exercise Yes   Activity/Exercise Type other (see comments)  (Pt says she goes to the [facility].)   Fall history within last six months no   Activity/Exercise/Self-Care Comment Pt's daughter helps with dressing, bathing, toileting, cooking, cleaning. Pt has a walk in shower with a shower chair and a raised toilet seat.   General Information   Onset of Illness/Injury or Date of Surgery 01/16/23   Referring Physician Shalonda Mireles MD   Patient/Family Therapy Goals Statement (PT) Return home.   Pertinent History of Current Problem (include personal factors and/or comorbidities that impact the POC) Pt is a 72 year old female with past medical history of bilateral knee replacements, hypertension, BMI 34, left hip replacement, CKD stage III, low back pain with sciatica, uterine prolapse, who was found to have a T8-9 tumor  concerning for meningioma and spinal cord compression.   Existing Precautions/Restrictions fall;spinal   Heart Disease Risk Factors Diabetes;High blood pressure;Overweight;Age;Race   Cognition   Affect/Mental Status (Cognition) WFL   Orientation Status (Cognition) oriented x 4   Follows Commands (Cognition) over 90% accuracy   Pain Assessment   Patient Currently in Pain Yes, see Vital Sign flowsheet   Range of Motion (ROM)   ROM Comment Bilateral UE/LE WFL   Strength (Manual Muscle Testing)   Strength (Manual Muscle Testing) Deficits observed during functional mobility   Bed Mobility   Comment, (Bed Mobility) Mod A of 2 for sup>sit with elevated HOB   Transfers   Comment, (Transfers) Mod A of 2 for STS transfer   Gait/Stairs (Locomotion)   Comment, (Gait/Stairs) Min A of 2 to sidestep with FWW.   Balance   Balance Comments Impaired static & dynamic sitting, impaired static & dynamic standing   Sensory Examination   Sensory Perception Comments WFL bilateral UEs, bilateral numbness, tingling, and decreased sensation bilateral LEs distal to mid-calf.   Clinical Impression   Criteria for Skilled Therapeutic Intervention Yes, treatment indicated   PT Diagnosis (PT) impaired functional mobility   Influenced by the following impairments pain, impaired strength, impaired sensation, impaired tolerance for functional mobility   Functional limitations due to impairments bed mobility, transfers, gait   Clinical Presentation (PT Evaluation Complexity) Stable/Uncomplicated   Clinical Presentation Rationale clinical judgement, pt expected to make improvements post-surgery   Clinical Decision Making (Complexity) low complexity   Planned Therapy Interventions (PT) balance training;bed mobility training;gait training;home exercise program;neuromuscular re-education;patient/family education;stair training;strengthening;transfer training;progressive activity/exercise;risk factor education;home program guidelines   Risk & Benefits of  therapy have been explained evaluation/treatment results reviewed;care plan/treatment goals reviewed;risks/benefits reviewed;participants voiced agreement with care plan;patient   PT Total Evaluation Time   PT Colin Low Complexity Minutes (97992) 15   Plan of Care Review   Plan of Care Reviewed With patient   Physical Therapy Goals   PT Frequency 5x/week   PT Predicted Duration/Target Date for Goal Attainment 01/27/23   PT Goals Transfers;Bed Mobility;Gait;Stairs   PT: Bed Mobility Independent   PT: Transfers Modified independent;Assistive device;Within precautions;Sit to/from stand   PT: Gait Modified independent;Assistive device;Within precautions;Greater than 200 feet   PT: Stairs Supervision/stand-by assist;Rail on left;Assistive device;6 stairs   PT Discharge Planning   PT Plan Ambulation, progress bed mobility and transfer independence w/in precautions.   PT Discharge Recommendation (DC Rec) Transitional Care Facility   PT Rationale for DC Rec Pt is functioning below baseline requiring mod Ax2 for bed mobility, transfers, and ambulation. Pt would benefit from continued IP rehab to return to OF and return home safely.   PT Brief overview of current status Lift assist to chair.

## 2023-01-18 NOTE — PLAN OF CARE
"/61 (BP Location: Left arm)   Pulse 95   Temp 99.6  F (37.6  C) (Oral)   Resp 18   Ht 1.6 m (5' 3\")   Wt 91.8 kg (202 lb 6.4 oz)   SpO2 96%   BMI 35.85 kg/m      Neuro: A&Ox4. Neuro checks Q4, no changes. Calm & cooperative.   Cardiac: No tele orders. VSS.   Respiratory: Sating >98% on RA.   GI/: Retaining urine, bladder scan = 613ml. 16Fr william inserted with 625ml UOP. No BM.   Diet/appetite: Tolerating regular diet. Swallows ok with HOB flat.   Activity:  Assist of 2, with repositioning and cares. Log roll. HOB 30 degrees now OK.   Pain: At acceptable level on current regimen. IV dilaudid 0.2-0.4mg Q2 and PO oxycodone 5-10mg Q4. Pain located in upper back.   Skin: No new deficits noted. Spine incision dressing changed to daily changes with betadine. Dressing currently CDI with minimal drainage.   LDA's: L & R PIV saline locked.     Plan: Transfer orders in for 6A. Continue with POC.     "

## 2023-01-19 ENCOUNTER — APPOINTMENT (OUTPATIENT)
Dept: PHYSICAL THERAPY | Facility: CLINIC | Age: 73
DRG: 029 | End: 2023-01-19
Attending: NEUROLOGICAL SURGERY
Payer: COMMERCIAL

## 2023-01-19 ENCOUNTER — APPOINTMENT (OUTPATIENT)
Dept: OCCUPATIONAL THERAPY | Facility: CLINIC | Age: 73
DRG: 029 | End: 2023-01-19
Attending: NEUROLOGICAL SURGERY
Payer: COMMERCIAL

## 2023-01-19 ENCOUNTER — APPOINTMENT (OUTPATIENT)
Dept: GENERAL RADIOLOGY | Facility: CLINIC | Age: 73
DRG: 029 | End: 2023-01-19
Attending: STUDENT IN AN ORGANIZED HEALTH CARE EDUCATION/TRAINING PROGRAM
Payer: COMMERCIAL

## 2023-01-19 LAB
ANION GAP SERPL CALCULATED.3IONS-SCNC: 12 MMOL/L (ref 7–15)
BUN SERPL-MCNC: 15.2 MG/DL (ref 8–23)
CALCIUM SERPL-MCNC: 9.2 MG/DL (ref 8.8–10.2)
CHLORIDE SERPL-SCNC: 104 MMOL/L (ref 98–107)
CREAT SERPL-MCNC: 0.91 MG/DL (ref 0.51–0.95)
CRP SERPL-MCNC: 258 MG/L
DEPRECATED HCO3 PLAS-SCNC: 24 MMOL/L (ref 22–29)
ERYTHROCYTE [DISTWIDTH] IN BLOOD BY AUTOMATED COUNT: 14.7 % (ref 10–15)
ERYTHROCYTE [DISTWIDTH] IN BLOOD BY AUTOMATED COUNT: 14.9 % (ref 10–15)
ERYTHROCYTE [SEDIMENTATION RATE] IN BLOOD BY WESTERGREN METHOD: 77 MM/HR (ref 0–30)
GFR SERPL CREATININE-BSD FRML MDRD: 67 ML/MIN/1.73M2
GLUCOSE SERPL-MCNC: 102 MG/DL (ref 70–99)
HCT VFR BLD AUTO: 31.5 % (ref 35–47)
HCT VFR BLD AUTO: 31.5 % (ref 35–47)
HGB BLD-MCNC: 10 G/DL (ref 11.7–15.7)
HGB BLD-MCNC: 9.8 G/DL (ref 11.7–15.7)
LACTATE SERPL-SCNC: 1 MMOL/L (ref 0.7–2)
MAGNESIUM SERPL-MCNC: 2.1 MG/DL (ref 1.7–2.3)
MCH RBC QN AUTO: 27.9 PG (ref 26.5–33)
MCH RBC QN AUTO: 28.4 PG (ref 26.5–33)
MCHC RBC AUTO-ENTMCNC: 31.1 G/DL (ref 31.5–36.5)
MCHC RBC AUTO-ENTMCNC: 31.7 G/DL (ref 31.5–36.5)
MCV RBC AUTO: 90 FL (ref 78–100)
MCV RBC AUTO: 90 FL (ref 78–100)
PHOSPHATE SERPL-MCNC: 2 MG/DL (ref 2.5–4.5)
PLATELET # BLD AUTO: 213 10E3/UL (ref 150–450)
PLATELET # BLD AUTO: 223 10E3/UL (ref 150–450)
POTASSIUM SERPL-SCNC: 3.9 MMOL/L (ref 3.4–5.3)
RBC # BLD AUTO: 3.51 10E6/UL (ref 3.8–5.2)
RBC # BLD AUTO: 3.52 10E6/UL (ref 3.8–5.2)
SODIUM SERPL-SCNC: 140 MMOL/L (ref 136–145)
WBC # BLD AUTO: 10.4 10E3/UL (ref 4–11)
WBC # BLD AUTO: 9.2 10E3/UL (ref 4–11)

## 2023-01-19 PROCEDURE — 97110 THERAPEUTIC EXERCISES: CPT | Mod: GP

## 2023-01-19 PROCEDURE — 71045 X-RAY EXAM CHEST 1 VIEW: CPT | Mod: 26 | Performed by: RADIOLOGY

## 2023-01-19 PROCEDURE — 83605 ASSAY OF LACTIC ACID: CPT | Performed by: NURSE PRACTITIONER

## 2023-01-19 PROCEDURE — 250N000011 HC RX IP 250 OP 636

## 2023-01-19 PROCEDURE — 36415 COLL VENOUS BLD VENIPUNCTURE: CPT | Performed by: NURSE PRACTITIONER

## 2023-01-19 PROCEDURE — 84100 ASSAY OF PHOSPHORUS: CPT

## 2023-01-19 PROCEDURE — 97530 THERAPEUTIC ACTIVITIES: CPT | Mod: GP

## 2023-01-19 PROCEDURE — 120N000005 HC R&B MS OVERFLOW UMMC

## 2023-01-19 PROCEDURE — 71045 X-RAY EXAM CHEST 1 VIEW: CPT

## 2023-01-19 PROCEDURE — 85027 COMPLETE CBC AUTOMATED: CPT | Performed by: NURSE PRACTITIONER

## 2023-01-19 PROCEDURE — 82310 ASSAY OF CALCIUM: CPT

## 2023-01-19 PROCEDURE — 250N000013 HC RX MED GY IP 250 OP 250 PS 637: Performed by: NEUROLOGICAL SURGERY

## 2023-01-19 PROCEDURE — 97110 THERAPEUTIC EXERCISES: CPT | Mod: GO

## 2023-01-19 PROCEDURE — 85652 RBC SED RATE AUTOMATED: CPT | Performed by: NURSE PRACTITIONER

## 2023-01-19 PROCEDURE — 85014 HEMATOCRIT: CPT

## 2023-01-19 PROCEDURE — 250N000011 HC RX IP 250 OP 636: Performed by: STUDENT IN AN ORGANIZED HEALTH CARE EDUCATION/TRAINING PROGRAM

## 2023-01-19 PROCEDURE — 250N000013 HC RX MED GY IP 250 OP 250 PS 637: Performed by: NURSE PRACTITIONER

## 2023-01-19 PROCEDURE — 258N000003 HC RX IP 258 OP 636

## 2023-01-19 PROCEDURE — 250N000011 HC RX IP 250 OP 636: Performed by: NURSE PRACTITIONER

## 2023-01-19 PROCEDURE — 36415 COLL VENOUS BLD VENIPUNCTURE: CPT

## 2023-01-19 PROCEDURE — 250N000013 HC RX MED GY IP 250 OP 250 PS 637

## 2023-01-19 PROCEDURE — 99222 1ST HOSP IP/OBS MODERATE 55: CPT | Performed by: NURSE PRACTITIONER

## 2023-01-19 PROCEDURE — 86140 C-REACTIVE PROTEIN: CPT | Performed by: NURSE PRACTITIONER

## 2023-01-19 PROCEDURE — 83735 ASSAY OF MAGNESIUM: CPT | Performed by: NEUROLOGICAL SURGERY

## 2023-01-19 RX ORDER — HYDROMORPHONE HYDROCHLORIDE 2 MG/1
2-4 TABLET ORAL
Status: DISCONTINUED | OUTPATIENT
Start: 2023-01-19 | End: 2023-01-25 | Stop reason: HOSPADM

## 2023-01-19 RX ORDER — ACETAMINOPHEN 325 MG/1
975 TABLET ORAL EVERY 6 HOURS PRN
Status: DISCONTINUED | OUTPATIENT
Start: 2023-01-19 | End: 2023-01-19

## 2023-01-19 RX ORDER — HYDROMORPHONE HCL IN WATER/PF 6 MG/30 ML
0.4 PATIENT CONTROLLED ANALGESIA SYRINGE INTRAVENOUS ONCE
Status: COMPLETED | OUTPATIENT
Start: 2023-01-19 | End: 2023-01-19

## 2023-01-19 RX ORDER — GABAPENTIN 100 MG/1
200 CAPSULE ORAL DAILY
Status: DISCONTINUED | OUTPATIENT
Start: 2023-01-19 | End: 2023-01-20

## 2023-01-19 RX ORDER — HEPARIN SODIUM 5000 [USP'U]/.5ML
5000 INJECTION, SOLUTION INTRAVENOUS; SUBCUTANEOUS EVERY 8 HOURS
Status: DISCONTINUED | OUTPATIENT
Start: 2023-01-19 | End: 2023-01-25 | Stop reason: HOSPADM

## 2023-01-19 RX ORDER — HYDROCHLOROTHIAZIDE 25 MG/1
25 TABLET ORAL EVERY MORNING
Status: DISCONTINUED | OUTPATIENT
Start: 2023-01-19 | End: 2023-01-25 | Stop reason: HOSPADM

## 2023-01-19 RX ORDER — HYDRALAZINE HYDROCHLORIDE 20 MG/ML
10 INJECTION INTRAMUSCULAR; INTRAVENOUS
Status: DISCONTINUED | OUTPATIENT
Start: 2023-01-19 | End: 2023-01-25 | Stop reason: HOSPADM

## 2023-01-19 RX ORDER — LIDOCAINE 4 G/G
2 PATCH TOPICAL
Status: DISCONTINUED | OUTPATIENT
Start: 2023-01-19 | End: 2023-01-25 | Stop reason: HOSPADM

## 2023-01-19 RX ORDER — OXYCODONE HCL 10 MG/1
10 TABLET, FILM COATED, EXTENDED RELEASE ORAL EVERY 12 HOURS
Status: DISCONTINUED | OUTPATIENT
Start: 2023-01-19 | End: 2023-01-19

## 2023-01-19 RX ORDER — LABETALOL HYDROCHLORIDE 5 MG/ML
20 INJECTION, SOLUTION INTRAVENOUS
Status: DISCONTINUED | OUTPATIENT
Start: 2023-01-19 | End: 2023-01-25 | Stop reason: HOSPADM

## 2023-01-19 RX ADMIN — HEPARIN SODIUM 5000 UNITS: 5000 INJECTION, SOLUTION INTRAVENOUS; SUBCUTANEOUS at 17:49

## 2023-01-19 RX ADMIN — POTASSIUM & SODIUM PHOSPHATES POWDER PACK 280-160-250 MG 1 PACKET: 280-160-250 PACK at 11:57

## 2023-01-19 RX ADMIN — HYDROMORPHONE HYDROCHLORIDE 2 MG: 2 TABLET ORAL at 19:39

## 2023-01-19 RX ADMIN — HYDROMORPHONE HYDROCHLORIDE 0.4 MG: 0.2 INJECTION, SOLUTION INTRAMUSCULAR; INTRAVENOUS; SUBCUTANEOUS at 16:15

## 2023-01-19 RX ADMIN — OXYCODONE HYDROCHLORIDE 10 MG: 10 TABLET ORAL at 10:26

## 2023-01-19 RX ADMIN — ACETAMINOPHEN 975 MG: 325 TABLET, FILM COATED ORAL at 17:48

## 2023-01-19 RX ADMIN — SODIUM CHLORIDE: 9 INJECTION, SOLUTION INTRAVENOUS at 22:24

## 2023-01-19 RX ADMIN — ACETAMINOPHEN 975 MG: 325 TABLET, FILM COATED ORAL at 00:55

## 2023-01-19 RX ADMIN — HYDROMORPHONE HYDROCHLORIDE 0.2 MG: 0.2 INJECTION, SOLUTION INTRAMUSCULAR; INTRAVENOUS; SUBCUTANEOUS at 04:24

## 2023-01-19 RX ADMIN — HYDRALAZINE HYDROCHLORIDE 10 MG: 20 INJECTION INTRAMUSCULAR; INTRAVENOUS at 14:47

## 2023-01-19 RX ADMIN — OXYCODONE HYDROCHLORIDE 10 MG: 10 TABLET ORAL at 00:54

## 2023-01-19 RX ADMIN — POLYETHYLENE GLYCOL 3350 17 G: 17 POWDER, FOR SOLUTION ORAL at 19:39

## 2023-01-19 RX ADMIN — FAMOTIDINE 20 MG: 20 TABLET, FILM COATED ORAL at 19:39

## 2023-01-19 RX ADMIN — METHOCARBAMOL 500 MG: 500 TABLET, FILM COATED ORAL at 16:15

## 2023-01-19 RX ADMIN — POTASSIUM & SODIUM PHOSPHATES POWDER PACK 280-160-250 MG 1 PACKET: 280-160-250 PACK at 16:14

## 2023-01-19 RX ADMIN — SENNOSIDES AND DOCUSATE SODIUM 2 TABLET: 50; 8.6 TABLET ORAL at 19:39

## 2023-01-19 RX ADMIN — SENNOSIDES AND DOCUSATE SODIUM 2 TABLET: 50; 8.6 TABLET ORAL at 08:04

## 2023-01-19 RX ADMIN — Medication 10000 UNITS: at 08:06

## 2023-01-19 RX ADMIN — Medication 100 UNITS: at 08:06

## 2023-01-19 RX ADMIN — GABAPENTIN 200 MG: 100 CAPSULE ORAL at 19:32

## 2023-01-19 RX ADMIN — ALLOPURINOL 200 MG: 100 TABLET ORAL at 08:04

## 2023-01-19 RX ADMIN — HYDROCHLOROTHIAZIDE 25 MG: 25 TABLET ORAL at 11:50

## 2023-01-19 RX ADMIN — METHOCARBAMOL 500 MG: 500 TABLET, FILM COATED ORAL at 19:39

## 2023-01-19 RX ADMIN — SODIUM CHLORIDE: 9 INJECTION, SOLUTION INTRAVENOUS at 10:44

## 2023-01-19 RX ADMIN — HYDROMORPHONE HYDROCHLORIDE 4 MG: 2 TABLET ORAL at 22:32

## 2023-01-19 RX ADMIN — ZINC SULFATE 220 MG (50 MG) CAPSULE 220 MG: CAPSULE at 08:04

## 2023-01-19 RX ADMIN — COLCHICINE 0.6 MG: 0.6 TABLET, FILM COATED ORAL at 08:05

## 2023-01-19 RX ADMIN — MAGNESIUM HYDROXIDE 30 ML: 400 SUSPENSION ORAL at 08:06

## 2023-01-19 RX ADMIN — Medication 250 MG: at 08:05

## 2023-01-19 RX ADMIN — OXYCODONE HYDROCHLORIDE 5 MG: 5 TABLET ORAL at 06:43

## 2023-01-19 RX ADMIN — METHOCARBAMOL 500 MG: 500 TABLET, FILM COATED ORAL at 08:04

## 2023-01-19 RX ADMIN — POTASSIUM & SODIUM PHOSPHATES POWDER PACK 280-160-250 MG 1 PACKET: 280-160-250 PACK at 08:05

## 2023-01-19 RX ADMIN — LIDOCAINE PATCH 4% 2 PATCH: 40 PATCH TOPICAL at 09:47

## 2023-01-19 RX ADMIN — POLYETHYLENE GLYCOL 3350 17 G: 17 POWDER, FOR SOLUTION ORAL at 08:05

## 2023-01-19 RX ADMIN — OXYCODONE HYDROCHLORIDE 10 MG: 10 TABLET ORAL at 14:31

## 2023-01-19 RX ADMIN — ACETAMINOPHEN 975 MG: 325 TABLET, FILM COATED ORAL at 08:03

## 2023-01-19 RX ADMIN — METHOCARBAMOL 500 MG: 500 TABLET, FILM COATED ORAL at 11:57

## 2023-01-19 RX ADMIN — HEPARIN SODIUM 5000 UNITS: 5000 INJECTION, SOLUTION INTRAVENOUS; SUBCUTANEOUS at 09:46

## 2023-01-19 RX ADMIN — FAMOTIDINE 20 MG: 20 TABLET, FILM COATED ORAL at 08:04

## 2023-01-19 RX ADMIN — SIMVASTATIN 20 MG: 20 TABLET, FILM COATED ORAL at 22:34

## 2023-01-19 SDOH — ECONOMIC STABILITY: TRANSPORTATION INSECURITY
IN THE PAST 12 MONTHS, HAS LACK OF TRANSPORTATION KEPT YOU FROM MEETINGS, WORK, OR FROM GETTING THINGS NEEDED FOR DAILY LIVING?: NO

## 2023-01-19 SDOH — ECONOMIC STABILITY: FOOD INSECURITY: WITHIN THE PAST 12 MONTHS, YOU WORRIED THAT YOUR FOOD WOULD RUN OUT BEFORE YOU GOT MONEY TO BUY MORE.: NEVER TRUE

## 2023-01-19 SDOH — ECONOMIC STABILITY: INCOME INSECURITY: IN THE LAST 12 MONTHS, WAS THERE A TIME WHEN YOU WERE NOT ABLE TO PAY THE MORTGAGE OR RENT ON TIME?: NO

## 2023-01-19 SDOH — ECONOMIC STABILITY: FOOD INSECURITY: WITHIN THE PAST 12 MONTHS, THE FOOD YOU BOUGHT JUST DIDN'T LAST AND YOU DIDN'T HAVE MONEY TO GET MORE.: NEVER TRUE

## 2023-01-19 SDOH — ECONOMIC STABILITY: TRANSPORTATION INSECURITY
IN THE PAST 12 MONTHS, HAS THE LACK OF TRANSPORTATION KEPT YOU FROM MEDICAL APPOINTMENTS OR FROM GETTING MEDICATIONS?: NO

## 2023-01-19 ASSESSMENT — ACTIVITIES OF DAILY LIVING (ADL)
ADLS_ACUITY_SCORE: 34
ADLS_ACUITY_SCORE: 32

## 2023-01-19 ASSESSMENT — SOCIAL DETERMINANTS OF HEALTH (SDOH)
HOW OFTEN DO YOU GET TOGETHER WITH FRIENDS OR RELATIVES?: MORE THAN THREE TIMES A WEEK
WITHIN THE LAST YEAR, HAVE YOU BEEN AFRAID OF YOUR PARTNER OR EX-PARTNER?: NO
HOW HARD IS IT FOR YOU TO PAY FOR THE VERY BASICS LIKE FOOD, HOUSING, MEDICAL CARE, AND HEATING?: HARD
WITHIN THE LAST YEAR, HAVE TO BEEN RAPED OR FORCED TO HAVE ANY KIND OF SEXUAL ACTIVITY BY YOUR PARTNER OR EX-PARTNER?: NO
IN A TYPICAL WEEK, HOW MANY TIMES DO YOU TALK ON THE PHONE WITH FAMILY, FRIENDS, OR NEIGHBORS?: MORE THAN THREE TIMES A WEEK
WITHIN THE LAST YEAR, HAVE YOU BEEN HUMILIATED OR EMOTIONALLY ABUSED IN OTHER WAYS BY YOUR PARTNER OR EX-PARTNER?: NO
WITHIN THE LAST YEAR, HAVE YOU BEEN KICKED, HIT, SLAPPED, OR OTHERWISE PHYSICALLY HURT BY YOUR PARTNER OR EX-PARTNER?: NO

## 2023-01-19 ASSESSMENT — LIFESTYLE VARIABLES
HOW OFTEN DO YOU HAVE SIX OR MORE DRINKS ON ONE OCCASION: NEVER
SKIP TO QUESTIONS 9-10: 1
AUDIT-C TOTAL SCORE: 0
HOW MANY STANDARD DRINKS CONTAINING ALCOHOL DO YOU HAVE ON A TYPICAL DAY: PATIENT DOES NOT DRINK
HOW OFTEN DO YOU HAVE A DRINK CONTAINING ALCOHOL: NEVER

## 2023-01-19 NOTE — PLAN OF CARE
Transfer    Transferred to: 5C  Via: bed  Reason for transfer: Pt no longer appropriate for 6B- improved patient condition  Family: Aware of transfer  Belongings: Packed and sent with pt  Chart: Delivered with pt to next unit  Medications: Meds sent to new unit with pt  Report given to: CAROLINE Foreman  Pt status:     Neuro: A&Ox4. Constant tingling and numbness in LE. Otherwise neuros intact.  Cardiac: VSS.   Respiratory: Sating 93-96% on RA.  GI/: Adequate urine output via william. No BM this shift  Diet/appetite: Tolerating Regular diet. Eating well.  Activity:  Assist of 2, not OOB this shift. Pt shifted weight in bed while asleep.  Pain: Inconsistently managed on current regimen. LE nerve pain constant and incisional pain intermittent.  Skin: No new deficits noted.  LDA's: L PIV, SL, stings with flush but otherwise CDI. R PIV SL, CDI.    Plan: Continue with POC. Notify primary team with changes.

## 2023-01-19 NOTE — PROGRESS NOTES
"[facility], [place]   Neurosurgery Progress Note:    Date of service: 1/19/2023    Assessment: Laure Wood is a 72 year old female s/p Thoracic 7-9 laminectomy with resection of intradural extramedullary mass on 1/16/2023 with Dr. Ambar Antunez.     Clinically Significant Risk Factors Present on Admission             # Obesity: Estimated body mass index is 35.85 kg/m  as calculated from the following:    Height as of this encounter: 1.6 m (5' 3\").    Weight as of this encounter: 91.8 kg (202 lb 6.4 oz).      Plan:  - Neuro checks/vital signs: Every 4 hours  - SBP: <160  - Pain control: Tylenol, and oxycodone PRN.  Will add lidocaine patches  - Pain Management consult - appreciate recommendations.   - Activity: up as tolerated  - Restrictions/Bracing: None  - Diet: ADAT  - DVT prophylaxis: SCDs and subcutaneous heparin 5000 units q 8 hours  - PT/OT: recommending TCU   - Nutrition consult for high protein diet  - Wound healing protocol  - Daily dressing changes  - Bowel regimen     RUBEN Bajwa, CNP  Neurosurgery  Pager [phone]      Interval History:  Patient continues to report back pain not completely managed by current pain medications.  Patient reports decreased appetite due to pain. Activity limited due to pain as well. PT/OT recommending TCU placement.      Objective:   Temp:  [98.1  F (36.7  C)-99.7  F (37.6  C)] 99.4  F (37.4  C)  Pulse:  [77-90] 89  Resp:  [20-24] 20  BP: (113-148)/(47-77) 113/47  SpO2:  [93 %-96 %] 96 %  I/O last 3 completed shifts:  In: 760 [P.O.:760]  Out: 1670 [Urine:1670]    Gen: Appears comfortable, NAD  Wound: Incision, clean, dry, intact. Dressing changed  Neurologic:  - Alert & Oriented to person, place, time, and situation  - Follows commands briskly  - Speech fluent, spontaneous. No aphasia or dysarthria.  - No gaze preference. No apparent hemineglect.  - PERRL, EOMI  - Strong eye closure, jaw clench, and cheek puff  - Face symmetric with sensation " intact to light touch  - Palate elevates symmetrically, uvula midline, tongue protrudes midline  - Trapezii and sternocleidomastoid muscles 5/5 bilaterally  - No pronator drift     Del Tr Bi WE WF Gr   R 5 5 5 5 5 5   L 5 5 5 5 5 5    HF KE KF DF PF EHL   R 4 4 4 4 4 4   L 4+ 4+ 4+ 4+ 4+ 4+     Reflexes 2+ throughout    Sensation intact and symmetric to light touch throughout bilateral upper extremities.  Numbness and tingling present in pelvis area and in bilateral lower extremities.     LABS  Recent Labs   Lab Test 01/19/23  0543 01/18/23  0545 01/17/23  1130   WBC 9.2 9.2 8.5   HGB 9.8* 9.4* 10.6*   MCV 90 89 85    212 206       Recent Labs   Lab Test 01/19/23  0543 01/18/23  0545 01/17/23  0739    138 140   POTASSIUM 3.9 3.9 5.0   CHLORIDE 104 105 108*   CO2 24 23 17*   BUN 15.2 15.1 21.8   CR 0.91 0.95 0.75   ANIONGAP 12 10 15   ALONSO 9.2 8.8 9.3   * 130* 107*       IMAGING    Recent Results (from the past 24 hour(s))   XR Surgery REMINGTON L/T 5 Min Fluoro w Stills    Narrative    This exam was marked as non-reportable because it will not be read by a   radiologist or a [place] non-radiologist provider.

## 2023-01-19 NOTE — PROGRESS NOTES
CHW gave the pt several community resources regarding energy assistance along giving the pt resources on rental assistance  programs which she requested.    Brijesh Aguiar   Inpatient Community Health Worker  [facility] 5A & 5B

## 2023-01-19 NOTE — PROGRESS NOTES
Admitted/transferred from:   2 RN  skin assessment completed by Kellen Ferris RN and MARIE Fowler  Skin assessment finding: Dressing in back clean, dry and intact. Scar on each knee and skin tag in back.   Interventions/actions: No adjustment needed.    Will continue to monitor.

## 2023-01-19 NOTE — CONSULTS
Care Management Initial Consult    General Information  Assessment completed with: Patient,    Type of CM/SW Visit: Initial Assessment  Primary Care Provider verified and updated as needed: Yes   Readmission within the last 30 days: no previous admission in last 30 days   Reason for Consult: facility placement  Advance Care Planning: Not prsent on chart  Advance Care Planning Reviewed: no concerns identified        Communication Assessment  Patient's communication style: spoken language (English or Bilingual)    Hearing Difficulty or Deaf: no   Wear Glasses or Blind: no    Cognitive  Cognitive/Neuro/Behavioral: WDL                      Living Environment:   People in home: child(tomy), adult  3  Current living Arrangements: house      Able to return to prior arrangements: yes  Living Arrangement Comments: Able to return home with 24/7 assist by daughter after TCU stay to assist in returning back to baseline    Family/Social Support:  Care provided by: child(tomy)  Provides care for: no one, unable/limited ability to care for self  Marital Status:   Support System: Adult Children          Description of Support System: Supportive, Involved    Support Assessment: Adequate family and caregiver support    Current Resources:   Patient receiving home care services: No  Community Resources: Elderly Waiver, County Worker, PCA (daughter)  Equipment currently used at home: cane, straight, shower chair, walker, rolling  Supplies currently used at home: None    Employment/Financial:  Employment Status: retired     Financial Concerns:  Heating/Electricity bills overwhelming  Referral to Financial Worker: No     Lifestyle & Psychosocial Needs:  Social Determinants of Health     Tobacco Use: Low Risk      Smoking Tobacco Use: Never     Smokeless Tobacco Use: Never     Passive Exposure: Not on file   Alcohol Use: Not At Risk     Frequency of Alcohol Consumption: Never     Average Number of Drinks: Patient does not drink      Frequency of Binge Drinking: Never   Financial Resource Strain: High Risk     Difficulty of Paying Living Expenses: Hard   Food Insecurity: No Food Insecurity     Worried About Running Out of Food in the Last Year: Never true     Ran Out of Food in the Last Year: Never true   Transportation Needs: No Transportation Needs     Lack of Transportation (Medical): No     Lack of Transportation (Non-Medical): No   Physical Activity: Not on file   Stress: Not on file   Social Connections: Unknown     Frequency of Communication with Friends and Family: More than three times a week     Frequency of Social Gatherings with Friends and Family: More than three times a week     Attends Confucianism Services: Not on file     Active Member of Clubs or Organizations: Not on file     Attends Club or Organization Meetings: Not on file     Marital Status: Not on file   Intimate Partner Violence: Not At Risk     Fear of Current or Ex-Partner: No     Emotionally Abused: No     Physically Abused: No     Sexually Abused: No   Depression: Not at risk     PHQ-2 Score: 0   Housing Stability: Low Risk      Unable to Pay for Housing in the Last Year: No     Number of Places Lived in the Last Year: 1     Unstable Housing in the Last Year: No     Functional Status:  Prior to admission patient needed assistance: yes  Dependent ADLs: Bathing, Dressing, Grooming, Toileting, Ambulation-cane, Positioning, Incontinence, Transfers  Dependent IADLs: Cleaning, Cooking, Meal Preparation, Transportation, Laundry, Shopping     Mental Health Status:  Mental Health Status: No Current Concerns       Chemical Dependency Status:  Chemical Dependency Status: No Current Concerns           Values/Beliefs:  Spiritual, Cultural Beliefs, Confucianism Practices, Values that affect care: yes    Description of Beliefs that Will Affect Care: Denominational    Cultural/Confucianism Practices Patient Routinely Participates In: prayer    Values/Beliefs Comment: Patient's daughter is a      Additional Information:  Background: Per 1/16 MD note, Laure is a 72 year old woman who presented to neurosurgery clinic with progressive lower extremity weakness and symptoms of myelopathy. 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     Progress:  met with patient at bedside to complete care management assessment. SW introduced self, the role of the medical social worker, and the nature of the care management assessment. Patient lives in a house with her adult daughter and son-in-law. The patient uses a cane and rolling walker for mobility and her daughter provides 24/7 assist at home including being her PCA. The patient receives these PCA services through her [facility] Waiver. The patient denied any home care or home infusion services. The patient gets rides from family or just calls a cab. She denied any safety concerns at home. She confirmed that she has enough food at home. She is concerned about her ability to pay her heating/electric bills and requested community resources to assist. The patient denied any mental or chemical health needs. She indicated that her daughter is a  and provides her good spiritual support. The patient will likely require a wheelchair van ride at discharge.     SW discussed patient's therapy recommendations for discharging from the hospital and admitting to a transitional care unit. SW provided patient education on the medicare website's care compare tool (https://www.medicare.gov/care-compare/) and a printout. SW explained the service & quality ratings for TCUs and requested 10 choices by end of the day.     Services:    [facility] (Ph: [phone])  PCA 5 hours/day    Next Steps:  1. MARLY delegated task to 5th floor DWIGHT Coley to obtain community resource information to provide to the patient on assistance paying her utility bills.  2. MARLY will follow-up with the  patient/daughter within 24 hours to obtain TCU choices & begin referrals.    Plan:  will follow for discharge planning, psychosocial needs, community resources, and advocacy.  _____________________________     GIULIANA Jefferson, LICMARLY  Advanced Practice Independent Clinical   [facility]   [address] 5B Medicine Beds [phone] & 5C Medicine Overflow Beds [phone]  [email or web address]  Phone: [phone]  Pager: [phone]  Fax: [phone]  Message me on [employer] [place].    Weekend 5A & 5B  (0800 - 1630) Pager: [phone]     Weekend 5A & 5B RNCC ([phone]) Pager: [phone]     Weekdays after hours (1630 - 0000), Saturday & Sunday after hours ([phone]), & [facility] Recognized Holidays Coverage  Pager: [phone]

## 2023-01-19 NOTE — CONSULTS
"Pain Service Consultation Note  [facility]      Patient Name: Laure Wood  MRN: [id number]   Age: 72 year old  Sex: female  Date: January 19, 2023                                      Reviewed: Yes    Referring Provider:  RUBEN Katz  Referring Service:  Neurosurgery  Reason for Consultation: \"postop pain management\"    Assessment/Recommendations:  Laure Wood is a 72 year old female with approximately two month history of worsening back pain, LE numbness and weakness who is now s/p thoracic 7-9 laminectomy with resection of intradural extramedullary mass on 1/16. Acute postoperative pain with neuropathic pain.  Opioid naive. Denies history of chronic pain.     Plan:   1. No long acting opioid recommended for acute postoperative pain.  Patient opioid naive prior to admission    2. Discontinue oxycodone. Patient didn't tolerate it in the past (after knee surgeries), made her too sleepy so prefers trying opioid rotation.    3. Start Dilaudid 2-4 mg PO Q 3 hr PRN pain, taper off with healing.     4. Reserve IV Dilaudid only for pain not controlled with maximized oral opioid    5. Start gabapentin (Neurontin) 200 mg PO daily for neuropathic pain. Start first dose this evening.  Based on effect and side effect, increase dose to 200 mg PO BID (e.g., 6 am - 6 pm, or 7 am - 7pm)    6. Change lidocaine 4% patch to max of 3 patches topically, apply one patches to soles of feet, may cut patches for coverage, apply all three patches at bedtime and leave on for 12 hrs on/off 12 hrs.      7. Continue scheduled Tylenol 975 mg Q 8h     8. Continue scheduled Robaxin 500 mg PO QID, space doses at least 4hrs apart. Patient states it's been very effective for her muscle spasm pain    9. Current bowel regimen per primary service is effective    10. Outpatient consider:     TENS for back pain if not contraindicated.     Referral for chronic pain clinic or neurology clinic if she has persistent " "neuropathic pain    Thank you for the opportunity to participate in the care of Laure Wood  Pain Service will continue to follow.    Discussed with attending anesthesiologist  Primary Service Contacted with Recommendations? Yes    Please Page the Pain Team [facility] [place] [place]: \"PAIN MANAGEMENT ACUTE INPATIENT/ [facility]\"    Erica Julien, RUBEN CNP  1/19/2023      Chief Complaint:  Bilateral hip and LE pain      History of Present Illness:  Daughter Nisha present during interview, asking questions and taking notes regarding pain management recommendations.  Laure Wood is a 72 year old female with PMH significant for HTN, dyslipidemia, postop n/v, obesity, renal insufficiency, knee OA s/p L)TKA (2020) and R) TKA (2021), one to two month history of progressive bilateral leg numbness and weakness.  No history of trauma.  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 She denies alteration in bowel or bladder habits,  Pain in low back and hips with increasing weakness severely limited her mobility.  Prior to admission Laure reports her pain was bilateral hips, low back radiating posterior legs and this pain improved after surgery. The acute postoperative pain is reported to be located low back, bilateral hips radiating BLE right greater than left to her feet.  Sole of left foot feels like she is walking on ground glass. Feels light touch to right foot, and denies neuropathic pain . States current pain is 10/10, was able to sit at edge of bed, but not ambulating.   Laure is opioid naive taking only Tylenol and using ointments for the pain over the past couple of months. Prior use of oxycodone after knee surgeries led to increased undesirable sedation.      Discussed acute postoperative pain, neuropathic pain, multimodal therapy approaches, use of nonopioid, adjuvant medications, use of gabapentin now and int the future, likely " outpatient medications like amitriptyline and duloxetine could also be added and dosing adjusted based on effect and side effect.. Also discussed options in the outpatient setting if she has persistent pain include TENS, pain management clinic referral      Per [place]  review pulled from system on 01/19/23.    Past Medical History:  Past Medical History:   Diagnosis Date     Gastroesophageal reflux disease      Hyperlipidemia LDL goal <160      Hypertension, essential, benign      Knee osteoarthritis     knees, hands     Motion sickness      PONV (postoperative nausea and vomiting)          Family History:    Family History   Problem Relation Age of Onset     Anesthesia Reaction No family hx of      Deep Vein Thrombosis No family hx of      Bleeding Disorder No family hx of        Social History:  Social History     Tobacco Use     Smoking status: Never     Smokeless tobacco: Never   Substance Use Topics     Alcohol use: No          Review of Systems:  Complete ROS reviewed. Unless otherwise noted, all other systems found to be negative.        Laboratory Results:  Recent Labs   Lab Test 01/19/23  1025 01/19/23  0543    213   BUN  --  15.2         Allergies:  Allergies   Allergen Reactions     No Known Drug Allergy        Current Pain Related Medications:  Medications related to Pain Management (From now, onward)    Start     Dose/Rate Route Frequency Ordered Stop    01/19/23 0830  Lidocaine (LIDOCARE) 4 % Patch 2 patch         2 patch  over 12 Hours Transdermal EVERY 24 HOURS 0800 01/19/23 0759      01/19/23 0830  lidocaine patch in PLACE          Transdermal EVERY 8 HOURS SCHEDULED 01/19/23 0759      01/18/23 0900  acetaminophen (TYLENOL) tablet 975 mg         975 mg Oral EVERY 8 HOURS 01/18/23 0818 01/21/23 0859    01/18/23 0830  methocarbamol (ROBAXIN) tablet 500 mg         500 mg Oral 4 TIMES DAILY 01/18/23 0818      01/17/23 0930  senna-docusate (SENOKOT-S/PERICOLACE) 8.6-50 MG per tablet 2 tablet       "   2 tablet Oral 2 TIMES DAILY 01/17/23 0845      01/17/23 0930  polyethylene glycol (MIRALAX) Packet 17 g         17 g Oral 2 TIMES DAILY 01/17/23 0845      01/17/23 0900  magnesium hydroxide (MILK OF MAGNESIA) suspension 30 mL         30 mL Oral DAILY 01/17/23 0856      01/17/23 0015  bisacodyl (DULCOLAX) suppository 10 mg         10 mg Rectal DAILY PRN 01/17/23 0015      01/17/23 0015  lidocaine 1 % 0.1-1 mL         0.1-1 mL Other EVERY 1 HOUR PRN 01/17/23 0015      01/17/23 0015  lidocaine (LMX4) cream          Topical EVERY 1 HOUR PRN 01/17/23 0015      01/17/23 0015  oxyCODONE (ROXICODONE) tablet 5 mg        See Hyperspace for full Linked Orders Report.    5 mg Oral EVERY 4 HOURS PRN 01/17/23 0015      01/17/23 0015  oxyCODONE IR (ROXICODONE) tablet 10 mg        See Hyperspace for full Linked Orders Report.    10 mg Oral EVERY 4 HOURS PRN 01/17/23 0015              Physical Exam:  Vitals: BP (!) 141/70   Pulse 86   Temp 99.7  F (37.6  C) (Oral)   Resp 20   Ht 1.6 m (5' 3\")   Wt 91.8 kg (202 lb 6.4 oz)   SpO2 94%   BMI 35.85 kg/m      Physical Exam:   CONSTITUTIONAL/GENERAL APPEARANCE:  NAD  EYES: EOMI, sclera anicteric, PERRLA  ENT/NECK: atraumatic, lips and oral mucous membranes dry  RESPIRATORY: non-labored breathing on room air. No cough, wheeze  ABDOMEN: Soft, non-tender, non-distended  MUSCULOSKELETAL/BACK/SPINE/EXTREMITIES: Moves all extremities. Back incision nontender, no erythema, dressing intact  NEURO: Alert and Oriented x3. Answers questions appropriately  SKIN/VASCULAR EXAM:  No jaundice, no visible rashes or lesions      Please see A&P for additional details of medical decision making.        "

## 2023-01-19 NOTE — PLAN OF CARE
"Arrived from 6B at 0020. Alert and oriented x 4. Complained of pain in back and all extremities. Numbness and tingling in bilateral upper extremities. Other neuro's intact. Oxycodone 10 mg and tylenol given. Dilaudid 0.2 also given. Dressing in back clean, dry and intact. Had small watery stool. Elizabeth patent. Repositioned last at 0650. Phos was 2.0 and needs replacement.  Problem: Plan of Care - These are the overarching goals to be used throughout the patient stay.    Goal: Plan of Care Review  Description: The Plan of Care Review/Shift note should be completed every shift.  The Outcome Evaluation is a brief statement about your assessment that the patient is improving, declining, or no change.  This information will be displayed automatically on your shift note.  Outcome: Progressing  Goal: Patient-Specific Goal (Individualized)  Description: You can add care plan individualizations to a care plan. Examples of Individualization might be:  \"Parent requests to be called daily at 9am for status\", \"I have a hard time hearing out of my right ear\", or \"Do not touch me to wake me up as it startles me\".  Outcome: Progressing  Goal: Absence of Hospital-Acquired Illness or Injury  Outcome: Progressing  Intervention: Identify and Manage Fall Risk  Recent Flowsheet Documentation  Taken 1/19/2023 0039 by Kellen Ferris RN  Safety Promotion/Fall Prevention:    assistive device/personal items within reach    bed alarm on  Intervention: Prevent Skin Injury  Recent Flowsheet Documentation  Taken 1/19/2023 0120 by Kellen Ferris, RN  Body Position:    right    turned  Intervention: Prevent and Manage VTE (Venous Thromboembolism) Risk  Recent Flowsheet Documentation  Taken 1/19/2023 0039 by Kellen Ferris RN  VTE Prevention/Management: SCDs (sequential compression devices) on  Goal: Optimal Comfort and Wellbeing  Outcome: Progressing  Goal: Readiness for Transition of Care  Outcome: Progressing   Goal Outcome " Evaluation:

## 2023-01-19 NOTE — PLAN OF CARE
Temp: 98.3  F (36.8  C) Temp src: Oral BP: (!) 166/92 Pulse: 84   Resp: 17 SpO2: 97 % O2 Device: None (Room air)      Neuro: A&Ox4.   Cardiac: T- max 101. Hypertensive 166/92- MD notified. PRN hydralazine and tylenol given. Portable CXR done  Respiratory: Sating 97% on RA.  GI/: Adequate urine output via Elizabeth. No BM  Diet/appetite: Tolerating diet. Eating fair   Activity: Sat up at the edge of bed with PT, was not able to stand up due to pain   Pain: Back pain 9/10- not controlled with oxycodone. Neurosurgery team was notified.   Skin: Back incision premapore c/d/I. Sacral mepilex in place, lidocaine patches on lower back   LDA's: PIV x2. Left SL. Right infusing NS at 85ml/hr     Phos-2.0 is being replaced with Neutra Phos packet. Mag and K+ WNL. Recheck for Phos ordered for tomorrow am     Plan: Continue with POC. Notify primary team with changes.

## 2023-01-20 ENCOUNTER — APPOINTMENT (OUTPATIENT)
Dept: ULTRASOUND IMAGING | Facility: CLINIC | Age: 73
DRG: 029 | End: 2023-01-20
Attending: NURSE PRACTITIONER
Payer: COMMERCIAL

## 2023-01-20 LAB
ANION GAP SERPL CALCULATED.3IONS-SCNC: 11 MMOL/L (ref 7–15)
BUN SERPL-MCNC: 9.7 MG/DL (ref 8–23)
CALCIUM SERPL-MCNC: 9 MG/DL (ref 8.8–10.2)
CHLORIDE SERPL-SCNC: 103 MMOL/L (ref 98–107)
CREAT SERPL-MCNC: 0.93 MG/DL (ref 0.51–0.95)
DEPRECATED HCO3 PLAS-SCNC: 25 MMOL/L (ref 22–29)
ERYTHROCYTE [DISTWIDTH] IN BLOOD BY AUTOMATED COUNT: 14.6 % (ref 10–15)
GFR SERPL CREATININE-BSD FRML MDRD: 65 ML/MIN/1.73M2
GLUCOSE SERPL-MCNC: 115 MG/DL (ref 70–99)
HCT VFR BLD AUTO: 29.5 % (ref 35–47)
HGB BLD-MCNC: 9.3 G/DL (ref 11.7–15.7)
INTERPRETATION: NORMAL
LAB DIRECTOR COMMENTS: NORMAL
LAB DIRECTOR DISCLAIMER: NORMAL
LAB DIRECTOR INTERPRETATION: NORMAL
LAB DIRECTOR METHODOLOGY: NORMAL
LAB DIRECTOR RESULTS: NORMAL
MAGNESIUM SERPL-MCNC: 1.9 MG/DL (ref 1.7–2.3)
MCH RBC QN AUTO: 28 PG (ref 26.5–33)
MCHC RBC AUTO-ENTMCNC: 31.5 G/DL (ref 31.5–36.5)
MCV RBC AUTO: 89 FL (ref 78–100)
PATH REPORT.COMMENTS IMP SPEC: ABNORMAL
PATH REPORT.COMMENTS IMP SPEC: YES
PATH REPORT.FINAL DX SPEC: ABNORMAL
PATH REPORT.GROSS SPEC: ABNORMAL
PATH REPORT.MICROSCOPIC SPEC OTHER STN: ABNORMAL
PATH REPORT.RELEVANT HX SPEC: ABNORMAL
PHOSPHATE SERPL-MCNC: 2.6 MG/DL (ref 2.5–4.5)
PHOTO IMAGE: ABNORMAL
PLATELET # BLD AUTO: 237 10E3/UL (ref 150–450)
POTASSIUM SERPL-SCNC: 3.9 MMOL/L (ref 3.4–5.3)
RBC # BLD AUTO: 3.32 10E6/UL (ref 3.8–5.2)
SIGNIFICANT RESULTS: NORMAL
SODIUM SERPL-SCNC: 139 MMOL/L (ref 136–145)
SPECIMEN DESCRIPTION: NORMAL
SPECIMEN DESCRIPTION: NORMAL
TEST DETAILS, MDL: NORMAL
WBC # BLD AUTO: 9.6 10E3/UL (ref 4–11)

## 2023-01-20 PROCEDURE — 250N000013 HC RX MED GY IP 250 OP 250 PS 637

## 2023-01-20 PROCEDURE — 99232 SBSQ HOSP IP/OBS MODERATE 35: CPT | Performed by: ANESTHESIOLOGY

## 2023-01-20 PROCEDURE — 93970 EXTREMITY STUDY: CPT | Mod: 26 | Performed by: RADIOLOGY

## 2023-01-20 PROCEDURE — 80048 BASIC METABOLIC PNL TOTAL CA: CPT

## 2023-01-20 PROCEDURE — 83735 ASSAY OF MAGNESIUM: CPT | Performed by: NEUROLOGICAL SURGERY

## 2023-01-20 PROCEDURE — G0452 MOLECULAR PATHOLOGY INTERPR: HCPCS | Mod: 26 | Performed by: PATHOLOGY

## 2023-01-20 PROCEDURE — 85027 COMPLETE CBC AUTOMATED: CPT

## 2023-01-20 PROCEDURE — 88307 TISSUE EXAM BY PATHOLOGIST: CPT | Mod: 26 | Performed by: SPECIALIST

## 2023-01-20 PROCEDURE — 250N000011 HC RX IP 250 OP 636: Performed by: NURSE PRACTITIONER

## 2023-01-20 PROCEDURE — 93970 EXTREMITY STUDY: CPT

## 2023-01-20 PROCEDURE — 120N000005 HC R&B MS OVERFLOW UMMC

## 2023-01-20 PROCEDURE — 250N000013 HC RX MED GY IP 250 OP 250 PS 637: Performed by: NEUROLOGICAL SURGERY

## 2023-01-20 PROCEDURE — 250N000013 HC RX MED GY IP 250 OP 250 PS 637: Performed by: NURSE PRACTITIONER

## 2023-01-20 PROCEDURE — 81455 SO/HL 51/>GSAP DNA/DNA&RNA: CPT | Performed by: NEUROLOGICAL SURGERY

## 2023-01-20 PROCEDURE — 84100 ASSAY OF PHOSPHORUS: CPT | Performed by: NEUROLOGICAL SURGERY

## 2023-01-20 PROCEDURE — 36415 COLL VENOUS BLD VENIPUNCTURE: CPT

## 2023-01-20 RX ORDER — GABAPENTIN 100 MG/1
200 CAPSULE ORAL 2 TIMES DAILY
Status: DISCONTINUED | OUTPATIENT
Start: 2023-01-20 | End: 2023-01-25 | Stop reason: HOSPADM

## 2023-01-20 RX ORDER — MAGNESIUM OXIDE 400 MG/1
400 TABLET ORAL EVERY 4 HOURS
Status: COMPLETED | OUTPATIENT
Start: 2023-01-20 | End: 2023-01-20

## 2023-01-20 RX ADMIN — POTASSIUM & SODIUM PHOSPHATES POWDER PACK 280-160-250 MG 1 PACKET: 280-160-250 PACK at 12:14

## 2023-01-20 RX ADMIN — COLCHICINE 0.6 MG: 0.6 TABLET, FILM COATED ORAL at 08:32

## 2023-01-20 RX ADMIN — ACETAMINOPHEN 975 MG: 325 TABLET, FILM COATED ORAL at 00:33

## 2023-01-20 RX ADMIN — HEPARIN SODIUM 5000 UNITS: 5000 INJECTION, SOLUTION INTRAVENOUS; SUBCUTANEOUS at 00:33

## 2023-01-20 RX ADMIN — METHOCARBAMOL 500 MG: 500 TABLET, FILM COATED ORAL at 21:07

## 2023-01-20 RX ADMIN — HEPARIN SODIUM 5000 UNITS: 5000 INJECTION, SOLUTION INTRAVENOUS; SUBCUTANEOUS at 17:16

## 2023-01-20 RX ADMIN — HYDROMORPHONE HYDROCHLORIDE 4 MG: 2 TABLET ORAL at 08:30

## 2023-01-20 RX ADMIN — HYDROMORPHONE HYDROCHLORIDE 4 MG: 2 TABLET ORAL at 02:06

## 2023-01-20 RX ADMIN — HEPARIN SODIUM 5000 UNITS: 5000 INJECTION, SOLUTION INTRAVENOUS; SUBCUTANEOUS at 08:38

## 2023-01-20 RX ADMIN — HYDROMORPHONE HYDROCHLORIDE 4 MG: 2 TABLET ORAL at 21:01

## 2023-01-20 RX ADMIN — HYDROMORPHONE HYDROCHLORIDE 4 MG: 2 TABLET ORAL at 11:39

## 2023-01-20 RX ADMIN — POTASSIUM & SODIUM PHOSPHATES POWDER PACK 280-160-250 MG 1 PACKET: 280-160-250 PACK at 08:34

## 2023-01-20 RX ADMIN — Medication 100 UNITS: at 08:32

## 2023-01-20 RX ADMIN — MAGNESIUM OXIDE TAB 400 MG (241.3 MG ELEMENTAL MG) 400 MG: 400 (241.3 MG) TAB at 12:14

## 2023-01-20 RX ADMIN — HYDROMORPHONE HYDROCHLORIDE 4 MG: 2 TABLET ORAL at 18:08

## 2023-01-20 RX ADMIN — METHOCARBAMOL 500 MG: 500 TABLET, FILM COATED ORAL at 12:14

## 2023-01-20 RX ADMIN — ACETAMINOPHEN 975 MG: 325 TABLET, FILM COATED ORAL at 17:13

## 2023-01-20 RX ADMIN — SIMVASTATIN 20 MG: 20 TABLET, FILM COATED ORAL at 21:03

## 2023-01-20 RX ADMIN — HYDROMORPHONE HYDROCHLORIDE 2 MG: 2 TABLET ORAL at 14:43

## 2023-01-20 RX ADMIN — GABAPENTIN 200 MG: 100 CAPSULE ORAL at 08:34

## 2023-01-20 RX ADMIN — LIDOCAINE PATCH 4% 2 PATCH: 40 PATCH TOPICAL at 08:38

## 2023-01-20 RX ADMIN — HYDROMORPHONE HYDROCHLORIDE 2 MG: 2 TABLET ORAL at 16:34

## 2023-01-20 RX ADMIN — FAMOTIDINE 20 MG: 20 TABLET, FILM COATED ORAL at 08:34

## 2023-01-20 RX ADMIN — HYDROCHLOROTHIAZIDE 25 MG: 25 TABLET ORAL at 08:33

## 2023-01-20 RX ADMIN — ALLOPURINOL 200 MG: 100 TABLET ORAL at 08:36

## 2023-01-20 RX ADMIN — Medication 250 MG: at 08:33

## 2023-01-20 RX ADMIN — HYDROMORPHONE HYDROCHLORIDE 4 MG: 2 TABLET ORAL at 04:53

## 2023-01-20 RX ADMIN — ZINC SULFATE 220 MG (50 MG) CAPSULE 220 MG: CAPSULE at 08:33

## 2023-01-20 RX ADMIN — GABAPENTIN 200 MG: 100 CAPSULE ORAL at 21:03

## 2023-01-20 RX ADMIN — METHOCARBAMOL 500 MG: 500 TABLET, FILM COATED ORAL at 08:34

## 2023-01-20 RX ADMIN — ACETAMINOPHEN 975 MG: 325 TABLET, FILM COATED ORAL at 08:37

## 2023-01-20 RX ADMIN — Medication 10000 UNITS: at 08:33

## 2023-01-20 RX ADMIN — FAMOTIDINE 20 MG: 20 TABLET, FILM COATED ORAL at 21:03

## 2023-01-20 RX ADMIN — METHOCARBAMOL 500 MG: 500 TABLET, FILM COATED ORAL at 15:50

## 2023-01-20 RX ADMIN — MAGNESIUM OXIDE TAB 400 MG (241.3 MG ELEMENTAL MG) 400 MG: 400 (241.3 MG) TAB at 08:37

## 2023-01-20 ASSESSMENT — ACTIVITIES OF DAILY LIVING (ADL)
ADLS_ACUITY_SCORE: 36
ADLS_ACUITY_SCORE: 34
ADLS_ACUITY_SCORE: 34

## 2023-01-20 NOTE — PLAN OF CARE
Patient rested comfortably this shift given the increase and adjustment in pain regimen. She reports her pain is better controlled with the PO dilaudid. Patient is A/O x4, respirations regular and easy, speech clear and appropriate, and neuros intact. She reports numbness and tingling in BLE which is baseline for her. Dressing to midline back is clean, dry and intact. Elizabeth remains patent and draining clear, yellow urine. Patient drinking adequate PO input, discontinued NS. Incontinent, small bowel movement x1 this shift. Oriented patient to her plan of care, denies any questions or concerns at present. Electrolyte conditional order criteria met, replacement initiated as indicated.           Problem: Plan of Care - These are the overarching goals to be used throughout the patient stay.    Goal: Plan of Care Review  Description: The Plan of Care Review/Shift note should be completed every shift.  The Outcome Evaluation is a brief statement about your assessment that the patient is improving, declining, or no change.  This information will be displayed automatically on your shift note.  Outcome: Progressing  Flowsheets (Taken 1/20/2023 0607)  Plan of Care Reviewed With: patient  Overall Patient Progress: improving  Goal: Optimal Comfort and Wellbeing  Outcome: Progressing  Intervention: Monitor Pain and Promote Comfort  Recent Flowsheet Documentation  Taken 1/19/2023 2108 by Ruma Shields RN  Pain Management Interventions: repositioned     Problem: Hypertension Comorbidity  Goal: Blood Pressure in Desired Range  Outcome: Progressing  Intervention: Maintain Blood Pressure Management  Recent Flowsheet Documentation  Taken 1/20/2023 0030 by Ruma Shields, RN  Medication Review/Management: medications reviewed  Taken 1/19/2023 2000 by Ruma Shields, RN  Medication Review/Management: medications reviewed     Problem: Spinal Surgery  Goal: Absence of Bleeding  Outcome: Progressing   Goal Outcome Evaluation:      Plan of  Care Reviewed With: patient    Overall Patient Progress: improvingOverall Patient Progress: improving

## 2023-01-20 NOTE — PLAN OF CARE
"  Problem: Plan of Care - These are the overarching goals to be used throughout the patient stay.    Goal: Patient-Specific Goal (Individualized)  Description: You can add care plan individualizations to a care plan. Examples of Individualization might be:  \"Parent requests to be called daily at 9am for status\", \"I have a hard time hearing out of my right ear\", or \"Do not touch me to wake me up as it startles me\".  Outcome: Progressing     Problem: Spinal Surgery  Goal: Optimal Coping with Surgery  Outcome: Progressing   Goal Outcome Evaluation:  Afebrile. Blood pressure 159/82. Sharp back pain 7/10 and received dilaudid, tylenol, gabapentin, robaxin and two lidocaine patches placed  on each side of midline back incision. Dressing is c/d/I. Bilateral LE ultrasound done. Complained of numbness in waist and provider is aware. Bed bath. Purewick was placed. Voided 1200 ml. Gassiness and had one medium soft stool. Using the bed pan. Turning every 2-3 hours. Pcd's on. Assist x 2 with turns. Plan is to go to rehab when bed is available.                         "

## 2023-01-20 NOTE — CONSULTS
Brief Radiation Oncology Note    Laure Wood is a 72 year old female admitted inpatient s/p T7-9 laminectomy with resection of intradural extramedullary mass on 1/16 with Dr. Ambar Antunez.  Radiation oncology was consulted based on result of pathology, at least CNS WHO grade 2 meningioma. Patient can be seen in outpatient setting. I informed patient, on call attending, and Radiation oncology scheduling for appointment to be scheduled once discharged.     Alo Munson MD on 1/20/2023 at 4:24 PM    I agree with the resident's note and plan of care.  The patient was not seen by me but scheduled for an OP appointment    STEVEN Malin M.D.  Department of Radiation Oncology  [facility]

## 2023-01-20 NOTE — PROGRESS NOTES
Care Management Follow Up    Length of Stay (days): 4  Expected Discharge Date: 01/23/2023  Concerns to be Addressed: discharge planning     Patient plan of care discussed at interdisciplinary rounds: Yes  Anticipated Discharge Disposition: Transitional Care  Anticipated Discharge Services: None  Anticipated Discharge DME: None  Patient/family educated on Medicare website which has current facility and service quality ratings: yes  Education Provided on the Discharge Plan: yes  Patient/Family in Agreement with the Plan: yes  Referrals Placed by CM/SW: Internal Clinic Care Coordination, Senior Linkage Line, Communication hand-offs to next level of Care Providers, Post Acute Facilities  Private pay costs discussed: Not applicable  Additional Information: @1050: SW attempted to meet with pt at bedside to obtain TCU choices, however the pt was out of her room. SW will return at a later time.    @1340 SW returned to meet with pt at bedside. Pt advised she had not yet made her TCU choices and was wanting her children to assist in choosing them. Her children are coming to the hospital to visit her tomorrow. SW agreed to return tomorrow to obtain choices since TCU admissions and placement does not occur on the weekend anyway.    SW placed this patient on the weekend SW help list for follow-up.  __________________________     GIULIANA Jefferson, ANABEL  Advanced Practice Independent Clinical   [facility]   [address] 5B Medicine Beds [phone] & 5C Medicine Overflow Beds [phone]  [email or web address]  Phone: [phone]  Pager: [phone]  Fax: [phone]  Message me on [employer] [place].    Weekend 5A & 5B  Pager: [phone]   Weekend 5A & 5B RNCC Pager: [phone]  Weekdays after hours (1630 - 0000), Saturday & Sunday after hours ([phone]), & [facility] Recognized Holidays Coverage  Pager: [phone]

## 2023-01-20 NOTE — PROGRESS NOTES
"[facility], [place]   Neurosurgery Progress Note:    Date of service: 1/20/2023    Assessment: Laure oWod is a 72 year old female s/p Thoracic 7-9 laminectomy with resection of intradural extramedullary mass on 1/16/2023 with Dr. Ambar Antunez.     Clinically Significant Risk Factors Present on Admission             # Obesity: Estimated body mass index is 35.85 kg/m  as calculated from the following:    Height as of this encounter: 1.6 m (5' 3\").    Weight as of this encounter: 91.8 kg (202 lb 6.4 oz).      Plan:  - Neuro checks/vital signs: Every 4 hours  - SBP: <160  - Pain control: Tylenol, gabapentin PO Dialudid PRN and lidocaine patches  - Pain Management consult - appreciate recommendations.   - Activity: up as tolerated  - Restrictions/Bracing: None  - Diet: ADAT  - DVT prophylaxis: SCDs and subcutaneous heparin 5000 units q 8 hours  - PT/OT: recommending TCU   - Nutrition consult for high protein diet  - Wound healing protocol  - Daily dressing changes  - Bowel regimen   - Remove william catheter today  - To to chair/ambulate TID    RUBEN Bajwa, CNP  Neurosurgery  Pager [phone]      Interval History:  Patient reports pain slightly better controlled.  Patient reports feet still feel \"heavy.\"  Will remove william today.  Patient need to increase activity as able.  PT/OT recommending TCU.      Objective:   Temp:  [97.4  F (36.3  C)-100.4  F (38  C)] 97.4  F (36.3  C)  Pulse:  [84-90] 88  Resp:  [15-18] 18  BP: (118-166)/() 159/82  SpO2:  [95 %-98 %] 96 %  I/O last 3 completed shifts:  In: 2702.67 [P.O.:2000; I.V.:702.67]  Out: 2925 [Urine:2925]    Gen: Appears comfortable, NAD  Wound: Incision, clean, dry, intact. Dressing changed  Neurologic:  - Alert & Oriented to person, place, time, and situation  - Follows commands briskly  - Speech fluent, spontaneous. No aphasia or dysarthria.  - No gaze preference. No apparent hemineglect.  - PERRL, EOMI  - Strong eye closure, jaw " clench, and cheek puff  - Face symmetric with sensation intact to light touch  - Palate elevates symmetrically, uvula midline, tongue protrudes midline  - Trapezii and sternocleidomastoid muscles 5/5 bilaterally  - No pronator drift     Del Tr Bi WE WF Gr   R 5 5 5 5 5 5   L 5 5 5 5 5 5    HF KE KF DF PF EHL   R 4 4 4 4 4 4   L 4+ 4+ 4+ 4+ 4+ 4+     Reflexes 2+ throughout    Sensation intact and symmetric to light touch throughout bilateral upper extremities.  Numbness and tingling present in pelvis area and in bilateral lower extremities.     LABS  Recent Labs   Lab Test 01/20/23  0413 01/19/23  1025 01/19/23  0543   WBC 9.6 10.4 9.2   HGB 9.3* 10.0* 9.8*   MCV 89 90 90    223 213       Recent Labs   Lab Test 01/20/23  0412 01/19/23  0543 01/18/23  0545    140 138   POTASSIUM 3.9 3.9 3.9   CHLORIDE 103 104 105   CO2 25 24 23   BUN 9.7 15.2 15.1   CR 0.93 0.91 0.95   ANIONGAP 11 12 10   ALONSO 9.0 9.2 8.8   * 102* 130*       IMAGING    Recent Results (from the past 24 hour(s))   XR Surgery REMINGTON L/T 5 Min Fluoro w Stills    Narrative    This exam was marked as non-reportable because it will not be read by a   radiologist or a [place] non-radiologist provider.

## 2023-01-20 NOTE — PROGRESS NOTES
"Pain Service Progress Note  [facility]  Date: 01/20/2023       Patient Name: Laure Wood  MRN: [id number]  Age: 72 year old  Sex: female      Assessment:  Laure Wood is a 72 year old female with approximately two month history of worsening back pain, LE numbness and weakness who is now s/p thoracic 7-9 laminectomy with resection of intradural extramedullary mass on 1/16. Acute postoperative pain with neuropathic pain.  Opioid naive. Denies history of chronic pain.    Procedure: thoracic 7-9 laminectomy with resection of intradural extramedullary mass    Date of Surgery: 1/16/2023    Plan/Recommendations:  1. No long acting opioid recommended for acute postoperative pain.  Patient opioid naive prior to admission     2.   Continue Dilaudid 2-4 mg PO Q 3 hr PRN pain, taper off with healing.      3.   Reserve IV Dilaudid only for pain not controlled with maximized oral opioid     4.   Recommend increasing gabapentin to 200 mg PO BID (e.g., 6 am - 6 pm, or 7 am - 7pm).     5.   Continue lidocaine 4% patch to max of 3 patches topically, apply one patches to soles of feet, may cut patches for coverage, apply all three patches at bedtime and leave on for 12 hrs on/off 12 hrs.       6.   Continue scheduled Tylenol 975 mg Q 8h      7.   Continue scheduled Robaxin 500 mg PO QID, space doses at least 4hrs apart. Patient states it's been very effective for her muscle spasm pain     8.   Continue bowel regimen per primary service      9.    Outpatient consider:     TENS for back pain if not contraindicated.     Referral for chronic pain clinic or neurology clinic if she has persistent neuropathic pain    Pain Service will sign off.    Discussed with attending anesthesiologist    Please Page the Pain Team Via [employer]: \"PAIN MANAGEMENT ACUTE INPATIENT/ [facility]\"    Casi Luna MD  01/20/2023     Overnight Events:   No acute events over night.     Subjective: She used the lidocaine " "patches on the soles of her feet but said these fell off. She denies any side effects from either the dilaudid or the gabapentin. She still has parasthesias in both her feet and an aching/soreness in her back at the surgical site. Overall she feels that her pain is about the same as it was yesterday, currently rating it as a 7/10. She also commented that her feet/legs feel heavy.     Pain Location:  Primarily her back at the surgical incision.    Pain Intensity:    Pain at Rest: 7/10      Diet: Advance Diet as Tolerated: Regular Diet Adult  Snacks/Supplements Adult: Ensure Max Protein (bariatric); Between Meals    Relevant Labs:  Recent Labs   Lab Test 01/20/23 0413 01/20/23 0412     --    BUN  --  9.7       Physical Exam:  Vitals: BP (!) 146/64   Pulse 84   Temp 99.1  F (37.3  C) (Oral)   Resp 16   Ht 1.6 m (5' 3\")   Wt 91.8 kg (202 lb 6.4 oz)   SpO2 98%   BMI 35.85 kg/m      Physical Exam:   GENERAL: Healthy, alert and no distress  EYES: Eyes grossly normal to inspection.  No discharge or erythema, or obvious scleral/conjunctival abnormalities.  RESP: No audible wheeze, cough, or visible cyanosis.  No visible retractions or increased work of breathing.    SKIN: Visible skin clear. No significant rash, abnormal pigmentation or lesions.  NEURO: Cranial nerves grossly intact.  Mentation and speech appropriate for age.  PSYCH: Mentation appears normal, affect normal/bright, judgement and insight intact, normal speech and appearance well-groomed.      Relevant Medications:  Current Pain Medications:  Medications related to Pain Management (From now, onward)    Start     Dose/Rate Route Frequency Ordered Stop    01/19/23 1830  gabapentin (NEURONTIN) capsule 200 mg         200 mg Oral DAILY 01/19/23 1829 01/19/23 1828  HYDROmorphone (DILAUDID) tablet 2-4 mg         2-4 mg Oral EVERY 3 HOURS PRN 01/19/23 1829 01/19/23 0830  Lidocaine (LIDOCARE) 4 % Patch 2 patch         2 patch  over 12 Hours " Transdermal EVERY 24 HOURS 0800 01/19/23 0759      01/19/23 0830  lidocaine patch in PLACE          Transdermal EVERY 8 HOURS SCHEDULED 01/19/23 0759      01/18/23 0900  acetaminophen (TYLENOL) tablet 975 mg         975 mg Oral EVERY 8 HOURS 01/18/23 0818 01/21/23 0859    01/18/23 0830  methocarbamol (ROBAXIN) tablet 500 mg         500 mg Oral 4 TIMES DAILY 01/18/23 0818      01/17/23 0930  senna-docusate (SENOKOT-S/PERICOLACE) 8.6-50 MG per tablet 2 tablet         2 tablet Oral 2 TIMES DAILY 01/17/23 0845      01/17/23 0930  polyethylene glycol (MIRALAX) Packet 17 g         17 g Oral 2 TIMES DAILY 01/17/23 0845      01/17/23 0900  magnesium hydroxide (MILK OF MAGNESIA) suspension 30 mL         30 mL Oral DAILY 01/17/23 0856      01/17/23 0015  bisacodyl (DULCOLAX) suppository 10 mg         10 mg Rectal DAILY PRN 01/17/23 0015      01/17/23 0015  lidocaine 1 % 0.1-1 mL         0.1-1 mL Other EVERY 1 HOUR PRN 01/17/23 0015      01/17/23 0015  lidocaine (LMX4) cream          Topical EVERY 1 HOUR PRN 01/17/23 0015            Primary Service Contacted with Recommendations? Yes

## 2023-01-21 ENCOUNTER — APPOINTMENT (OUTPATIENT)
Dept: GENERAL RADIOLOGY | Facility: CLINIC | Age: 73
DRG: 029 | End: 2023-01-21
Attending: STUDENT IN AN ORGANIZED HEALTH CARE EDUCATION/TRAINING PROGRAM
Payer: COMMERCIAL

## 2023-01-21 LAB
ANION GAP SERPL CALCULATED.3IONS-SCNC: 13 MMOL/L (ref 7–15)
BUN SERPL-MCNC: 17.8 MG/DL (ref 8–23)
CALCIUM SERPL-MCNC: 9.1 MG/DL (ref 8.8–10.2)
CHLORIDE SERPL-SCNC: 100 MMOL/L (ref 98–107)
CREAT SERPL-MCNC: 0.91 MG/DL (ref 0.51–0.95)
DEPRECATED HCO3 PLAS-SCNC: 24 MMOL/L (ref 22–29)
ERYTHROCYTE [DISTWIDTH] IN BLOOD BY AUTOMATED COUNT: 14.5 % (ref 10–15)
GFR SERPL CREATININE-BSD FRML MDRD: 67 ML/MIN/1.73M2
GLUCOSE SERPL-MCNC: 105 MG/DL (ref 70–99)
HCT VFR BLD AUTO: 31.6 % (ref 35–47)
HGB BLD-MCNC: 9.9 G/DL (ref 11.7–15.7)
MAGNESIUM SERPL-MCNC: 2.1 MG/DL (ref 1.7–2.3)
MCH RBC QN AUTO: 27.9 PG (ref 26.5–33)
MCHC RBC AUTO-ENTMCNC: 31.3 G/DL (ref 31.5–36.5)
MCV RBC AUTO: 89 FL (ref 78–100)
PHOSPHATE SERPL-MCNC: 3.2 MG/DL (ref 2.5–4.5)
PLATELET # BLD AUTO: 279 10E3/UL (ref 150–450)
POTASSIUM SERPL-SCNC: 4.2 MMOL/L (ref 3.4–5.3)
RBC # BLD AUTO: 3.55 10E6/UL (ref 3.8–5.2)
SODIUM SERPL-SCNC: 137 MMOL/L (ref 136–145)
TROPONIN T SERPL HS-MCNC: 18 NG/L
WBC # BLD AUTO: 8.5 10E3/UL (ref 4–11)

## 2023-01-21 PROCEDURE — 84484 ASSAY OF TROPONIN QUANT: CPT | Performed by: STUDENT IN AN ORGANIZED HEALTH CARE EDUCATION/TRAINING PROGRAM

## 2023-01-21 PROCEDURE — 250N000013 HC RX MED GY IP 250 OP 250 PS 637: Performed by: NEUROLOGICAL SURGERY

## 2023-01-21 PROCEDURE — 250N000013 HC RX MED GY IP 250 OP 250 PS 637

## 2023-01-21 PROCEDURE — 120N000005 HC R&B MS OVERFLOW UMMC

## 2023-01-21 PROCEDURE — 36415 COLL VENOUS BLD VENIPUNCTURE: CPT

## 2023-01-21 PROCEDURE — 250N000011 HC RX IP 250 OP 636: Performed by: NURSE PRACTITIONER

## 2023-01-21 PROCEDURE — 93010 ELECTROCARDIOGRAM REPORT: CPT | Performed by: INTERNAL MEDICINE

## 2023-01-21 PROCEDURE — 36415 COLL VENOUS BLD VENIPUNCTURE: CPT | Performed by: STUDENT IN AN ORGANIZED HEALTH CARE EDUCATION/TRAINING PROGRAM

## 2023-01-21 PROCEDURE — 85014 HEMATOCRIT: CPT

## 2023-01-21 PROCEDURE — 71045 X-RAY EXAM CHEST 1 VIEW: CPT

## 2023-01-21 PROCEDURE — 71045 X-RAY EXAM CHEST 1 VIEW: CPT | Mod: 26 | Performed by: RADIOLOGY

## 2023-01-21 PROCEDURE — 250N000013 HC RX MED GY IP 250 OP 250 PS 637: Performed by: NURSE PRACTITIONER

## 2023-01-21 PROCEDURE — 84100 ASSAY OF PHOSPHORUS: CPT | Performed by: NEUROLOGICAL SURGERY

## 2023-01-21 PROCEDURE — 93005 ELECTROCARDIOGRAM TRACING: CPT

## 2023-01-21 PROCEDURE — 80048 BASIC METABOLIC PNL TOTAL CA: CPT

## 2023-01-21 PROCEDURE — 83735 ASSAY OF MAGNESIUM: CPT | Performed by: NEUROLOGICAL SURGERY

## 2023-01-21 RX ADMIN — HYDROMORPHONE HYDROCHLORIDE 4 MG: 2 TABLET ORAL at 17:56

## 2023-01-21 RX ADMIN — HYDROMORPHONE HYDROCHLORIDE 4 MG: 2 TABLET ORAL at 21:08

## 2023-01-21 RX ADMIN — HEPARIN SODIUM 5000 UNITS: 5000 INJECTION, SOLUTION INTRAVENOUS; SUBCUTANEOUS at 01:52

## 2023-01-21 RX ADMIN — METHOCARBAMOL 500 MG: 500 TABLET, FILM COATED ORAL at 06:03

## 2023-01-21 RX ADMIN — Medication 100 UNITS: at 07:50

## 2023-01-21 RX ADMIN — Medication 10000 UNITS: at 07:50

## 2023-01-21 RX ADMIN — HYDROMORPHONE HYDROCHLORIDE 4 MG: 2 TABLET ORAL at 01:55

## 2023-01-21 RX ADMIN — MAGNESIUM HYDROXIDE 30 ML: 400 SUSPENSION ORAL at 07:50

## 2023-01-21 RX ADMIN — HYDROCHLOROTHIAZIDE 25 MG: 25 TABLET ORAL at 07:49

## 2023-01-21 RX ADMIN — COLCHICINE 0.6 MG: 0.6 TABLET, FILM COATED ORAL at 07:50

## 2023-01-21 RX ADMIN — METHOCARBAMOL 500 MG: 500 TABLET, FILM COATED ORAL at 15:32

## 2023-01-21 RX ADMIN — ZINC SULFATE 220 MG (50 MG) CAPSULE 220 MG: CAPSULE at 07:49

## 2023-01-21 RX ADMIN — GABAPENTIN 200 MG: 100 CAPSULE ORAL at 07:49

## 2023-01-21 RX ADMIN — SIMVASTATIN 20 MG: 20 TABLET, FILM COATED ORAL at 21:37

## 2023-01-21 RX ADMIN — LIDOCAINE PATCH 4% 2 PATCH: 40 PATCH TOPICAL at 07:49

## 2023-01-21 RX ADMIN — HEPARIN SODIUM 5000 UNITS: 5000 INJECTION, SOLUTION INTRAVENOUS; SUBCUTANEOUS at 17:59

## 2023-01-21 RX ADMIN — HYDROMORPHONE HYDROCHLORIDE 4 MG: 2 TABLET ORAL at 14:47

## 2023-01-21 RX ADMIN — Medication 250 MG: at 07:49

## 2023-01-21 RX ADMIN — HYDROMORPHONE HYDROCHLORIDE 4 MG: 2 TABLET ORAL at 11:53

## 2023-01-21 RX ADMIN — HEPARIN SODIUM 5000 UNITS: 5000 INJECTION, SOLUTION INTRAVENOUS; SUBCUTANEOUS at 10:03

## 2023-01-21 RX ADMIN — GABAPENTIN 200 MG: 100 CAPSULE ORAL at 19:54

## 2023-01-21 RX ADMIN — METHOCARBAMOL 500 MG: 500 TABLET, FILM COATED ORAL at 10:47

## 2023-01-21 RX ADMIN — FAMOTIDINE 20 MG: 20 TABLET, FILM COATED ORAL at 07:49

## 2023-01-21 RX ADMIN — METHOCARBAMOL 500 MG: 500 TABLET, FILM COATED ORAL at 21:37

## 2023-01-21 RX ADMIN — HYDROMORPHONE HYDROCHLORIDE 4 MG: 2 TABLET ORAL at 07:47

## 2023-01-21 RX ADMIN — FAMOTIDINE 20 MG: 20 TABLET, FILM COATED ORAL at 19:54

## 2023-01-21 RX ADMIN — ACETAMINOPHEN 975 MG: 325 TABLET, FILM COATED ORAL at 01:52

## 2023-01-21 RX ADMIN — ALLOPURINOL 200 MG: 100 TABLET ORAL at 07:49

## 2023-01-21 ASSESSMENT — ACTIVITIES OF DAILY LIVING (ADL)
ADLS_ACUITY_SCORE: 36
ADLS_ACUITY_SCORE: 34
ADLS_ACUITY_SCORE: 36

## 2023-01-21 NOTE — PLAN OF CARE
Problem: Plan of Care - These are the overarching goals to be used throughout the patient stay.    Goal: Optimal Comfort and Wellbeing  Outcome: Progressing   Goal Outcome Evaluation:  Neuro's are intact. Temp max 99.0. Complained of chest pain and provider notified. EKG, chest xray and troponin was done. Back pain and received lidocaine patches, icy hot patch, dilaudid x 3, robaxin and gabapentin. Troponin 18. Using the IS at bedside. Ate two scrambled eggs, mcgriddle and maxim water. Using the purewick and voided 935 ml. Incontinent and had a smear of stool. Declined miralax and senna.Turning every few hours. Assist x 2 and using the lift. Bed alarm on.

## 2023-01-21 NOTE — PLAN OF CARE
Goal Outcome Evaluation: Assumed care of patient at 0045. Patient reported back pain this shift, responded well to pain medication upon reassessment. Incision sight was clean dry and intact, minimal drainage this shift. Purewick in place. Neuro checks completed, pt. Continues to report numbness and tingling waist down. No nausea this shift. Turns Q 2 hours. Patient remained in bed overnight, reports she slept well.

## 2023-01-21 NOTE — PROGRESS NOTES
[facility], [place]   Neurosurgery Progress Note:    Date of service: 1/21/2023    Assessment: Laure Wood is a 72 year old female s/p Thoracic 7-9 laminectomy with resection of intradural extramedullary mass on 1/16/2023 with Dr. Ambar Antunez.     Clinically Significant Risk Factors Present on Admission                    Plan:  - Neuro checks/vital signs: Every 4 hours  - SBP: <160  - Pain control: Tylenol, gabapentin PO Dialudid PRN and lidocaine patches  - Pain Management consult - appreciate recommendations.   - Activity: up as tolerated  - Restrictions/Bracing: None  - Diet: ADAT  - DVT prophylaxis: SCDs and subcutaneous heparin 5000 units q 8 hours  - PT/OT: recommending TCU   - Nutrition consult for high protein diet  - Wound healing protocol  - Daily dressing changes  - Bowel regimen   - To chair/ambulate TID  - Radiation oncology referral upon discharge    Radha Newton MD, PhD  PGY-2 Neurosurgery  Please page NSGY on call with questions      Interval History:  Patient reports pain slightly better controlled. Elizabeth removed. Lower extremity DVT ultrasound negative.    Objective:   Temp:  [97.4  F (36.3  C)-100  F (37.8  C)] 97.8  F (36.6  C)  Pulse:  [81-93] 85  Resp:  [16-18] 16  BP: (121-159)/(63-82) 121/69  SpO2:  [96 %-99 %] 98 %  I/O last 3 completed shifts:  In: 2017 [P.O.:2017]  Out: 1930 [Urine:1930]    Gen: Appears comfortable, NAD  Wound: Incision, clean, dry, intact. Dressing changed  Neurologic:  - Alert & Oriented to person, place, time, and situation  - Follows commands briskly  - Speech fluent, spontaneous. No aphasia or dysarthria.  - No gaze preference. No apparent hemineglect.  - PERRL, EOMI  - Strong eye closure, jaw clench, and cheek puff  - Face symmetric with sensation intact to light touch  - Palate elevates symmetrically, uvula midline, tongue protrudes midline  - Trapezii and sternocleidomastoid muscles 5/5 bilaterally  - No pronator drift     Del Tr Bi  WE WF Gr   R 5 5 5 5 5 5   L 5 5 5 5 5 5    HF KE KF DF PF EHL   R 4 4 4 4 4 4   L 4+ 4+ 4+ 4+ 4+ 4+     Reflexes 2+ throughout    Sensation intact and symmetric to light touch throughout bilateral upper extremities.  Numbness and tingling present in pelvis area and in bilateral lower extremities.     LABS  Recent Labs   Lab Test 01/20/23  0413 01/19/23  1025 01/19/23  0543   WBC 9.6 10.4 9.2   HGB 9.3* 10.0* 9.8*   MCV 89 90 90    223 213       Recent Labs   Lab Test 01/20/23  0412 01/19/23  0543 01/18/23  0545    140 138   POTASSIUM 3.9 3.9 3.9   CHLORIDE 103 104 105   CO2 25 24 23   BUN 9.7 15.2 15.1   CR 0.93 0.91 0.95   ANIONGAP 11 12 10   ALONSO 9.0 9.2 8.8   * 102* 130*       IMAGING    Recent Results (from the past 24 hour(s))   XR Surgery REMINGTON L/T 5 Min Fluoro w Stills    Narrative    This exam was marked as non-reportable because it will not be read by a   radiologist or a [place] non-radiologist provider.          I have seen this patient with the resident and formulated a plan and agree with this note.  AMP

## 2023-01-21 NOTE — PLAN OF CARE
Problem: Plan of Care - These are the overarching goals to be used throughout the patient stay.    Goal: Plan of Care Review  Description: The Plan of Care Review/Shift note should be completed every shift.  The Outcome Evaluation is a brief statement about your assessment that the patient is improving, declining, or no change.  This information will be displayed automatically on your shift note.  Outcome: Progressing   Goal Outcome Evaluation:  Temp max 100.0 PO - down to 99.0 PO.  Generalized back pain 4-7 out of 10 present requiring dilaudid 4 mg every 3 hours (next dose due 2400 - family/pt wants to keep on a schedule) + robaxin + tylenol + gabapentin.  Incision (sutures) clean/dry/intact - dressing changed for a small amt of serosang drainage.  Voided x 1 (has purewick) with one smear of loose stool.  Neuro checks normal except for numbness and tingling waist down to toes.  Using incentive spirometry on own.  PCDs on.  Ate well/no nausea.  Turns Q 2hr; did not get out of bed.  Pt thirsty/dry mouth.

## 2023-01-21 NOTE — PROGRESS NOTES
Care Management Follow Up    Length of Stay (days): 5    Expected Discharge Date: 01/25/2023     Concerns to be Addressed: discharge planning     Patient plan of care discussed at interdisciplinary rounds: Yes    Anticipated Discharge Disposition: Transitional Care     Anticipated Discharge Services: None  Anticipated Discharge DME: None    Patient/family educated on Medicare website which has current facility and service quality ratings: yes  Education Provided on the Discharge Plan:    Patient/Family in Agreement with the Plan: yes    Referrals Placed by CM/SW: Internal Clinic Care Coordination, Senior Linkage Line, Communication hand-offs to next level of Care Providers, Post Acute Facilities  Private pay costs discussed: Not applicable    Additional Information:  SW met with the to get her TCU choices. Pt provided the following list to try in order, referral was sent via DOD.    1. [place]  2. [facility]  3. [facility]  4. [facility].  5. [facility]      GIULIANA Walker, [facility]  Weekend Coverage 5C/7C/7D  Pager [phone]     Male

## 2023-01-22 LAB
ANION GAP SERPL CALCULATED.3IONS-SCNC: 13 MMOL/L (ref 7–15)
BUN SERPL-MCNC: 20.1 MG/DL (ref 8–23)
CALCIUM SERPL-MCNC: 9.6 MG/DL (ref 8.8–10.2)
CHLORIDE SERPL-SCNC: 100 MMOL/L (ref 98–107)
CREAT SERPL-MCNC: 0.94 MG/DL (ref 0.51–0.95)
DEPRECATED HCO3 PLAS-SCNC: 26 MMOL/L (ref 22–29)
ERYTHROCYTE [DISTWIDTH] IN BLOOD BY AUTOMATED COUNT: 14.5 % (ref 10–15)
GFR SERPL CREATININE-BSD FRML MDRD: 64 ML/MIN/1.73M2
GLUCOSE SERPL-MCNC: 104 MG/DL (ref 70–99)
HCT VFR BLD AUTO: 31.6 % (ref 35–47)
HGB BLD-MCNC: 9.9 G/DL (ref 11.7–15.7)
MAGNESIUM SERPL-MCNC: 2 MG/DL (ref 1.7–2.3)
MCH RBC QN AUTO: 27.4 PG (ref 26.5–33)
MCHC RBC AUTO-ENTMCNC: 31.3 G/DL (ref 31.5–36.5)
MCV RBC AUTO: 88 FL (ref 78–100)
PHOSPHATE SERPL-MCNC: 3.8 MG/DL (ref 2.5–4.5)
PLATELET # BLD AUTO: 353 10E3/UL (ref 150–450)
POTASSIUM SERPL-SCNC: 4.1 MMOL/L (ref 3.4–5.3)
RBC # BLD AUTO: 3.61 10E6/UL (ref 3.8–5.2)
SODIUM SERPL-SCNC: 139 MMOL/L (ref 136–145)
WBC # BLD AUTO: 7.2 10E3/UL (ref 4–11)

## 2023-01-22 PROCEDURE — 999N000127 HC STATISTIC PERIPHERAL IV START W US GUIDANCE

## 2023-01-22 PROCEDURE — 80048 BASIC METABOLIC PNL TOTAL CA: CPT

## 2023-01-22 PROCEDURE — 250N000011 HC RX IP 250 OP 636: Performed by: NURSE PRACTITIONER

## 2023-01-22 PROCEDURE — 83735 ASSAY OF MAGNESIUM: CPT

## 2023-01-22 PROCEDURE — 36415 COLL VENOUS BLD VENIPUNCTURE: CPT

## 2023-01-22 PROCEDURE — 250N000013 HC RX MED GY IP 250 OP 250 PS 637

## 2023-01-22 PROCEDURE — 84100 ASSAY OF PHOSPHORUS: CPT

## 2023-01-22 PROCEDURE — 120N000005 HC R&B MS OVERFLOW UMMC

## 2023-01-22 PROCEDURE — 250N000011 HC RX IP 250 OP 636: Performed by: NEUROLOGICAL SURGERY

## 2023-01-22 PROCEDURE — 250N000013 HC RX MED GY IP 250 OP 250 PS 637: Performed by: NEUROLOGICAL SURGERY

## 2023-01-22 PROCEDURE — 85048 AUTOMATED LEUKOCYTE COUNT: CPT

## 2023-01-22 PROCEDURE — 250N000013 HC RX MED GY IP 250 OP 250 PS 637: Performed by: NURSE PRACTITIONER

## 2023-01-22 RX ORDER — MAGNESIUM SULFATE HEPTAHYDRATE 40 MG/ML
2 INJECTION, SOLUTION INTRAVENOUS ONCE
Status: COMPLETED | OUTPATIENT
Start: 2023-01-22 | End: 2023-01-22

## 2023-01-22 RX ADMIN — HYDROMORPHONE HYDROCHLORIDE 4 MG: 2 TABLET ORAL at 08:16

## 2023-01-22 RX ADMIN — GABAPENTIN 200 MG: 100 CAPSULE ORAL at 08:18

## 2023-01-22 RX ADMIN — HYDROCHLOROTHIAZIDE 25 MG: 25 TABLET ORAL at 08:18

## 2023-01-22 RX ADMIN — LIDOCAINE PATCH 4% 2 PATCH: 40 PATCH TOPICAL at 08:42

## 2023-01-22 RX ADMIN — METHOCARBAMOL 500 MG: 500 TABLET, FILM COATED ORAL at 04:00

## 2023-01-22 RX ADMIN — FAMOTIDINE 20 MG: 20 TABLET, FILM COATED ORAL at 20:42

## 2023-01-22 RX ADMIN — ZINC SULFATE 220 MG (50 MG) CAPSULE 220 MG: CAPSULE at 08:19

## 2023-01-22 RX ADMIN — HYDROMORPHONE HYDROCHLORIDE 4 MG: 2 TABLET ORAL at 11:11

## 2023-01-22 RX ADMIN — MAGNESIUM HYDROXIDE 30 ML: 400 SUSPENSION ORAL at 08:20

## 2023-01-22 RX ADMIN — GABAPENTIN 200 MG: 100 CAPSULE ORAL at 20:43

## 2023-01-22 RX ADMIN — HYDROMORPHONE HYDROCHLORIDE 4 MG: 2 TABLET ORAL at 03:59

## 2023-01-22 RX ADMIN — Medication 10000 UNITS: at 08:18

## 2023-01-22 RX ADMIN — Medication 1 MG: at 00:45

## 2023-01-22 RX ADMIN — HYDROMORPHONE HYDROCHLORIDE 4 MG: 2 TABLET ORAL at 14:28

## 2023-01-22 RX ADMIN — FAMOTIDINE 20 MG: 20 TABLET, FILM COATED ORAL at 08:19

## 2023-01-22 RX ADMIN — Medication 100 UNITS: at 08:18

## 2023-01-22 RX ADMIN — ALLOPURINOL 200 MG: 100 TABLET ORAL at 08:19

## 2023-01-22 RX ADMIN — METHOCARBAMOL 500 MG: 500 TABLET, FILM COATED ORAL at 17:26

## 2023-01-22 RX ADMIN — METHOCARBAMOL 500 MG: 500 TABLET, FILM COATED ORAL at 11:11

## 2023-01-22 RX ADMIN — MAGNESIUM SULFATE IN WATER 2 G: 40 INJECTION, SOLUTION INTRAVENOUS at 11:11

## 2023-01-22 RX ADMIN — HYDROMORPHONE HYDROCHLORIDE 4 MG: 2 TABLET ORAL at 00:30

## 2023-01-22 RX ADMIN — HEPARIN SODIUM 5000 UNITS: 5000 INJECTION, SOLUTION INTRAVENOUS; SUBCUTANEOUS at 17:26

## 2023-01-22 RX ADMIN — HYDROMORPHONE HYDROCHLORIDE 4 MG: 2 TABLET ORAL at 20:43

## 2023-01-22 RX ADMIN — HYDROMORPHONE HYDROCHLORIDE 4 MG: 2 TABLET ORAL at 17:26

## 2023-01-22 RX ADMIN — SIMVASTATIN 20 MG: 20 TABLET, FILM COATED ORAL at 23:35

## 2023-01-22 RX ADMIN — COLCHICINE 0.6 MG: 0.6 TABLET, FILM COATED ORAL at 08:19

## 2023-01-22 RX ADMIN — Medication 250 MG: at 08:16

## 2023-01-22 RX ADMIN — HEPARIN SODIUM 5000 UNITS: 5000 INJECTION, SOLUTION INTRAVENOUS; SUBCUTANEOUS at 02:07

## 2023-01-22 RX ADMIN — METHOCARBAMOL 500 MG: 500 TABLET, FILM COATED ORAL at 23:35

## 2023-01-22 RX ADMIN — HEPARIN SODIUM 5000 UNITS: 5000 INJECTION, SOLUTION INTRAVENOUS; SUBCUTANEOUS at 11:11

## 2023-01-22 ASSESSMENT — ACTIVITIES OF DAILY LIVING (ADL)
ADLS_ACUITY_SCORE: 35
ADLS_ACUITY_SCORE: 35
ADLS_ACUITY_SCORE: 27
ADLS_ACUITY_SCORE: 27
ADLS_ACUITY_SCORE: 35
ADLS_ACUITY_SCORE: 27
ADLS_ACUITY_SCORE: 36
ADLS_ACUITY_SCORE: 34
ADLS_ACUITY_SCORE: 35
ADLS_ACUITY_SCORE: 27
ADLS_ACUITY_SCORE: 35
ADLS_ACUITY_SCORE: 27

## 2023-01-22 NOTE — PLAN OF CARE
Problem: Plan of Care - These are the overarching goals to be used throughout the patient stay.    Goal: Plan of Care Review  Description: The Plan of Care Review/Shift note should be completed every shift.  The Outcome Evaluation is a brief statement about your assessment that the patient is improving, declining, or no change.  This information will be displayed automatically on your shift note.  Outcome: Progressing   Goal Outcome Evaluation:  Temp max 99.7 PO.  Other VSS.  Pain level in upper/lower bilateral back averaged to a level 7 before pain meds and 5 after dilaudid 4 mg PO (given every 3 hours).  Eating fair, drinking well and using a purewick.  Continues to have numbness/tingling waist to toes (although less so than yesterday).  Left leg feels very heavy.  Has pain and difficulty fully moving left hand - site of old IV site.  Turned q 2 hours as she did not get out of bed.

## 2023-01-22 NOTE — PLAN OF CARE
Neuro: A&Ox4. Numbness and tingling in bilateral lower extremities. Calls appropriately. Can make needs known.   Cardiac:  VSS.   Respiratory: Sating >95% on RA.  GI/: Adequate urine output via purewick. No BM this shift.   Diet/appetite: Tolerating regular diet. Eating well.  Activity:  Assist of 2 with lift. Pt not oob this shift. Repositioning Q2 hrs. Pt is able to help with repositioning.  Pain: At acceptable level on current regimen. C/O of back pain. PRN dilaudid x2 with relief.   Skin: No new deficits noted. Surgical incision on back CDI.    LDA's: Purewick. 1 right PIV.     Plan: Plan is for discharge to a TCU. Pain management. Continue with POC. Notify primary team with changes.   Goal Outcome Evaluation:      Plan of Care Reviewed With: patient    Overall Patient Progress: improvingOverall Patient Progress: improving

## 2023-01-22 NOTE — PROGRESS NOTES
[facility], [place]   Neurosurgery Progress Note:    Date of service: 01/22/2023    Assessment: Laure Wood is a 72 year old female s/p Thoracic 7-9 laminectomy with resection of intradural extramedullary mass on 1/16/2023 with Dr. Ambar Antunez.     Clinically Significant Risk Factors Present on Admission                    Plan:  - Neuro checks/vital signs: Every 4 hours  - SBP: <160  - Pain control: Tylenol, gabapentin PO Dialudid PRN and lidocaine patches  - Pain Management consult - appreciate recommendations.   - Activity: up as tolerated  - Restrictions/Bracing: None  - Diet: ADAT  - DVT prophylaxis: SCDs and subcutaneous heparin 5000 units q 8 hours  - PT/OT: recommending TCU   - Nutrition consult for high protein diet  - Wound healing protocol  - Daily dressing changes  - Bowel regimen   - To chair/ambulate TID  - Radiation oncology referral upon discharge    Jesus Robertson MD  PGY-3 Neurosurgery  Please page NSGY on call with questions      Interval History:  NAEO.    Objective:   Temp:  [98.6  F (37  C)-99.7  F (37.6  C)] 98.6  F (37  C)  Pulse:  [78-91] 83  Resp:  [16-18] 16  BP: (105-127)/(52-77) 127/77  SpO2:  [95 %-99 %] 98 %  I/O last 3 completed shifts:  In: 1180 [P.O.:1180]  Out: 1885 [Urine:1335; Other:550]    Gen: Appears comfortable, NAD  Wound: Incision, clean, dry, intact. Dressing changed  Neurologic:  - Alert & Oriented to person, place, time, and situation  - Follows commands briskly  - Speech fluent, spontaneous. No aphasia or dysarthria.  - No gaze preference. No apparent hemineglect.  - PERRL, EOMI  - Strong eye closure, jaw clench, and cheek puff  - Face symmetric with sensation intact to light touch  - Palate elevates symmetrically, uvula midline, tongue protrudes midline  - Trapezii and sternocleidomastoid muscles 5/5 bilaterally  - No pronator drift     Del Tr Bi WE WF Gr   R 5 5 5 5 5 5   L 5 5 5 5 5 5    HF KE KF DF PF EHL   R 4 4 4 4 4 4   L 4+ 4+ 4+  4+ 4+ 4+     Reflexes 2+ throughout    Sensation intact and symmetric to light touch throughout bilateral upper extremities.  Numbness and tingling present in pelvis area and in bilateral lower extremities.     LABS  Recent Labs   Lab Test 01/22/23 0751 01/21/23 0444 01/20/23 0413   WBC 7.2 8.5 9.6   HGB 9.9* 9.9* 9.3*   MCV 88 89 89    279 237       Recent Labs   Lab Test 01/22/23 0751 01/21/23 0444 01/20/23 0412    137 139   POTASSIUM 4.1 4.2 3.9   CHLORIDE 100 100 103   CO2 26 24 25   BUN 20.1 17.8 9.7   CR 0.94 0.91 0.93   ANIONGAP 13 13 11   ALONSO 9.6 9.1 9.0   * 105* 115*       IMAGING    Recent Results (from the past 24 hour(s))   XR Surgery REMINGTON L/T 5 Min Fluoro w Stills    Narrative    This exam was marked as non-reportable because it will not be read by a   radiologist or a [place] non-radiologist provider.          I have seen this patient with the resident and formulated a plan and agree with this note.  AMP

## 2023-01-23 ENCOUNTER — APPOINTMENT (OUTPATIENT)
Dept: PHYSICAL THERAPY | Facility: CLINIC | Age: 73
DRG: 029 | End: 2023-01-23
Attending: NEUROLOGICAL SURGERY
Payer: COMMERCIAL

## 2023-01-23 ENCOUNTER — APPOINTMENT (OUTPATIENT)
Dept: OCCUPATIONAL THERAPY | Facility: CLINIC | Age: 73
DRG: 029 | End: 2023-01-23
Attending: NEUROLOGICAL SURGERY
Payer: COMMERCIAL

## 2023-01-23 ENCOUNTER — LAB REQUISITION (OUTPATIENT)
Dept: LAB | Facility: CLINIC | Age: 73
End: 2023-01-23
Payer: COMMERCIAL

## 2023-01-23 ENCOUNTER — TRANSCRIBE ORDERS (OUTPATIENT)
Dept: OTHER | Age: 73
End: 2023-01-23

## 2023-01-23 ENCOUNTER — APPOINTMENT (OUTPATIENT)
Dept: GENERAL RADIOLOGY | Facility: CLINIC | Age: 73
DRG: 029 | End: 2023-01-23
Attending: NURSE PRACTITIONER
Payer: COMMERCIAL

## 2023-01-23 ENCOUNTER — LAB REQUISITION (OUTPATIENT)
Dept: LAB | Facility: CLINIC | Age: 73
DRG: 029 | End: 2023-01-23
Payer: COMMERCIAL

## 2023-01-23 ENCOUNTER — APPOINTMENT (OUTPATIENT)
Dept: ULTRASOUND IMAGING | Facility: CLINIC | Age: 73
DRG: 029 | End: 2023-01-23
Attending: NURSE PRACTITIONER
Payer: COMMERCIAL

## 2023-01-23 DIAGNOSIS — D32.9 MENINGIOMA (H): Primary | ICD-10-CM

## 2023-01-23 LAB
ANION GAP SERPL CALCULATED.3IONS-SCNC: 16 MMOL/L (ref 7–15)
BUN SERPL-MCNC: 28.2 MG/DL (ref 8–23)
CALCIUM SERPL-MCNC: 10 MG/DL (ref 8.8–10.2)
CHLORIDE SERPL-SCNC: 99 MMOL/L (ref 98–107)
CREAT SERPL-MCNC: 0.94 MG/DL (ref 0.51–0.95)
DEPRECATED HCO3 PLAS-SCNC: 24 MMOL/L (ref 22–29)
ERYTHROCYTE [DISTWIDTH] IN BLOOD BY AUTOMATED COUNT: 14.5 % (ref 10–15)
GFR SERPL CREATININE-BSD FRML MDRD: 64 ML/MIN/1.73M2
GLUCOSE SERPL-MCNC: 105 MG/DL (ref 70–99)
HCT VFR BLD AUTO: 32.1 % (ref 35–47)
HGB BLD-MCNC: 9.9 G/DL (ref 11.7–15.7)
MAGNESIUM SERPL-MCNC: 2.3 MG/DL (ref 1.7–2.3)
MCH RBC QN AUTO: 27.3 PG (ref 26.5–33)
MCHC RBC AUTO-ENTMCNC: 30.8 G/DL (ref 31.5–36.5)
MCV RBC AUTO: 89 FL (ref 78–100)
PHOSPHATE SERPL-MCNC: 3.9 MG/DL (ref 2.5–4.5)
PLATELET # BLD AUTO: 418 10E3/UL (ref 150–450)
POTASSIUM SERPL-SCNC: 4.1 MMOL/L (ref 3.4–5.3)
RBC # BLD AUTO: 3.62 10E6/UL (ref 3.8–5.2)
SODIUM SERPL-SCNC: 139 MMOL/L (ref 136–145)
WBC # BLD AUTO: 7.8 10E3/UL (ref 4–11)

## 2023-01-23 PROCEDURE — 85027 COMPLETE CBC AUTOMATED: CPT

## 2023-01-23 PROCEDURE — 81456 SO/HL 51/>GSAP RNA ALYS: CPT | Performed by: NEUROLOGICAL SURGERY

## 2023-01-23 PROCEDURE — 250N000013 HC RX MED GY IP 250 OP 250 PS 637: Performed by: STUDENT IN AN ORGANIZED HEALTH CARE EDUCATION/TRAINING PROGRAM

## 2023-01-23 PROCEDURE — 250N000011 HC RX IP 250 OP 636: Performed by: NURSE PRACTITIONER

## 2023-01-23 PROCEDURE — 120N000005 HC R&B MS OVERFLOW UMMC

## 2023-01-23 PROCEDURE — G0452 MOLECULAR PATHOLOGY INTERPR: HCPCS | Mod: 26 | Performed by: PATHOLOGY

## 2023-01-23 PROCEDURE — 74018 RADEX ABDOMEN 1 VIEW: CPT | Mod: 26 | Performed by: RADIOLOGY

## 2023-01-23 PROCEDURE — 97530 THERAPEUTIC ACTIVITIES: CPT | Mod: GO

## 2023-01-23 PROCEDURE — 80048 BASIC METABOLIC PNL TOTAL CA: CPT

## 2023-01-23 PROCEDURE — 250N000013 HC RX MED GY IP 250 OP 250 PS 637: Performed by: NURSE PRACTITIONER

## 2023-01-23 PROCEDURE — 97535 SELF CARE MNGMENT TRAINING: CPT | Mod: GO

## 2023-01-23 PROCEDURE — 93971 EXTREMITY STUDY: CPT | Mod: 26 | Performed by: STUDENT IN AN ORGANIZED HEALTH CARE EDUCATION/TRAINING PROGRAM

## 2023-01-23 PROCEDURE — 84999 UNLISTED CHEMISTRY PROCEDURE: CPT | Performed by: SPECIALIST

## 2023-01-23 PROCEDURE — 84100 ASSAY OF PHOSPHORUS: CPT

## 2023-01-23 PROCEDURE — 83735 ASSAY OF MAGNESIUM: CPT | Performed by: NEUROLOGICAL SURGERY

## 2023-01-23 PROCEDURE — 250N000013 HC RX MED GY IP 250 OP 250 PS 637

## 2023-01-23 PROCEDURE — 74018 RADEX ABDOMEN 1 VIEW: CPT

## 2023-01-23 PROCEDURE — 81277 CYTOGENOMIC NEO MICRORA ALYS: CPT | Performed by: SPECIALIST

## 2023-01-23 PROCEDURE — 93971 EXTREMITY STUDY: CPT | Mod: LT

## 2023-01-23 PROCEDURE — 97530 THERAPEUTIC ACTIVITIES: CPT | Mod: GP | Performed by: PHYSICAL THERAPIST

## 2023-01-23 PROCEDURE — 99024 POSTOP FOLLOW-UP VISIT: CPT

## 2023-01-23 PROCEDURE — 250N000013 HC RX MED GY IP 250 OP 250 PS 637: Performed by: NEUROLOGICAL SURGERY

## 2023-01-23 PROCEDURE — 36415 COLL VENOUS BLD VENIPUNCTURE: CPT

## 2023-01-23 RX ORDER — AMOXICILLIN 250 MG
2 CAPSULE ORAL 2 TIMES DAILY
DISCHARGE
Start: 2023-01-23 | End: 2023-01-30

## 2023-01-23 RX ORDER — GABAPENTIN 100 MG/1
200 CAPSULE ORAL 2 TIMES DAILY
DISCHARGE
Start: 2023-01-23 | End: 2023-07-31

## 2023-01-23 RX ORDER — CHOLECALCIFEROL (VITAMIN D3) 125 MCG
10000 CAPSULE ORAL DAILY
DISCHARGE
Start: 2023-01-24 | End: 2023-03-06

## 2023-01-23 RX ORDER — ALLOPURINOL 100 MG/1
200 TABLET ORAL DAILY
Qty: 180 TABLET | Refills: 3 | COMMUNITY
Start: 2023-01-23 | End: 2023-10-02

## 2023-01-23 RX ORDER — COLCHICINE 0.6 MG/1
0.6 TABLET ORAL DAILY
Qty: 90 TABLET | Refills: 3 | COMMUNITY
Start: 2023-01-23 | End: 2023-10-12

## 2023-01-23 RX ORDER — HYDROCHLOROTHIAZIDE 25 MG/1
25 TABLET ORAL DAILY
Qty: 90 TABLET | Refills: 0 | COMMUNITY
Start: 2023-01-23 | End: 2023-03-19

## 2023-01-23 RX ORDER — HYDROMORPHONE HYDROCHLORIDE 2 MG/1
2-4 TABLET ORAL
Refills: 0 | Status: SHIPPED | DISCHARGE
Start: 2023-01-23 | End: 2023-01-24

## 2023-01-23 RX ORDER — SIMVASTATIN 20 MG
20 TABLET ORAL AT BEDTIME
Qty: 90 TABLET | Refills: 3 | COMMUNITY
Start: 2023-01-23 | End: 2023-10-02

## 2023-01-23 RX ORDER — ACETAMINOPHEN 325 MG/1
650 TABLET ORAL EVERY 6 HOURS PRN
COMMUNITY
Start: 2023-01-23 | End: 2023-01-26

## 2023-01-23 RX ORDER — ZINC SULFATE 50(220)MG
220 CAPSULE ORAL DAILY
DISCHARGE
Start: 2023-01-24 | End: 2023-03-06

## 2023-01-23 RX ORDER — HYDROMORPHONE HYDROCHLORIDE 2 MG/1
2 TABLET ORAL ONCE
Status: COMPLETED | OUTPATIENT
Start: 2023-01-23 | End: 2023-01-23

## 2023-01-23 RX ORDER — METHOCARBAMOL 500 MG/1
500 TABLET, FILM COATED ORAL 4 TIMES DAILY
DISCHARGE
Start: 2023-01-23 | End: 2023-02-01

## 2023-01-23 RX ORDER — POLYETHYLENE GLYCOL 3350 17 G/17G
17 POWDER, FOR SOLUTION ORAL DAILY
Qty: 510 G | DISCHARGE
Start: 2023-01-23 | End: 2023-01-30

## 2023-01-23 RX ADMIN — METHOCARBAMOL 500 MG: 500 TABLET, FILM COATED ORAL at 05:45

## 2023-01-23 RX ADMIN — SENNOSIDES AND DOCUSATE SODIUM 2 TABLET: 50; 8.6 TABLET ORAL at 09:50

## 2023-01-23 RX ADMIN — METHOCARBAMOL 500 MG: 500 TABLET, FILM COATED ORAL at 21:06

## 2023-01-23 RX ADMIN — Medication 250 MG: at 09:36

## 2023-01-23 RX ADMIN — HEPARIN SODIUM 5000 UNITS: 5000 INJECTION, SOLUTION INTRAVENOUS; SUBCUTANEOUS at 09:50

## 2023-01-23 RX ADMIN — METHOCARBAMOL 500 MG: 500 TABLET, FILM COATED ORAL at 16:46

## 2023-01-23 RX ADMIN — Medication 100 UNITS: at 09:36

## 2023-01-23 RX ADMIN — ALLOPURINOL 200 MG: 100 TABLET ORAL at 09:36

## 2023-01-23 RX ADMIN — MENTHOL 1 PATCH: 205.5 PATCH TOPICAL at 09:36

## 2023-01-23 RX ADMIN — HYDROCHLOROTHIAZIDE 25 MG: 25 TABLET ORAL at 09:37

## 2023-01-23 RX ADMIN — LIDOCAINE PATCH 4% 2 PATCH: 40 PATCH TOPICAL at 21:18

## 2023-01-23 RX ADMIN — MAGNESIUM HYDROXIDE 30 ML: 400 SUSPENSION ORAL at 09:50

## 2023-01-23 RX ADMIN — HEPARIN SODIUM 5000 UNITS: 5000 INJECTION, SOLUTION INTRAVENOUS; SUBCUTANEOUS at 18:02

## 2023-01-23 RX ADMIN — HYDROMORPHONE HYDROCHLORIDE 4 MG: 2 TABLET ORAL at 02:35

## 2023-01-23 RX ADMIN — SIMVASTATIN 20 MG: 20 TABLET, FILM COATED ORAL at 21:08

## 2023-01-23 RX ADMIN — METHOCARBAMOL 500 MG: 500 TABLET, FILM COATED ORAL at 09:37

## 2023-01-23 RX ADMIN — GABAPENTIN 200 MG: 100 CAPSULE ORAL at 21:08

## 2023-01-23 RX ADMIN — FAMOTIDINE 20 MG: 20 TABLET, FILM COATED ORAL at 21:08

## 2023-01-23 RX ADMIN — GABAPENTIN 200 MG: 100 CAPSULE ORAL at 09:36

## 2023-01-23 RX ADMIN — HYDROMORPHONE HYDROCHLORIDE 2 MG: 2 TABLET ORAL at 11:43

## 2023-01-23 RX ADMIN — COLCHICINE 0.6 MG: 0.6 TABLET, FILM COATED ORAL at 09:37

## 2023-01-23 RX ADMIN — HYDROMORPHONE HYDROCHLORIDE 4 MG: 2 TABLET ORAL at 09:27

## 2023-01-23 RX ADMIN — HYDROMORPHONE HYDROCHLORIDE 4 MG: 2 TABLET ORAL at 21:06

## 2023-01-23 RX ADMIN — FAMOTIDINE 20 MG: 20 TABLET, FILM COATED ORAL at 09:36

## 2023-01-23 RX ADMIN — Medication 10000 UNITS: at 09:36

## 2023-01-23 RX ADMIN — ZINC SULFATE 220 MG (50 MG) CAPSULE 220 MG: CAPSULE at 09:37

## 2023-01-23 RX ADMIN — HYDROMORPHONE HYDROCHLORIDE 4 MG: 2 TABLET ORAL at 13:24

## 2023-01-23 RX ADMIN — SENNOSIDES AND DOCUSATE SODIUM 2 TABLET: 50; 8.6 TABLET ORAL at 21:08

## 2023-01-23 RX ADMIN — HYDROMORPHONE HYDROCHLORIDE 4 MG: 2 TABLET ORAL at 05:45

## 2023-01-23 RX ADMIN — HYDROMORPHONE HYDROCHLORIDE 4 MG: 2 TABLET ORAL at 16:46

## 2023-01-23 RX ADMIN — HEPARIN SODIUM 5000 UNITS: 5000 INJECTION, SOLUTION INTRAVENOUS; SUBCUTANEOUS at 02:35

## 2023-01-23 ASSESSMENT — ACTIVITIES OF DAILY LIVING (ADL)
ADLS_ACUITY_SCORE: 34
ADLS_ACUITY_SCORE: 33
ADLS_ACUITY_SCORE: 34
ADLS_ACUITY_SCORE: 34
ADLS_ACUITY_SCORE: 33
ADLS_ACUITY_SCORE: 27
ADLS_ACUITY_SCORE: 27
ADLS_ACUITY_SCORE: 33
ADLS_ACUITY_SCORE: 34
ADLS_ACUITY_SCORE: 27

## 2023-01-23 NOTE — PROGRESS NOTES
Care Management Follow Up    [facility]  [address] [place]  [address]  P: [phone]  P: [phone] - Admissions  F: [phone]  1/23: CHW spoke with admissions they are very interested in the pt. They will call SW tomorrow discussing possible placement     [facility] (P: [phone], F: [phone])   1/23:CHW LVM for admissions to f/u on referral; requested they call SW back.     Declined     [facility]  [address] [place] [address]  [phone];  Admissions:  [phone];  F:  [phone]  1/23 Declined; no bed     [facility]  [address] [place]  [address]  P: [phone]  F: [phone]  1/23:Declined; acuity to high    [facility]  [address]  [place], [place] [id number] [phone]  1/23 Declined; no bed     Brijesh Aguiar   Inpatient Community Health Worker  [facility] 5A & 5B

## 2023-01-23 NOTE — PROGRESS NOTES
AVSS.  Neuro's intact.  4 mg Dilaudid given q3 hours PRN when requested.  Heating pad ordered.  Incision clean and dry.  Appetite poor, ate small lunch & a couple bites of dinner.  Purewick with good UOP.  Pt refuses q2 hr turns due to pain, but has been agreeable to turns hours after her doses of Dilaudid because of her pain.  Continue POC.

## 2023-01-23 NOTE — DISCHARGE SUMMARY
[facility] Discharge Summary and Instructions    Laure Wood MRN# [id number]   Age: 72 year old YOB: 1950     Date of Admission:  1/16/2023  Date of Discharge::  1/25/2023  Admitting Physician:  Ambar Antunez MD  Discharge Physician:  Ambar Antunez MD          Admission Diagnoses:   Meningioma (H) [D32.9]  Intradural mass (H) [G95.89]          Discharge Diagnosis:     Meningioma (H) [D32.9]  Intradural mass (H) [G95.89]     Clinically Significant Risk Factors Present on Admission      # HTN         Procedures:   1. T7-9 laminectomy and resection of intradural mass  2. Use of intra-operative C-arm  3. Use of microscope  4. Use of ultrasound  5. Neuromonitoring with SSEPs, MEPs           Brief History of Illness:   Laure is a 72 year old woman who presented to neurosurgery clinic with progressive lower extremity weakness and symptoms of myelopathy. 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  Patient has elected to undergo above-mentioned procedure.           Hospital Course:   Patient underwent above-mentioned procedure on 1/16/23. The operation was uncomplicated and she was admitted to the floor for routine post operative cares. Patient was kept on flat bedrest for first post op day and started on wound healing protocol. On 1/17 patient did have heart rates 80s-90s with a BBB. Echo revealed ejection fraction of 44-60% and left bundle branch block. Troponin not elevated. Chest XR negative for cardiopulmonary findings. Patient did have a one time fever of 101 for which a bilateral lower extremity DVT US was obtained and negative.  Pain Team was consulted for pain management recommendations. Subcutaneous Heparin was started on 1/19. Pathology is still pending at time of discharge.    Patient will have 1 week follow up appointment scheduled with Neurosurgery for wound check and suture removal. If patient is still in her rehab  facility 14 days post-op it is ok that a provider at the facility remove the stitches. Additionally, a referral was placed for patient to schedule an outpatient follow up with Radiation Oncology.           Discharge Medications:     Current Discharge Medication List      START taking these medications    Details   gabapentin (NEURONTIN) 100 MG capsule Take 2 capsules (200 mg) by mouth 2 times daily    Comments: For pain  Associated Diagnoses: S/P laminectomy      HYDROmorphone (DILAUDID) 2 MG tablet Take 1-2 tablets (2-4 mg) by mouth every 3 hours as needed for severe pain (7-10)  Refills: 0    Associated Diagnoses: S/P laminectomy      methocarbamol (ROBAXIN) 500 MG tablet Take 1 tablet (500 mg) by mouth 4 times daily    Comments: For muscle spasms  Associated Diagnoses: S/P laminectomy      polyethylene glycol (MIRALAX) 17 GM/Dose powder Take 17 g by mouth daily  Qty: 510 g    Comments: For constipation  Associated Diagnoses: S/P laminectomy      senna-docusate (SENOKOT-S/PERICOLACE) 8.6-50 MG tablet Take 2 tablets by mouth 2 times daily    Comments: For constipation  Associated Diagnoses: S/P laminectomy      vitamin A 3 MG (47384 UNITS) capsule Take 1 capsule (10,000 Units) by mouth daily    Comments: For wound healing  Associated Diagnoses: S/P laminectomy      zinc sulfate (ZINCATE) 220 (50 Zn) MG capsule Take 1 capsule (220 mg) by mouth daily    Comments: For wound healing  Associated Diagnoses: S/P laminectomy         CONTINUE these medications which have CHANGED    Details   acetaminophen (TYLENOL) 325 MG tablet Take 2 tablets (650 mg) by mouth every 6 hours as needed for mild pain    Associated Diagnoses: S/P TKR (total knee replacement) using cement, right      allopurinol (ZYLOPRIM) 100 MG tablet Take 2 tablets (200 mg) by mouth daily  Qty: 180 tablet, Refills: 3    Comments: Gout  Associated Diagnoses: Idiopathic chronic gout of multiple sites without tophus      colchicine (COLCYRS) 0.6 MG tablet Take  1 tablet (0.6 mg) by mouth daily  Qty: 90 tablet, Refills: 3    Comments: Gout  Associated Diagnoses: Idiopathic chronic gout of multiple sites without tophus      diclofenac (VOLTAREN) 1 % topical gel Apply 2-4 g topically 4 times daily R Knee, foot as needed  Qty: 100 g, Refills: 11    Comments: For pain  Associated Diagnoses: Osteoarthritis of both knees, unspecified osteoarthritis type      hydrochlorothiazide (HYDRODIURIL) 25 MG tablet Take 1 tablet (25 mg) by mouth daily  Qty: 90 tablet, Refills: 0    Comments: Hypertension  Associated Diagnoses: Essential hypertension, benign      omeprazole (PRILOSEC) 20 MG DR capsule Take 1 capsule (20 mg) by mouth daily as needed (pt takes prn for occasional GERD symptoms)    Comments: GERD      simvastatin (ZOCOR) 20 MG tablet Take 1 tablet (20 mg) by mouth At Bedtime  Qty: 90 tablet, Refills: 3    Comments: Hyperlipidemia  Associated Diagnoses: Mixed hyperlipidemia            Objective:   Temp:  [98.3  F (36.8  C)-99.2  F (37.3  C)] 98.6  F (37  C)  Pulse:  [74-86] 82  Resp:  [14-18] 14  BP: ()/(59-75) 117/73  SpO2:  [93 %-98 %] 93 %  I/O last 3 completed shifts:  In: 2250 [P.O.:2250]  Out: 2350 [Urine:2350]     Gen: Appears comfortable, NAD  Wound: Incision, clean, dry, intact.  Neurologic:  - Alert & Oriented to person, place, time, and situation  - Follows commands briskly  - Speech fluent, spontaneous. No aphasia or dysarthria.  - No gaze preference. No apparent hemineglect.  - PERRL, EOMI  - Strong eye closure, jaw clench, and cheek puff  - Face symmetric with sensation intact to light touch  - Trapezii and sternocleidomastoid muscles 5/5 bilaterally  - No pronator drift       Del Tr Bi WE WF Gr   R 5 5 5 5 5 5   L 5 5 5 5 5 5     HF KE KF DF PF EHL   R 4 4 4 4 4 4   L 4+ 4+ 4+ 4+ 4+ 4+   Patient is pain limited in left hand today due to IV placement attempts at this location.  Reflexes 2+ throughout     Sensation intact and symmetric to light touch throughout  bilateral upper extremities.  Numbness and tingling present in bilateral lower extremities.            Discharge Instructions and Follow-Up:     Discharge diet: Regular   Discharge activity: You may advance activity as tolerated. No strenuous exercise or heay lifting greater than 10 lbs for 4 weeks or until seen and cleared in clinic.     Discharge follow-up: Follow-up with Neurosurgery [facility] in 1 weeks for wound check/post-hospital evaluation.    Follow-up Dr. Ambar Antunez MD in 6 weeks, all additional follow-up visits to be determined by Dr. Ambar Antunez MD      Patient will require follow-up with Radiation Oncology in 2 weeks.      Wound care: Ok to shower,however no scrubbing of the wound and no soaking of the wound, meaning no bathtubs or swimming pools. Pat dry only. Leave wound open to air.  Patient to have wound checked 2 weeks following surgery.    Wound location: back  Closure technique: Nylon suture  Dressing needs: None  Post-op care at follow-up: Keep dry and clean       Please call if you have:  1. increased pain, redness, drainage, swelling at your incision  2. fevers > 101.5 F degrees  3. with any questions or concerns.  You may reach the Neurosurgery clinic at [phone] during regular work hours. ER at [phone].    and ask for the Neurosurgery Resident on call at [phone], during off hours or weekends.         Discharge Disposition:     Discharged to rehabilitation facility        Nisreen Bobo PA-C  Neurosurgery Department  Pager: [phone]

## 2023-01-23 NOTE — PROGRESS NOTES
[facility], [place]   Neurosurgery Progress Note:    Date of service: 01/23/2023    Assessment: Laure Wood is a 72 year old female s/p Thoracic 7-9 laminectomy with resection of intradural extramedullary mass on 1/16/2023 with Dr. Ambar Antunez.     Clinically Significant Risk Factors Present on Admission       # HTN      Plan:  - Neuro checks/vital signs: Every 4 hours  - SBP: <160  - Pain control: Tylenol, gabapentin PO Dialudid PRN and lidocaine patches  - Pain Management consult - appreciate recommendations.   - Activity: up as tolerated  - Restrictions/Bracing: None  - Diet: ADAT  - DVT prophylaxis: SCDs and subcutaneous heparin 5000 units q 8 hours  - PT/OT: recommending TCU   - Nutrition consult for high protein diet  - Wound healing protocol  - Daily dressing changes  - Bowel regimen   - To chair/ambulate TID  - Radiation oncology referral upon discharge    Nisreen Bobo PA-C  Neurosurgery Department  Pager: [phone]      Interval History:  Patient reports back pain is currently well controlled. She is still reporting some paresthesias to bilateral legs and feet. States she has been eating well and had bowel movement yesterday.    Objective:   Temp:  [97.2  F (36.2  C)-98.8  F (37.1  C)] 98.3  F (36.8  C)  Pulse:  [67-85] 82  Resp:  [15-18] 16  BP: (121-163)/(65-81) 121/69  SpO2:  [97 %-99 %] 99 %  I/O last 3 completed shifts:  In: 930 [P.O.:930]  Out: 2675 [Urine:2675]    Gen: Appears comfortable, NAD  Wound: Incision, clean, dry, intact.  Neurologic:  - Alert & Oriented to person, place, time, and situation  - Follows commands briskly  - Speech fluent, spontaneous. No aphasia or dysarthria.  - No gaze preference. No apparent hemineglect.  - PERRL, EOMI  - Strong eye closure, jaw clench, and cheek puff  - Face symmetric with sensation intact to light touch  - Trapezii and sternocleidomastoid muscles 5/5 bilaterally  - No pronator drift     Del Tr Bi WE WF Gr   R 5 5 5 5 5 5    L 5 5 5 5 5 5    HF KE KF DF PF EHL   R 4 4 4 4 4 4   L 4+ 4+ 4+ 4+ 4+ 4+   Patient is pain limited in left hand today due to IV placement attempts at this location.  Reflexes 2+ throughout    Sensation intact and symmetric to light touch throughout bilateral upper extremities.  Numbness and tingling present in pelvis area and in bilateral lower extremities.     LABS  Recent Labs   Lab Test 01/23/23  0347 01/22/23  0751 01/21/23  0444   WBC 7.8 7.2 8.5   HGB 9.9* 9.9* 9.9*   MCV 89 88 89    353 279       Recent Labs   Lab Test 01/23/23  0347 01/22/23  0751 01/21/23  0444    139 137   POTASSIUM 4.1 4.1 4.2   CHLORIDE 99 100 100   CO2 24 26 24   BUN 28.2* 20.1 17.8   CR 0.94 0.94 0.91   ANIONGAP 16* 13 13   ALONSO 10.0 9.6 9.1   * 104* 105*       IMAGING    Recent Results (from the past 24 hour(s))   XR Surgery [facility] L/T 5 Min Fluoro w Stills    Narrative    This exam was marked as non-reportable because it will not be read by a   radiologist or a [place] non-radiologist provider.

## 2023-01-23 NOTE — PLAN OF CARE
Neuro: A&Ox4.   Cardiac: HR regular. VSS.   Respiratory: Sating >92% on RA.  GI/: Adequate urine output. BM X1  Diet/appetite: Tolerating Regular diet. Eating fair.   Activity:  Assist of 2 with gait belt, up to chair.  Pain: At acceptable level on current regimen. -see MAR  Skin: No new deficits noted.  LDA's: PIV-SL.     Plan: X1 2mg po Dilaudid given for breakthrough pain after first time out of bed. BLE numbness and new L hand numbness reported to neurosurgery MDs. L hand ultrasound ordered. Abdominal ultrasound completed. Encouraged activity and out of bed. Updated care plan.

## 2023-01-23 NOTE — PLAN OF CARE
"AVSS.  Neuro's intact.  4 mg Dilaudid given q3 hours, and heat packs for back pain.  Heating pad ordered.  Incision cleaned & dressing changed.  Mag 2.0, 2 mg replaced, recheck in a.m.  Appetite poor, ate small lunch & a couple bites of dinner.  Purewick with good UOP.  No BM today.  Bed bath given by pt's daughter.  PIV was burning with flushes, new one placed.  Pt refuses q2 hr turns due to pain, but agreeable to turn q3 hours after her doses of Dilaudid are given.  Continue POC.       Problem: Plan of Care - These are the overarching goals to be used throughout the patient stay.    Goal: Plan of Care Review  Description: The Plan of Care Review/Shift note should be completed every shift.  The Outcome Evaluation is a brief statement about your assessment that the patient is improving, declining, or no change.  This information will be displayed automatically on your shift note.  Outcome: Progressing     Problem: Plan of Care - These are the overarching goals to be used throughout the patient stay.    Goal: Patient-Specific Goal (Individualized)  Description: You can add care plan individualizations to a care plan. Examples of Individualization might be:  \"Parent requests to be called daily at 9am for status\", \"I have a hard time hearing out of my right ear\", or \"Do not touch me to wake me up as it startles me\".  Outcome: Progressing     Problem: Plan of Care - These are the overarching goals to be used throughout the patient stay.    Goal: Absence of Hospital-Acquired Illness or Injury  Outcome: Progressing     Problem: Plan of Care - These are the overarching goals to be used throughout the patient stay.    Goal: Absence of Hospital-Acquired Illness or Injury  Intervention: Identify and Manage Fall Risk  Recent Flowsheet Documentation  Taken 1/22/2023 0800 by Joan Enciso, RN  Safety Promotion/Fall Prevention:   activity supervised   assistive device/personal items within reach     Problem: Plan of Care - These " are the overarching goals to be used throughout the patient stay.    Goal: Absence of Hospital-Acquired Illness or Injury  Intervention: Prevent Skin Injury  Recent Flowsheet Documentation  Taken 1/22/2023 1200 by Joan Enciso RN  Body Position:   turned   right  Taken 1/22/2023 1100 by Joan Enciso RN  Body Position: (turned side to side during bed bath w/daughter) turned  Taken 1/22/2023 1030 by Joan Enciso RN  Body Position: (Pt states she wants to only be turned q4 d/t pain. After RN educating, pt agreed to turn q3 to align with when she gets dilaudid) refuses positioning  Taken 1/22/2023 0830 by Joan Enciso RN  Body Position:   turned   left     Problem: Plan of Care - These are the overarching goals to be used throughout the patient stay.    Goal: Absence of Hospital-Acquired Illness or Injury  Intervention: Prevent and Manage VTE (Venous Thromboembolism) Risk  Recent Flowsheet Documentation  Taken 1/22/2023 0800 by Joan Enciso RN  VTE Prevention/Management: SCDs (sequential compression devices) on     Problem: Plan of Care - These are the overarching goals to be used throughout the patient stay.    Goal: Optimal Comfort and Wellbeing  Outcome: Progressing     Problem: Plan of Care - These are the overarching goals to be used throughout the patient stay.    Goal: Optimal Comfort and Wellbeing  Intervention: Monitor Pain and Promote Comfort  Recent Flowsheet Documentation  Taken 1/22/2023 1100 by Joan Enciso RN  Pain Management Interventions: medication (see MAR)  Taken 1/22/2023 0808 by Joan Enciso RN  Pain Management Interventions: medication (see MAR)     Problem: Plan of Care - These are the overarching goals to be used throughout the patient stay.    Goal: Readiness for Transition of Care  Outcome: Progressing     Problem: Hypertension Comorbidity  Goal: Blood Pressure in Desired Range  Outcome: Progressing     Problem: Hypertension Comorbidity  Goal: Blood Pressure in Desired  Range  Intervention: Maintain Blood Pressure Management  Recent Flowsheet Documentation  Taken 1/22/2023 0800 by Joan Enciso RN  Medication Review/Management: medications reviewed     Problem: Spinal Surgery  Goal: Optimal Coping with Surgery  Outcome: Progressing     Problem: Spinal Surgery  Goal: Absence of Bleeding  Outcome: Progressing     Problem: Spinal Surgery  Goal: Effective Bowel Elimination  Outcome: Progressing     Problem: Spinal Surgery  Goal: Fluid and Electrolyte Balance  Outcome: Progressing     Problem: Spinal Surgery  Goal: Optimal Functional Ability  Outcome: Progressing     Problem: Spinal Surgery  Goal: Optimal Functional Ability  Intervention: Optimize Functional Status  Recent Flowsheet Documentation  Taken 1/22/2023 0830 by Joan Enciso RN  Positioning/Transfer Devices:   pillows   in use  Taken 1/22/2023 0800 by Joan Enciso RN  Activity Management: activity adjusted per tolerance     Problem: Spinal Surgery  Goal: Absence of Infection Signs and Symptoms  Outcome: Progressing     Problem: Spinal Surgery  Goal: Optimal Neurologic Function  Outcome: Progressing     Problem: Spinal Surgery  Goal: Optimal Neurologic Function  Intervention: Optimize Neurologic Function  Recent Flowsheet Documentation  Taken 1/22/2023 1200 by Joan Enciso RN  Body Position:   turned   right  Taken 1/22/2023 1100 by Joan Enciso RN  Body Position: (turned side to side during bed bath w/daughter) turned  Taken 1/22/2023 1030 by Joan Enciso RN  Body Position: (Pt states she wants to only be turned q4 d/t pain. After RN educating, pt agreed to turn q3 to align with when she gets dilaudid) refuses positioning  Taken 1/22/2023 0830 by Joan Enciso RN  Body Position:   turned   left     Problem: Spinal Surgery  Goal: Anesthesia/Sedation Recovery  Intervention: Optimize Anesthesia Recovery  Recent Flowsheet Documentation  Taken 1/22/2023 0800 by Joan Enciso RN  Safety Promotion/Fall Prevention:    activity supervised   assistive device/personal items within reach  Administration (IS): self-administered     Problem: Spinal Surgery  Goal: Optimal Pain Control and Function  Outcome: Progressing     Problem: Spinal Surgery  Goal: Optimal Pain Control and Function  Intervention: Prevent or Manage Pain  Recent Flowsheet Documentation  Taken 1/22/2023 1100 by Joan Enciso RN  Pain Management Interventions: medication (see MAR)  Taken 1/22/2023 0808 by Joan Enciso RN  Pain Management Interventions: medication (see MAR)     Problem: Spinal Surgery  Goal: Nausea and Vomiting Relief  Outcome: Progressing     Problem: Spinal Surgery  Goal: Effective Urinary Elimination  Outcome: Progressing     Problem: Spinal Surgery  Goal: Effective Oxygenation and Ventilation  Intervention: Optimize Oxygenation and Ventilation  Recent Flowsheet Documentation  Taken 1/22/2023 1433 by Joan Enciso RN  Head of Bed (HOB) Positioning: HOB at 20-30 degrees  Taken 1/22/2023 1200 by Joan Enciso RN  Head of Bed (HOB) Positioning: HOB at 20-30 degrees  Taken 1/22/2023 1100 by Joan Enciso RN  Head of Bed (HOB) Positioning: HOB at 20-30 degrees  Taken 1/22/2023 1030 by Joan Enciso RN  Head of Bed (HOB) Positioning: HOB at 20-30 degrees  Taken 1/22/2023 0830 by Joan Enciso RN  Head of Bed (HOB) Positioning: HOB at 20-30 degrees     Problem: Pain Acute  Goal: Optimal Pain Control and Function  Outcome: Progressing     Problem: Pain Acute  Goal: Optimal Pain Control and Function  Intervention: Develop Pain Management Plan  Recent Flowsheet Documentation  Taken 1/22/2023 1100 by Joan Enciso RN  Pain Management Interventions: medication (see MAR)  Taken 1/22/2023 0808 by Joan Enciso RN  Pain Management Interventions: medication (see MAR)     Problem: Pain Acute  Goal: Optimal Pain Control and Function  Intervention: Prevent or Manage Pain  Recent Flowsheet Documentation  Taken 1/22/2023 0800 by Joan Enciso RN  Medication  Review/Management: medications reviewed   Goal Outcome Evaluation:

## 2023-01-24 ENCOUNTER — APPOINTMENT (OUTPATIENT)
Dept: PHYSICAL THERAPY | Facility: CLINIC | Age: 73
DRG: 029 | End: 2023-01-24
Attending: NEUROLOGICAL SURGERY
Payer: COMMERCIAL

## 2023-01-24 ENCOUNTER — APPOINTMENT (OUTPATIENT)
Dept: OCCUPATIONAL THERAPY | Facility: CLINIC | Age: 73
DRG: 029 | End: 2023-01-24
Attending: NEUROLOGICAL SURGERY
Payer: COMMERCIAL

## 2023-01-24 LAB
Lab: NORMAL
MAGNESIUM SERPL-MCNC: 2.1 MG/DL (ref 1.7–2.3)
PERFORMING LABORATORY: NORMAL
PHOSPHATE SERPL-MCNC: 3.7 MG/DL (ref 2.5–4.5)
POTASSIUM SERPL-SCNC: 4 MMOL/L (ref 3.4–5.3)
SPECIMEN STATUS: NORMAL
TEST NAME: NORMAL

## 2023-01-24 PROCEDURE — 250N000013 HC RX MED GY IP 250 OP 250 PS 637: Performed by: NEUROLOGICAL SURGERY

## 2023-01-24 PROCEDURE — 250N000013 HC RX MED GY IP 250 OP 250 PS 637: Performed by: NURSE PRACTITIONER

## 2023-01-24 PROCEDURE — 97530 THERAPEUTIC ACTIVITIES: CPT | Mod: GO

## 2023-01-24 PROCEDURE — 250N000011 HC RX IP 250 OP 636: Performed by: NURSE PRACTITIONER

## 2023-01-24 PROCEDURE — 83735 ASSAY OF MAGNESIUM: CPT | Performed by: NEUROLOGICAL SURGERY

## 2023-01-24 PROCEDURE — 84100 ASSAY OF PHOSPHORUS: CPT | Performed by: NEUROLOGICAL SURGERY

## 2023-01-24 PROCEDURE — 97530 THERAPEUTIC ACTIVITIES: CPT | Mod: GP | Performed by: PHYSICAL THERAPIST

## 2023-01-24 PROCEDURE — 120N000005 HC R&B MS OVERFLOW UMMC

## 2023-01-24 PROCEDURE — 97116 GAIT TRAINING THERAPY: CPT | Mod: GP | Performed by: PHYSICAL THERAPIST

## 2023-01-24 PROCEDURE — 84132 ASSAY OF SERUM POTASSIUM: CPT | Performed by: NEUROLOGICAL SURGERY

## 2023-01-24 PROCEDURE — 250N000013 HC RX MED GY IP 250 OP 250 PS 637

## 2023-01-24 PROCEDURE — 36415 COLL VENOUS BLD VENIPUNCTURE: CPT | Performed by: NEUROLOGICAL SURGERY

## 2023-01-24 PROCEDURE — 97535 SELF CARE MNGMENT TRAINING: CPT | Mod: GO

## 2023-01-24 RX ORDER — HYDROXYZINE HYDROCHLORIDE 25 MG/1
50 TABLET, FILM COATED ORAL EVERY 6 HOURS PRN
Status: DISCONTINUED | OUTPATIENT
Start: 2023-01-24 | End: 2023-01-25 | Stop reason: HOSPADM

## 2023-01-24 RX ORDER — ACETAMINOPHEN 325 MG/1
650 TABLET ORAL EVERY 4 HOURS PRN
Status: DISCONTINUED | OUTPATIENT
Start: 2023-01-24 | End: 2023-01-25 | Stop reason: HOSPADM

## 2023-01-24 RX ORDER — ACETAMINOPHEN 325 MG/1
650 TABLET ORAL EVERY 4 HOURS PRN
COMMUNITY
Start: 2023-01-24 | End: 2023-01-26 | Stop reason: DRUGHIGH

## 2023-01-24 RX ORDER — HYDROMORPHONE HYDROCHLORIDE 2 MG/1
2-4 TABLET ORAL
Qty: 60 TABLET | Refills: 0 | Status: SHIPPED | OUTPATIENT
Start: 2023-01-24 | End: 2023-01-25

## 2023-01-24 RX ORDER — HYDROXYZINE HYDROCHLORIDE 25 MG/1
25 TABLET, FILM COATED ORAL EVERY 6 HOURS PRN
Status: DISCONTINUED | OUTPATIENT
Start: 2023-01-24 | End: 2023-01-25 | Stop reason: HOSPADM

## 2023-01-24 RX ADMIN — METHOCARBAMOL 500 MG: 500 TABLET, FILM COATED ORAL at 15:49

## 2023-01-24 RX ADMIN — FAMOTIDINE 20 MG: 20 TABLET, FILM COATED ORAL at 20:24

## 2023-01-24 RX ADMIN — POLYETHYLENE GLYCOL 3350 17 G: 17 POWDER, FOR SOLUTION ORAL at 20:24

## 2023-01-24 RX ADMIN — ZINC SULFATE 220 MG (50 MG) CAPSULE 220 MG: CAPSULE at 09:32

## 2023-01-24 RX ADMIN — HYDROMORPHONE HYDROCHLORIDE 4 MG: 2 TABLET ORAL at 00:18

## 2023-01-24 RX ADMIN — ALLOPURINOL 200 MG: 100 TABLET ORAL at 09:32

## 2023-01-24 RX ADMIN — FAMOTIDINE 20 MG: 20 TABLET, FILM COATED ORAL at 09:32

## 2023-01-24 RX ADMIN — ACETAMINOPHEN 650 MG: 325 TABLET ORAL at 18:54

## 2023-01-24 RX ADMIN — LIDOCAINE PATCH 4% 2 PATCH: 40 PATCH TOPICAL at 20:24

## 2023-01-24 RX ADMIN — METHOCARBAMOL 500 MG: 500 TABLET, FILM COATED ORAL at 03:24

## 2023-01-24 RX ADMIN — HYDROMORPHONE HYDROCHLORIDE 4 MG: 2 TABLET ORAL at 12:46

## 2023-01-24 RX ADMIN — Medication 10000 UNITS: at 09:33

## 2023-01-24 RX ADMIN — Medication 100 UNITS: at 09:32

## 2023-01-24 RX ADMIN — HYDROMORPHONE HYDROCHLORIDE 4 MG: 2 TABLET ORAL at 15:49

## 2023-01-24 RX ADMIN — HEPARIN SODIUM 5000 UNITS: 5000 INJECTION, SOLUTION INTRAVENOUS; SUBCUTANEOUS at 09:32

## 2023-01-24 RX ADMIN — GABAPENTIN 200 MG: 100 CAPSULE ORAL at 20:24

## 2023-01-24 RX ADMIN — COLCHICINE 0.6 MG: 0.6 TABLET, FILM COATED ORAL at 09:32

## 2023-01-24 RX ADMIN — HYDROCHLOROTHIAZIDE 25 MG: 25 TABLET ORAL at 09:32

## 2023-01-24 RX ADMIN — HYDROMORPHONE HYDROCHLORIDE 4 MG: 2 TABLET ORAL at 03:24

## 2023-01-24 RX ADMIN — HYDROMORPHONE HYDROCHLORIDE 4 MG: 2 TABLET ORAL at 22:13

## 2023-01-24 RX ADMIN — HYDROXYZINE HYDROCHLORIDE 25 MG: 25 TABLET, FILM COATED ORAL at 13:54

## 2023-01-24 RX ADMIN — ACETAMINOPHEN 650 MG: 325 TABLET ORAL at 13:34

## 2023-01-24 RX ADMIN — HYDROMORPHONE HYDROCHLORIDE 4 MG: 2 TABLET ORAL at 06:21

## 2023-01-24 RX ADMIN — Medication 250 MG: at 09:32

## 2023-01-24 RX ADMIN — SENNOSIDES AND DOCUSATE SODIUM 2 TABLET: 50; 8.6 TABLET ORAL at 20:24

## 2023-01-24 RX ADMIN — HEPARIN SODIUM 5000 UNITS: 5000 INJECTION, SOLUTION INTRAVENOUS; SUBCUTANEOUS at 18:50

## 2023-01-24 RX ADMIN — METHOCARBAMOL 500 MG: 500 TABLET, FILM COATED ORAL at 22:13

## 2023-01-24 RX ADMIN — HYDROMORPHONE HYDROCHLORIDE 4 MG: 2 TABLET ORAL at 09:27

## 2023-01-24 RX ADMIN — SIMVASTATIN 20 MG: 20 TABLET, FILM COATED ORAL at 22:13

## 2023-01-24 RX ADMIN — GABAPENTIN 200 MG: 100 CAPSULE ORAL at 09:32

## 2023-01-24 RX ADMIN — HYDROXYZINE HYDROCHLORIDE 25 MG: 25 TABLET, FILM COATED ORAL at 20:24

## 2023-01-24 RX ADMIN — METHOCARBAMOL 500 MG: 500 TABLET, FILM COATED ORAL at 09:32

## 2023-01-24 RX ADMIN — HYDROMORPHONE HYDROCHLORIDE 4 MG: 2 TABLET ORAL at 18:50

## 2023-01-24 RX ADMIN — HEPARIN SODIUM 5000 UNITS: 5000 INJECTION, SOLUTION INTRAVENOUS; SUBCUTANEOUS at 02:16

## 2023-01-24 ASSESSMENT — ACTIVITIES OF DAILY LIVING (ADL)
ADLS_ACUITY_SCORE: 27

## 2023-01-24 NOTE — PROVIDER NOTIFICATION
Paged neurosurgery resident:  pt asking for a muscle relaxer, she has robaxin scheduled already but she continues to struggle with turns and working with therapy secondary to pain.

## 2023-01-24 NOTE — PLAN OF CARE
Problem: Plan of Care - These are the overarching goals to be used throughout the patient stay.    Goal: Optimal Comfort and Wellbeing  Intervention: Monitor Pain and Promote Comfort  Recent Flowsheet Documentation  Taken 1/23/2023 2100 by Mary Lopez, RN  Pain Management Interventions:    medication (see MAR)    heat applied  Taken 1/23/2023 1606 by Mary Lopez, RN  Pain Management Interventions: (heat to left  posterior hand-wrist) medication (see MAR)   Goal Outcome Evaluation:  S-post surgical intervention to remove mass located in the spinal dural area. Incsion cover with dressing and is dry and intact. Upper area at start of incsion location with edema. Daughter was in room when MD came in room and is aware. Family member stated MD took a picture-pt able to have 2 ensures-yogurt and bread thru out the bharati. Pt able to turn in bed side to side with minimal assistance. Needs guidance on pillows and linen etc. Dilaudid given x 2 this bharati. Purix used for urine output. Did not want any more activity after being in chair x 3 hrs earlier. Encouraged IS and stated she does it a lot up to the top- daughter confirmed this. 'i try my best to do it freq'  B-post op surgical mass removal-  -Copy paste- s/p Thoracic 7-9 laminectomy with resection of intradural extramedullary mass on 1/16/2023 with Dr. Ambar Antunez.   A-pt with increasing mobility and nutritional intake.   R-give pt ensure x 3 times daily on ice... will drinkit if you help her--holding it to drink until completed. Encourage pt to repostion and roll in bed with minimal assistance offer analgesia atc. Postive feed back on her efforts.

## 2023-01-24 NOTE — PROGRESS NOTES
CLINICAL NUTRITION SERVICES - REASSESSMENT NOTE     Nutrition Prescription    RECOMMENDATIONS FOR MDs/PROVIDERS TO ORDER:  None at this time     Malnutrition Status:    Patient does not meet two criteria at this time but is at risk     Recommendations already ordered by Registered Dietitian (RD):  Continue ensure max     Future/Additional Recommendations:  Continue to monitor appetite, oral intake and need for nutrition supplements      EVALUATION OF THE PROGRESS TOWARD GOALS   Diet: Regular  + ensure max protein @ 2:00   Intake: 25-50%      NEW FINDINGS   Laure was not responsive to writer- daughter in room and reported pain is currently her biggest concern and Laure appeared visually very uncomfortable through visit- grimacing.  Daughter reported nutrition is not a concern at this time.     Weight Trends:   01/24/23 0830 91 kg (200 lb 9.9 oz) --   01/23/23 0827 90.9 kg (200 lb 4.8 oz) Bed scale   01/22/23 0347 93.3 kg (205 lb 11.2 oz) Bed scale   01/21/23 2139 89 kg (196 lb 1.6 oz) --   01/18/23 0226 91.8 kg (202 lb 6.4 oz) Bed scale   01/17/23 0200 89.2 kg (196 lb 9.6 oz) Bed scale   01/16/23 1137 88.3 kg (194 lb 10.7 oz)    No weight loss     GI: No nausea noted. Last bowel movement, 1/23, diarrhea noted.    Skin: no wounds noted, midline back incision  Labs: Reviewed   Medications:   Ascorbic acid  Vitamin A 31598 unites  Vitaman E 100 units   Zinc sulfate    MALNUTRITION  % Intake: < 75% for > 7 days (moderate)  % Weight Loss: None noted  Subcutaneous Fat Loss: None observed- limited assessment  Muscle Loss: None observed- limited assessment  Fluid Accumulation/Edema: None noted  Malnutrition Diagnosis: Patient does not meet two of the established criteria necessary for diagnosing malnutrition but is at risk for malnutrition    Previous Goals   Patient to consume % of nutritionally adequate meal trays TID, or the equivalent with supplements/snacks  Evaluation: Not met    Previous Nutrition  Diagnosis  Increased nutrient needs (protein) related to post-op status as evidenced by RD consult for high protein education, goal for 1.5 gm/kg protein per day.    Evaluation: ongoing     CURRENT NUTRITION DIAGNOSIS  Predicted inadequate nutrient intake (energy/protein) related to pain and increased metabolic demand as evidenced by intakes of 25-50%       Increased nutrient needs (protein) related to post-op status as evidenced by RD consult for high protein education, goal for 1.5 gm/kg protein per day.      INTERVENTIONS  Implementation  Encouraged oral intake   Encouraged continued use of ensure pending adequacy/frequency of consumption of other high protein foods   -High protein foods reviewed     Goals  Patient to consume % of nutritionally adequate meal trays TID, or the equivalent with supplements/snacks.    Monitoring/Evaluation  Progress toward goals will be monitored and evaluated per protocol.    Monica Fajardo RD, LD  5C/BMT pager: [phone]

## 2023-01-24 NOTE — PLAN OF CARE
"Vital signs:  Temp: 97.7  F (36.5  C) Temp src: Axillary BP: 120/77 Pulse: 91   Resp: 16 SpO2: 99 % O2 Device: None (Room air) Oxygen Delivery: 2 LPM Height: 160 cm (5' 3\") Weight: 91 kg (200 lb 9.9 oz)  Estimated body mass index is 35.54 kg/m  as calculated from the following:    Height as of this encounter: 1.6 m (5' 3\").    Weight as of this encounter: 91 kg (200 lb 9.9 oz).      Pt denies nausea. Fair appetite. Complains of back pain and pain to left arm. Pt has T7-9 laminectomy on 1/16. Dressing to thoracic spine changed this morning, site CDI, sutures remain in place. Q4hr neuros unchanged. Left arm with decreased range of motion, decreased strength, and pain that pt believes is secondary to multiple pokes for an IV the other day? US LUE negative for DVT. Pt using hot packs for comfort to LUE. Pt struggled this morning to work with therapy and to reposition secondary to pain; spoke with providers and got orders for atarax and tylenol, pt given both x1 with improvement in function for afternoon PT session. Also, pt likes PO dilaudid around the clock. Assist x2 to pivot to chair/commode. Reposition q2hrs. Pt does turn well but needs help to position pillows and blankets. Plan for discharge to [facility] tomorrow at 1015 via wheelchair transport.    Problem: Plan of Care - These are the overarching goals to be used throughout the patient stay.    Goal: Plan of Care Review  Description: The Plan of Care Review/Shift note should be completed every shift.  The Outcome Evaluation is a brief statement about your assessment that the patient is improving, declining, or no change.  This information will be displayed automatically on your shift note.  Outcome: Progressing  Flowsheets (Taken 1/24/2023 1740)  Plan of Care Reviewed With: patient  Overall Patient Progress: improving  Goal: Absence of Hospital-Acquired Illness or Injury  Intervention: Identify and Manage Fall Risk  Recent Flowsheet Documentation  Taken " 1/24/2023 0800 by Gemma Norman RN  Safety Promotion/Fall Prevention: fall prevention program maintained  Intervention: Prevent Skin Injury  Recent Flowsheet Documentation  Taken 1/24/2023 0930 by Gemma Norman RN  Body Position:    turned    left  Taken 1/24/2023 0800 by Gemma Norman RN  Body Position: (wants to wait until pain medication available) refuses positioning  Goal: Optimal Comfort and Wellbeing  Outcome: Progressing  Intervention: Monitor Pain and Promote Comfort  Recent Flowsheet Documentation  Taken 1/24/2023 0927 by Gemma Norman RN  Pain Management Interventions: medication (see MAR)  Taken 1/24/2023 0830 by Gemma Norman RN  Pain Management Interventions: around-the-clock dosing utilized     Problem: Hypertension Comorbidity  Goal: Blood Pressure in Desired Range  Intervention: Maintain Blood Pressure Management  Recent Flowsheet Documentation  Taken 1/24/2023 0800 by Gemma Norman RN  Medication Review/Management: medications reviewed     Problem: Spinal Surgery  Goal: Optimal Coping with Surgery  Outcome: Progressing  Goal: Absence of Bleeding  Outcome: Progressing  Goal: Effective Bowel Elimination  Outcome: Progressing  Goal: Optimal Functional Ability  Outcome: Progressing  Intervention: Optimize Functional Status  Recent Flowsheet Documentation  Taken 1/24/2023 0800 by Gemma Norman RN  Activity Management: activity adjusted per tolerance  Goal: Absence of Infection Signs and Symptoms  Outcome: Progressing  Goal: Optimal Neurologic Function  Intervention: Optimize Neurologic Function  Recent Flowsheet Documentation  Taken 1/24/2023 0930 by Gemma Norman RN  Body Position:    turned    left  Taken 1/24/2023 0800 by Gemma Norman RN  Body Position: (wants to wait until pain medication available) refuses positioning  Goal: Anesthesia/Sedation Recovery  Intervention: Optimize Anesthesia Recovery  Recent Flowsheet Documentation  Taken 1/24/2023 0800 by Gemma Norman RN  Safety Promotion/Fall  Prevention: fall prevention program maintained  Goal: Optimal Pain Control and Function  Outcome: Progressing  Intervention: Prevent or Manage Pain  Recent Flowsheet Documentation  Taken 1/24/2023 0927 by Gemma Norman, RN  Pain Management Interventions: medication (see MAR)  Taken 1/24/2023 0830 by Gemma Norman, RN  Pain Management Interventions: around-the-clock dosing utilized   Goal Outcome Evaluation:      Plan of Care Reviewed With: patient    Overall Patient Progress: improvingOverall Patient Progress: improving

## 2023-01-24 NOTE — PLAN OF CARE
"/73 (BP Location: Right arm)   Pulse 82   Temp 98.6  F (37  C) (Oral)   Resp 14   Ht 1.6 m (5' 3\")   Wt 90.9 kg (200 lb 4.8 oz)   SpO2 93%   BMI 35.48 kg/m       Patient afebrile, vital signs stable, patient complained in pain in left arm due to previous IV attempts. Patient refused Q2 hour turns due to pain and has been turning Q3 hrs when PRN pain medication becomes available. Patient has a Purewick. Due to Purewick leakage, patient had a urine incontinence incident. Neuro checks were completed. Patient is an assist level of 2 with a gait belt. Continue with plan of care.    Goal Outcome Evaluation:  Problem: Spinal Surgery  Goal: Optimal Pain Control and Function  Outcome: Not Progressing     Problem: Pain Acute  Goal: Optimal Pain Control and Function  Outcome: Not Progressing  Intervention: Prevent or Manage Pain  Recent Flowsheet Documentation  Taken 1/24/2023 0000 by Raimundo Louie RN  Medication Review/Management: medications reviewed     Problem: Spinal Surgery  Goal: Nausea and Vomiting Relief  Outcome: Progressing                           "

## 2023-01-24 NOTE — PROGRESS NOTES
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 Ambar Antunez.     Clinically Significant Risk Factors Present on Admission       # HTN      Plan:  - Neuro checks/vital signs: Every 4 hours  - SBP: <160  - Pain control: Tylenol, gabapentin PO Dialudid PRN and lidocaine patches  - Pain Management consult - appreciate recommendations.   - Activity: up as tolerated  - Restrictions/Bracing: None  - Diet: ADAT  - DVT prophylaxis: SCDs and subcutaneous heparin 5000 units q 8 hours  - PT/OT: recommending TCU   - Nutrition consult for high protein diet  - Wound healing protocol  - Daily dressing changes  - Bowel regimen   - To chair/ambulate TID  - Radiation oncology referral upon discharge    Nisreen Bobo PA-C  Neurosurgery Department  Pager: [phone]      Interval History:  Patient reports improving back pain. She states her left hand pain is still present extending into wrist and forearm. Upper extremity US negative for DVT. Swelling down from yesterday. Patient still endorses some upper abdominal pain. Reports last bowel movement to be 1/21. Abdominal XR yesterday not concerning for Ileus. Encouraged patient to continue with bowel regimen and work with therapies.    Objective:   Temp:  [98.3  F (36.8  C)-99.2  F (37.3  C)] 98.6  F (37  C)  Pulse:  [74-86] 82  Resp:  [14-18] 14  BP: ()/(59-75) 117/73  SpO2:  [93 %-98 %] 93 %  I/O last 3 completed shifts:  In: 2250 [P.O.:2250]  Out: 2350 [Urine:2350]    Gen: Appears comfortable, NAD  Wound: Incision, clean, dry, intact.  Neurologic:  - Alert & Oriented to person, place, time, and situation  - Follows commands briskly  - Speech fluent, spontaneous. No aphasia or dysarthria.  - No gaze preference. No apparent hemineglect.  - PERRL, EOMI  - Strong eye  closure, jaw clench, and cheek puff  - Face symmetric with sensation intact to light touch  - Trapezii and sternocleidomastoid muscles 5/5 bilaterally  - No pronator drift     Del Tr Bi WE WF Gr   R 5 5 5 5 5 5   L 5 5 5 5 5 5    HF KE KF DF PF EHL   R 4 4 4 4 4 4   L 4+ 4+ 4+ 4+ 4+ 4+   Patient is pain limited in left hand today due to IV placement attempts at this location.  Reflexes 2+ throughout    Sensation intact and symmetric to light touch throughout bilateral upper extremities.  Numbness and tingling present in bilateral lower extremities.     LABS  Recent Labs   Lab Test 01/23/23  0347 01/22/23  0751 01/21/23  0444   WBC 7.8 7.2 8.5   HGB 9.9* 9.9* 9.9*   MCV 89 88 89    353 279       Recent Labs   Lab Test 01/24/23  0424 01/23/23  0347 01/22/23  0751 01/21/23  0444   NA  --  139 139 137   POTASSIUM 4.0 4.1 4.1 4.2   CHLORIDE  --  99 100 100   CO2  --  24 26 24   BUN  --  28.2* 20.1 17.8   CR  --  0.94 0.94 0.91   ANIONGAP  --  16* 13 13   ALONSO  --  10.0 9.6 9.1   GLC  --  105* 104* 105*       IMAGING    Recent Results (from the past 24 hour(s))   XR Surgery REMINGTON L/T 5 Min Fluoro w Stills    Narrative    This exam was marked as non-reportable because it will not be read by a   radiologist or a [place] non-radiologist provider.

## 2023-01-24 NOTE — PROGRESS NOTES
Care Management Follow Up    Length of Stay (days): 8    Expected Discharge Date: 01/25/2023     Concerns to be Addressed: discharge planning     Patient plan of care discussed at interdisciplinary rounds: Yes  Anticipated Discharge Disposition: Transitional Care  Anticipated Discharge Services: None  Anticipated Discharge DME: None  Patient/family educated on Medicare website which has current facility and service quality ratings: yes  Education Provided on the Discharge Plan: yes   Patient/Family in Agreement with the Plan: yes    Referrals Placed by CM/SW: Internal Clinic Care Coordination, Senior Linkage Line, Communication hand-offs to next level of Care Providers, Post Acute Facilities  Private pay costs discussed: Not applicable    Additional Information:  Progress:  received call from Carmella at [facility]. They have a double room available, but private room available for $45 extra per day.   informed nursing and Neurosurgery.     spoke with patient who called her daughters to inform them of the TCU placement.  Patient and her daughters expressed concern that the first time patient was out of bed with PT was yesterday and worried that transitioning to another facility was a bit sudden.  attempted to explain the number of physical therapy hours at a TCU is far greater than what she would receive in the hospital.  Patient's daughters expressed that they are also concerned about the pain patient is experiencing in her arms and waist and that they still have medical concerns about their mother discharging.  SW validated the family and patient's concerns and informed the family that it may be more productive to include the doctor in this conversation.  Patient's daughter stated that she planned on being at the hospital in an hour.     spoke with Neurosurgery and scheduled to meet with patient and her daughter at 12:30pm.      and  Neurosurgery met with patient and patient's daughter, Adri, at bedside and patient and daughter's concerns were addressed.  It was determined patient will discharge to TCU today or tomorrow, pending availability.       confirmed with Carmella at TCU that they can take her any time tomorrow.       arranged an EMS wheelchair ride for tomorrow, 1/25, at 10:15 am to [facility] T[facility] and informed nursing, providers, and patient and family.  left VM with Carmella at TCU to inform her of the ride time.      [facility]  [address] [place]  [address]  P: [phone]  P: [phone] - Admissions (Carmella)  F: [phone]     will continue to follow for discharge planning.       GIULIANA Chase, [facility]  [address]   [email or web address]  Office: [phone]   Pager: [phone]

## 2023-01-25 ENCOUNTER — TELEPHONE (OUTPATIENT)
Dept: GERIATRICS | Facility: CLINIC | Age: 73
End: 2023-01-25
Payer: COMMERCIAL

## 2023-01-25 VITALS
WEIGHT: 188.2 LBS | DIASTOLIC BLOOD PRESSURE: 79 MMHG | RESPIRATION RATE: 18 BRPM | HEIGHT: 63 IN | SYSTOLIC BLOOD PRESSURE: 128 MMHG | TEMPERATURE: 98.8 F | HEART RATE: 80 BPM | BODY MASS INDEX: 33.35 KG/M2 | OXYGEN SATURATION: 90 %

## 2023-01-25 VITALS
OXYGEN SATURATION: 97 % | HEIGHT: 63 IN | DIASTOLIC BLOOD PRESSURE: 75 MMHG | TEMPERATURE: 98.2 F | SYSTOLIC BLOOD PRESSURE: 136 MMHG | HEART RATE: 77 BPM | RESPIRATION RATE: 16 BRPM | BODY MASS INDEX: 35.55 KG/M2 | WEIGHT: 200.62 LBS

## 2023-01-25 DIAGNOSIS — Z98.890 S/P LAMINECTOMY: ICD-10-CM

## 2023-01-25 LAB
ANION GAP SERPL CALCULATED.3IONS-SCNC: 15 MMOL/L (ref 7–15)
ATRIAL RATE - MUSE: 80 BPM
BUN SERPL-MCNC: 38 MG/DL (ref 8–23)
CALCIUM SERPL-MCNC: 9.9 MG/DL (ref 8.8–10.2)
CHLORIDE SERPL-SCNC: 102 MMOL/L (ref 98–107)
CREAT SERPL-MCNC: 1.11 MG/DL (ref 0.51–0.95)
DEPRECATED HCO3 PLAS-SCNC: 23 MMOL/L (ref 22–29)
DIASTOLIC BLOOD PRESSURE - MUSE: NORMAL MMHG
ERYTHROCYTE [DISTWIDTH] IN BLOOD BY AUTOMATED COUNT: 14.6 % (ref 10–15)
GFR SERPL CREATININE-BSD FRML MDRD: 53 ML/MIN/1.73M2
GLUCOSE SERPL-MCNC: 97 MG/DL (ref 70–99)
HCT VFR BLD AUTO: 32.8 % (ref 35–47)
HGB BLD-MCNC: 10.2 G/DL (ref 11.7–15.7)
INTERPRETATION ECG - MUSE: NORMAL
MAGNESIUM SERPL-MCNC: 2.1 MG/DL (ref 1.7–2.3)
MCH RBC QN AUTO: 27.9 PG (ref 26.5–33)
MCHC RBC AUTO-ENTMCNC: 31.1 G/DL (ref 31.5–36.5)
MCV RBC AUTO: 90 FL (ref 78–100)
P AXIS - MUSE: 43 DEGREES
PHOSPHATE SERPL-MCNC: 3.7 MG/DL (ref 2.5–4.5)
PLATELET # BLD AUTO: 472 10E3/UL (ref 150–450)
POTASSIUM SERPL-SCNC: 4.3 MMOL/L (ref 3.4–5.3)
PR INTERVAL - MUSE: 162 MS
QRS DURATION - MUSE: 128 MS
QT - MUSE: 390 MS
QTC - MUSE: 449 MS
R AXIS - MUSE: -26 DEGREES
RBC # BLD AUTO: 3.66 10E6/UL (ref 3.8–5.2)
SODIUM SERPL-SCNC: 140 MMOL/L (ref 136–145)
SYSTOLIC BLOOD PRESSURE - MUSE: NORMAL MMHG
T AXIS - MUSE: 108 DEGREES
VENTRICULAR RATE- MUSE: 80 BPM
WBC # BLD AUTO: 8.1 10E3/UL (ref 4–11)

## 2023-01-25 PROCEDURE — 250N000013 HC RX MED GY IP 250 OP 250 PS 637: Performed by: NURSE PRACTITIONER

## 2023-01-25 PROCEDURE — 250N000013 HC RX MED GY IP 250 OP 250 PS 637

## 2023-01-25 PROCEDURE — 80048 BASIC METABOLIC PNL TOTAL CA: CPT

## 2023-01-25 PROCEDURE — 250N000013 HC RX MED GY IP 250 OP 250 PS 637: Performed by: NEUROLOGICAL SURGERY

## 2023-01-25 PROCEDURE — 250N000011 HC RX IP 250 OP 636: Performed by: NURSE PRACTITIONER

## 2023-01-25 PROCEDURE — 85027 COMPLETE CBC AUTOMATED: CPT

## 2023-01-25 PROCEDURE — 83735 ASSAY OF MAGNESIUM: CPT

## 2023-01-25 PROCEDURE — 36415 COLL VENOUS BLD VENIPUNCTURE: CPT

## 2023-01-25 PROCEDURE — 84100 ASSAY OF PHOSPHORUS: CPT

## 2023-01-25 RX ORDER — HYDROMORPHONE HYDROCHLORIDE 2 MG/1
2 TABLET ORAL
Qty: 20 TABLET | Refills: 0 | Status: SHIPPED | OUTPATIENT
Start: 2023-01-25 | End: 2023-01-26

## 2023-01-25 RX ADMIN — HYDROCHLOROTHIAZIDE 25 MG: 25 TABLET ORAL at 09:04

## 2023-01-25 RX ADMIN — ALLOPURINOL 200 MG: 100 TABLET ORAL at 09:05

## 2023-01-25 RX ADMIN — MENTHOL 1 PATCH: 205.5 PATCH TOPICAL at 09:04

## 2023-01-25 RX ADMIN — HEPARIN SODIUM 5000 UNITS: 5000 INJECTION, SOLUTION INTRAVENOUS; SUBCUTANEOUS at 09:06

## 2023-01-25 RX ADMIN — METHOCARBAMOL 500 MG: 500 TABLET, FILM COATED ORAL at 09:05

## 2023-01-25 RX ADMIN — METHOCARBAMOL 500 MG: 500 TABLET, FILM COATED ORAL at 04:54

## 2023-01-25 RX ADMIN — Medication 250 MG: at 09:05

## 2023-01-25 RX ADMIN — FAMOTIDINE 20 MG: 20 TABLET, FILM COATED ORAL at 09:05

## 2023-01-25 RX ADMIN — GABAPENTIN 200 MG: 100 CAPSULE ORAL at 09:05

## 2023-01-25 RX ADMIN — HYDROMORPHONE HYDROCHLORIDE 4 MG: 2 TABLET ORAL at 07:11

## 2023-01-25 RX ADMIN — MAGNESIUM HYDROXIDE 30 ML: 400 SUSPENSION ORAL at 09:12

## 2023-01-25 RX ADMIN — Medication 100 UNITS: at 09:03

## 2023-01-25 RX ADMIN — ACETAMINOPHEN 650 MG: 325 TABLET ORAL at 04:53

## 2023-01-25 RX ADMIN — ACETAMINOPHEN 650 MG: 325 TABLET ORAL at 00:25

## 2023-01-25 RX ADMIN — HEPARIN SODIUM 5000 UNITS: 5000 INJECTION, SOLUTION INTRAVENOUS; SUBCUTANEOUS at 02:36

## 2023-01-25 RX ADMIN — HYDROMORPHONE HYDROCHLORIDE 4 MG: 2 TABLET ORAL at 10:26

## 2023-01-25 RX ADMIN — SENNOSIDES AND DOCUSATE SODIUM 2 TABLET: 50; 8.6 TABLET ORAL at 09:05

## 2023-01-25 RX ADMIN — Medication 10000 UNITS: at 09:04

## 2023-01-25 RX ADMIN — HYDROMORPHONE HYDROCHLORIDE 4 MG: 2 TABLET ORAL at 02:35

## 2023-01-25 RX ADMIN — COLCHICINE 0.6 MG: 0.6 TABLET, FILM COATED ORAL at 09:04

## 2023-01-25 RX ADMIN — ZINC SULFATE 220 MG (50 MG) CAPSULE 220 MG: CAPSULE at 09:05

## 2023-01-25 ASSESSMENT — ACTIVITIES OF DAILY LIVING (ADL)
ADLS_ACUITY_SCORE: 28
ADLS_ACUITY_SCORE: 28
ADLS_ACUITY_SCORE: 27
ADLS_ACUITY_SCORE: 28

## 2023-01-25 NOTE — PROVIDER NOTIFICATION
Paged neurosurg resident:  FYI pt has refused labs to be drawn today despite multiple attempts from lab to draw them. it was a CBC and BMP. plan for her to discharge tomorrow.    Response- okay to skip labs today as plan for her to discharge tomorrow.

## 2023-01-25 NOTE — PLAN OF CARE
Occupational Therapy Discharge Summary    Reason for therapy discharge:    Discharged to transitional care facility.    Progress towards therapy goal(s). See goals on Care Plan in [facility] electronic health record for goal details.  Goals partially met.  Barriers to achieving goals:   discharge from facility.    Therapy recommendation(s):    Continued therapy is recommended.  Rationale/Recommendations:  decreased ADL/IADL IND and safety post-surgery.

## 2023-01-25 NOTE — PROGRESS NOTES
[facility] Care Coordination Contact    OBRA 1 right faxed to [facility] admissions office.   CC contacted Nursing Home LSW Katina Hairston [phone] and left a message with this CC contact information, reviewed community POC as well requested to be notified of concerns, care conferences and discharge planning.  CC reached out to adult daughter Adri regarding transition and offered support as needed.    Transition log updated.   PCP notified of transition to TCU via EMR.    Roney Harrison RN BSN PHN  [facility] Care Coordinator  [phone]  Fax:[phone]

## 2023-01-25 NOTE — PLAN OF CARE
"Alert and oriented x 4. Neuro's intact except tingling in bilateral lower extremities. Rated pain in back incision 7. Tylenol and dilaudid given. Dressing clean, dry and intact. Repositioned Q 2 hours last at 05 . Dressing clean, dry and intact. Pure wick in place. Call light in reach. Plan is to discharge to TCU today at 1015.  Problem: Plan of Care - These are the overarching goals to be used throughout the patient stay.    Goal: Plan of Care Review  Description: The Plan of Care Review/Shift note should be completed every shift.  The Outcome Evaluation is a brief statement about your assessment that the patient is improving, declining, or no change.  This information will be displayed automatically on your shift note.  Outcome: Progressing  Goal: Patient-Specific Goal (Individualized)  Description: You can add care plan individualizations to a care plan. Examples of Individualization might be:  \"Parent requests to be called daily at 9am for status\", \"I have a hard time hearing out of my right ear\", or \"Do not touch me to wake me up as it startles me\".  Outcome: Progressing  Goal: Absence of Hospital-Acquired Illness or Injury  Outcome: Progressing  Intervention: Identify and Manage Fall Risk  Recent Flowsheet Documentation  Taken 1/25/2023 0036 by Kellen Ferris RN  Safety Promotion/Fall Prevention: fall prevention program maintained  Intervention: Prevent Skin Injury  Recent Flowsheet Documentation  Taken 1/25/2023 0036 by Kellen Ferris RN  Body Position:    turned    right    side-lying  Intervention: Prevent and Manage VTE (Venous Thromboembolism) Risk  Recent Flowsheet Documentation  Taken 1/25/2023 0036 by Kellen Ferris RN  VTE Prevention/Management: SCDs (sequential compression devices) on  Goal: Optimal Comfort and Wellbeing  Outcome: Progressing  Goal: Readiness for Transition of Care  Outcome: Progressing   Goal Outcome Evaluation:                        "

## 2023-01-25 NOTE — PROGRESS NOTES
TRANSITIONS OF CARE (CARLA) LOG   CARLA tasks should be completed by the CC within one (1) business day of notification of each transition. Follow up contact with member is required after return to their usual care setting.  Note:  If CC finds out about the transitions fifteen (15) days or more after the member has returned to their usual care setting, no CARLA log is needed. However, the CC should check in with the member to discuss the transition process, any changes needed to the care plan and document it in a case note.    Member Name:  Laure Wood MCO Name:  [employer] Member ID#: [id number]   Product: [facility] Care Coordinator Contact:  Roney Harrison RN, PHN Agency/County/Care System: [facility]   Transition Communication Actions from Care Management Contact   Transition #1   Notification Date: 1/16/23 Transition Date:   1/16/23 Transition From: Home     Is this the member s usual care setting?               Yes Transition To: [facility]   Transition Type:  Planned  Reason for Admission/Comments:  Member was admitted due to removal of meningioma tumor.   Contact member/responsible party to offer assistance with transition Date completed: 1/16/23    Notes from conversation with the member/responsible party, provider, discharging and receiving facility (as applicable): CC contacted Hospital /discharge planner via the [employer] Transitional Care Hand-In Process, with community care plan included.  CC reached out to adult daughter Adri regarding transition and offered support as needed.  Reviewed and update care plan as needed.  Notified community service providers and placed services [facility] on hold as needed.  Transition log initiated.   PCP notified of hospitalization via EMR.       Shared CC contact info, care plan/services with receiving setting--Date completed: 1/16/23   Name & Title of receiving setting  contact: [facility]   Notified PCP of transition--Date completed:  1/16/23     via  EMR  Name of PCP: Yoshi Bowen     Transition #2   Notification Date: 1/25/23 Transition Date:   1/25/23 Transition From: [facility], [facility]     Is this the member s usual care setting?               no Transition To: Rehabiliation Facility, [facility] TCU   Transition Type:  Planned  Reason for Admission/Comments:  Member was admitted for rehab.   Contact member/responsible party to offer assistance with transition Date completed: 1/25/23    Notes from conversation with the member/responsible party, provider, discharging and receiving facility (as applicable): OBRA 1 right faxed to longterm admissions office.   CC contacted Nursing Home MARLY Hairston [phone] and left a message with this CC contact information, reviewed community POC as well requested to be notified of concerns, care conferences and discharge planning.  CC reached out to adult daughter Adri regarding transition and offered support as needed.    Transition log updated.   PCP notified of transition to TCU via EMR.       Shared CC contact info, care plan/services with receiving setting--Date completed:    Name & Title of receiving setting contact: [facility]   Notified PCP of transition--Date completed:  1/25/23     via  EMR  Name of PCP: Yoshi Bowen      Transition #3   Notification Date: 2/6/23 Transition Date:  2/9/23 Transition From: Rehabiliation Facility, [facility]     Is this the member s usual care setting?               no Transition To: Home   Transition Type:  Planned  Reason for Admission/Comments:   Discharged to home.   Contact member/responsible party to offer assistance with transition Date completed: 2/8/23    Notes from conversation with the member/responsible party, provider, discharging and receiving  facility (as applicable): CC contacted member and reviewed discharge summary.  Member has a follow-up appointment with PCP in 7 days: No: Offered Assistance with setting up a follow up appointment   Member has had a change in condition: No  Home visit needed: Yes: for annual and is scheduled for 2/8/23  Care plan reviewed and updated.  The following home based services [facility] were resumed.  New referrals placed: Yes: Therapies: PT OT  Transition log completed.   PCP, Yoshi Bowen, notified of transition back to home via EMR.       Shared CC contact info, care plan/services with receiving setting--Date completed:  1/31/23  Name & Title of receiving setting contact: Home   Notified PCP of transition--Date completed:  2/9/23     via  EMR  Name of PCP: Yoshi Bowen      *RETURN TO USUAL CARE SETTING: *Complete tasks below when the member is discharging TO their usual care setting within one (1) business day of notification..      For situations where the Care Coordinator is notified of the discharge prior to the date of discharge, the Care Coordinator must follow up with the member or designated representative to confirm that discharge actually occurred and discuss required CARLA tasks as outlined in the CARLA Instructions.  (This includes situations where it may be a  new  usual care setting for the member. (i.e., a community member who decides upon permanent nursing home placement following hospitalization and rehab).    Discuss with Member/Responsible Party:    Check  Yes  - if the member, family member and/or SNF/facility staff manages the following:    If  No  provide explanation in the comments section.          Date completed:  Communicated with member or their designated representative about the following:  care transition process; about changes to the member s health status; plan of care updates; education about transitions and how to prevent unplanned transitions/readmissions    Four  Pillars for Optimal Transition:    Check  Yes  - if the member, family member and/or SNF/facility staff manages the following:    If  No  provide explanation in the comments section.          [x]  Yes     []  No Does the member have a follow-up appointment scheduled with primary care or specialist? (Mental health hospitalizations--the appt. should be w/in 7 days)              For mental health hospitalizations:  []  Yes     []  No     Does the member have a follow-up appointment scheduled with a mental health practitioner within 7 days of discharge?  [x]  Yes     []  No     Has a medication review been completed with member? If no, refer to PCP, home care nurse, MTM, pharmacist  [x]  Yes     []  No     Can the member manage their medications or is there a system in place to manage medications (e.g. home care set-up)?         [x]  Yes     []  No     Can the member verbalize warning signs and symptoms to watch for and how to respond?  [x]  Yes     []  No     Does the member have a copy of and understand their discharge instructions?  If no, assist to obtain copy of discharge instructions, review discharge instructions, and assist to contact PCP to discuss questions about their recent hospitalization.  [x]  Yes     []  No     Does the member have adequate food, housing and transportation?  If no, add goal and discuss additional supports available to the member                                                                                                                                                                                 [x]  Yes     []  No     Is the member safe in their home?  If no, document needs and support provided                                                                                                                                                                          []  Yes     [x]  No     Are there any concerns of vulnerability, abuse, or neglect?  If yes, document concerns and actions  taken by Care Coordinator as a mandated                                                                                                                                                                              [x]  Yes     []  No     Does the member use a Personal Health Care Record?  Check  Yes  if visit summary, discharge summary, and/or healthcare summary are being used as a PHR.                                                                                                                                                                                  [x]  Yes     []  No     Have you reviewed the discharge summary with the member? If  No  provide explanation in comments.  [x]  Yes     []  No     Have you updated the member s care plan/support plan? Add new diagnosis, medications, treatments, goals & interventions, as applicable. If No, provide explanation in comments.    Comments:       Member was discharged to home with orders for in home PT OT. Member lives with her daughter and grandchildren who are supportive. CC completed annual HRA and PCA at the TCU on 2/8/23. Has a f/u appointment with neurologist on 2/14/23 and the neurosurgeon on 3/6/23. Daughter will make appointment with PCP upon discharge.

## 2023-01-25 NOTE — PLAN OF CARE
Physical Therapy Discharge Summary    Reason for therapy discharge:    Discharged to transitional care facility.    Progress towards therapy goal(s). See goals on Care Plan in [facility] electronic health record for goal details.  Goals not met.  Barriers to achieving goals:   discharge from facility.    Therapy recommendation(s):    Continued therapy is recommended.  Rationale/Recommendations:  Pt is functioning below baseline requiring mod Ax2 for bed mobility, min A x 2 for bed mobility and ambulation 6ft with FWW.  Pt would benefit from TCU to return to PLOF and return home safely. Unsafe to [place] home at this time..

## 2023-01-25 NOTE — PROGRESS NOTES
Care Management Discharge Note    Discharge Date: 01/25/2023  Discharge Disposition: Transitional Care  [facility]  [address] [place]  [address]  P: [phone]  P: [phone] (RN to RN)  P: [phone] - Admissions (Carmella)  F: [phone]  Discharge Services: None  Discharge DME: None  Discharge Transportation: Agency  Private pay costs discussed: Not applicable  PAS Confirmation Code:  ([id number])  Patient/family educated on Medicare website which has current facility and service quality ratings: Yes  Education Provided on the Discharge Plan:  Yes  Persons Notified of Discharge Plans: Pt, bedside RN, provider, Pt's PCP, and Carmella ([facility] admissions)  Patient/Family in Agreement with the Plan: Yes  Handoff Referral Completed: Yes    Additional Information:    Bedside RN, provider, pt and family notified of discharge. Pt is discharging to [facility] TCU. MARLY Yanes set up EMS wheelchair ride for 10:15 a.m. Discharge orders faxed to [facility]CU by MARLY Erazo. [facility] admission screening completed ([id number]).     _____________    Laith Bush  Clinical  Intern  Unit 5B Medicine Beds & ED  [email or web address]  Office: [phone]  Pager: [phone]

## 2023-01-25 NOTE — PLAN OF CARE
"Assumed care: 1900 - 2330    /68 (BP Location: Right arm)   Pulse 87   Temp 98.1  F (36.7  C) (Axillary)   Resp 16   Ht 1.6 m (5' 3\")   Wt 91 kg (200 lb 9.9 oz)   SpO2 96%   BMI 35.54 kg/m      Shift summary:  Laure Wood is alert and oriented.  Vital signs stable, on room air, and afebrile.  Patient back pain controlled with atarax and dilaudid x 1.  No nausea, vomiting, diarrhea.  Turned Q2H.  No BM.  External catheter in place.        Recent Labs   Lab 01/23/23  0347 01/22/23  0751   * 104*      Hemoglobin   Date Value Ref Range Status   01/23/2023 9.9 (L) 11.7 - 15.7 g/dL Final   04/08/2021 10.0 (L) 11.7 - 15.7 g/dL Final     Platelet Count   Date Value Ref Range Status   01/23/2023 418 150 - 450 10e3/uL Final   04/06/2021 291 150 - 450 10e9/L Final     WBC   Date Value Ref Range Status   03/16/2021 4.5 4.0 - 11.0 10e9/L Final     WBC Count   Date Value Ref Range Status   01/23/2023 7.8 4.0 - 11.0 10e3/uL Final     Potassium   Date Value Ref Range Status   01/24/2023 4.0 3.4 - 5.3 mmol/L Final   03/16/2022 4.5 3.4 - 5.3 mmol/L Final   03/16/2021 3.9 3.4 - 5.3 mmol/L Final      Magnesium   Date Value Ref Range Status   01/24/2023 2.1 1.7 - 2.3 mg/dL Final      Phosphorus   Date Value Ref Range Status   01/24/2023 3.7 2.5 - 4.5 mg/dL Final     Goal Outcome Evaluation:    Problem: Plan of Care - These are the overarching goals to be used throughout the patient stay.    Goal: Plan of Care Review  Description: The Plan of Care Review/Shift note should be completed every shift.  The Outcome Evaluation is a brief statement about your assessment that the patient is improving, declining, or no change.  This information will be displayed automatically on your shift note.  Outcome: Progressing  Goal: Patient-Specific Goal (Individualized)  Description: You can add care plan individualizations to a care plan. Examples of Individualization might be:  \"Parent requests to be called daily at 9am for " "status\", \"I have a hard time hearing out of my right ear\", or \"Do not touch me to wake me up as it startles me\".  Outcome: Progressing  Goal: Absence of Hospital-Acquired Illness or Injury  Outcome: Progressing  Intervention: Identify and Manage Fall Risk  Recent Flowsheet Documentation  Taken 1/24/2023 2100 by Evangelist Tompkins RN  Safety Promotion/Fall Prevention: fall prevention program maintained  Intervention: Prevent Skin Injury  Recent Flowsheet Documentation  Taken 1/24/2023 2100 by Evangelist Tompkins RN  Body Position:   turned   left  Goal: Optimal Comfort and Wellbeing  Outcome: Progressing  Intervention: Monitor Pain and Promote Comfort  Recent Flowsheet Documentation  Taken 1/24/2023 2100 by Evangelist Tompkins RN  Pain Management Interventions:   medication (see MAR)   heat applied  Taken 1/24/2023 1935 by Evangelist Tompkins RN  Pain Management Interventions:   medication (see MAR)   heat applied  Goal: Readiness for Transition of Care  Outcome: Progressing     Problem: Hypertension Comorbidity  Goal: Blood Pressure in Desired Range  Outcome: Progressing  Intervention: Maintain Blood Pressure Management  Recent Flowsheet Documentation  Taken 1/24/2023 2100 by Evangelist Tompkins RN  Medication Review/Management: medications reviewed     Problem: Spinal Surgery  Goal: Optimal Coping with Surgery  Outcome: Progressing  Goal: Absence of Bleeding  Outcome: Progressing  Goal: Effective Bowel Elimination  Outcome: Progressing  Goal: Fluid and Electrolyte Balance  Outcome: Progressing  Goal: Optimal Functional Ability  Outcome: Progressing  Intervention: Optimize Functional Status  Recent Flowsheet Documentation  Taken 1/24/2023 2100 by Evangelist Tompkins, RN  Activity Management: activity adjusted per tolerance  Positioning/Transfer Devices:   pillows   in use  Goal: Absence of Infection Signs and Symptoms  Outcome: Progressing  Goal: Optimal Neurologic Function  Outcome: " Progressing  Intervention: Optimize Neurologic Function  Recent Flowsheet Documentation  Taken 1/24/2023 2100 by Evangelist Tompkins, RN  Body Position:   turned   left  Goal: Optimal Pain Control and Function  Outcome: Progressing  Intervention: Prevent or Manage Pain  Recent Flowsheet Documentation  Taken 1/24/2023 2100 by Evangelist Tompkins, RN  Pain Management Interventions:   medication (see MAR)   heat applied  Taken 1/24/2023 1935 by Evangelist Tompkins, RN  Pain Management Interventions:   medication (see MAR)   heat applied  Goal: Nausea and Vomiting Relief  Outcome: Progressing  Goal: Effective Urinary Elimination  Outcome: Progressing     Problem: Pain Acute  Goal: Optimal Pain Control and Function  Outcome: Progressing  Intervention: Develop Pain Management Plan  Recent Flowsheet Documentation  Taken 1/24/2023 2100 by Evangelist Tompkins, RN  Pain Management Interventions:   medication (see MAR)   heat applied  Taken 1/24/2023 1935 by Evangelist oTmpkins, RN  Pain Management Interventions:   medication (see MAR)   heat applied  Intervention: Prevent or Manage Pain  Recent Flowsheet Documentation  Taken 1/24/2023 2100 by Evangelist Tompkins, RN  Medication Review/Management: medications reviewed

## 2023-01-26 ENCOUNTER — TRANSITIONAL CARE UNIT VISIT (OUTPATIENT)
Dept: GERIATRICS | Facility: CLINIC | Age: 73
End: 2023-01-26
Payer: COMMERCIAL

## 2023-01-26 ENCOUNTER — PATIENT OUTREACH (OUTPATIENT)
Dept: CARE COORDINATION | Facility: CLINIC | Age: 73
End: 2023-01-26

## 2023-01-26 DIAGNOSIS — R53.81 PHYSICAL DECONDITIONING: ICD-10-CM

## 2023-01-26 DIAGNOSIS — D62 ANEMIA DUE TO BLOOD LOSS, ACUTE: ICD-10-CM

## 2023-01-26 DIAGNOSIS — K59.03 DRUG-INDUCED CONSTIPATION: ICD-10-CM

## 2023-01-26 DIAGNOSIS — G95.89 INTRADURAL MASS (H): Primary | ICD-10-CM

## 2023-01-26 DIAGNOSIS — I10 BENIGN ESSENTIAL HYPERTENSION: ICD-10-CM

## 2023-01-26 DIAGNOSIS — Z98.890 S/P LAMINECTOMY: ICD-10-CM

## 2023-01-26 PROCEDURE — 99310 SBSQ NF CARE HIGH MDM 45: CPT | Performed by: NURSE PRACTITIONER

## 2023-01-26 RX ORDER — HYDROMORPHONE HYDROCHLORIDE 2 MG/1
2-4 TABLET ORAL EVERY 4 HOURS PRN
Qty: 20 TABLET | Refills: 0
Start: 2023-01-26 | End: 2023-02-03

## 2023-01-26 RX ORDER — ACETAMINOPHEN 500 MG
1000 TABLET ORAL 3 TIMES DAILY
Start: 2023-01-26

## 2023-01-26 NOTE — PROGRESS NOTES
"[facility] GERIATRICS    PRIMARY CARE PROVIDER AND CLINIC:  Yoshi Bowen MD, [address]  [address] / [facility] [address]  Chief Complaint   Patient presents with     Hospital F/U      [place] Medical Record Number:  [id number]  Place of Service where encounter took place:  [facility]) [25]    Laure Wood  is a 72 year old  (1950), admitted to the above facility from  [facility]. Hospital stay 1/16/23 through 1/25/23..   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 Antunez , pathology on mass still pending  Post op complications fever, workup negative for infection, tachycardia Echo revealed EF of 44-60% and left BBB.   On exam today: patient is resting in bed, states she has low back and incisional pain rated 7/10, ongoing \"shooting pain\" down legs bilaterally, LE numbness and weakness states legs feel \"heavy\" these symptoms were present prior to surgery, patient states bowel and bladder are working, denies CP, palpitations, SOB, N/V/D no other concerns at time of exam.     CODE STATUS/ADVANCE DIRECTIVES DISCUSSION:  Prior  CPR/Full code   ALLERGIES:   Allergies   Allergen Reactions     No Known Drug Allergy       PAST MEDICAL HISTORY:   Past Medical History:   Diagnosis Date     Gastroesophageal reflux disease      Hyperlipidemia LDL goal <160      Hypertension, essential, benign      Knee osteoarthritis     knees, hands     Motion sickness      PONV (postoperative nausea and vomiting)       PAST SURGICAL HISTORY:   has a past surgical history that includes cataract iol, rt/lt (Bilateral); Colonoscopy (Left, 7/25/2019); Arthroplasty knee (Left, 7/27/2020); Arthroplasty knee (Right, 4/6/2021); and Laminectomy, excise tumor thoracic, combined (N/A, 1/16/2023).  FAMILY HISTORY: " family history is not on file.  SOCIAL HISTORY:   reports that she has never smoked. She has never used smokeless tobacco. She reports that she does not drink alcohol and does not use drugs.  Patient's living condition: lives with family, child(tomy), adult     Post Discharge Medication Reconciliation Status:   MED REC REQUIRED  Post Medication Reconciliation Status: discharge medications reconciled and changed, per note/orders       Current Outpatient Medications   Medication Sig     acetaminophen (TYLENOL) 325 MG tablet Take 2 tablets (650 mg) by mouth every 4 hours as needed for mild pain or fever     acetaminophen (TYLENOL) 325 MG tablet Take 2 tablets (650 mg) by mouth every 6 hours as needed for mild pain     allopurinol (ZYLOPRIM) 100 MG tablet Take 2 tablets (200 mg) by mouth daily     ascorbic acid 250 MG CHEW Take 250 mg by mouth daily     colchicine (COLCYRS) 0.6 MG tablet Take 1 tablet (0.6 mg) by mouth daily     diclofenac (VOLTAREN) 1 % topical gel Apply 2-4 g topically 4 times daily R Knee, foot as needed     gabapentin (NEURONTIN) 100 MG capsule Take 2 capsules (200 mg) by mouth 2 times daily     hydrochlorothiazide (HYDRODIURIL) 25 MG tablet Take 1 tablet (25 mg) by mouth daily     HYDROmorphone (DILAUDID) 2 MG tablet Take 1 tablet (2 mg) by mouth every 3 hours as needed for severe pain (7-10)     methocarbamol (ROBAXIN) 500 MG tablet Take 1 tablet (500 mg) by mouth 4 times daily     omeprazole (PRILOSEC) 20 MG DR capsule Take 1 capsule (20 mg) by mouth daily as needed (pt takes prn for occasional GERD symptoms)     polyethylene glycol (MIRALAX) 17 GM/Dose powder Take 17 g by mouth daily     senna-docusate (SENOKOT-S/PERICOLACE) 8.6-50 MG tablet Take 2 tablets by mouth 2 times daily     simvastatin (ZOCOR) 20 MG tablet Take 1 tablet (20 mg) by mouth At Bedtime     vitamin A 3 MG (19250 UNITS) capsule Take 1 capsule (10,000 Units) by mouth daily     zinc sulfate (ZINCATE) 220 (50 Zn) MG capsule Take 1  "capsule (220 mg) by mouth daily     No current facility-administered medications for this visit.       ROS:  10 point ROS of systems including Constitutional, Eyes, Respiratory, Cardiovascular, Gastroenterology, Genitourinary, Integumentary, Musculoskeletal, Psychiatric were all negative except for pertinent positives noted in my HPI.    Vitals:  /79   Pulse 80   Temp 98.8  F (37.1  C)   Resp 18   Ht 1.6 m (5' 3\")   Wt 85.4 kg (188 lb 3.2 oz)   LMP  (LMP Unknown)   SpO2 90%   BMI 33.34 kg/m    Exam:  GENERAL APPEARANCE:  Alert, in no distress  ENT:  Mouth and posterior oropharynx normal, moist mucous membranes, normal hearing acuity  EYES:  EOM, conjunctivae, lids, pupils and irises normal, PERRL  RESP:  respiratory effort and palpation of chest normal, lungs clear to auscultation , no respiratory distress  CV:  Palpation and auscultation of heart done , regular rate and rhythm, no murmur, rub, or gallop, no edema  ABDOMEN:  normal bowel sounds, soft, nontender, no hepatosplenomegaly or other masses  M/S:   patient resting in bed, able to move LE, can pick feet up off the bed at time of exam  SKIN:  Inspection of skin and subcutaneous tissue baseline, did not visualize surgical incision  NEURO:   speech wnl  PSYCH:  affect and mood normal    Lab/Diagnostic data:  Recent labs in [facility] reviewed by me today.  and   Most Recent 3 CBC's:Recent Labs   Lab Test 01/25/23  0657 01/23/23  0347 01/22/23  0751   WBC 8.1 7.8 7.2   HGB 10.2* 9.9* 9.9*   MCV 90 89 88   * 418 353     Most Recent 3 BMP's:Recent Labs   Lab Test 01/25/23  0657 01/24/23  0424 01/23/23  0347 01/22/23  0751     --  139 139   POTASSIUM 4.3 4.0 4.1 4.1   CHLORIDE 102  --  99 100   CO2 23  --  24 26   BUN 38.0*  --  28.2* 20.1   CR 1.11*  --  0.94 0.94   ANIONGAP 15  --  16* 13   ALONSO 9.9  --  10.0 9.6   GLC 97  --  105* 104*       ASSESSMENT/PLAN:    (G95.89) Intradural mass (H)  (primary encounter diagnosis)  (Z98.890) S/P " laminectomy  (R53.81) Physical deconditioning  Comment: acute/ongoing s/p T7-9 laminectomy and resection of intradural mass Dr. Antunez   Plan: PT and OT, increase dilaudid to 2-4 mg q 4 hours prn, continue robaxin 500mg QID, neurontin 200mg BID, change tylenol to 1000mg TID scheduled  F/u with Dr. Antunez as directed  Pathology pending has appt with radiation oncology    (K59.03) Drug-induced constipation  Comment: acute/ongoing  Plan: continue miralax 17gm QD, senna s 2 PO BID    (I10) Benign essential hypertension  Comment: ongoing  Plan: BMP follow, continue HCTZ 25mg QD    (D62) Anemia due to blood loss, acute  Comment: acute/ongoing  Plan: follow Hgb, last Hgb 10 prior to discharge    Orders:  CBC and BMP Monday  Tylenol 1000mg TID scheduled  Increase dilaudid to 2-4mg q 4 hours prn      Total time spent with patient visit at the skilled nursing facility was 45 min including patient visit and review of past records. Greater than 50% of total time spent with counseling and coordinating care due to discussed pain level, will adjust pain medications, discussed lab monitoring.     Electronically signed by:  Tonya Lynn Haase, APRN CNP

## 2023-01-26 NOTE — PROGRESS NOTES
Clinic Care Coordination Contact  A user error has taken place: encounter opened in error, closed for administrative reasons.

## 2023-01-26 NOTE — LETTER
"    1/26/2023        RE: Larue Wood  [address]  [facility] [place] [address]        [facility] GERIATRICS    PRIMARY CARE PROVIDER AND CLINIC:  Yoshi Bowen MD, [address]  [address] / [facility] [address]  Chief Complaint   Patient presents with     Hospital F/U      [place] Medical Record Number:  [id number]  [facility]    Laure Wood  is a 72 year old  (1950), admitted to the above facility from  [facility]. Hospital stay 1/16/23 through 1/25/23..   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 Antunez , pathology on mass still pending  Post op complications fever, workup negative for infection, tachycardia Echo revealed EF of 44-60% and left BBB.   On exam today: patient is resting in bed, states she has low back and incisional pain rated 7/10, ongoing \"shooting pain\" down legs bilaterally, LE numbness and weakness states legs feel \"heavy\" these symptoms were present prior to surgery, patient states bowel and bladder are working, denies CP, palpitations, SOB, N/V/D no other concerns at time of exam.     CODE STATUS/ADVANCE DIRECTIVES DISCUSSION:  Prior  CPR/Full code   ALLERGIES:   Allergies   Allergen Reactions     No Known Drug Allergy       PAST MEDICAL HISTORY:   Past Medical History:   Diagnosis Date     Gastroesophageal reflux disease      Hyperlipidemia LDL goal <160      Hypertension, essential, benign      Knee osteoarthritis     knees, hands     Motion sickness      PONV (postoperative nausea and vomiting)       PAST SURGICAL HISTORY:   has a past surgical history that includes cataract iol, rt/lt (Bilateral); Colonoscopy (Left, 7/25/2019); Arthroplasty knee (Left, 7/27/2020); Arthroplasty knee (Right, 4/6/2021); " and Laminectomy, excise tumor thoracic, combined (N/A, 1/16/2023).  FAMILY HISTORY: family history is not on file.  SOCIAL HISTORY:   reports that she has never smoked. She has never used smokeless tobacco. She reports that she does not drink alcohol and does not use drugs.  Patient's living condition: lives with family, child(tomy), adult     Post Discharge Medication Reconciliation Status:   MED REC REQUIRED  Post Medication Reconciliation Status: discharge medications reconciled and changed, per note/orders       Current Outpatient Medications   Medication Sig     acetaminophen (TYLENOL) 325 MG tablet Take 2 tablets (650 mg) by mouth every 4 hours as needed for mild pain or fever     acetaminophen (TYLENOL) 325 MG tablet Take 2 tablets (650 mg) by mouth every 6 hours as needed for mild pain     allopurinol (ZYLOPRIM) 100 MG tablet Take 2 tablets (200 mg) by mouth daily     ascorbic acid 250 MG CHEW Take 250 mg by mouth daily     colchicine (COLCYRS) 0.6 MG tablet Take 1 tablet (0.6 mg) by mouth daily     diclofenac (VOLTAREN) 1 % topical gel Apply 2-4 g topically 4 times daily R Knee, foot as needed     gabapentin (NEURONTIN) 100 MG capsule Take 2 capsules (200 mg) by mouth 2 times daily     hydrochlorothiazide (HYDRODIURIL) 25 MG tablet Take 1 tablet (25 mg) by mouth daily     HYDROmorphone (DILAUDID) 2 MG tablet Take 1 tablet (2 mg) by mouth every 3 hours as needed for severe pain (7-10)     methocarbamol (ROBAXIN) 500 MG tablet Take 1 tablet (500 mg) by mouth 4 times daily     omeprazole (PRILOSEC) 20 MG DR capsule Take 1 capsule (20 mg) by mouth daily as needed (pt takes prn for occasional GERD symptoms)     polyethylene glycol (MIRALAX) 17 GM/Dose powder Take 17 g by mouth daily     senna-docusate (SENOKOT-S/PERICOLACE) 8.6-50 MG tablet Take 2 tablets by mouth 2 times daily     simvastatin (ZOCOR) 20 MG tablet Take 1 tablet (20 mg) by mouth At Bedtime     vitamin A 3 MG (78428 UNITS) capsule Take 1 capsule  "(10,000 Units) by mouth daily     zinc sulfate (ZINCATE) 220 (50 Zn) MG capsule Take 1 capsule (220 mg) by mouth daily     No current facility-administered medications for this visit.       ROS:  10 point ROS of systems including Constitutional, Eyes, Respiratory, Cardiovascular, Gastroenterology, Genitourinary, Integumentary, Musculoskeletal, Psychiatric were all negative except for pertinent positives noted in my HPI.    Vitals:  /79   Pulse 80   Temp 98.8  F (37.1  C)   Resp 18   Ht 1.6 m (5' 3\")   Wt 85.4 kg (188 lb 3.2 oz)   LMP  (LMP Unknown)   SpO2 90%   BMI 33.34 kg/m    Exam:  GENERAL APPEARANCE:  Alert, in no distress  ENT:  Mouth and posterior oropharynx normal, moist mucous membranes, normal hearing acuity  EYES:  EOM, conjunctivae, lids, pupils and irises normal, PERRL  RESP:  respiratory effort and palpation of chest normal, lungs clear to auscultation , no respiratory distress  CV:  Palpation and auscultation of heart done , regular rate and rhythm, no murmur, rub, or gallop, no edema  ABDOMEN:  normal bowel sounds, soft, nontender, no hepatosplenomegaly or other masses  M/S:   patient resting in bed, able to move LE, can pick feet up off the bed at time of exam  SKIN:  Inspection of skin and subcutaneous tissue baseline, did not visualize surgical incision  NEURO:   speech wnl  PSYCH:  affect and mood normal    Lab/Diagnostic data:  Recent labs in [facility] reviewed by me today.  and   Most Recent 3 CBC's:Recent Labs   Lab Test 01/25/23  0657 01/23/23  0347 01/22/23  0751   WBC 8.1 7.8 7.2   HGB 10.2* 9.9* 9.9*   MCV 90 89 88   * 418 353     Most Recent 3 BMP's:Recent Labs   Lab Test 01/25/23  0657 01/24/23  0424 01/23/23  0347 01/22/23  0751     --  139 139   POTASSIUM 4.3 4.0 4.1 4.1   CHLORIDE 102  --  99 100   CO2 23  --  24 26   BUN 38.0*  --  28.2* 20.1   CR 1.11*  --  0.94 0.94   ANIONGAP 15  --  16* 13   ALONSO 9.9  --  10.0 9.6   GLC 97  --  105* 104* "       ASSESSMENT/PLAN:    (G95.89) Intradural mass (H)  (primary encounter diagnosis)  (Z98.890) S/P laminectomy  (R53.81) Physical deconditioning  Comment: acute/ongoing s/p T7-9 laminectomy and resection of intradural mass Dr. Antunez   Plan: PT and OT, increase dilaudid to 2-4 mg q 4 hours prn, continue robaxin 500mg QID, neurontin 200mg BID, change tylenol to 1000mg TID scheduled  F/u with Dr. Antunez as directed  Pathology pending has appt with radiation oncology    (K59.03) Drug-induced constipation  Comment: acute/ongoing  Plan: continue miralax 17gm QD, senna s 2 PO BID    (I10) Benign essential hypertension  Comment: ongoing  Plan: BMP follow, continue HCTZ 25mg QD    (D62) Anemia due to blood loss, acute  Comment: acute/ongoing  Plan: follow Hgb, last Hgb 10 prior to discharge    Orders:  CBC and BMP Monday  Tylenol 1000mg TID scheduled  Increase dilaudid to 2-4mg q 4 hours prn      Total time spent with patient visit at the skilled nursing facility was 45 min including patient visit and review of past records. Greater than 50% of total time spent with counseling and coordinating care due to discussed pain level, will adjust pain medications, discussed lab monitoring.     Electronically signed by:  Tonya Lynn Haase, APRN CNP                       Sincerely,        Tonya Lynn Haase, APRN CNP

## 2023-01-30 ENCOUNTER — TRANSITIONAL CARE UNIT VISIT (OUTPATIENT)
Dept: GERIATRICS | Facility: CLINIC | Age: 73
End: 2023-01-30
Payer: COMMERCIAL

## 2023-01-30 VITALS
RESPIRATION RATE: 18 BRPM | HEART RATE: 87 BPM | DIASTOLIC BLOOD PRESSURE: 71 MMHG | WEIGHT: 187.4 LBS | HEIGHT: 63 IN | OXYGEN SATURATION: 95 % | BODY MASS INDEX: 33.2 KG/M2 | SYSTOLIC BLOOD PRESSURE: 101 MMHG | TEMPERATURE: 98.7 F

## 2023-01-30 DIAGNOSIS — I10 BENIGN ESSENTIAL HYPERTENSION: ICD-10-CM

## 2023-01-30 DIAGNOSIS — G95.89 INTRADURAL MASS (H): Primary | ICD-10-CM

## 2023-01-30 DIAGNOSIS — R53.81 PHYSICAL DECONDITIONING: ICD-10-CM

## 2023-01-30 DIAGNOSIS — Z98.890 S/P LAMINECTOMY: ICD-10-CM

## 2023-01-30 DIAGNOSIS — D62 ANEMIA DUE TO BLOOD LOSS, ACUTE: ICD-10-CM

## 2023-01-30 DIAGNOSIS — K59.03 DRUG-INDUCED CONSTIPATION: ICD-10-CM

## 2023-01-30 PROCEDURE — 99309 SBSQ NF CARE MODERATE MDM 30: CPT | Performed by: NURSE PRACTITIONER

## 2023-01-30 RX ORDER — POLYETHYLENE GLYCOL 3350 17 G/17G
17 POWDER, FOR SOLUTION ORAL DAILY PRN
Qty: 510 G
Start: 2023-01-30 | End: 2023-03-06

## 2023-01-30 RX ORDER — AMOXICILLIN 250 MG
2 CAPSULE ORAL 2 TIMES DAILY PRN
Start: 2023-01-30 | End: 2023-03-06

## 2023-01-30 NOTE — PROGRESS NOTES
"[facility] GERIATRICS    Chief Complaint   Patient presents with     RECHECK     HPI:  Laure Wood is a 72 year old  (1950), who is being seen today for an episodic care visit at: [facility] ([facility]) [25]. Today's concern is:   Intradural mass s/p laminectomy: patient sitting up in w/c, states pain in legs is 4/10 achy pain, low back incisional pain 6/10, in therapy she is walking up to 400 feet using a 4ww with CGA, patient feels she is getting stronger  Constipation: states bowels are working  HTN:  /68, 101/71, 111/74 with HR in 80 range, denies CP, palpitations, SOB  Anemia: Hgb today 10.3    Allergies, and PMH/PSH reviewed in [facility] today.  REVIEW OF SYSTEMS:  10 point ROS of systems including Constitutional, Eyes, Respiratory, Cardiovascular, Gastroenterology, Genitourinary, Integumentary, Musculoskeletal, Psychiatric were all negative except for pertinent positives noted in my HPI.    Objective:   /71   Pulse 87   Temp 98.7  F (37.1  C)   Resp 18   Ht 1.6 m (5' 3\")   Wt 85 kg (187 lb 6.4 oz)   LMP  (LMP Unknown)   SpO2 95%   BMI 33.20 kg/m    GENERAL APPEARANCE:  Alert, in no distress, morbidly obese  ENT:  Mouth and posterior oropharynx normal, moist mucous membranes, normal hearing acuity  EYES:  EOM, conjunctivae, lids, pupils and irises normal, PERRL  RESP:  respiratory effort and palpation of chest normal, lungs clear to auscultation , no respiratory distress  CV:  Palpation and auscultation of heart done , regular rate and rhythm, no murmur, rub, or gallop, peripheral edema trace+ in LE bilaterally  ABDOMEN:  normal bowel sounds, soft, nontender, no hepatosplenomegaly or other masses  M/S:   patient sitting up in w/c  SKIN:  Inspection of skin and subcutaneous tissue baseline, did not visualize surgical incision  NEURO:   speech wnl  PSYCH:  affect and mood normal    Recent labs in [facility] reviewed by me today.  and   Most Recent 3 CBC's:Recent Labs   Lab Test " 01/25/23  0657 01/23/23  0347 01/22/23  0751   WBC 8.1 7.8 7.2   HGB 10.2* 9.9* 9.9*   MCV 90 89 88   * 418 353     Most Recent 3 BMP's:Recent Labs   Lab Test 01/25/23  0657 01/24/23  0424 01/23/23  0347 01/22/23  0751     --  139 139   POTASSIUM 4.3 4.0 4.1 4.1   CHLORIDE 102  --  99 100   CO2 23  --  24 26   BUN 38.0*  --  28.2* 20.1   CR 1.11*  --  0.94 0.94   ANIONGAP 15  --  16* 13   ALONSO 9.9  --  10.0 9.6   GLC 97  --  105* 104*       Assessment/Plan:  (G95.89) Intradural mass (H)  (primary encounter diagnosis)  (Z98.890) S/P laminectomy  (R53.81) Physical deconditioning  Comment: acute/ongoing s/p T7-9 laminectomy and resection of intradural mass Dr. Antunez   Plan: PT and OT, continue dilaudid to 2-4 mg q 4 hours prn, continue robaxin 500mg QID, neurontin 200mg BID, change tylenol to 1000mg TID scheduled  F/u with Dr. Antunez as directed  Pathology pending at this time,  has appt with radiation oncology       (K59.03) Drug-induced constipation  Comment: acute/ongoing  No change  Plan: change miralax 17gm to  QD prn, senna s 2 PO to  BID prn     (I10) Benign essential hypertension  Comment: ongoing, no change  Plan: BMP follow, continue HCTZ 25mg QD     (D62) Anemia due to blood loss, acute  Comment: acute/ongoing  Plan: follow Hgb, Hgb 1/30/23 was 10.1       MED REC REQUIRED  Post Medication Reconciliation Status: medication reconcilation previously completed during another office visit      Orders:  Change miralax to 17gm QD prn and senna s to 1 PO BID prn      Electronically signed by: Tonya Lynn Haase, RUBEN CNP

## 2023-01-30 NOTE — LETTER
"    1/30/2023        RE: Laure Wood  [address] [address] [address]        [facility]    Chief Complaint   Patient presents with     RECHECK     HPI:  Laure Wood is a 72 year old  (1950), who is being seen today for an episodic care visit at: [facility]) [25]. Today's concern is:   Intradural mass s/p laminectomy: patient sitting up in w/c, states pain in legs is 4/10 achy pain, low back incisional pain 6/10, in therapy she is walking up to 400 feet using a 4ww with CGA, patient feels she is getting stronger  Constipation: states bowels are working  HTN:  /68, 101/71, 111/74 with HR in 80 range, denies CP, palpitations, SOB  Anemia: Hgb today 10.3    Allergies, and PMH/PSH reviewed in [facility] today.  REVIEW OF SYSTEMS:  10 point ROS of systems including Constitutional, Eyes, Respiratory, Cardiovascular, Gastroenterology, Genitourinary, Integumentary, Musculoskeletal, Psychiatric were all negative except for pertinent positives noted in my HPI.    Objective:   /71   Pulse 87   Temp 98.7  F (37.1  C)   Resp 18   Ht 1.6 m (5' 3\")   Wt 85 kg (187 lb 6.4 oz)   LMP  (LMP Unknown)   SpO2 95%   BMI 33.20 kg/m    GENERAL APPEARANCE:  Alert, in no distress, morbidly obese  ENT:  Mouth and posterior oropharynx normal, moist mucous membranes, normal hearing acuity  EYES:  EOM, conjunctivae, lids, pupils and irises normal, PERRL  RESP:  respiratory effort and palpation of chest normal, lungs clear to auscultation , no respiratory distress  CV:  Palpation and auscultation of heart done , regular rate and rhythm, no murmur, rub, or gallop, peripheral edema trace+ in LE bilaterally  ABDOMEN:  normal bowel sounds, soft, nontender, no hepatosplenomegaly or other masses  M/S:   patient sitting up in w/c  SKIN:  Inspection of skin and subcutaneous tissue baseline, did not visualize surgical incision  NEURO:   speech wnl  PSYCH:  affect and mood normal    Recent labs in " [facility] reviewed by me today.  and   Most Recent 3 CBC's:Recent Labs   Lab Test 01/25/23  0657 01/23/23  0347 01/22/23  0751   WBC 8.1 7.8 7.2   HGB 10.2* 9.9* 9.9*   MCV 90 89 88   * 418 353     Most Recent 3 BMP's:Recent Labs   Lab Test 01/25/23  0657 01/24/23  0424 01/23/23  0347 01/22/23  0751     --  139 139   POTASSIUM 4.3 4.0 4.1 4.1   CHLORIDE 102  --  99 100   CO2 23  --  24 26   BUN 38.0*  --  28.2* 20.1   CR 1.11*  --  0.94 0.94   ANIONGAP 15  --  16* 13   ALONSO 9.9  --  10.0 9.6   GLC 97  --  105* 104*       Assessment/Plan:  (G95.89) Intradural mass (H)  (primary encounter diagnosis)  (Z98.890) S/P laminectomy  (R53.81) Physical deconditioning  Comment: acute/ongoing s/p T7-9 laminectomy and resection of intradural mass Dr. Antunez   Plan: PT and OT, continue dilaudid to 2-4 mg q 4 hours prn, continue robaxin 500mg QID, neurontin 200mg BID, change tylenol to 1000mg TID scheduled  F/u with Dr. Antunez as directed  Pathology pending at this time,  has appt with radiation oncology       (K59.03) Drug-induced constipation  Comment: acute/ongoing  No change  Plan: change miralax 17gm to  QD prn, senna s 2 PO to  BID prn     (I10) Benign essential hypertension  Comment: ongoing, no change  Plan: BMP follow, continue HCTZ 25mg QD     (D62) Anemia due to blood loss, acute  Comment: acute/ongoing  Plan: follow Hgb, Hgb 1/30/23 was 10.1       MED REC REQUIRED  Post Medication Reconciliation Status: medication reconcilation previously completed during another office visit      Orders:  Change miralax to 17gm QD prn and senna s to 1 PO BID prn      Electronically signed by: Tonya Lynn Haase, RUBEN CNP             Sincerely,        Tonya Lynn Haase, APRN CNP

## 2023-01-31 NOTE — PROGRESS NOTES
[facility] Care Coordination Contact    CC attended telephonic care conference for member on 1/31/23 at [facility].   Present at care conference member, this care coordinator, adult daughter (Mona), NH  (Lucero Tomlinson), NH RN (Ginger) and NH Therapy (Alo ) NH Dietician (Jada).  OT Report: Working on dressing and bathing, using incontinent products with assist.   Cognitive testing results: Short Blessed Test completed x2: No cognition issues.   PT Report:  Right leg improving but still needs assist of one with transfers. Can walk 300 ft, with walker holding on the belt due to gait instability and.    Nursing Report: Pain levels improving 5/10. Spinal incision improving and taking vitamin supplements to help with wound healing. Daily dressing to spinal incision. Has follow-up with the neuro surgeon on 2/6/22. Neurologist appt on 2/14/23.  Mild edema on legs. Showers with assist of one.   Dietician Report: Refusing to eat facility meals, daughter brings ethnic food, however eating less. At risk for weight loss.   Social Work Report: Will plan discharge after completing therapy andl share discharge summary with writer.   TCU Recommendations: Therapy plans to work with member till middle of the next week or till indpependent. Ongoing physical therapy recommended and referral will be sent to [facility].   CC Report:  Notified TCU team of 30 day waiver closure requirement. Reviewed that if member discharges after the 30th day, will need to be re-screened for elderly waiver prior to discharge.     Roney Harrison RN BSN PHN  [facility] Care Coordinator  [phone]  Fax:[phone]

## 2023-02-01 ENCOUNTER — TRANSITIONAL CARE UNIT VISIT (OUTPATIENT)
Dept: GERIATRICS | Facility: CLINIC | Age: 73
End: 2023-02-01
Payer: COMMERCIAL

## 2023-02-01 VITALS
HEIGHT: 63 IN | RESPIRATION RATE: 18 BRPM | TEMPERATURE: 99 F | SYSTOLIC BLOOD PRESSURE: 102 MMHG | HEART RATE: 78 BPM | BODY MASS INDEX: 33.13 KG/M2 | WEIGHT: 187 LBS | OXYGEN SATURATION: 95 % | DIASTOLIC BLOOD PRESSURE: 65 MMHG

## 2023-02-01 DIAGNOSIS — D62 ANEMIA DUE TO BLOOD LOSS, ACUTE: ICD-10-CM

## 2023-02-01 DIAGNOSIS — K59.03 DRUG-INDUCED CONSTIPATION: ICD-10-CM

## 2023-02-01 DIAGNOSIS — G95.89 INTRADURAL MASS (H): Primary | ICD-10-CM

## 2023-02-01 DIAGNOSIS — I10 BENIGN ESSENTIAL HYPERTENSION: ICD-10-CM

## 2023-02-01 DIAGNOSIS — R53.81 PHYSICAL DECONDITIONING: ICD-10-CM

## 2023-02-01 DIAGNOSIS — Z98.890 S/P LAMINECTOMY: ICD-10-CM

## 2023-02-01 PROCEDURE — 99309 SBSQ NF CARE MODERATE MDM 30: CPT | Performed by: NURSE PRACTITIONER

## 2023-02-01 RX ORDER — METHOCARBAMOL 500 MG/1
500 TABLET, FILM COATED ORAL 3 TIMES DAILY PRN
Start: 2023-02-01 | End: 2023-02-03

## 2023-02-01 NOTE — LETTER
"    2/1/2023        RE: Laure Wood  [address] [address] [address]        [facility]    Chief Complaint   Patient presents with     RECHECK     HPI:  Laure Wood is a 72 year old  (1950), who is being seen today for an episodic care visit at: [facility]) [25]. Today's concern is:   Intradural mass s/p laminectomy: patient sitting up in w/c, states pain in legs is improving some, low back incisional pain rated 4/10, also improving,  in therapy she is walking up to 400 feet using a 4ww with CGA, patient states she is getting stronger in therapy  Constipation: states bowels are working  HTN:  /66, 102/65,135/92 with HR in 70-80 range, denies CP, palpitations, SOB  Anemia: Hgb stable 10.3     Allergies, and PMH/PSH reviewed in [facility] today.  REVIEW OF SYSTEMS:  10 point ROS of systems including Constitutional, Eyes, Respiratory, Cardiovascular, Gastroenterology, Genitourinary, Integumentary, Musculoskeletal, Psychiatric were all negative except for pertinent positives noted in my HPI.    Objective:   /65   Pulse 78   Temp 99  F (37.2  C)   Resp 18   Ht 1.6 m (5' 3\")   Wt 84.8 kg (187 lb)   LMP  (LMP Unknown)   SpO2 95%   BMI 33.13 kg/m    GENERAL APPEARANCE:  Alert, in no distress, morbidly obese  ENT:  Mouth and posterior oropharynx normal, moist mucous membranes, normal hearing acuity  EYES:  EOM, conjunctivae, lids, pupils and irises normal, PERRL  RESP:  respiratory effort and palpation of chest normal, lungs clear to auscultation , no respiratory distress  CV:  Palpation and auscultation of heart done , regular rate and rhythm, no murmur, rub, or gallop, peripheral edema trace+ in LE bilaterally  ABDOMEN:  normal bowel sounds, soft, nontender, no hepatosplenomegaly or other masses  M/S:   patient sitting up in w/c  SKIN:  Inspection of skin and subcutaneous tissue baseline, did not visualize surgical incision  NEURO:   speech wnl  PSYCH:  affect " and mood normal    Recent labs in [facility] reviewed by me today.  and   Most Recent 3 CBC's:  Recent Labs   Lab Test 01/25/23  0657 01/23/23  0347 01/22/23  0751   WBC 8.1 7.8 7.2   HGB 10.2* 9.9* 9.9*   MCV 90 89 88   * 418 353     Most Recent 3 BMP's:  Recent Labs   Lab Test 01/25/23  0657 01/24/23  0424 01/23/23  0347 01/22/23  0751     --  139 139   POTASSIUM 4.3 4.0 4.1 4.1   CHLORIDE 102  --  99 100   CO2 23  --  24 26   BUN 38.0*  --  28.2* 20.1   CR 1.11*  --  0.94 0.94   ANIONGAP 15  --  16* 13   ALONSO 9.9  --  10.0 9.6   GLC 97  --  105* 104*       Assessment/Plan:  (G95.89) Intradural mass (H)  (primary encounter diagnosis)  (Z98.890) S/P laminectomy  (R53.81) Physical deconditioning  Comment: acute/ongoing s/p T7-9 laminectomy and resection of intradural mass Dr. Antunez   Plan: PT and OT, continue dilaudid to 2-4 mg q 4 hours prn, change robaxin to 500mg TID prn, continue neurontin 200mg BID,  tylenol to 1000mg TID scheduled  F/u with Dr. Antunez as directed  Pathology pending at this time,  has appt with radiation oncology        (K59.03) Drug-induced constipation  Comment: acute/ongoing  No change  Plan: change miralax 17gm to  QD prn, senna s 2 PO to  BID prn     (I10) Benign essential hypertension  Comment: ongoing, no change  Plan: BMP follow, continue HCTZ 25mg QD     (D62) Anemia due to blood loss, acute  Comment: acute/ongoing, no change  Plan: follow Hgb, Hgb 1/30/23 was 10.1       MED REC REQUIRED  Post Medication Reconciliation Status: medication reconcilation previously completed during another office visit      Orders:  Change robaxin to 500mg TID prn      Electronically signed by: Tonya Lynn Haase, RUBEN CNP             Sincerely,        Tonya Lynn Haase, APRN CNP

## 2023-02-01 NOTE — PROGRESS NOTES
"[facility] GERIATRICS    Chief Complaint   Patient presents with     RECHECK     HPI:  Laure Wood is a 72 year old  (1950), who is being seen today for an episodic care visit at: [facility] ([facility]) [25]. Today's concern is:   Intradural mass s/p laminectomy: patient sitting up in w/c, states pain in legs is improving some, low back incisional pain rated 4/10, also improving,  in therapy she is walking up to 400 feet using a 4ww with CGA, patient states she is getting stronger in therapy  Constipation: states bowels are working  HTN:  /66, 102/65,135/92 with HR in 70-80 range, denies CP, palpitations, SOB  Anemia: Hgb stable 10.3     Allergies, and PMH/PSH reviewed in [facility] today.  REVIEW OF SYSTEMS:  10 point ROS of systems including Constitutional, Eyes, Respiratory, Cardiovascular, Gastroenterology, Genitourinary, Integumentary, Musculoskeletal, Psychiatric were all negative except for pertinent positives noted in my HPI.    Objective:   /65   Pulse 78   Temp 99  F (37.2  C)   Resp 18   Ht 1.6 m (5' 3\")   Wt 84.8 kg (187 lb)   LMP  (LMP Unknown)   SpO2 95%   BMI 33.13 kg/m    GENERAL APPEARANCE:  Alert, in no distress, morbidly obese  ENT:  Mouth and posterior oropharynx normal, moist mucous membranes, normal hearing acuity  EYES:  EOM, conjunctivae, lids, pupils and irises normal, PERRL  RESP:  respiratory effort and palpation of chest normal, lungs clear to auscultation , no respiratory distress  CV:  Palpation and auscultation of heart done , regular rate and rhythm, no murmur, rub, or gallop, peripheral edema trace+ in LE bilaterally  ABDOMEN:  normal bowel sounds, soft, nontender, no hepatosplenomegaly or other masses  M/S:   patient sitting up in w/c  SKIN:  Inspection of skin and subcutaneous tissue baseline, did not visualize surgical incision  NEURO:   speech wnl  PSYCH:  affect and mood normal    Recent labs in [facility] reviewed by me today.  and   Most Recent 3 " CBC's:  Recent Labs   Lab Test 01/25/23  0657 01/23/23  0347 01/22/23  0751   WBC 8.1 7.8 7.2   HGB 10.2* 9.9* 9.9*   MCV 90 89 88   * 418 353     Most Recent 3 BMP's:  Recent Labs   Lab Test 01/25/23  0657 01/24/23  0424 01/23/23  0347 01/22/23  0751     --  139 139   POTASSIUM 4.3 4.0 4.1 4.1   CHLORIDE 102  --  99 100   CO2 23  --  24 26   BUN 38.0*  --  28.2* 20.1   CR 1.11*  --  0.94 0.94   ANIONGAP 15  --  16* 13   ALONSO 9.9  --  10.0 9.6   GLC 97  --  105* 104*       Assessment/Plan:  (G95.89) Intradural mass (H)  (primary encounter diagnosis)  (Z98.890) S/P laminectomy  (R53.81) Physical deconditioning  Comment: acute/ongoing s/p T7-9 laminectomy and resection of intradural mass Dr. Antunez   Plan: PT and OT, continue dilaudid to 2-4 mg q 4 hours prn, change robaxin to 500mg TID prn, continue neurontin 200mg BID,  tylenol to 1000mg TID scheduled  F/u with Dr. Antunez as directed  Pathology pending at this time,  has appt with radiation oncology        (K59.03) Drug-induced constipation  Comment: acute/ongoing  No change  Plan: change miralax 17gm to  QD prn, senna s 2 PO to  BID prn     (I10) Benign essential hypertension  Comment: ongoing, no change  Plan: BMP follow, continue HCTZ 25mg QD     (D62) Anemia due to blood loss, acute  Comment: acute/ongoing, no change  Plan: follow Hgb, Hgb 1/30/23 was 10.1       MED REC REQUIRED  Post Medication Reconciliation Status: medication reconcilation previously completed during another office visit      Orders:  Change robaxin to 500mg TID prn      Electronically signed by: Tonya Lynn Haase, RUBEN CNP

## 2023-02-03 ENCOUNTER — TRANSITIONAL CARE UNIT VISIT (OUTPATIENT)
Dept: GERIATRICS | Facility: CLINIC | Age: 73
End: 2023-02-03
Payer: COMMERCIAL

## 2023-02-03 VITALS
HEART RATE: 80 BPM | DIASTOLIC BLOOD PRESSURE: 66 MMHG | SYSTOLIC BLOOD PRESSURE: 120 MMHG | OXYGEN SATURATION: 97 % | TEMPERATURE: 98.4 F | WEIGHT: 187.6 LBS | HEIGHT: 63 IN | BODY MASS INDEX: 33.24 KG/M2

## 2023-02-03 DIAGNOSIS — I10 BENIGN ESSENTIAL HYPERTENSION: ICD-10-CM

## 2023-02-03 DIAGNOSIS — K59.03 DRUG-INDUCED CONSTIPATION: ICD-10-CM

## 2023-02-03 DIAGNOSIS — D62 ANEMIA DUE TO BLOOD LOSS, ACUTE: ICD-10-CM

## 2023-02-03 DIAGNOSIS — G95.89 INTRADURAL MASS (H): Primary | ICD-10-CM

## 2023-02-03 DIAGNOSIS — R53.81 PHYSICAL DECONDITIONING: ICD-10-CM

## 2023-02-03 DIAGNOSIS — Z98.890 S/P LAMINECTOMY: ICD-10-CM

## 2023-02-03 LAB — MAYO MISC RESULT: NORMAL

## 2023-02-03 PROCEDURE — 99309 SBSQ NF CARE MODERATE MDM 30: CPT | Performed by: NURSE PRACTITIONER

## 2023-02-03 RX ORDER — HYDROMORPHONE HYDROCHLORIDE 2 MG/1
2 TABLET ORAL EVERY 6 HOURS PRN
Qty: 20 TABLET | Refills: 0
Start: 2023-02-03 | End: 2023-02-08

## 2023-02-03 NOTE — LETTER
"    2/3/2023        RE: Laure Wood  [address] [address] [address]        [facility]    Chief Complaint   Patient presents with     RECHECK     HPI:  Laure Wood is a 72 year old  (1950), who is being seen today for an episodic care visit at: [facility]) [25]. Today's concern is:   Intradural mass s/p laminectomy: patient is walking in hallway with therapy, states pain is controlled, she has started weaning off pain meds,   in therapy she is walking > 400 feet using a 4ww with SBA, looking at discharge early next week  Constipation: no concerns with bowesl  HTN:  /70, 120/66, 102/65 with HR in 70-80 range, denies CP, palpitations, SOB  Anemia: Hgb stable 10.3    Allergies, and PMH/PSH reviewed in [facility] today.  REVIEW OF SYSTEMS:  10 point ROS of systems including Constitutional, Eyes, Respiratory, Cardiovascular, Gastroenterology, Genitourinary, Integumentary, Musculoskeletal, Psychiatric were all negative except for pertinent positives noted in my HPI.    Objective:   /66   Pulse 80   Temp 98.4  F (36.9  C)   Ht 1.6 m (5' 2.99\")   Wt 85.1 kg (187 lb 9.6 oz)   LMP  (LMP Unknown)   SpO2 97%   BMI 33.24 kg/m    GENERAL APPEARANCE:  Alert, in no distress  ENT:  Mouth and posterior oropharynx normal, moist mucous membranes, normal hearing acuity  EYES:  EOM, conjunctivae, lids, pupils and irises normal, PERRL  RESP:  respiratory effort and palpation of chest normal, lungs clear to auscultation , no respiratory distress  CV:  Palpation and auscultation of heart done , regular rate and rhythm, no murmur, rub, or gallop, peripheral edema trace+ in LE bilaterally  ABDOMEN:  normal bowel sounds, soft, nontender, no hepatosplenomegaly or other masses  M/S:   patient ambulating in hallway, gait steady  SKIN:  Inspection of skin and subcutaneous tissue baseline, did not visualize surgical incision  NEURO:   speech wnl  PSYCH:  affect and mood normal    Recent " labs in [facility] reviewed by me today.  and   Most Recent 3 CBC's:Recent Labs   Lab Test 01/25/23  0657 01/23/23  0347 01/22/23  0751   WBC 8.1 7.8 7.2   HGB 10.2* 9.9* 9.9*   MCV 90 89 88   * 418 353     Most Recent 3 BMP's:Recent Labs   Lab Test 01/25/23  0657 01/24/23  0424 01/23/23  0347 01/22/23  0751     --  139 139   POTASSIUM 4.3 4.0 4.1 4.1   CHLORIDE 102  --  99 100   CO2 23  --  24 26   BUN 38.0*  --  28.2* 20.1   CR 1.11*  --  0.94 0.94   ANIONGAP 15  --  16* 13   ALONSO 9.9  --  10.0 9.6   GLC 97  --  105* 104*       Assessment/Plan:  (G95.89) Intradural mass (H)  (primary encounter diagnosis)  (Z98.890) S/P laminectomy  (R53.81) Physical deconditioning  Comment: acute/ongoing s/p T7-9 laminectomy and resection of intradural mass Dr. Antunez   Plan: PT and OT, decrease dilaudid to 2 mg q 6 hours prn (discussed with patient),  continue neurontin 200mg BID,  tylenol to 1000mg TID scheduled  DC robaxin patient not using  F/u with Dr. Antunez as directed  Pathology pending at this time,  has appt with radiation oncology        (K59.03) Drug-induced constipation  Comment: acute/ongoing  No change  Plan: continue miralax 17gm to  QD prn, senna s 2 PO to  BID prn     (I10) Benign essential hypertension  Comment: ongoing, no change  Plan: BMP follow, continue HCTZ 25mg QD     (D62) Anemia due to blood loss, acute  Comment: acute/ongoing, no change  Plan: follow Hgb, Hgb 1/30/23 was 10.1          MED REC REQUIRED  Post Medication Reconciliation Status: medication reconcilation previously completed during another office visit      Orders:  DC robaxin  Decrease dilaudid to 2mg q 6 hours prn      Electronically signed by: Tonya Lynn Haase, APRN CNP             Sincerely,        Tonya Lynn Haase, APRN CNP

## 2023-02-03 NOTE — PROGRESS NOTES
"[facility] GERIATRICS    Chief Complaint   Patient presents with     RECHECK     HPI:  Laure Wood is a 72 year old  (1950), who is being seen today for an episodic care visit at: [facility] ([facility]) [25]. Today's concern is:   Intradural mass s/p laminectomy: patient is walking in hallway with therapy, states pain is controlled, she has started weaning off pain meds,   in therapy she is walking > 400 feet using a 4ww with SBA, looking at discharge early next week  Constipation: no concerns with bowesl  HTN:  /70, 120/66, 102/65 with HR in 70-80 range, denies CP, palpitations, SOB  Anemia: Hgb stable 10.3    Allergies, and PMH/PSH reviewed in [facility] today.  REVIEW OF SYSTEMS:  10 point ROS of systems including Constitutional, Eyes, Respiratory, Cardiovascular, Gastroenterology, Genitourinary, Integumentary, Musculoskeletal, Psychiatric were all negative except for pertinent positives noted in my HPI.    Objective:   /66   Pulse 80   Temp 98.4  F (36.9  C)   Ht 1.6 m (5' 2.99\")   Wt 85.1 kg (187 lb 9.6 oz)   LMP  (LMP Unknown)   SpO2 97%   BMI 33.24 kg/m    GENERAL APPEARANCE:  Alert, in no distress  ENT:  Mouth and posterior oropharynx normal, moist mucous membranes, normal hearing acuity  EYES:  EOM, conjunctivae, lids, pupils and irises normal, PERRL  RESP:  respiratory effort and palpation of chest normal, lungs clear to auscultation , no respiratory distress  CV:  Palpation and auscultation of heart done , regular rate and rhythm, no murmur, rub, or gallop, peripheral edema trace+ in LE bilaterally  ABDOMEN:  normal bowel sounds, soft, nontender, no hepatosplenomegaly or other masses  M/S:   patient ambulating in hallway, gait steady  SKIN:  Inspection of skin and subcutaneous tissue baseline, did not visualize surgical incision  NEURO:   speech wnl  PSYCH:  affect and mood normal    Recent labs in [facility] reviewed by me today.  and   Most Recent 3 CBC's:Recent Labs   Lab Test " 01/25/23  0657 01/23/23  0347 01/22/23  0751   WBC 8.1 7.8 7.2   HGB 10.2* 9.9* 9.9*   MCV 90 89 88   * 418 353     Most Recent 3 BMP's:Recent Labs   Lab Test 01/25/23  0657 01/24/23  0424 01/23/23  0347 01/22/23  0751     --  139 139   POTASSIUM 4.3 4.0 4.1 4.1   CHLORIDE 102  --  99 100   CO2 23  --  24 26   BUN 38.0*  --  28.2* 20.1   CR 1.11*  --  0.94 0.94   ANIONGAP 15  --  16* 13   ALONSO 9.9  --  10.0 9.6   GLC 97  --  105* 104*       Assessment/Plan:  (G95.89) Intradural mass (H)  (primary encounter diagnosis)  (Z98.890) S/P laminectomy  (R53.81) Physical deconditioning  Comment: acute/ongoing s/p T7-9 laminectomy and resection of intradural mass Dr. Antunez   Plan: PT and OT, decrease dilaudid to 2 mg q 6 hours prn (discussed with patient),  continue neurontin 200mg BID,  tylenol to 1000mg TID scheduled  DC robaxin patient not using  F/u with Dr. Antunez as directed  Pathology pending at this time,  has appt with radiation oncology        (K59.03) Drug-induced constipation  Comment: acute/ongoing  No change  Plan: continue miralax 17gm to  QD prn, senna s 2 PO to  BID prn     (I10) Benign essential hypertension  Comment: ongoing, no change  Plan: BMP follow, continue HCTZ 25mg QD     (D62) Anemia due to blood loss, acute  Comment: acute/ongoing, no change  Plan: follow Hgb, Hgb 1/30/23 was 10.1          MED REC REQUIRED  Post Medication Reconciliation Status: medication reconcilation previously completed during another office visit      Orders:  DC robaxin  Decrease dilaudid to 2mg q 6 hours prn      Electronically signed by: Tonya Lynn Haase, APRN CNP

## 2023-02-04 ENCOUNTER — TELEPHONE (OUTPATIENT)
Dept: GERIATRICS | Facility: CLINIC | Age: 73
End: 2023-02-04
Payer: COMMERCIAL

## 2023-02-04 NOTE — PROGRESS NOTES
Staff paged for notes of low BP - disconnected number provided for call back.    Dr Maude MIRANDA DNP

## 2023-02-05 NOTE — PROGRESS NOTES
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 T7-9 laminectomy and resection of intradural mass  2. Use of intra-operative C-arm  3. Use of microscope  4. Use of ultrasound  5. Neuromonitoring with SSEPs, MEPs     Resident: Thania Castro MD     Anesthesia: General endotracheal     Complications: none     Estimated blood loss: 50ml     Indications: Laure is a 72 year old woman who presented to neurosurgery clinic with progressive lower extremity weakness and symptoms of myelopathy. 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 She was indicated for abovementioned procedure. We explained the risks of the procedure and she agreed to proceed with surgery after signing informed consent.    Hospital Course:    Patient underwent above-mentioned procedure on 1/16/23. The operation was uncomplicated and she was admitted to the floor for routine post operative cares. Patient was kept on flat bedrest for first post op day and started on wound healing protocol. On 1/17 patient did have heart rates 80s-90s with a BBB. Echo revealed ejection fraction of 44-60% and left bundle branch block. Troponin not elevated. Chest XR negative for cardiopulmonary findings. Patient did have a one time fever of 101 for which a bilateral lower extremity DVT US was obtained and negative.  Pain Team was consulted for pain management recommendations. Subcutaneous Heparin was started on 1/19. Pathology is still pending at time of discharge.    Patient will have 1 week follow up appointment scheduled with Neurosurgery for wound check and suture removal. If patient is still in her rehab facility 14 days post-op it is ok  that a provider at the facility remove the stitches. Additionally, a referral was placed for patient to schedule an outpatient follow up with Radiation Oncology.       HPI: Laure Wood is a pleasant 72 year old female who is s/p T7-9 laminectomies and resection of intradural mass by Dr. Antunez on 1/16/23.  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  Referral to radiation oncology was placed at discharge.      Currently, patient is 2 weeks out from surgery.  She is currently still in rehab at [facility] with possible discharge on Thursday.  She is working on weaning off her pain medication.  She does c/o of incisional pain for me today.  The paresthesias she was feeling in her torso and BLE is greatly reduced since surgery and is almost gone.  She is working at being more mobile.  She is in a wheelchair today, but walks with a walker in rehab.  She denies issues with bowel/bladder.       Physical Exam   LMP  (LMP Unknown)     Constitutional: Alert, no acute distress.    [place]: not tested, patient unsteady without walker    Neurological:    Strength (L/R)  5/5 Deltoid  5/5 Bicep  5/5 Wrist Extensor  5/5 Tricep  5/5 Handgrip    5/4 Hip Flexion  5/4 Knee Extension  5/4 Ankle Dorsiflexion  5/4 Plantar Flexion    Sensation: SILT BUE/BLE    Incision:  Clean, dry and intact.  No erythema, induration or fluctuance.  No drainage noted. Sutures present.    IMAGING:  No new imaging.    ASSESSMENT:  Laure Wood is a 72 year old female who is s/p T7-9 laminectomies and resection of intradural mass by Dr. Antunez on 1/16/23.  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  Referral to radiation oncology was placed at discharge.      Patient is about 2 weeks out from surgery.  She has almost complete resolution of the paresthesias in her lower body. She is  working on increasing her ambulatory ability and weaning off narcotic pain medication.     Sutures removed today.  Steri strips placed over incision to promote additional healing for the next 1 week.      PLAN:    You may now use NSAIDs, in additional to Tylenol, muscle relaxer, ice/heat for pain.    Okay to resume aspirin.  Wean off narcotic pain medications.  Would recommend using Robaxin (muscle relaxer) if has muscle tightness or spasms.        Okay to shower and get incision wet.  No submerging/soaking it until it is fully healed (around 4 weeks post-op).   Cover portions of incision with a breathable dressing that may get irritated by clothing rubbing on it.  Continue to increase time ambulating, avoid bending, twisting, strenuous exercise, or heavy lifting (greater than 10 pounds).   No driving while using narcotics or other sedating medications, such as sleep aids, muscle relaxants, etc.    Follow up in 4 weeks. No imaging needed at this appointment.    Follow up with Rad-Onc as planned.      Patient is in agreement with this plan and states no further questions.      Ally Luna PA-C  Department of Neurosurgery  Office: [phone]

## 2023-02-06 ENCOUNTER — PATIENT OUTREACH (OUTPATIENT)
Dept: GERIATRIC MEDICINE | Facility: CLINIC | Age: 73
End: 2023-02-06

## 2023-02-06 ENCOUNTER — OFFICE VISIT (OUTPATIENT)
Dept: NEUROSURGERY | Facility: CLINIC | Age: 73
End: 2023-02-06
Payer: COMMERCIAL

## 2023-02-06 VITALS
SYSTOLIC BLOOD PRESSURE: 122 MMHG | HEIGHT: 63 IN | DIASTOLIC BLOOD PRESSURE: 80 MMHG | OXYGEN SATURATION: 99 % | WEIGHT: 193.3 LBS | HEART RATE: 89 BPM | BODY MASS INDEX: 34.25 KG/M2

## 2023-02-06 DIAGNOSIS — Z98.890 S/P SPINAL SURGERY: Primary | ICD-10-CM

## 2023-02-06 DIAGNOSIS — D49.7 INTRADURAL EXTRAMEDULLARY THORACIC TUMOR: ICD-10-CM

## 2023-02-06 PROCEDURE — 99024 POSTOP FOLLOW-UP VISIT: CPT | Performed by: PHYSICIAN ASSISTANT

## 2023-02-06 ASSESSMENT — PAIN SCALES - GENERAL: PAINLEVEL: MILD PAIN (3)

## 2023-02-06 NOTE — PROGRESS NOTES
[facility] Care Coordination Contact    Received e-mail from MARLY Ambriz stating member will be discharged to home on 2/9/23. CC called member and schuled annual HRA on 2/8/23.   Roney Harrison RN BSN PHN  [facility] Care Coordinator  [phone]  Fax:[phone]

## 2023-02-06 NOTE — PROGRESS NOTES
[facility] Care Coordination Contact    Called member to schedule annual HRA home visit. HRA has been scheduled for 2/8/23.     Roney Harrison RN BSN PHN  [facility] Care Coordinator  [phone]  Fax:[phone]

## 2023-02-06 NOTE — LETTER
2/6/2023       RE: Laure Wood  [address] [facility] [address]     Dear Colleague,    Thank you for referring your patient, Laure Wood, to the [facility]UR[facility] [place] at [facility]. Please see a copy of my visit note below.        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 T7-9 laminectomy and resection of intradural mass  2. Use of intra-operative C-arm  3. Use of microscope  4. Use of ultrasound  5. Neuromonitoring with SSEPs, MEPs     Resident: Thania Castro MD     Anesthesia: General endotracheal     Complications: none     Estimated blood loss: 50ml     Indications: Laure is a 72 year old woman who presented to neurosurgery clinic with progressive lower extremity weakness and symptoms of myelopathy. 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 She was indicated for abovementioned procedure. We explained the risks of the procedure and she agreed to proceed with surgery after signing informed consent.    Hospital Course:    Patient underwent above-mentioned procedure on 1/16/23. The operation was uncomplicated and she was admitted to the floor for routine post operative cares. Patient was kept on flat bedrest for first post op day and started on wound healing protocol. On 1/17 patient did have heart rates 80s-90s with a BBB. Echo revealed ejection fraction of 44-60% and left bundle branch block. Troponin not elevated. Chest XR negative for cardiopulmonary findings. Patient did have a one time fever of 101 for which a bilateral lower extremity DVT US was obtained and negative.  Pain Team was  consulted for pain management recommendations. Subcutaneous Heparin was started on 1/19. Pathology is still pending at time of discharge.    Patient will have 1 week follow up appointment scheduled with Neurosurgery for wound check and suture removal. If patient is still in her rehab facility 14 days post-op it is ok that a provider at the facility remove the stitches. Additionally, a referral was placed for patient to schedule an outpatient follow up with Radiation Oncology.       HPI: Laure Wood is a pleasant 72 year old female who is s/p T7-9 laminectomies and resection of intradural mass by Dr. Antunez on 1/16/23.  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  Referral to radiation oncology was placed at discharge.      Currently, patient is 2 weeks out from surgery.  She is currently still in rehab at [facility] with possible discharge on Thursday.  She is working on weaning off her pain medication.  She does c/o of incisional pain for me today.  The paresthesias she was feeling in her torso and BLE is greatly reduced since surgery and is almost gone.  She is working at being more mobile.  She is in a wheelchair today, but walks with a walker in rehab.  She denies issues with bowel/bladder.       Physical Exam   LMP  (LMP Unknown)     Constitutional: Alert, no acute distress.    [place]: not tested, patient unsteady without walker    Neurological:    Strength (L/R)  5/5 Deltoid  5/5 Bicep  5/5 Wrist Extensor  5/5 Tricep  5/5 Handgrip    5/4 Hip Flexion  5/4 Knee Extension  5/4 Ankle Dorsiflexion  5/4 Plantar Flexion    Sensation: SILT BUE/BLE    Incision:  Clean, dry and intact.  No erythema, induration or fluctuance.  No drainage noted. Sutures present.    IMAGING:  No new imaging.    ASSESSMENT:  Laure Wood is a 72 year old female who is s/p T7-9 laminectomies and resection of intradural mass by Dr. Antunez on 1/16/23.  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  Referral to radiation oncology was placed at discharge.      Patient is about 2 weeks out from surgery.  She has almost complete resolution of the paresthesias in her lower body. She is working on increasing her ambulatory ability and weaning off narcotic pain medication.     Sutures removed today.  Steri strips placed over incision to promote additional healing for the next 1 week.      PLAN:    You may now use NSAIDs, in additional to Tylenol, muscle relaxer, ice/heat for pain.    Okay to resume aspirin.  Wean off narcotic pain medications.  Would recommend using Robaxin (muscle relaxer) if has muscle tightness or spasms.        Okay to shower and get incision wet.  No submerging/soaking it until it is fully healed (around 4 weeks post-op).   Cover portions of incision with a breathable dressing that may get irritated by clothing rubbing on it.  Continue to increase time ambulating, avoid bending, twisting, strenuous exercise, or heavy lifting (greater than 10 pounds).   No driving while using narcotics or other sedating medications, such as sleep aids, muscle relaxants, etc.    Follow up in 4 weeks. No imaging needed at this appointment.    Follow up with Rad-Onc as planned.      Patient is in agreement with this plan and states no further questions.      Ally Luna PA-C  Department of Neurosurgery  Office: [phone]

## 2023-02-06 NOTE — PATIENT INSTRUCTIONS
Follow up with Ally Luna PA-C in 4 weeks.    No lifting >10 pounds.  Avoid hugging motions.  No bending/twisting activities.   Keep steri strips on for the next 7 days.    Okay to shower and let water drip over incision, pat dry.

## 2023-02-08 ENCOUNTER — DISCHARGE SUMMARY NURSING HOME (OUTPATIENT)
Dept: GERIATRICS | Facility: CLINIC | Age: 73
End: 2023-02-08
Payer: COMMERCIAL

## 2023-02-08 ENCOUNTER — PATIENT OUTREACH (OUTPATIENT)
Dept: GERIATRIC MEDICINE | Facility: CLINIC | Age: 73
End: 2023-02-08

## 2023-02-08 VITALS
HEART RATE: 74 BPM | SYSTOLIC BLOOD PRESSURE: 107 MMHG | TEMPERATURE: 98.4 F | DIASTOLIC BLOOD PRESSURE: 72 MMHG | RESPIRATION RATE: 17 BRPM | BODY MASS INDEX: 33.24 KG/M2 | HEIGHT: 63 IN | WEIGHT: 187.6 LBS | OXYGEN SATURATION: 99 %

## 2023-02-08 DIAGNOSIS — D62 ANEMIA DUE TO BLOOD LOSS, ACUTE: ICD-10-CM

## 2023-02-08 DIAGNOSIS — R53.81 PHYSICAL DECONDITIONING: ICD-10-CM

## 2023-02-08 DIAGNOSIS — G95.89 INTRADURAL MASS (H): Primary | ICD-10-CM

## 2023-02-08 DIAGNOSIS — Z98.890 S/P LAMINECTOMY: ICD-10-CM

## 2023-02-08 DIAGNOSIS — K59.03 DRUG-INDUCED CONSTIPATION: ICD-10-CM

## 2023-02-08 DIAGNOSIS — I10 BENIGN ESSENTIAL HYPERTENSION: ICD-10-CM

## 2023-02-08 PROCEDURE — 99316 NF DSCHRG MGMT 30 MIN+: CPT | Performed by: NURSE PRACTITIONER

## 2023-02-08 RX ORDER — METHOCARBAMOL 500 MG/1
500 TABLET, FILM COATED ORAL 3 TIMES DAILY PRN
COMMUNITY
End: 2023-02-08

## 2023-02-08 NOTE — PROGRESS NOTES
[facility] GERIATRICS DISCHARGE SUMMARY  PATIENT'S NAME: Laure Wood  YOB: 1950  MEDICAL RECORD NUMBER:  [id number]  Place of Service where encounter took place:  [facility]) [25]    PRIMARY CARE PROVIDER AND CLINIC RESPONSIBLE AFTER TRANSFER:   Yoshi Bowen MD, [address] [facility] [address] / [facility] [address]    [facility] [place]     Transferring providers: Tonya Lynn Haase, APRN CNP, Dr. Austin Ann MD  Recent Hospitalization/ED:  [facility] stay 1/16/23 to 1/25/23.  Date of SNF Admission: January 25, 2023  Date of SNF (anticipated) Discharge: February 09, 2023  Discharged to: previous independent home  Cognitive Scores: BIMS: 15/15 and Short blessed: 6/28  Physical Function: Ambulating > 300 ft with 4ww  DME: Walker    CODE STATUS/ADVANCE DIRECTIVES DISCUSSION:  Prior   ALLERGIES: No known drug allergy    NURSING FACILITY COURSE   Medication Changes/Rationale:     See assessment and plan    Summary of nursing facility stay:   Patient progressed to walking > 300 feet using a 4ww with SBA, SBA with ADL's, does have a PCA at home to assist with cares, will DC home with home PT through [facility].     Discharge Medications:  MED REC REQUIRED  Post Medication Reconciliation Status: discharge medications reconciled and changed, per note/orders       Current Outpatient Medications   Medication Sig Dispense Refill     acetaminophen (TYLENOL) 500 MG tablet Take 2 tablets (1,000 mg) by mouth 3 times daily       allopurinol (ZYLOPRIM) 100 MG tablet Take 2 tablets (200 mg) by mouth daily 180 tablet 3     ascorbic acid 250 MG CHEW Take 250 mg by mouth daily       colchicine (COLCYRS) 0.6 MG tablet Take 1 tablet (0.6 mg) by mouth daily 90 tablet 3     diclofenac (VOLTAREN) 1 % topical gel Apply 2-4 g topically 4 times daily R Knee, foot as needed 100 g 11     gabapentin (NEURONTIN) 100 MG capsule Take 2 capsules (200 mg) by mouth 2  "times daily       hydrochlorothiazide (HYDRODIURIL) 25 MG tablet Take 1 tablet (25 mg) by mouth daily 90 tablet 0     HYDROmorphone (DILAUDID) 2 MG tablet Take 1 tablet (2 mg) by mouth every 6 hours as needed for severe pain (7-10) 20 tablet 0     methocarbamol (ROBAXIN) 500 MG tablet Take 500 mg by mouth 3 times daily as needed for muscle spasms For spasms       omeprazole (PRILOSEC) 20 MG DR capsule Take 1 capsule (20 mg) by mouth daily as needed (pt takes prn for occasional GERD symptoms)       polyethylene glycol (MIRALAX) 17 GM/Dose powder Take 17 g by mouth daily as needed for constipation 510 g      senna-docusate (SENOKOT-S/PERICOLACE) 8.6-50 MG tablet Take 2 tablets by mouth 2 times daily as needed for constipation       simvastatin (ZOCOR) 20 MG tablet Take 1 tablet (20 mg) by mouth At Bedtime 90 tablet 3     vitamin A 3 MG (79585 UNITS) capsule Take 1 capsule (10,000 Units) by mouth daily       zinc sulfate (ZINCATE) 220 (50 Zn) MG capsule Take 1 capsule (220 mg) by mouth daily          MED REC REQUIRED  Post Medication Reconciliation Status: discharge medications reconciled and changed, per note/orders     Controlled medications:   not applicable/none     Past Medical History:   Past Medical History:   Diagnosis Date     Gastroesophageal reflux disease      Hyperlipidemia LDL goal <160      Hypertension, essential, benign      Knee osteoarthritis     knees, hands     Motion sickness      PONV (postoperative nausea and vomiting)      Physical Exam:   Vitals: /72   Pulse 74   Temp 98.4  F (36.9  C)   Resp 17   Ht 1.6 m (5' 3\")   Wt 85.1 kg (187 lb 9.6 oz)   LMP  (LMP Unknown)   SpO2 99%   BMI 33.23 kg/m    BMI: Body mass index is 33.23 kg/m .  GENERAL APPEARANCE:  Alert, in no distress  ENT:  Mouth and posterior oropharynx normal, moist mucous membranes, normal hearing acuity  EYES:  EOM, conjunctivae, lids, pupils and irises normal, PERRL  RESP:  respiratory effort and palpation of chest " normal, lungs clear to auscultation , no respiratory distress  CV:  Palpation and auscultation of heart done , regular rate and rhythm, no murmur, rub, or gallop, peripheral edema trace+ in LE bilaterally  ABDOMEN:  normal bowel sounds, soft, nontender, no hepatosplenomegaly or other masses  M/S:   patient sitting up in w/c  SKIN:  Inspection of skin and subcutaneous tissue baseline, did not visualize surgical incision  NEURO:   speech wnl  PSYCH:  affect and mood normal     SNF labs: Recent labs in [facility] reviewed by me today.  and   Most Recent 3 CBC's:  Recent Labs   Lab Test 01/25/23  0657 01/23/23  0347 01/22/23  0751   WBC 8.1 7.8 7.2   HGB 10.2* 9.9* 9.9*   MCV 90 89 88   * 418 353     Most Recent 3 BMP's:  Recent Labs   Lab Test 01/25/23  0657 01/24/23  0424 01/23/23  0347 01/22/23  0751     --  139 139   POTASSIUM 4.3 4.0 4.1 4.1   CHLORIDE 102  --  99 100   CO2 23  --  24 26   BUN 38.0*  --  28.2* 20.1   CR 1.11*  --  0.94 0.94   ANIONGAP 15  --  16* 13   ALONSO 9.9  --  10.0 9.6   GLC 97  --  105* 104*     (G95.89) Intradural mass (H)  (primary encounter diagnosis)  (Z98.890) S/P laminectomy  (R53.81) Physical deconditioning  Comment: acute/ongoing s/p T7-9 laminectomy and resection of intradural mass Dr. Antunez   Plan: DC to home with home PT through [facility] continue neurontin 200mg BID,  tylenol to 1000mg TID scheduled  DC robaxin and dilaudid  F/u with Dr. Antunez as directed  Pathology pending at this time,  has appt with radiation oncology        (K59.03) Drug-induced constipation  Comment: acute/ongoing  Plan: continue miralax 17gm to  QD prn, senna s 2 PO to  BID prn     (I10) Benign essential hypertension  Comment: ongoing  Plan: continue HCTZ 25mg QD     (D62) Anemia due to blood loss, acute  Comment: acute/ongoing, no change  Plan: follow up with PCP,  Hgb 1/30/23 was 10.1    DISCHARGE PLAN:    Follow up labs: No labs orders/due    Medical Follow Up:      Follow up with primary care provider in  1 weeks    Current [place] scheduled appointments:     Future Appointments   Date Time Provider Department Center   2/14/2023  2:30 PM Katina Dominguez MD [facility]    3/6/2023  8:30 AM Ally Luna PA-C [facility]         Discharge Services: Home Care:  Physical Therapy    Discharge Instructions Verbalized to Patient at Discharge:     None    TOTAL DISCHARGE TIME:   Greater than 30 minutes  Electronically signed by:  Tonya Lynn Haase, APRN CNP

## 2023-02-08 NOTE — LETTER
2/8/2023        RE: Laure Wood  [address]  [facility] [address]        [facility] GERIATRICS DISCHARGE SUMMARY  PATIENT'S NAME: Laure Wood  YOB: 1950  MEDICAL RECORD NUMBER:  [id number]  Place of Service where encounter took place:  [facility]) [25]    PRIMARY CARE PROVIDER AND CLINIC RESPONSIBLE AFTER TRANSFER:   Yoshi Bowen MD, [address] / [facility] [address]    [facility] Provider     Transferring providers: Tonya Lynn Haase, APRN CNP, Dr. Austin Ann MD  Recent Hospitalization/ED:  [facility] stay 1/16/23 to 1/25/23.  Date of SNF Admission: January 25, 2023  Date of SNF (anticipated) Discharge: February 09, 2023  Discharged to: previous independent home  Cognitive Scores: BIMS: 15/15 and Short blessed: 6/28  Physical Function: Ambulating > 300 ft with 4ww  DME: Walker    CODE STATUS/ADVANCE DIRECTIVES DISCUSSION:  Prior   ALLERGIES: No known drug allergy    NURSING FACILITY COURSE   Medication Changes/Rationale:     See assessment and plan    Summary of nursing facility stay:   Patient progressed to walking > 300 feet using a 4ww with SBA, SBA with ADL's, does have a PCA at home to assist with cares, will DC home with home PT through [facility].     Discharge Medications:  MED REC REQUIRED  Post Medication Reconciliation Status: discharge medications reconciled and changed, per note/orders       Current Outpatient Medications   Medication Sig Dispense Refill     acetaminophen (TYLENOL) 500 MG tablet Take 2 tablets (1,000 mg) by mouth 3 times daily       allopurinol (ZYLOPRIM) 100 MG tablet Take 2 tablets (200 mg) by mouth daily 180 tablet 3     ascorbic acid 250 MG CHEW Take 250 mg by mouth daily       colchicine (COLCYRS) 0.6 MG tablet Take 1 tablet (0.6 mg) by mouth daily 90 tablet 3     diclofenac (VOLTAREN) 1 % topical gel Apply 2-4 g topically 4 times daily R Knee, foot as needed 100  "g 11     gabapentin (NEURONTIN) 100 MG capsule Take 2 capsules (200 mg) by mouth 2 times daily       hydrochlorothiazide (HYDRODIURIL) 25 MG tablet Take 1 tablet (25 mg) by mouth daily 90 tablet 0     HYDROmorphone (DILAUDID) 2 MG tablet Take 1 tablet (2 mg) by mouth every 6 hours as needed for severe pain (7-10) 20 tablet 0     methocarbamol (ROBAXIN) 500 MG tablet Take 500 mg by mouth 3 times daily as needed for muscle spasms For spasms       omeprazole (PRILOSEC) 20 MG DR capsule Take 1 capsule (20 mg) by mouth daily as needed (pt takes prn for occasional GERD symptoms)       polyethylene glycol (MIRALAX) 17 GM/Dose powder Take 17 g by mouth daily as needed for constipation 510 g      senna-docusate (SENOKOT-S/PERICOLACE) 8.6-50 MG tablet Take 2 tablets by mouth 2 times daily as needed for constipation       simvastatin (ZOCOR) 20 MG tablet Take 1 tablet (20 mg) by mouth At Bedtime 90 tablet 3     vitamin A 3 MG (10450 UNITS) capsule Take 1 capsule (10,000 Units) by mouth daily       zinc sulfate (ZINCATE) 220 (50 Zn) MG capsule Take 1 capsule (220 mg) by mouth daily          MED REC REQUIRED  Post Medication Reconciliation Status: discharge medications reconciled and changed, per note/orders     Controlled medications:   not applicable/none     Past Medical History:   Past Medical History:   Diagnosis Date     Gastroesophageal reflux disease      Hyperlipidemia LDL goal <160      Hypertension, essential, benign      Knee osteoarthritis     knees, hands     Motion sickness      PONV (postoperative nausea and vomiting)      Physical Exam:   Vitals: /72   Pulse 74   Temp 98.4  F (36.9  C)   Resp 17   Ht 1.6 m (5' 3\")   Wt 85.1 kg (187 lb 9.6 oz)   LMP  (LMP Unknown)   SpO2 99%   BMI 33.23 kg/m    BMI: Body mass index is 33.23 kg/m .  GENERAL APPEARANCE:  Alert, in no distress  ENT:  Mouth and posterior oropharynx normal, moist mucous membranes, normal hearing acuity  EYES:  EOM, conjunctivae, lids, " pupils and irises normal, PERRL  RESP:  respiratory effort and palpation of chest normal, lungs clear to auscultation , no respiratory distress  CV:  Palpation and auscultation of heart done , regular rate and rhythm, no murmur, rub, or gallop, peripheral edema trace+ in LE bilaterally  ABDOMEN:  normal bowel sounds, soft, nontender, no hepatosplenomegaly or other masses  M/S:   patient sitting up in w/c  SKIN:  Inspection of skin and subcutaneous tissue baseline, did not visualize surgical incision  NEURO:   speech wnl  PSYCH:  affect and mood normal     SNF labs: Recent labs in [facility] reviewed by me today.  and   Most Recent 3 CBC's:  Recent Labs   Lab Test 01/25/23  0657 01/23/23  0347 01/22/23  0751   WBC 8.1 7.8 7.2   HGB 10.2* 9.9* 9.9*   MCV 90 89 88   * 418 353     Most Recent 3 BMP's:  Recent Labs   Lab Test 01/25/23  0657 01/24/23  0424 01/23/23  0347 01/22/23  0751     --  139 139   POTASSIUM 4.3 4.0 4.1 4.1   CHLORIDE 102  --  99 100   CO2 23  --  24 26   BUN 38.0*  --  28.2* 20.1   CR 1.11*  --  0.94 0.94   ANIONGAP 15  --  16* 13   ALONSO 9.9  --  10.0 9.6   GLC 97  --  105* 104*     (G95.89) Intradural mass (H)  (primary encounter diagnosis)  (Z98.890) S/P laminectomy  (R53.81) Physical deconditioning  Comment: acute/ongoing s/p T7-9 laminectomy and resection of intradural mass Dr. Antunez   Plan: DC to home with home PT through [facility] continue neurontin 200mg BID,  tylenol to 1000mg TID scheduled  DC robaxin and dilaudid  F/u with Dr. Antunez as directed  Pathology pending at this time,  has appt with radiation oncology        (K59.03) Drug-induced constipation  Comment: acute/ongoing  Plan: continue miralax 17gm to  QD prn, senna s 2 PO to  BID prn     (I10) Benign essential hypertension  Comment: ongoing  Plan: continue HCTZ 25mg QD     (D62) Anemia due to blood loss, acute  Comment: acute/ongoing, no change  Plan: follow up with PCP,  Hgb 1/30/23 was 10.1    DISCHARGE PLAN:    Follow up labs: No  labs orders/due    Medical Follow Up:      Follow up with primary care provider in 1 weeks    [facility] scheduled appointments:     Future Appointments   Date Time Provider Department Center   2/14/2023  2:30 PM Katina Dominguez MD [facility]    3/6/2023  8:30 AM Ally Luna PA-C [facility]         Discharge Services: Home Care:  Physical Therapy    Discharge Instructions Verbalized to Patient at Discharge:     None    TOTAL DISCHARGE TIME:   Greater than 30 minutes  Electronically signed by:  Tonya Lynn Haase, APRN CNP                     Sincerely,        Tonya Lynn Haase, APRN CNP

## 2023-02-08 NOTE — PROGRESS NOTES
[facility] Care Coordination Contact    [facility] Home Visit Assessment     Home visit for Health Risk Assessment with Laure CHILDERS [place] completed on February 8, 2023    Type of residence:: Private home - stairs  Current living arrangement:: I live in a private home with family     Assessment completed with:: Patient    Current Care Plan  Member currently receiving the following home care services:     Member currently receiving the following community resources: PCA, Home Care      Medication Review  Medication reconciliation completed in Epic: Yes  Medication set-up & administration: Family/informal caregiver sets up weekly.  Family caregiver administers medications.  Medication Risk Assessment Medication (1 or more, place referral to MTM): Recent transition (IP/TCU within last 30 days)  MTM Referral Placed: No: States daughter is has good knowledge of all meds and sets up in pill box    Mental/Behavioral Health   Depression Screening:   PHQ-2 Total Score (Adult) - Positive if 3 or more points; Administer PHQ-9 if positive: 0       Mental health DX:: No        Falls Assessment:   Fallen 2 or more times in the past year?: No   Any fall with injury in the past year?: No    ADL/IADL Dependencies:   Dependent ADLs:: Bathing, Dressing, Grooming, Incontinence, Toileting, Transfers, Ambulation-walker, Positioning  Dependent IADLs:: Cleaning, Cooking, Laundry, Shopping, Medication Management, Meal Preparation, Money Management, Incontinence, Transportation    [facility] Health Plan sponsored benefits: Shared information re: Silver Sneakers/gym memberships, ASA, Calcium +D.    PCA Assessment completed at visit: Yes Annual PCA assessment indicated 23 units per day of PCA. This is an increase from the previous assessment.     Elderly Waiver Eligibility: Yes-will continue on EW    Care Plan & Recommendations: Will continue PCA. Member will participate in home care PT.     See LTCC for detailed assessment  information.    Follow-Up Plan: Member informed of future contact, plan to f/u with member with a 6 month telephone assessment.  Contact information shared with member and family, encouraged member to call with any questions or concerns at any time.    [place] care continuum providers: Please see Snapshot and Care Management Flowsheets for Specific details of care plan.    Roney Harrison RN BSN PHN  [facility] Care Coordinator  [phone]  Fax:[phone]    This CC note routed to PCP.

## 2023-02-09 NOTE — PROGRESS NOTES
[facility] Care Coordination Contact    CC contacted member and reviewed discharge summary.  Member has a follow-up appointment with PCP in 7 days: No: Offered Assistance with setting up a follow up appointment   Member has had a change in condition: No  Home visit needed: Yes: for annual  and is scheduled for 2/8/23  Care plan reviewed and updated.  The following home based services [employer] PCA were resumed.  New referrals placed: Yes: Therapies: PT OT  Transition log completed.   PCP, Yoshi Bowen, notified of transition back to home via EMR.    Roney Harrison RN BSN PHN  [facility] Care Coordinator  [phone]  Fax:[phone]

## 2023-02-17 ENCOUNTER — TELEPHONE (OUTPATIENT)
Dept: INTERNAL MEDICINE | Facility: CLINIC | Age: 73
End: 2023-02-17
Payer: COMMERCIAL

## 2023-02-17 ENCOUNTER — PATIENT OUTREACH (OUTPATIENT)
Dept: GERIATRIC MEDICINE | Facility: CLINIC | Age: 73
End: 2023-02-17
Payer: COMMERCIAL

## 2023-02-17 NOTE — TELEPHONE ENCOUNTER
Reason for Call:  Other prescription    Detailed comments: PT one time per week for 4 weeks  One time a week every other week for 4 weeks.  Ana Rosa would like verbal orders    Phone Number Patient can be reached at: Other phone number:  [phone]    Best Time: any    Can we leave a detailed message on this number? YES    Call taken on 2/17/2023 at 1:19 PM by Venessa Benítez

## 2023-02-17 NOTE — PROGRESS NOTES
[facility] Care Coordination Contact    Received after visit chart from care coordinator.  Completed following tasks: Mailed copy of care plan to client, Updated services in Database, Submitted referrals/auths for homemaking and Mailed Safe Medication Disposal   , Provider Signature - No POC Shared:  Member indicates that they do not want their POC shared with any EW providers.     and [employer]:  Faxed completed PCA assessment to PCA Agency and mailed copies to member.  Faxed MD Communication to PCP.  Emailed referral form for auth to [employer].    Vy Raman  Care Management Specialist  [facility]  [phone]

## 2023-02-17 NOTE — LETTER
February 17, 2023    Important [employer] Information    LAURE LOPEZ  [address]  [facility] [address]  Your Care Plan  Dear Laure,  When we spoke recently, I promised to send you a Care Plan. The plan enclosed is a summary of our discussion. It includes the steps we agreed would help you meet your health goals. In addition, I can help you with:  Mrtweyf-E-TpleHH  This program is available to members who need a ride to medical and dental visits. To schedule a ride, call [phone] or [phone] (toll free). TTY: 711. You can call 8 a.m. to 8 p.m. Seven days a week. Access to a representative may be limited at times.   One Pass  One Pass is your no-cost, complete, fitness solution for your mind and body. To learn more visit [employer]/fitness or call One Pass, toll-free 1 [phone] (TTY: 718) 8 a.m. to 9 p.m. Monday-Friday.  Health Care Directive   This form helps you outline your health care wishes. You can request a form from me and I will answer any questions you have before you discuss it with your doctor.   Annual Physical  Take a key step on your path to good health and set up an annual physical at your clinic.  Questions?  Call me at [phone] Monday-Friday between 8am and 5pm.  TTY: 711. As we discussed, I plan to be in touch with you again in 6 months to follow up via phone.  Sincerely,    Roney Harrison RN, PHN    E-mail: [email or web address]  Phone: [phone]      [employer]    cc: member records                                                                                             CB5 ([facility]) (5-2020)    Civil Rights Notice  Discrimination is against the law. [employer] does not discriminate on the basis of any of the following:    Race    Color    National Origin    Creed    Muslim    Age    Public Assistance Status    Receipt of Health Care Services    Disability (including physical or mental impairment)    Sex (including sex stereotypes and gender  identity)    Marital Status    Political Beliefs    Medical Condition    Genetic Information    Sexual Orientation    Claims Experience    Medical History    Health Status    Auxiliary Aids and Services:  [employer] provides auxiliary aids and services, like qualified interpreters or information in accessible formats, free of charge and in a timely manner, to ensure an equal opportunity to participate in our health care programs. Contact [employer] at [employer]/contact medicaid or call [phone] (toll free); TTY:714 or at [employer]/contactmedicaid.    Language Assistance Services:  [employer] provides translated documents and spoken language interpreting, free of charge and in a timely manner, when language assistance services are necessary to ensure limited English speakers have meaningful access to our information and services. Contact [employer] at [phone] (toll free); TTY: 765 or [email or web address]/contactmedicaid.     Civil Rights Complaints  You have the right to file a discrimination complaint if you believe you were treated in a discriminatory way by [employer]. You may contact any of the following four agencies directly to file a discrimination complaint.    U.S. Department of Health and Human Services  Office for Civil Rights (OCR)  You have the right to file a complaint with the OCR, a federal agency, if you believe you have been discriminated against because of any of the following:    Race    Disability    Color    Sex    National Origin    Age    Episcopalian (in some cases)    Contact the OCR directly to file a complaint:         Director         U.S. Department of Health and Human Services  Office for Civil Rights         [address] [place] [address], [place] [address]         Customer Response Center: Toll-free: [phone]          TDD: [phone]         Email: [email or web address]    [place] Department of Human Rights (MD)  In [place], you have the right to file a  complaint with the MDHR if you believe you have been discriminated against because of any of the following:      Race    Color    National Origin    Buddhism    Creed    Sex    Sexual Orientation    Marital Status    Public Assistance Status    Disability    Contact the MDHR directly to file a complaint:         [facility] Rights         [address] [address]         [phone] (voice)          [phone] (toll free)         711 or [phone] ([place] Relay)         [phone] (Fax)         [email or web address] (Email)     [place] Department of Human Services (DHS)  You have the right to file a complaint with [facility] if you believe you have been discriminated against in our health care programs because of any of the following:    Race    Color    National Origin    Creed    Buddhism    Age    Public Assistance Status    Receipt of Health Care Services    Disability (including physical or mental impairment)    Sex (including sex stereotypes and gender identity)    Marital Status    Political Beliefs    Medical Condition    Genetic Information    Sexual Orientation    Claims Experience    Medical History    Health Status    Complaints must be in writing and filed within 180 days of the date you discovered the alleged discrimination. The complaint must contain your name and address and describe the discrimination you are complaining about. After we get your complaint, we will review it and notify you in writing about whether we have authority to investigate. If we do, we will investigate the complaint.      [facility] will notify you in writing of the investigation s outcome. You have a right to appeal the outcome if you disagree with the decision. To appeal, you must send a written request to have [facility] review the investigation outcome. Be brief and state why you disagree with the decision. Include additional information you think is important.      If you  file a complaint in this way, the people who work for the agency named in the complaint cannot retaliate against you. This means they cannot punish you in any way for filing a complaint. Filing a complaint in this way does not stop you from seeking out other legal or administration actions.     Contact DHS directly to file a discrimination complaint:        Civil Rights Coordinator        [place] Department of Human Services        Equal Opportunity and Access Division        [address] [address]        [place] [address] [phone] (voice) or use your preferred relay service     [employer] Complaint Notice   You have the right to file a complaint with [employer] if you believe you have been discriminated against because of any of the following:       Medical condition    Health status    Receipt of health care services    Claims experience    Medical history    Genetic information    Disability (including mental or physical impairment)    Marital status    Age    Sex (including sex stereotypes and gender identity)    Sexual orientation    National origin    Race    Color    Synagogue    Creed    Public assistance status    Political beliefs    You can file a complaint and ask for help in filing a complaint in person or by mail, phone, fax, or email at:     [employer] Civil Rights Coordinator  [employer] [facility]  [address], [place] [address] [phone] (voice and fax) or TTF:470  Email: [email or web address]    American Indians can begin or continue to use [place] and  Health Services (IHS) clinics. We will not require prior approval or impose any conditions for you to get services at these clinics. For elders age 65 years and older this includes Elderly Waiver (EW) services accessed through the [place]. If a doctor or other provider in a [place] or IHS clinic refers you to a provider in our network, we will not require you to see your primary care provider prior to the  referral.

## 2023-02-22 ENCOUNTER — PATIENT OUTREACH (OUTPATIENT)
Dept: GERIATRIC MEDICINE | Facility: CLINIC | Age: 73
End: 2023-02-22
Payer: COMMERCIAL

## 2023-02-22 NOTE — TELEPHONE ENCOUNTER
Provider Response to 2nd Level Triage Request    I have reviewed the RN documentation. My recommendation is:  10/10 pain? May be better in her case to get her to describe what she can do w.o pain , etc. As I find she commonly tells me  something is awful and then is obviously not in that much distress. If in fact 10/10 - I would have her go to urgent care to be seen today to get relief.   otherwise I recommend she cont her PT. Did the course of prednisone we used help at all?   I'll consider imaging as well.    .

## 2023-02-22 NOTE — PROGRESS NOTES
[facility] Care Coordination Contact    Received a request to submit a DTR for the terminated of ext PCA. Documentation completed and faxed to the health plan. Care Coordinator aware.    Mary Galvan RN  Utilization   [facility]  [phone]

## 2023-03-03 DIAGNOSIS — Z53.9 DIAGNOSIS NOT YET DEFINED: Primary | ICD-10-CM

## 2023-03-03 PROCEDURE — G0180 MD CERTIFICATION HHA PATIENT: HCPCS | Performed by: INTERNAL MEDICINE

## 2023-03-06 ENCOUNTER — OFFICE VISIT (OUTPATIENT)
Dept: NEUROSURGERY | Facility: CLINIC | Age: 73
End: 2023-03-06
Payer: COMMERCIAL

## 2023-03-06 VITALS
BODY MASS INDEX: 34.04 KG/M2 | HEIGHT: 63 IN | WEIGHT: 192.1 LBS | SYSTOLIC BLOOD PRESSURE: 134 MMHG | OXYGEN SATURATION: 99 % | DIASTOLIC BLOOD PRESSURE: 81 MMHG | HEART RATE: 80 BPM

## 2023-03-06 DIAGNOSIS — D49.7 INTRADURAL EXTRAMEDULLARY THORACIC TUMOR: ICD-10-CM

## 2023-03-06 DIAGNOSIS — R29.898 WEAKNESS OF RIGHT LOWER EXTREMITY: ICD-10-CM

## 2023-03-06 DIAGNOSIS — Z98.890 S/P SPINAL SURGERY: Primary | ICD-10-CM

## 2023-03-06 PROCEDURE — 99024 POSTOP FOLLOW-UP VISIT: CPT | Performed by: PHYSICIAN ASSISTANT

## 2023-03-06 RX ORDER — FAMOTIDINE 20 MG/1
TABLET, FILM COATED ORAL
COMMUNITY
Start: 2023-02-12 | End: 2023-10-05

## 2023-03-06 ASSESSMENT — PAIN SCALES - GENERAL: PAINLEVEL: SEVERE PAIN (7)

## 2023-03-06 NOTE — LETTER
3/6/2023       RE: Laure Wood  [address] [address] [facility] [address]     Dear Colleague,    Thank you for referring your patient, Laure Wood, to the [facility] NEUROSURGERY CLINIC [place] at [facility]. Please see a copy of my visit note below.        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 T7-9 laminectomy and resection of intradural mass  2. Use of intra-operative C-arm  3. Use of microscope  4. Use of ultrasound  5. Neuromonitoring with SSEPs, MEPs     Resident: Thania Castro MD     Anesthesia: General endotracheal     Complications: none     Estimated blood loss: 50ml     Indications: Laure is a 72 year old woman who presented to neurosurgery clinic with progressive lower extremity weakness and symptoms of myelopathy. 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 She was indicated for abovementioned procedure. We explained the risks of the procedure and she agreed to proceed with surgery after signing informed consent.     Hospital Course:     Patient underwent above-mentioned procedure on 1/16/23. The operation was uncomplicated and she was admitted to the floor for routine post operative cares. Patient was kept on flat bedrest for first post op day and started on wound healing protocol. On 1/17 patient did have heart rates 80s-90s with a BBB. Echo revealed ejection fraction of 44-60% and left bundle branch block. Troponin not elevated. Chest XR negative for cardiopulmonary findings. Patient did have a one time fever of 101 for which a bilateral lower extremity DVT US was obtained and negative.  Pain Team was  consulted for pain management recommendations. Subcutaneous Heparin was started on 1/19. Pathology is still pending at time of discharge.    Patient will have 1 week follow up appointment scheduled with Neurosurgery for wound check and suture removal. If patient is still in her rehab facility 14 days post-op it is ok that a provider at the facility remove the stitches. Additionally, a referral was placed for patient to schedule an outpatient follow up with Radiation Oncology.         HPI: Laure Wood is a pleasant 72 year old female who is s/p T7-9 laminectomies and resection of intradural mass by Dr. Antunez on 1/16/23.  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  Referral to radiation oncology was placed at discharge.       2/6/23 visit for 2 week p/o:  She is currently still in rehab at [facility] with possible discharge on Thursday.  She is working on weaning off her pain medication.  She does c/o of incisional pain for me today.  The paresthesias she was feeling in her torso and BLE is greatly reduced since surgery and is almost gone.  She is working at being more mobile.  She is in a wheelchair today, but walks with a walker in rehab.  She denies issues with bowel/bladder.      Patient was subsequently discharged from nursing facility to home with HH PT.        Currently, patient is about 6 weeks out from surgery.  Patient presents today with her daughter.  She has returned back home with home health physical therapy.  She is doing well overall.  She is no longer taking any narcotic pain medication.  She notes that her burning pain is gone.  She notes some tingling in her toes bilaterally, and some sensation deficits in her distal legs.  She denies any bowel or bladder issues currently and is able to feel when she wipes herself.  She does note that she is having some right knee issues.  She has a history of bilateral knee  replacements and is connected to an orthopedic surgeon.  They intend to follow-up with the orthopedic doctor that did her knees to evaluate her right knee.  She denies that she has had any injury to her knee but with the weakness in her legs due to the tumor in her spine, her right knee has functioned differently and actually viviana.  She is using a cane to ambulate.  I have been unable to schedule an appointment with oncology noting that they were contacted to schedule the appointment but then when they tried to call back to schedule, the receiving party was not sure why they were calling.     Physical Exam   LMP  (LMP Unknown)     Constitutional: Alert, no acute distress.     [place]: Patient walks in the room without assistive devises. Using cane at home and for longer distances.     Neurological:     Strength (L/R)  4+/4 Hip Flexion  5/4+ Knee Extension (right knee swollen, and slightly warm to touch)  5/5 Ankle Dorsiflexion  5/5 Plantar Flexion     Sensation: SILT - altered sensation in toes and calves mainly--tingly     Incision:  healing well.      No clonus      IMAGING:    No new imaging.    ASSESSMENT:  Laure Wood is a 72 year old female who is s/p T7-9 laminectomies and resection of intradural mass by Dr. Antunez on 1/16/23.  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  Referral to radiation oncology was placed at discharge.      Patient is now approximately 6 weeks out from surgery.  Patient is healing well from her surgery and is gaining strength and has improving sensation in her bilateral lower extremities.  She does have some residual sensory changes in her distal legs and feet but feels this is been improving.  She has had no issues with her incision.  She is engaged in home health physical therapy.    She has not yet been able to schedule with oncology.  I did provide them with the number to contact them and  encouraged them to reach out to schedule.    PLAN:    You may now use NSAIDs, in additional to Tylenol, muscle relaxer, ice/heat for pain.    Okay to resume aspirin.    Continue to increase time ambulating, avoid excessive/repetitive bending, twisting, strenuous exercise, or heavy lifting (greater than 25 pounds).     Provided referral information for rad/onc w/ Dr. Grewal that was placed at discharge ([facility] [place] [phone])--patient to call to schedule this.    Follow up in 6 weeks with XR with Dr. Figueredo for 3 month post-op.    Medications reviewed with patient and daughter.  Continue to take gabapentin for now.  Will discuss this further at 3 month p/o.    Consider repeat MRI thoracic w/w at 6 month p/o.      Follow-up with orthopedics regarding right knee which does appear swollen and slightly warm to touch today.    Patient and her daughter are in agreement with this plan and have no further questions at this time.        Ally Luna PA-C  Department of Neurosurgery  Office: [phone]

## 2023-03-06 NOTE — PROGRESS NOTES
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 T7-9 laminectomy and resection of intradural mass  2. Use of intra-operative C-arm  3. Use of microscope  4. Use of ultrasound  5. Neuromonitoring with SSEPs, MEPs     Resident: Thania Castro MD     Anesthesia: General endotracheal     Complications: none     Estimated blood loss: 50ml     Indications: Laure is a 72 year old woman who presented to neurosurgery clinic with progressive lower extremity weakness and symptoms of myelopathy. 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 She was indicated for abovementioned procedure. We explained the risks of the procedure and she agreed to proceed with surgery after signing informed consent.     Hospital Course:     Patient underwent above-mentioned procedure on 1/16/23. The operation was uncomplicated and she was admitted to the floor for routine post operative cares. Patient was kept on flat bedrest for first post op day and started on wound healing protocol. On 1/17 patient did have heart rates 80s-90s with a BBB. Echo revealed ejection fraction of 44-60% and left bundle branch block. Troponin not elevated. Chest XR negative for cardiopulmonary findings. Patient did have a one time fever of 101 for which a bilateral lower extremity DVT US was obtained and negative.  Pain Team was consulted for pain management recommendations. Subcutaneous Heparin was started on 1/19. Pathology is still pending at time of discharge.    Patient will have 1 week follow up appointment scheduled with Neurosurgery for wound check and suture removal. If patient is still in her rehab facility 14 days post-op it is ok  that a provider at the facility remove the stitches. Additionally, a referral was placed for patient to schedule an outpatient follow up with Radiation Oncology.         HPI: Laure Wood is a pleasant 72 year old female who is s/p T7-9 laminectomies and resection of intradural mass by Dr. Antunez on 1/16/23.  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  Referral to radiation oncology was placed at discharge.       2/6/23 visit for 2 week p/o:  She is currently still in rehab at [facility] with possible discharge on Thursday.  She is working on weaning off her pain medication.  She does c/o of incisional pain for me today.  The paresthesias she was feeling in her torso and BLE is greatly reduced since surgery and is almost gone.  She is working at being more mobile.  She is in a wheelchair today, but walks with a walker in rehab.  She denies issues with bowel/bladder.      Patient was subsequently discharged from nursing facility to home with  PT.        Currently, patient is about 6 weeks out from surgery.  Patient presents today with her daughter.  She has returned back home with home health physical therapy.  She is doing well overall.  She is no longer taking any narcotic pain medication.  She notes that her burning pain is gone.  She notes some tingling in her toes bilaterally, and some sensation deficits in her distal legs.  She denies any bowel or bladder issues currently and is able to feel when she wipes herself.  She does note that she is having some right knee issues.  She has a history of bilateral knee replacements and is connected to an orthopedic surgeon.  They intend to follow-up with the orthopedic doctor that did her knees to evaluate her right knee.  She denies that she has had any injury to her knee but with the weakness in her legs due to the tumor in her spine, her right knee has functioned differently and  actually viviana.  She is using a cane to ambulate.  I have been unable to schedule an appointment with oncology noting that they were contacted to schedule the appointment but then when they tried to call back to schedule, the receiving party was not sure why they were calling.     Physical Exam   LMP  (LMP Unknown)     Constitutional: Alert, no acute distress.     [place]: Patient walks in the room without assistive devises. Using cane at home and for longer distances.     Neurological:     Strength (L/R)  4+/4 Hip Flexion  5/4+ Knee Extension (right knee swollen, and slightly warm to touch)  5/5 Ankle Dorsiflexion  5/5 Plantar Flexion     Sensation: SILT - altered sensation in toes and calves mainly--tingly     Incision:  healing well.      No clonus      IMAGING:    No new imaging.    ASSESSMENT:  Laure Wood is a 72 year old female who is s/p T7-9 laminectomies and resection of intradural mass by Dr. Antunez on 1/16/23.  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  Referral to radiation oncology was placed at discharge.      Patient is now approximately 6 weeks out from surgery.  Patient is healing well from her surgery and is gaining strength and has improving sensation in her bilateral lower extremities.  She does have some residual sensory changes in her distal legs and feet but feels this is been improving.  She has had no issues with her incision.  She is engaged in home health physical therapy.    She has not yet been able to schedule with oncology.  I did provide them with the number to contact them and encouraged them to reach out to schedule.    PLAN:    You may now use NSAIDs, in additional to Tylenol, muscle relaxer, ice/heat for pain.    Okay to resume aspirin.    Continue to increase time ambulating, avoid excessive/repetitive bending, twisting, strenuous exercise, or heavy lifting (greater than 25 pounds).      Provided referral information for rad/onc w/ Dr. Grewal that was placed at discharge ([facility] [place] [phone])--patient to call to schedule this.    Follow up in 6 weeks with XR with Dr. Figueredo for 3 month post-op.    Medications reviewed with patient and daughter.  Continue to take gabapentin for now.  Will discuss this further at 3 month p/o.    Consider repeat MRI thoracic w/w at 6 month p/o.      Follow-up with orthopedics regarding right knee which does appear swollen and slightly warm to touch today.    Patient and her daughter are in agreement with this plan and have no further questions at this time.        Ally Luna PA-C  Department of Neurosurgery  Office: [phone]

## 2023-03-06 NOTE — PATIENT INSTRUCTIONS
You saw Ally Luna PA-C today in clinic.  Please schedule your follow up with Dr. Hill on your way out today.  No imaging for that appointment.  You may call [phone] to schedule your appointment if you are unable to do so today.       Referral to rad/onc w/ Dr. Grewal placed at discharge is pending ([facility] [place] [phone])--patient to call to schedule this.    Follow up with your ortho provider for your right knee.

## 2023-03-17 DIAGNOSIS — I10 ESSENTIAL HYPERTENSION, BENIGN: ICD-10-CM

## 2023-03-19 RX ORDER — HYDROCHLOROTHIAZIDE 25 MG/1
25 TABLET ORAL DAILY
Qty: 90 TABLET | Refills: 0 | Status: SHIPPED | OUTPATIENT
Start: 2023-03-19 | End: 2023-06-09

## 2023-04-12 ENCOUNTER — OFFICE VISIT (OUTPATIENT)
Dept: URGENT CARE | Facility: URGENT CARE | Age: 73
End: 2023-04-12
Payer: COMMERCIAL

## 2023-04-12 VITALS
TEMPERATURE: 98.8 F | DIASTOLIC BLOOD PRESSURE: 64 MMHG | OXYGEN SATURATION: 99 % | HEART RATE: 84 BPM | SYSTOLIC BLOOD PRESSURE: 111 MMHG

## 2023-04-12 DIAGNOSIS — M10.9 ACUTE GOUT OF RIGHT WRIST, UNSPECIFIED CAUSE: Primary | ICD-10-CM

## 2023-04-12 PROCEDURE — 99213 OFFICE O/P EST LOW 20 MIN: CPT | Performed by: PHYSICIAN ASSISTANT

## 2023-04-12 RX ORDER — METHYLPREDNISOLONE 4 MG
TABLET, DOSE PACK ORAL
Qty: 21 TABLET | Refills: 0 | Status: SHIPPED | OUTPATIENT
Start: 2023-04-12 | End: 2023-07-31

## 2023-04-12 NOTE — PROGRESS NOTES
Assessment & Plan     Acute gout of right wrist, unspecified cause  Symptoms and exam suggest.  No evidence of cellulitis at this time.  Discussed with patient she has refills on her colchicine and allopurinol and she should call the pharmacy to get new prescriptions.  In the interim, Medrol Dosepak is prescribed.  Keep monitoring symptoms.  Follow-up if any worsening symptoms.  Patient agrees with the plan.  - methylPREDNISolone (MEDROL DOSEPAK) 4 MG tablet therapy pack  Dispense: 21 tablet; Refill: 0       Return in about 1 week (around 4/19/2023) for Symptoms failing to improve.    Jeri Darby PA-C  [facility] URGENT CARE [place]    Martha Kelsey is a 72 year old female who presents to clinic today for the following health issues:  Chief Complaint   Patient presents with     Urgent Care            Musculoskeletal Problem     Right hand pain and swollen x1week, pt has had gout in that hand before.     HPI    Patient is presenting to urgent care today accompanied by her daughter with a complaint of right wrist swelling and pain.  Ongoing symptoms for about a week now.  She denies any trauma or injury to the affected area. No fever. She has a history of gout.  But notes she has been out of her colchicine and allopurinol approximately a month ago.  She did not know she had refills on those prescriptions.      Review of Systems  Constitutional, HEENT, cardiovascular, pulmonary, GI, , musculoskeletal, neuro, skin, endocrine and psych systems are negative, except as otherwise noted.      Objective    /64   Pulse 84   Temp 98.8  F (37.1  C) (Tympanic)   LMP  (LMP Unknown)   SpO2 99%   Physical Exam   GENERAL: healthy, alert and no distress  MS: Right wrist exam: No gross deformities, ecchymosis noted.  Mild swelling noted. No erythema noted.  Diffuse tenderness to palpation at the wrist.  Range of motion is limited due to pain.

## 2023-04-18 DIAGNOSIS — Z98.890 S/P SPINAL SURGERY: ICD-10-CM

## 2023-04-18 DIAGNOSIS — D49.7 INTRADURAL EXTRAMEDULLARY THORACIC TUMOR: Primary | ICD-10-CM

## 2023-04-21 ENCOUNTER — TELEPHONE (OUTPATIENT)
Dept: NEUROSURGERY | Facility: CLINIC | Age: 73
End: 2023-04-21
Payer: COMMERCIAL

## 2023-04-25 ENCOUNTER — ANCILLARY PROCEDURE (OUTPATIENT)
Dept: GENERAL RADIOLOGY | Facility: CLINIC | Age: 73
End: 2023-04-25
Attending: PHYSICIAN ASSISTANT
Payer: COMMERCIAL

## 2023-04-25 ENCOUNTER — OFFICE VISIT (OUTPATIENT)
Dept: NEUROSURGERY | Facility: CLINIC | Age: 73
End: 2023-04-25
Payer: COMMERCIAL

## 2023-04-25 ENCOUNTER — PATIENT OUTREACH (OUTPATIENT)
Dept: ONCOLOGY | Facility: CLINIC | Age: 73
End: 2023-04-25

## 2023-04-25 VITALS
OXYGEN SATURATION: 99 % | DIASTOLIC BLOOD PRESSURE: 88 MMHG | SYSTOLIC BLOOD PRESSURE: 161 MMHG | WEIGHT: 190 LBS | BODY MASS INDEX: 33.66 KG/M2 | RESPIRATION RATE: 16 BRPM | HEART RATE: 68 BPM

## 2023-04-25 DIAGNOSIS — D49.7 INTRADURAL EXTRAMEDULLARY THORACIC TUMOR: ICD-10-CM

## 2023-04-25 DIAGNOSIS — Z98.890 S/P SPINAL SURGERY: Primary | ICD-10-CM

## 2023-04-25 DIAGNOSIS — Z98.890 S/P SPINAL SURGERY: ICD-10-CM

## 2023-04-25 DIAGNOSIS — D32.9 MENINGIOMA (H): Primary | ICD-10-CM

## 2023-04-25 PROCEDURE — 72070 X-RAY EXAM THORAC SPINE 2VWS: CPT | Performed by: STUDENT IN AN ORGANIZED HEALTH CARE EDUCATION/TRAINING PROGRAM

## 2023-04-25 PROCEDURE — 99024 POSTOP FOLLOW-UP VISIT: CPT | Performed by: PHYSICIAN ASSISTANT

## 2023-04-25 ASSESSMENT — PAIN SCALES - GENERAL: PAINLEVEL: NO PAIN (0)

## 2023-04-25 NOTE — PATIENT INSTRUCTIONS
Check at home if you are still taking Gabapentin.  Okay to start weaning off this if you are still taking it.  Decrease dose by dropping 1 pill per week until off.  Please call if you have questions on how to do that.  [phone].    Follow up with oncology for Grade II meningioma w/ chromosome 22 deletion.  Further imaging per oncology.      Okay to do activity as tolerated.  No driving restrictions as long as not taking any sedating medications.

## 2023-04-25 NOTE — LETTER
4/25/2023       RE: Laure Wood  [address] [address] [facility] [address]     Dear Colleague,    Thank you for referring your patient, Laure Wood, to the [facility] NEUROSURGERY CLINIC [place] at [facility]. Please see a copy of my visit note below.        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 T7-9 laminectomy and resection of intradural mass  2. Use of intra-operative C-arm  3. Use of microscope  4. Use of ultrasound  5. Neuromonitoring with SSEPs, MEPs     Resident: Thania Castro MD     Anesthesia: General endotracheal     Complications: none     Estimated blood loss: 50ml     Indications: Laure is a 72 year old woman who presented to neurosurgery clinic with progressive lower extremity weakness and symptoms of myelopathy. 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 She was indicated for abovementioned procedure. We explained the risks of the procedure and she agreed to proceed with surgery after signing informed consent.     Hospital Course:     Patient underwent above-mentioned procedure on 1/16/23. The operation was uncomplicated and she was admitted to the floor for routine post operative cares. Patient was kept on flat bedrest for first post op day and started on wound healing protocol. On 1/17 patient did have heart rates 80s-90s with a BBB. Echo revealed ejection fraction of 44-60% and left bundle branch block. Troponin not elevated. Chest XR negative for cardiopulmonary findings. Patient did have a one time fever of 101 for which a bilateral lower extremity DVT US was obtained and negative.  Pain Team was  "consulted for pain management recommendations. Subcutaneous Heparin was started on 1/19. Pathology is still pending at time of discharge.    Patient will have 1 week follow up appointment scheduled with Neurosurgery for wound check and suture removal. If patient is still in her rehab facility 14 days post-op it is ok that a provider at the facility remove the stitches. Additionally, a referral was placed for patient to schedule an outpatient follow up with Radiation Oncology.         HPI: Laure Wood is a pleasant 72 year old female who is s/p T7-9 laminectomies and resection of intradural mass by Dr. Antunez on 1/16/23.  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  Referral to radiation oncology was placed at discharge.       Patient is now about 3 months out from surgery.  She is recovery very well and only endorses some numbness at the base of her buttocks and in her great and little toes.  She is ambulating well with a cane.  She has not yet been seen by rad/oncology.       Physical Exam   BP (!) 161/88   Pulse 68   Resp 16   Wt 86.2 kg (190 lb)   LMP  (LMP Unknown)   SpO2 99%   BMI 33.66 kg/m      Constitutional: Alert, no acute distress.     [place]: Patient walks in the room without assistive devises. Using cane at home and for longer distances.     Neurological:     Strength (L/R)  4+/4+ Hip Flexion  5/5 Knee Extension   5/5 Ankle Dorsiflexion  5/5 Plantar Flexion    Hypoactive, sym BLE reflexes    Sensation: SILT    Incision:  well healed.      IMAGING:  Independently reviewed.      Thoracic XR 4/25/23 - \"Impression:  1.  No acute osseous abnormality.  2. Mild degenerative changes. Mild convex left curvature of the  thoracic spine.\"    ASSESSMENT:  Laure Wood is a 72 year old female who is s/p T7-9 laminectomies and resection of intradural mass by Dr. Antunez on 1/16/23.  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  Referral to radiation oncology was placed at discharge.      Patient is now approximately 3 months out from her surgery.  She is doing very well in her recovery.  She has some residual n/t, which we are hopeful will continue to resolve.      PLAN:    Okay to resume activity as tolerated.  No driving restrictions as long as not taking sedating medication.      New referral sent to rad/onc. We will defer to them for additional imaging needs.  We are happy to work with oncology if they feel our involvement is needed in the future.      Follow up PRN with nsgy.     Patient okay to wean off gabapentin if still taking that.      Patient in agreement with this plan and have no further questions at this time.            Again, thank you for allowing me to participate in the care of your patient.      Sincerely,    Ambar Antunez MD

## 2023-04-25 NOTE — PROGRESS NOTES
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 T7-9 laminectomy and resection of intradural mass  2. Use of intra-operative C-arm  3. Use of microscope  4. Use of ultrasound  5. Neuromonitoring with SSEPs, MEPs     Resident: Thania Castro MD     Anesthesia: General endotracheal     Complications: none     Estimated blood loss: 50ml     Indications: Laure is a 72 year old woman who presented to neurosurgery clinic with progressive lower extremity weakness and symptoms of myelopathy. 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 She was indicated for abovementioned procedure. We explained the risks of the procedure and she agreed to proceed with surgery after signing informed consent.     Hospital Course:     Patient underwent above-mentioned procedure on 1/16/23. The operation was uncomplicated and she was admitted to the floor for routine post operative cares. Patient was kept on flat bedrest for first post op day and started on wound healing protocol. On 1/17 patient did have heart rates 80s-90s with a BBB. Echo revealed ejection fraction of 44-60% and left bundle branch block. Troponin not elevated. Chest XR negative for cardiopulmonary findings. Patient did have a one time fever of 101 for which a bilateral lower extremity DVT US was obtained and negative.  Pain Team was consulted for pain management recommendations. Subcutaneous Heparin was started on 1/19. Pathology is still pending at time of discharge.    Patient will have 1 week follow up appointment scheduled with Neurosurgery for wound check and suture removal. If patient is still in her rehab facility 14 days post-op it is ok  "that a provider at the facility remove the stitches. Additionally, a referral was placed for patient to schedule an outpatient follow up with Radiation Oncology.         HPI: Laure Wood is a pleasant 72 year old female who is s/p T7-9 laminectomies and resection of intradural mass by Dr. Antunez on 1/16/23.  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  Referral to radiation oncology was placed at discharge.       Patient is now about 3 months out from surgery.  She is recovery very well and only endorses some numbness at the base of her buttocks and in her great and little toes.  She is ambulating well with a cane.  She has not yet been seen by rad/oncology.       Physical Exam   BP (!) 161/88   Pulse 68   Resp 16   Wt 86.2 kg (190 lb)   LMP  (LMP Unknown)   SpO2 99%   BMI 33.66 kg/m      Constitutional: Alert, no acute distress.     [place]: Patient walks in the room without assistive devises. Using cane at home and for longer distances.     Neurological:     Strength (L/R)  4+/4+ Hip Flexion  5/5 Knee Extension   5/5 Ankle Dorsiflexion  5/5 Plantar Flexion    Hypoactive, sym BLE reflexes    Sensation: SILT    Incision:  well healed.      IMAGING:  Independently reviewed.      Thoracic XR 4/25/23 - \"Impression:  1.  No acute osseous abnormality.  2. Mild degenerative changes. Mild convex left curvature of the  thoracic spine.\"    ASSESSMENT:  Laure Wood is a 72 year old female who is s/p T7-9 laminectomies and resection of intradural mass by Dr. Antunez on 1/16/23.  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  Referral to radiation oncology was placed at discharge.      Patient is now approximately 3 months out from her surgery.  She is doing very well in her recovery.  She has some residual n/t, which we are hopeful will continue to " resolve.      PLAN:    Okay to resume activity as tolerated.  No driving restrictions as long as not taking sedating medication.      New referral sent to rad/onc. We will defer to them for additional imaging needs.  We are happy to work with oncology if they feel our involvement is needed in the future.      Follow up PRN with nsgy.     Patient okay to wean off gabapentin if still taking that.      Patient in agreement with this plan and have no further questions at this time.        ABDI ValentineC  Department of Neurosurgery  Office: [phone]    I have seen this patient with the PA and formulated a plan and agree with this note.  AMP

## 2023-04-25 NOTE — PROGRESS NOTES
"New Patient Radiation Oncology Nurse Navigator Note     Referral Date: 4/25/23  Of note, referral was placed for RadOn in January 2023,  Referring provider:   Ally Luna PA-C   [address] [place] [id number]   Phone: [phone]   Fax: [phone]       Referring Clinic/Organization: [facility]     Referred to: Radiation Oncology    Requested provider (if applicable): First available - did not specify     Evaluation for : grade 2 thoracic meningioma s/p resection 1/16/23 - chromosome 22 deletion     Clinical History (per Nurse review of records provided):      Office Visit today with Neurosurgery (Dr. Antunez):  \"Laure Wood is a pleasant 72 year old female who is s/p T7-9 laminectomies and resection of intradural mass by Dr. Antunez on 1/16/23.  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  Referral to radiation oncology was placed at discharge.\"  \"Follow up with oncology for Grade II meningioma w/ chromosome 22 deletion.  Further imaging per oncology. \" -- BOOKMARKED    1/23/23 Path -- BOOKMARKED    Recent spine imaging--BOOKMARKED    1/16/23 path showing:  Final Diagnosis   A. Meninges, thoracic spinal tumor, resection:     - Meningioma (at least CNS WHO grade 2). See comment.   -- BOOKMARKED    Clinical Assessment / Barriers to Care (Per Nurse):  Pt lives in [place], [place]    Records Location: [facility]     Records Needed:   N/A    Additional testing needed prior to consult:   Pending input from team. IB to RadOnc team/Dr. Martinez to advise if patient should also establish with Neuro Onc (Dr. Lozada) or start with RadOnc. Per note today with Neurosurgery \"Follow up with oncology for Grade II meningioma w/ chromosome 22 deletion.  Further imaging per oncology.\"    Addendum: Per Dr. Martinez, start with RadOn referral, pt does not need referral to [facility] at this time.      Referral updates and Plan:   OUTGOING CALL " to pt:  Introduced my role as nurse navigator with [facility] Hematology/Oncology dept and that we have recd the referral for dx of meningioma from Neurosurgery team.  Pt confirms they are aware of the referral and ready to schedule. Pt reports her daughter will help arrange transportation for her through Medicare and asks that we call her daughter with appointment information/address. She would prefer to be seen in [place] if possible.     Provided contact information if future questions arise.     Parul Webber, BSN, RN, PHN, OCN  Hematology/Oncology Nurse Navigator  [facility]  [phone]

## 2023-04-26 ENCOUNTER — CARE COORDINATION (OUTPATIENT)
Dept: NEUROSURGERY | Facility: CLINIC | Age: 73
End: 2023-04-26
Payer: COMMERCIAL

## 2023-04-26 NOTE — PROGRESS NOTES
Phone call to patient to confirm current Gabapentin dosage and establish weaning schedule.  Patient confirms she has not been taking Gabapentin and has none in her possession.      Since patient is no longer taking the medication, no weaning schedule is needed.    Patient verbalized understanding and greement.

## 2023-04-26 NOTE — PROGRESS NOTES
Reviewed with Dr. Martinez pt's wishes to receive Radiation at [place] location closer to home. Ok to send referral to [facility] per Dr. Martinez. Referral routed to NPS to fax to [facility]. Patient's daughter notified referral will be sent to [facility] and that they will be reaching out. Writer will follow-up with [facility] to confirm referral is received and pt is scheduled for consult with [facility].

## 2023-05-09 NOTE — PROGRESS NOTES
"Subjective:  HPI  Physical Exam                    Objective:  System    Physical Exam    General     ROS    Assessment/Plan:    DISCHARGE REPORT    Progress reporting period is from 10/19/2022 to 11/16/2022.       SUBJECTIVE  Subjective:  When last seen on 11/19/2023, patient reported,  \"The numbness is slowly going.\"  She still had  pain and numbness in B anterior hips along with stiffness, also had significant pain in her LB with getting out of bed in the morning.  She was doing the exercises 2-3x/day and feels they help.  She could sleep through the night at that point without waking due to pain but had pain with getting out of bed each morning--up to 8/10.  Current status is unknown as patient did not return for additional follow-up visits.  Current Pain level:  As of 11/19/2022: 8/10.     Initial Pain level: 7/10.   Changes in function:  Unknown as patient did not return for additional follow-up visits.  Adverse reaction to treatment or activity: Unknown as patient did not return for additional follow-up visits.    OBJECTIVE  Objective:  As of 11/19/2022:  Lumbar AROM:  flexion nil loss, NE; extension min loss NE.  Decreased R foot numbness again after REIL w/self-OP--needs cues for end range and andre continue--needs to increase to every 2 hours to see more lasting effects/assess further   Current objective measurements are unavailable as patient did not return for additional follow-up visits.    ASSESSMENT/PLAN  Patient was seen for 3 visits with treatment focusing on lumbar extension exercises and mobilizations as well as posture to address LBP.  She reported progress at her last visit.  She has not returned for additional follow-up visits so current assessment is unavailable.  Updated problem list and treatment plan: Diagnosis 1:  LBP   No updated problem list or treatment plan as patient did not return for additional visits and is discharged from PT at this time.    STG/LTGs have been met or progress has " been made towards goals:  Unknown as patient did not return for additional follow-up visits.  Assessment of Progress: The patient has not returned to therapy. Current status is unknown.  Self Management Plans:  Patient has been instructed in a home treatment program.  Patient  has been instructed in self management of symptoms.  Laure continues to require the following intervention to meet STG and LTG's:  PT intervention is no longer required to meet STG/LTG.    Recommendations:  Patient is discharged from PT as she did not return for further follow-up visits.      Please refer to the daily flowsheet for treatment today, total treatment time and time spent performing 1:1 timed codes.

## 2023-05-10 ENCOUNTER — MEDICAL CORRESPONDENCE (OUTPATIENT)
Dept: HEALTH INFORMATION MANAGEMENT | Facility: CLINIC | Age: 73
End: 2023-05-10
Payer: COMMERCIAL

## 2023-05-16 NOTE — PROGRESS NOTES
Contacted SHERRELL Schroeder to follow-up and ensure referral was received. Confirmed referral was received and pt is scheduled for consult with [facility]. 5/17/23.

## 2023-05-17 ENCOUNTER — TRANSFERRED RECORDS (OUTPATIENT)
Dept: HEALTH INFORMATION MANAGEMENT | Facility: CLINIC | Age: 73
End: 2023-05-17
Payer: COMMERCIAL

## 2023-05-25 ENCOUNTER — TELEPHONE (OUTPATIENT)
Dept: NEUROSURGERY | Facility: CLINIC | Age: 73
End: 2023-05-25
Payer: COMMERCIAL

## 2023-05-30 ENCOUNTER — HOSPITAL ENCOUNTER (OUTPATIENT)
Dept: MRI IMAGING | Facility: CLINIC | Age: 73
Discharge: HOME | End: 2023-05-30
Attending: RADIOLOGY | Admitting: RADIOLOGY
Payer: COMMERCIAL

## 2023-05-30 DIAGNOSIS — D32.1: ICD-10-CM

## 2023-05-30 PROCEDURE — A9585 GADOBUTROL INJECTION: HCPCS | Performed by: RADIOLOGY

## 2023-05-30 PROCEDURE — 255N000002 HC RX 255 OP 636: Performed by: RADIOLOGY

## 2023-05-30 PROCEDURE — 72157 MRI CHEST SPINE W/O & W/DYE: CPT

## 2023-05-30 RX ORDER — GADOBUTROL 604.72 MG/ML
9 INJECTION INTRAVENOUS ONCE
Status: COMPLETED | OUTPATIENT
Start: 2023-05-30 | End: 2023-05-30

## 2023-05-30 RX ADMIN — GADOBUTROL 9 ML: 604.72 INJECTION INTRAVENOUS at 16:02

## 2023-06-09 ENCOUNTER — TRANSFERRED RECORDS (OUTPATIENT)
Dept: HEALTH INFORMATION MANAGEMENT | Facility: CLINIC | Age: 73
End: 2023-06-09
Payer: COMMERCIAL

## 2023-06-09 DIAGNOSIS — I10 ESSENTIAL HYPERTENSION, BENIGN: ICD-10-CM

## 2023-06-09 RX ORDER — HYDROCHLOROTHIAZIDE 25 MG/1
25 TABLET ORAL DAILY
Qty: 90 TABLET | Refills: 0 | Status: SHIPPED | OUTPATIENT
Start: 2023-06-09 | End: 2023-09-07

## 2023-07-31 ENCOUNTER — OFFICE VISIT (OUTPATIENT)
Dept: INTERNAL MEDICINE | Facility: CLINIC | Age: 73
End: 2023-07-31
Payer: COMMERCIAL

## 2023-07-31 VITALS
HEIGHT: 63 IN | TEMPERATURE: 98.3 F | DIASTOLIC BLOOD PRESSURE: 78 MMHG | HEART RATE: 77 BPM | RESPIRATION RATE: 18 BRPM | SYSTOLIC BLOOD PRESSURE: 126 MMHG | OXYGEN SATURATION: 99 % | BODY MASS INDEX: 34.16 KG/M2 | WEIGHT: 192.8 LBS

## 2023-07-31 DIAGNOSIS — N18.31 CHRONIC KIDNEY DISEASE, STAGE 3A (H): ICD-10-CM

## 2023-07-31 DIAGNOSIS — M17.0 OSTEOARTHRITIS OF BOTH KNEES, UNSPECIFIED OSTEOARTHRITIS TYPE: ICD-10-CM

## 2023-07-31 DIAGNOSIS — M25.512 ACUTE PAIN OF BOTH SHOULDERS: Primary | ICD-10-CM

## 2023-07-31 DIAGNOSIS — M25.511 ACUTE PAIN OF BOTH SHOULDERS: Primary | ICD-10-CM

## 2023-07-31 PROCEDURE — 99213 OFFICE O/P EST LOW 20 MIN: CPT | Performed by: INTERNAL MEDICINE

## 2023-07-31 NOTE — PROGRESS NOTES
"  Assessment & Plan     Acute pain of both shoulders  R>L.  Try some PT here - if not effective have her see sports med for cortisone inj  - Physical Therapy Referral; Future  - diclofenac (VOLTAREN) 1 % topical gel; Apply 2-4 g topically 4 times daily R Knee, foot as needed  Prescription drug management    CKD  Mild, non-proteinuric and non-diabetic. Avoid oral nsaids hence the topical diclofenac  No indication for ACEi given normal MA           BMI:   Estimated body mass index is 34.15 kg/m  as calculated from the following:    Height as of this encounter: 1.6 m (5' 3\").    Weight as of this encounter: 87.5 kg (192 lb 12.8 oz).   Weight management plan: Discussed healthy diet and exercise guidelines        Yoshi Bowen MD  [facility]    Martha Kelsey is a 73 year old, presenting for the following health issues:  Muscle Pain      HPI     Concern - pain in arms/wrist/joints  Onset: ongoing  Description: pain in shoulder, elbow, wrists  Intensity: severe  Progression of Symptoms:  worsens with more movement  Accompanying Signs & Symptoms: none  Previous history of similar problem: yes, ongoing issues  Precipitating factors:        Worsened by: activity  Alleviating factors:        Improved by: rest  Therapies tried and outcome:  none         Review of Systems         Objective    /78   Pulse 77   Temp 98.3  F (36.8  C) (Temporal)   Resp 18   Ht 1.6 m (5' 3\")   Wt 87.5 kg (192 lb 12.8 oz)   LMP  (LMP Unknown)   SpO2 99%   BMI 34.15 kg/m    Body mass index is 34.15 kg/m .  Physical Exam   GENERAL: alert and no distress  MS: R shoulder with painful internal and external rotation.  Less significant but similar on the left.                      "

## 2023-08-16 ENCOUNTER — THERAPY VISIT (OUTPATIENT)
Dept: PHYSICAL THERAPY | Facility: CLINIC | Age: 73
End: 2023-08-16
Attending: INTERNAL MEDICINE
Payer: COMMERCIAL

## 2023-08-16 DIAGNOSIS — M25.511 ACUTE PAIN OF BOTH SHOULDERS: ICD-10-CM

## 2023-08-16 DIAGNOSIS — M25.512 ACUTE PAIN OF BOTH SHOULDERS: ICD-10-CM

## 2023-08-16 PROCEDURE — 97162 PT EVAL MOD COMPLEX 30 MIN: CPT | Mod: GP | Performed by: PHYSICAL THERAPIST

## 2023-08-16 PROCEDURE — 97110 THERAPEUTIC EXERCISES: CPT | Mod: GP | Performed by: PHYSICAL THERAPIST

## 2023-08-16 NOTE — PROGRESS NOTES
PHYSICAL THERAPY EVALUATION  Type of Visit: Evaluation    See electronic medical record for Abuse and Falls Screening details.    Subjective  Pt reports that she's been having pain in both shoulder down the lateral arm on each side without known cause going on for about a month now. Pain is worst with washing in the shower, reaching, and certain position. Denies vague symptoms. Would like to get rid of this pain and return to PLOF pain free. Wants to get back to cooking without issue. Per chart review Hx significant for oncology/thoracic spinal surgery.         Presenting condition or subjective complaint: jessica shoulder  Date of onset: 23    Relevant medical history: Arthritis; Menopause; Numbness or tingling in perianal area   Dates & types of surgery: Jessica Knee, Back    Prior diagnostic imaging/testing results:       Prior therapy history for the same diagnosis, illness or injury: No      Living Environment  Social support: With a caregiver/helper   Type of home: House; 2-story   Stairs to enter the home: Yes 6 Is there a railing: Yes   Ramp: No   Stairs inside the home: Yes 12     Help at home: Self Cares (home health aide/personal care attendant, family, etc); Home and Yard maintenance tasks; Medication and/or finances; Assist for driving and community activities; Home management tasks (cooking, cleaning)  Equipment owned: Straight Cane     Employment: No    Hobbies/Interests: cooking    Patient goals for therapy: want to be able to cook and dress self    Pain assessment: Pain present  Location: Jessica Shoulder lateral/Ratin/10     Objective   Posture: forward head, rounded shoulders    Scapular positioning: limited assessment.     *=painful    Shoulder AROM (* = pain) Right Left   FL 30*        30*   ABD 25* 25*   ER/HBH 5* 5*   IR/HBB Right glute* Left hip*     Shoulder PROM (* = pain) Right Left   FL 35* 35*   ABD 30* 30*   ER 10 in neutral* 10 in neutral*   IR Limited* Limited*     Shoulder Strength (*  = pain) Right Left   FL unable/5 unable/5   ABD unable/5 unable/5   ER (0 abduction) 4-/5* 4/-5*   ER (90 abduction) NT/5 NT/5   IR 4/5* 4/5*   Biceps 4/5* 4/5*                 Palpation tenderness: anterior/lateral shoulder ankit    Other Tests: negative cervical screen    Assessment & Plan   CLINICAL IMPRESSIONS  Medical Diagnosis: Acute pain of both shoulder    Treatment Diagnosis: Ankit Shoulder pain   Impression/Assessment: Patient is a 73 year old female with Ankit Shoulder complaints.  The following significant findings have been identified: Pain, Decreased ROM/flexibility, Decreased joint mobility, Decreased strength, Impaired balance, Decreased proprioception, Impaired gait, Impaired muscle performance, and Decreased activity tolerance. These impairments interfere with their ability to perform self care tasks, work tasks, recreational activities, household chores, driving , household mobility, and community mobility as compared to previous level of function.     Clinical Decision Making (Complexity):  Clinical Presentation: Evolving/Changing  Clinical Presentation Rationale: based on medical and personal factors listed in PT evaluation  Clinical Decision Making (Complexity): Moderate complexity    PLAN OF CARE  Treatment Interventions:  Interventions: Manual Therapy, Neuromuscular Re-education, Therapeutic Activity, Therapeutic Exercise    Long Term Goals     PT Goal 1  Goal Identifier: Reaching  Goal Description: Pt will be able to reach overhead, out to the side, behind the back and cross body pain free in order to reach cabinets, for dressing, and ADls  Rationale: to maximize safety and independence with performance of ADLs and functional tasks;to maximize safety and independence within the home;to maximize safety and independence within the community;to maximize safety and independence with transportation;to maximize safety and independence with self cares  Goal Progress: new goal  Target Date: 10/25/23  PT  Goal 2  Goal Identifier: Lifting  Goal Description: Pt will be able to lift > 5 pounds overhead pain free in order to place things into high spaces with ADLs at home, and perform daily tasks at home  Rationale: to maximize safety and independence with performance of ADLs and functional tasks;to maximize safety and independence within the home;to maximize safety and independence within the community;to maximize safety and independence with transportation;to maximize safety and independence with self cares  Goal Progress: new goal  Target Date: 10/25/23      Frequency of Treatment: 1 x week  Duration of Treatment: 10 weeks    Recommended Referrals to Other Professionals: none  Education Assessment:   Learner/Method: Patient;No Barriers to Learning  Education Comments: no concerns    Risks and benefits of evaluation/treatment have been explained.   Patient/Family/caregiver agrees with Plan of Care.     Evaluation Time:     PT Eval, Moderate Complexity Minutes (28915): 15    Signing Clinician: STEFANIA Hendricks [facility]                                                                                   OUTPATIENT PHYSICAL THERAPY      PLAN OF TREATMENT FOR OUTPATIENT REHABILITATION   Patient's Last Name, First Name, Laure Garrido YOB: 1950   Provider's Name   [facility]   Medical Record No.  [id number]     Onset Date: 08/16/23  Start of Care Date: 08/16/23     Medical Diagnosis:  Acute pain of both shoulder      PT Treatment Diagnosis:  Ankit Shoulder pain Plan of Treatment  Frequency/Duration: 1 x week/ 10 weeks    Certification date from 08/16/23 to 10/25/23         See note for plan of treatment details and functional goals     Woody Latham PT                         I CERTIFY THE NEED FOR THESE SERVICES FURNISHED UNDER        THIS PLAN OF TREATMENT AND WHILE UNDER MY CARE     (Physician attestation of this document indicates  review and certification of the therapy plan).                Referring Provider:  Yoshi Bowen      Initial Assessment  See Epic Evaluation- Start of Care Date: 08/16/23

## 2023-08-23 ENCOUNTER — THERAPY VISIT (OUTPATIENT)
Dept: PHYSICAL THERAPY | Facility: CLINIC | Age: 73
End: 2023-08-23
Payer: COMMERCIAL

## 2023-08-23 DIAGNOSIS — M25.511 ACUTE PAIN OF BOTH SHOULDERS: Primary | ICD-10-CM

## 2023-08-23 DIAGNOSIS — M25.512 ACUTE PAIN OF BOTH SHOULDERS: Primary | ICD-10-CM

## 2023-08-23 PROCEDURE — 97110 THERAPEUTIC EXERCISES: CPT | Mod: GP | Performed by: PHYSICAL THERAPIST

## 2023-08-23 PROCEDURE — 97112 NEUROMUSCULAR REEDUCATION: CPT | Mod: GP | Performed by: PHYSICAL THERAPIST

## 2023-09-07 ENCOUNTER — PATIENT OUTREACH (OUTPATIENT)
Dept: GERIATRIC MEDICINE | Facility: CLINIC | Age: 73
End: 2023-09-07

## 2023-09-07 ENCOUNTER — THERAPY VISIT (OUTPATIENT)
Dept: PHYSICAL THERAPY | Facility: CLINIC | Age: 73
End: 2023-09-07
Payer: COMMERCIAL

## 2023-09-07 DIAGNOSIS — M25.512 ACUTE PAIN OF BOTH SHOULDERS: Primary | ICD-10-CM

## 2023-09-07 DIAGNOSIS — I10 ESSENTIAL HYPERTENSION, BENIGN: ICD-10-CM

## 2023-09-07 DIAGNOSIS — M25.511 ACUTE PAIN OF BOTH SHOULDERS: Primary | ICD-10-CM

## 2023-09-07 PROCEDURE — 97112 NEUROMUSCULAR REEDUCATION: CPT | Mod: GP | Performed by: PHYSICAL THERAPIST

## 2023-09-07 PROCEDURE — 97110 THERAPEUTIC EXERCISES: CPT | Mod: GP | Performed by: PHYSICAL THERAPIST

## 2023-09-07 NOTE — PROGRESS NOTES
[facility] Care Coordination Contact      [facility] Six-Month Telephone Assessment    6 month telephone assessment completed on 9/7/23.    ER visits: No  Hospitalizations: Yes -  [facility] 1/16/23 d/t Intradural mass.   TCU stays: Yes -  [facility]  Significant health status changes: No  Falls/Injuries: No  ADL/IADL changes: No  Changes in services: No    Caregiver Assessment follow up:  N/A. Has paid caregiver.     Goals: See POC in chart for goal progress documentation.      Will see member in 6 months for an annual health risk assessment.   Encouraged member to call CC with any questions or concerns in the meantime.    Roney Harrison RN BSN PHN  [facility] Care Coordinator  [phone]  Fax:[phone]

## 2023-09-08 RX ORDER — HYDROCHLOROTHIAZIDE 25 MG/1
25 TABLET ORAL DAILY
Qty: 90 TABLET | Refills: 0 | Status: SHIPPED | OUTPATIENT
Start: 2023-09-08 | End: 2023-10-02

## 2023-09-14 ENCOUNTER — THERAPY VISIT (OUTPATIENT)
Dept: PHYSICAL THERAPY | Facility: CLINIC | Age: 73
End: 2023-09-14
Payer: COMMERCIAL

## 2023-09-14 DIAGNOSIS — M25.511 ACUTE PAIN OF BOTH SHOULDERS: Primary | ICD-10-CM

## 2023-09-14 DIAGNOSIS — M25.512 ACUTE PAIN OF BOTH SHOULDERS: Primary | ICD-10-CM

## 2023-09-14 PROCEDURE — 97112 NEUROMUSCULAR REEDUCATION: CPT | Mod: GP | Performed by: PHYSICAL THERAPIST

## 2023-09-14 PROCEDURE — 97110 THERAPEUTIC EXERCISES: CPT | Mod: GP | Performed by: PHYSICAL THERAPIST

## 2023-09-25 ENCOUNTER — PATIENT OUTREACH (OUTPATIENT)
Dept: GERIATRIC MEDICINE | Facility: CLINIC | Age: 73
End: 2023-09-25
Payer: COMMERCIAL

## 2023-09-25 NOTE — PROGRESS NOTES
[facility] Care Coordination Contact    Internal CC change effective 10/01/2023.  Mailed member CC Change letter.  Additional tasks to be completed by CMS include: update database & EPIC, enter CC Change in MMIS, and move member file.    Kandy Garzon  Case Management Specialist  [facility]  [phone]

## 2023-09-25 NOTE — LETTER
September 25, 2023    Important [employer] Information    ABDULLAHI LOPEZ  [address] [place] [facility] [address]    Your New Care Coordinator  Dear Abdullahi,  My name is GIULIANA Wills, ANABEL  and I am your new Care Coordinator. You may reach me by calling [phone]. I will be in touch with you shortly to address any questions you may have.   I have also been in contact with Roney Harrison RN, PHN, your previous care coordinator, to ensure a smooth transition.  Questions?  Call me at [phone] Monday-Friday between 8am and 5pm. TTY: [phone]. I look forward to working with you as a [employer]  member.  Sincerely,    GIULIANA Wills, ANABEL  [phone]  [email or web address]    cc: member records

## 2023-10-02 ENCOUNTER — OFFICE VISIT (OUTPATIENT)
Dept: INTERNAL MEDICINE | Facility: CLINIC | Age: 73
End: 2023-10-02
Payer: COMMERCIAL

## 2023-10-02 VITALS
OXYGEN SATURATION: 99 % | TEMPERATURE: 97.3 F | BODY MASS INDEX: 33.98 KG/M2 | HEIGHT: 63 IN | HEART RATE: 67 BPM | DIASTOLIC BLOOD PRESSURE: 82 MMHG | RESPIRATION RATE: 16 BRPM | WEIGHT: 191.8 LBS | SYSTOLIC BLOOD PRESSURE: 126 MMHG

## 2023-10-02 DIAGNOSIS — Z00.00 ENCOUNTER FOR MEDICARE ANNUAL WELLNESS EXAM: Primary | ICD-10-CM

## 2023-10-02 DIAGNOSIS — M1A.09X0 IDIOPATHIC CHRONIC GOUT OF MULTIPLE SITES WITHOUT TOPHUS: ICD-10-CM

## 2023-10-02 DIAGNOSIS — R41.3 MEMORY CHANGE: ICD-10-CM

## 2023-10-02 DIAGNOSIS — E78.2 MIXED HYPERLIPIDEMIA: ICD-10-CM

## 2023-10-02 DIAGNOSIS — Z12.31 BREAST CANCER SCREENING BY MAMMOGRAM: ICD-10-CM

## 2023-10-02 DIAGNOSIS — I10 ESSENTIAL HYPERTENSION, BENIGN: ICD-10-CM

## 2023-10-02 LAB
ANION GAP SERPL CALCULATED.3IONS-SCNC: 13 MMOL/L (ref 7–15)
BUN SERPL-MCNC: 21.1 MG/DL (ref 8–23)
CALCIUM SERPL-MCNC: 10.3 MG/DL (ref 8.8–10.2)
CHLORIDE SERPL-SCNC: 106 MMOL/L (ref 98–107)
CHOLEST SERPL-MCNC: 237 MG/DL
CREAT SERPL-MCNC: 0.93 MG/DL (ref 0.51–0.95)
CREAT UR-MCNC: 81.4 MG/DL
DEPRECATED HCO3 PLAS-SCNC: 24 MMOL/L (ref 22–29)
EGFRCR SERPLBLD CKD-EPI 2021: 65 ML/MIN/1.73M2
ERYTHROCYTE [DISTWIDTH] IN BLOOD BY AUTOMATED COUNT: 14.8 % (ref 10–15)
GLUCOSE SERPL-MCNC: 82 MG/DL (ref 70–99)
HCT VFR BLD AUTO: 37.9 % (ref 35–47)
HDLC SERPL-MCNC: 79 MG/DL
HGB BLD-MCNC: 12 G/DL (ref 11.7–15.7)
LDLC SERPL CALC-MCNC: 142 MG/DL
MCH RBC QN AUTO: 27.8 PG (ref 26.5–33)
MCHC RBC AUTO-ENTMCNC: 31.7 G/DL (ref 31.5–36.5)
MCV RBC AUTO: 88 FL (ref 78–100)
MICROALBUMIN UR-MCNC: <12 MG/L
MICROALBUMIN/CREAT UR: NORMAL MG/G{CREAT}
NONHDLC SERPL-MCNC: 158 MG/DL
PLATELET # BLD AUTO: 216 10E3/UL (ref 150–450)
POTASSIUM SERPL-SCNC: 4.4 MMOL/L (ref 3.4–5.3)
RBC # BLD AUTO: 4.32 10E6/UL (ref 3.8–5.2)
SODIUM SERPL-SCNC: 143 MMOL/L (ref 135–145)
TRIGL SERPL-MCNC: 80 MG/DL
URATE SERPL-MCNC: 9.2 MG/DL (ref 2.4–5.7)
WBC # BLD AUTO: 5 10E3/UL (ref 4–11)

## 2023-10-02 PROCEDURE — 36415 COLL VENOUS BLD VENIPUNCTURE: CPT | Performed by: INTERNAL MEDICINE

## 2023-10-02 PROCEDURE — 84550 ASSAY OF BLOOD/URIC ACID: CPT | Performed by: INTERNAL MEDICINE

## 2023-10-02 PROCEDURE — G0008 ADMIN INFLUENZA VIRUS VAC: HCPCS | Performed by: INTERNAL MEDICINE

## 2023-10-02 PROCEDURE — 85027 COMPLETE CBC AUTOMATED: CPT | Performed by: INTERNAL MEDICINE

## 2023-10-02 PROCEDURE — 82570 ASSAY OF URINE CREATININE: CPT | Performed by: INTERNAL MEDICINE

## 2023-10-02 PROCEDURE — 80061 LIPID PANEL: CPT | Performed by: INTERNAL MEDICINE

## 2023-10-02 PROCEDURE — 82043 UR ALBUMIN QUANTITATIVE: CPT | Performed by: INTERNAL MEDICINE

## 2023-10-02 PROCEDURE — 99214 OFFICE O/P EST MOD 30 MIN: CPT | Mod: 25 | Performed by: INTERNAL MEDICINE

## 2023-10-02 PROCEDURE — G0439 PPPS, SUBSEQ VISIT: HCPCS | Performed by: INTERNAL MEDICINE

## 2023-10-02 PROCEDURE — 80048 BASIC METABOLIC PNL TOTAL CA: CPT | Performed by: INTERNAL MEDICINE

## 2023-10-02 PROCEDURE — 90662 IIV NO PRSV INCREASED AG IM: CPT | Performed by: INTERNAL MEDICINE

## 2023-10-02 ASSESSMENT — ACTIVITIES OF DAILY LIVING (ADL)
CURRENT_FUNCTION: TRANSPORTATION REQUIRES ASSISTANCE
CURRENT_FUNCTION: MONEY MANAGEMENT REQUIRES ASSISTANCE
CURRENT_FUNCTION: BATHING REQUIRES ASSISTANCE
CURRENT_FUNCTION: PREPARING MEALS REQUIRES ASSISTANCE
CURRENT_FUNCTION: MEDICATION ADMINISTRATION REQUIRES ASSISTANCE
CURRENT_FUNCTION: TELEPHONE REQUIRES ASSISTANCE
CURRENT_FUNCTION: HOUSEWORK REQUIRES ASSISTANCE
CURRENT_FUNCTION: SHOPPING REQUIRES ASSISTANCE
CURRENT_FUNCTION: LAUNDRY REQUIRES ASSISTANCE

## 2023-10-02 NOTE — LETTER
October 12, 2023      Laure E Israel  [address] [facility] [address]        Dear ,    We are writing to inform you of your test results.    Please make sure you take all of your medications EVERYDAY  I've increased your allopurinol to 300 mg to help lower the uric acid below 6. This will help prevent gout.   your new Rx at the pharmacy.  I've doubled your simvastatin to 40 mg. Take one each night.     Resulted Orders   Lipid panel reflex to direct LDL Non-fasting   Result Value Ref Range    Cholesterol 237 (H) <200 mg/dL    Triglycerides 80 <150 mg/dL    Direct Measure HDL 79 >=50 mg/dL    LDL Cholesterol Calculated 142 (H) <=100 mg/dL    Non HDL Cholesterol 158 (H) <130 mg/dL    Narrative    Cholesterol  Desirable:  <200 mg/dL    Triglycerides  Normal:  Less than 150 mg/dL  Borderline High:  150-199 mg/dL  High:  200-499 mg/dL  Very High:  Greater than or equal to 500 mg/dL    Direct Measure HDL  Female:  Greater than or equal to 50 mg/dL   Male:  Greater than or equal to 40 mg/dL    LDL Cholesterol  Desirable:  <100mg/dL  Above Desirable:  100-129 mg/dL   Borderline High:  130-159 mg/dL   High:  160-189 mg/dL   Very High:  >= 190 mg/dL    Non HDL Cholesterol  Desirable:  130 mg/dL  Above Desirable:  130-159 mg/dL  Borderline High:  160-189 mg/dL  High:  190-219 mg/dL  Very High:  Greater than or equal to 220 mg/dL   Albumin Random Urine Quantitative with Creat Ratio   Result Value Ref Range    Creatinine Urine mg/dL 81.4 mg/dL      Comment:      The reference ranges have not been established in urine creatinine. The results should be integrated into the clinical context for interpretation.    Albumin Urine mg/L <12.0 mg/L      Comment:      The reference ranges have not been established in urine albumin. The results should be integrated into the clinical context for interpretation.    Albumin Urine mg/g Cr        Comment:      Unable to calculate, urine albumin and/or urine creatinine is  outside detectable limits.  Microalbuminuria is defined as an albumin:creatinine ratio of 17 to 299 for males and 25 to 299 for females. A ratio of albumin:creatinine of 300 or higher is indicative of overt proteinuria.  Due to biologic variability, positive results should be confirmed by a second, first-morning random or 24-hour timed urine specimen. If there is discrepancy, a third specimen is recommended. When 2 out of 3 results are in the microalbuminuria range, this is evidence for incipient nephropathy and warrants increased efforts at glucose control, blood pressure control, and institution of therapy with an angiotensin-converting-enzyme (ACE) inhibitor (if the patient can tolerate it).     Uric acid   Result Value Ref Range    Uric Acid 9.2 (H) 2.4 - 5.7 mg/dL   CBC with platelets   Result Value Ref Range    WBC Count 5.0 4.0 - 11.0 10e3/uL      Comment:      This is a corrected result. Previous result was 5.4 10e3/uL on 10/2/2023 at  3:59 PM CDT    RBC Count 4.32 3.80 - 5.20 10e6/uL      Comment:      This is a corrected result. Previous result was 4.42 10e6/uL on 10/2/2023 at  3:59 PM CDT    Hemoglobin 12.0 11.7 - 15.7 g/dL      Comment:      This is a corrected result. Previous result was 12.1 g/dL on 10/2/2023 at  3:59 PM CDT    Hematocrit 37.9 35.0 - 47.0 %      Comment:      This is a corrected result. Previous result was 38.3 % on 10/2/2023 at  3:59 PM CDT    MCV 88 78 - 100 fL      Comment:      This is a corrected result. Previous result was 87 fL on 10/2/2023 at  3:59 PM CDT    MCH 27.8 26.5 - 33.0 pg      Comment:      This is a corrected result. Previous result was 27.4 pg on 10/2/2023 at  3:59 PM CDT    MCHC 31.7 31.5 - 36.5 g/dL      Comment:      This is a corrected result. Previous result was 31.6 g/dL on 10/2/2023 at  3:59 PM CDT    RDW 14.8 10.0 - 15.0 %      Comment:      This is a corrected result. Previous result was 14.4 % on 10/2/2023 at  3:59 PM CDT    Platelet Count 216 150 - 450  10e3/uL   Basic metabolic panel  (Ca, Cl, CO2, Creat, Gluc, K, Na, BUN)   Result Value Ref Range    Sodium 143 135 - 145 mmol/L      Comment:      Reference intervals for this test were updated on 09/26/2023 to more accurately reflect our healthy population. There may be differences in the flagging of prior results with similar values performed with this method. Interpretation of those prior results can be made in the context of the updated reference intervals.     Potassium 4.4 3.4 - 5.3 mmol/L    Chloride 106 98 - 107 mmol/L    Carbon Dioxide (CO2) 24 22 - 29 mmol/L    Anion Gap 13 7 - 15 mmol/L    Urea Nitrogen 21.1 8.0 - 23.0 mg/dL    Creatinine 0.93 0.51 - 0.95 mg/dL    GFR Estimate 65 >60 mL/min/1.73m2    Calcium 10.3 (H) 8.8 - 10.2 mg/dL    Glucose 82 70 - 99 mg/dL       If you have any questions or concerns, please call the clinic at the number listed above.       Sincerely,      Yoshi Bowen M.D.  Dept of Internal Medicine- [facility]

## 2023-10-02 NOTE — PROGRESS NOTES
DISCHARGE SUMMARY    Laure Wood was seen   times for evaluation and treatment.  Patient did not return for further treatment and current status is unknown.  Due to short treatment duration, no objective or functional changes were made.  Please see goal flow sheet from episode noted date below and initial evaluation for further information.  Patient is discharged from therapy and therapy episode is resolved as of 10/02/23.      Linked Episodes   Type: Episode: Status: Noted: Resolved: Last update: Updated by:   PHYSICAL THERAPY Ankit Shoulder  Active 8/16/2023 9/14/2023  1:23 PM Woody Latham, PT      Comments:

## 2023-10-02 NOTE — PROGRESS NOTES
"SUBJECTIVE:   Laure is a 73 year old who presents for Preventive Visit  Are you in the first 12 months of your Medicare coverage?  No    Healthy Habits:     In general, how would you rate your overall health?  Good    Frequency of exercise:: 3 x month.    Duration of exercise:  Greater than 60 minutes    Do you usually eat at least 4 servings of fruit and vegetables a day, include whole grains    & fiber and avoid regularly eating high fat or \"junk\" foods?  Yes    Taking medications regularly:  Yes    Barriers to taking medications:  None    Medication side effects:  None    Ability to successfully perform activities of daily living:  Housework requires assistance, bathing requires assistance, laundry requires assistance, medication administration requires assistance, shopping requires assistance, transportation requires assistance, telephone requires assistance, preparing meals requires assistance and money management requires assistance    Home Safety:  No safety concerns identified    Hearing Impairment:  No hearing concerns    In the past 6 months, have you been bothered by leaking of urine?  No    In general, how would you rate your overall mental or emotional health?  Good    Additional concerns today:  No          Have you ever done Advance Care Planning? (For example, a Health Directive, POLST, or a discussion with a medical provider or your loved ones about your wishes): No, advance care planning information given to patient to review.  Patient plans to discuss their wishes with loved ones or provider.         Fall risk  Fallen 2 or more times in the past year?: No  Any fall with injury in the past year?: No    Cognitive Screening   1) Repeat 3 items (Leader, Season, Table)    2) Clock draw: ABNORMAL .  3) 3 item recall: Recalls NO objects   Results: 0 items recalled: PROBABLE COGNITIVE IMPAIRMENT, **INFORM PROVIDER**    Mini-CogTM Copyright SHELLEY Fleming. Licensed by the author for use in [facility] " Services; reprinted with permission (sodon@.[email or web address]). All rights reserved.      Do you have sleep apnea, excessive snoring or daytime drowsiness? : no    Six Item Cognitive Impairment Test   (6CIT):    What year is it?                               Incorrect - 4 points  What month is it?                               Correct - 0 points    Give the patient an address to remember with five components:   Kroy Springer ( first and last name - 2 components)   [address]  (number and name of street - 2 components)   [place] ( city - 1 component)    About what time is it (within the hour)? Correct - 0 points  Count backwards from 20 to 1:   More than one error - 4 points  Say the months of the year in reverse: More than one error - 4 points  Repeat the address phrase:   2 errors - 4 points    Total 6CIT Score:      16/28    Interpretation: The 6CIT uses an inverse score and questions are weighted to produce a total out of 28. Scores of 0-7 are considered normal and 8 or more significant.    Advantages The test has high sensitivity without compromising specificity even in mild dementia. It is easy to translate linguistically and culturally.  Disadvantages The main disadvantage is in the scoring and weighting of the test, which is initially confusing, however computer models have simplified this greatly.    Probability Statistics: At the 7/8 cut off: Overall figures sensitivity 90% specificity 100%, in mild dementia sensitivity = 78% , specificity = 100%    Copyright 2000 [facility], [facility], [place]. Courtesy of Dr. Josh Anderson    Reviewed and updated as needed this visit by clinical staff     Meds  Problems             Reviewed and updated as needed this visit by Provider      Problems            Social History     Tobacco Use     Smoking status: Never     Smokeless tobacco: Never   Substance Use Topics     Alcohol use: No             10/2/2023     2:34 PM   Alcohol Use   Prescreen: >3 drinks/day or  ">7 drinks/week? No     Do you have a current opioid prescription? No  Do you use any other controlled substances or medications that are not prescribed by a provider? None              Current providers sharing in care for this patient include:   Patient Care Team:  Yoshi Bowen MD as PCP - General (Internal Medicine)  Yoshi Bowen MD as Assigned PCP  Jarred Reeves PA-C as Assigned Musculoskeletal Provider  Edel Bean MD as MD (OB/Gyn)  Kory Rodriguez MD as MD (OB/Gyn)  Kory Rodriguez MD as Assigned OBGYN Provider  Katina Dominguez MD as MD (Neurology)  Ambar Antunez MD as MD (Neurological Surgery)  John Waters, JOSE as Specialty Care Coordinator (Neurological Surgery)  Ambar Antunez MD as Assigned Neuroscience Provider  Ally Luna PA-C as Physician Assistant (Neurological Surgery)  Makenzie Pollard LICSW as Lead Care Coordinator (Primary Care - CC)    The following health maintenance items are reviewed in Epic and correct as of today:  Health Maintenance   Topic Date Due     ZOSTER IMMUNIZATION (1 of 2) Never done     DTAP/TDAP/TD IMMUNIZATION (2 - Td or Tdap) 05/29/2022     COVID-19 Vaccine (5 - 2023-24 season) 09/01/2023     MAMMO SCREENING  12/20/2023     MEDICARE ANNUAL WELLNESS VISIT  10/02/2024     ANNUAL REVIEW OF HM ORDERS  10/02/2024     FALL RISK ASSESSMENT  10/02/2024     LIPID  08/18/2027     ADVANCE CARE PLANNING  10/02/2028     DEXA  05/02/2034     HEPATITIS C SCREENING  Completed     PHQ-2 (once per calendar year)  Completed     INFLUENZA VACCINE  Completed     Pneumococcal Vaccine: 65+ Years  Completed     IPV IMMUNIZATION  Aged Out     HPV IMMUNIZATION  Aged Out     MENINGITIS IMMUNIZATION  Aged Out     COLORECTAL CANCER SCREENING  Discontinued               Review of Systems      OBJECTIVE:   /82   Pulse 67   Temp 97.3  F (36.3  C) (Temporal)   Resp 16   Ht 1.6 m (5' 3\")   Wt 87 kg (191 lb 12.8 oz)   LMP  (LMP Unknown)   SpO2 " "99%   BMI 33.98 kg/m   Estimated body mass index is 33.98 kg/m  as calculated from the following:    Height as of this encounter: 1.6 m (5' 3\").    Weight as of this encounter: 87 kg (191 lb 12.8 oz).  Physical Exam  GENERAL APPEARANCE: alert, no distress, and elderly  EYES: Eyes grossly normal to inspection, PERRL and conjunctivae and sclerae normal  HENT: ear canals and TM's normal, nose and mouth without ulcers or lesions, oropharynx clear and oral mucous membranes moist  NECK: no adenopathy, no asymmetry, masses, or scars and thyroid normal to palpation  RESP: lungs clear to auscultation - no rales, rhonchi or wheezes  CV: regular rate and rhythm, normal S1 S2, no S3 or S4, no murmur, click or rub, no peripheral edema and peripheral pulses strong  ABDOMEN: soft, nontender, no hepatosplenomegaly, no masses and bowel sounds normal  MS: no musculoskeletal defects are noted and gait is age appropriate without ataxia  SKIN: no suspicious lesions or rashes  NEURO: Normal strength and tone, sensory exam grossly normal, mentation intact and speech normal  PSYCH: mentation appears normal and affect normal/bright    Results for orders placed or performed in visit on 10/02/23 (from the past 24 hour(s))   CBC with platelets   Result Value Ref Range    WBC Count 5.4 4.0 - 11.0 10e3/uL    RBC Count 4.42 3.80 - 5.20 10e6/uL    Hemoglobin 12.1 11.7 - 15.7 g/dL    Hematocrit 38.3 35.0 - 47.0 %    MCV 87 78 - 100 fL    MCH 27.4 26.5 - 33.0 pg    MCHC 31.6 31.5 - 36.5 g/dL    RDW 14.4 10.0 - 15.0 %    Platelet Count         ASSESSMENT / PLAN:       ICD-10-CM    1. Encounter for Medicare annual wellness exam  Z00.00       2. Mixed hyperlipidemia  E78.2 Lipid panel reflex to direct LDL Non-fasting     Lipid panel reflex to direct LDL Non-fasting      3. Essential hypertension, benign  I10 Basic metabolic panel  (Ca, Cl, CO2, Creat, Gluc, K, Na, BUN)     Basic metabolic panel  (Ca, Cl, CO2, Creat, Gluc, K, Na, BUN)      4. " Idiopathic chronic gout of multiple sites without tophus  M1A.09X0 Uric acid     CBC with platelets     Uric acid     CBC with platelets      5. Breast cancer screening by mammogram  Z12.31 MA Screening Digital Bilateral      6. Memory change  R41.3 Occupational Therapy Referral        -Updated screening, immunizations, prevention.  Please see health maintenance list, care gaps  -difficult to know the significance of her cognitive screening- her lack of formal education may impact some of this and otherwise I think she is quite bright w/o  other obvious sx of memory decline. Will have her see OT and do some cognitive eval to get a better picture.   -no flares of gout since starting allopurinol. Check ua level - titrate if needed. Discharge colchicine if no further titration needed.  Patient has been advised of split billing requirements and indicates understanding: Yes    Addendum:  -LDL elevated, as is uric acid- wonder about compliance w/meds  -increase allopurinol to 300 mg. Cont colchicine until uric acid stable/at goal  -simvastatin 40 mg     COUNSELING:  Reviewed preventive health counseling, as reflected in patient instructions       Fall risk prevention        She reports that she has never smoked. She has never used smokeless tobacco.      Appropriate preventive services were discussed with this patient, including applicable screening as appropriate for fall prevention, nutrition, physical activity, Tobacco-use cessation, weight loss and cognition.  Checklist reviewing preventive services available has been given to the patient.    Reviewed patients plan of care and provided an AVS. The Basic Care Plan (routine screening as documented in Health Maintenance) for Laure meets the Care Plan requirement. This Care Plan has been established and reviewed with the Patient.          Yoshi Bowen MD  [facility]    Identified Health Risks:  I have reviewed Opioid Use Disorder and  Substance Use Disorder risk factors and made any needed referrals.

## 2023-10-02 NOTE — PATIENT INSTRUCTIONS
Patient Education   Personalized Prevention Plan  You are due for the preventive services outlined below.  Your care team is available to assist you in scheduling these services.  If you have already completed any of these items, please share that information with your care team to update in your medical record.  Health Maintenance Due   Topic Date Due     Zoster (Shingles) Vaccine (1 of 2) Never done     Diptheria Tetanus Pertussis (DTAP/TDAP/TD) Vaccine (2 - Td or Tdap) 05/29/2022     Cholesterol Lab  08/18/2023     Kidney Microalbumin Urine Test  08/18/2023     COVID-19 Vaccine (5 - 2023-24 season) 09/01/2023     Activities of Daily Living    Your Health Risk Assessment indicates you have difficulties with activities of daily living such as housework, bathing, preparing meals, taking medication, etc. Please make a follow up appointment for us to address this issue in more detail.

## 2023-10-03 ENCOUNTER — PATIENT OUTREACH (OUTPATIENT)
Dept: GERIATRIC MEDICINE | Facility: CLINIC | Age: 73
End: 2023-10-03
Payer: COMMERCIAL

## 2023-10-03 NOTE — PROGRESS NOTES
[facility] Care Coordination Contact    Member transferred from Roney Harrison to this care coordinator effective 10/1/2023.  Called member to introduce self.  Left message

## 2023-10-04 DIAGNOSIS — K21.9 GASTROESOPHAGEAL REFLUX DISEASE WITHOUT ESOPHAGITIS: Primary | ICD-10-CM

## 2023-10-05 RX ORDER — FAMOTIDINE 20 MG/1
TABLET, FILM COATED ORAL
Qty: 180 TABLET | Refills: 3 | Status: SHIPPED | OUTPATIENT
Start: 2023-10-05 | End: 2024-02-01

## 2023-10-12 RX ORDER — SIMVASTATIN 40 MG
40 TABLET ORAL AT BEDTIME
Qty: 90 TABLET | Refills: 3 | Status: SHIPPED | OUTPATIENT
Start: 2023-10-12

## 2023-10-12 RX ORDER — ALLOPURINOL 300 MG/1
300 TABLET ORAL DAILY
Qty: 90 TABLET | Refills: 3 | Status: SHIPPED | OUTPATIENT
Start: 2023-10-12

## 2023-10-12 RX ORDER — COLCHICINE 0.6 MG/1
0.6 TABLET ORAL DAILY
Qty: 90 TABLET | Refills: 3 | Status: SHIPPED | OUTPATIENT
Start: 2023-10-12

## 2023-10-12 RX ORDER — HYDROCHLOROTHIAZIDE 25 MG/1
25 TABLET ORAL DAILY
Qty: 90 TABLET | Refills: 3 | Status: SHIPPED | OUTPATIENT
Start: 2023-10-12 | End: 2024-02-01

## 2023-10-24 ENCOUNTER — HOSPITAL ENCOUNTER (OUTPATIENT)
Dept: MAMMOGRAPHY | Facility: CLINIC | Age: 73
Discharge: HOME | End: 2023-10-24
Attending: INTERNAL MEDICINE | Admitting: INTERNAL MEDICINE
Payer: COMMERCIAL

## 2023-10-24 DIAGNOSIS — Z12.31 BREAST CANCER SCREENING BY MAMMOGRAM: ICD-10-CM

## 2023-10-24 DIAGNOSIS — Z12.31 VISIT FOR SCREENING MAMMOGRAM: ICD-10-CM

## 2023-10-24 PROCEDURE — 77067 SCR MAMMO BI INCL CAD: CPT

## 2024-01-02 ENCOUNTER — PATIENT OUTREACH (OUTPATIENT)
Dept: GERIATRIC MEDICINE | Facility: CLINIC | Age: 74
End: 2024-01-02
Payer: COMMERCIAL

## 2024-01-02 NOTE — PROGRESS NOTES
[facility] Care Coordination Contact    Called member to schedule annual HRA home visit. HRA has been scheduled for Monday 1/8 at 1:00.    Ivon Hanson RN,  PHN  [facility] Care Coordinator  [phone]   
Statement Selected

## 2024-01-04 DIAGNOSIS — M1A.09X0 IDIOPATHIC CHRONIC GOUT OF MULTIPLE SITES WITHOUT TOPHUS: ICD-10-CM

## 2024-01-04 RX ORDER — ALLOPURINOL 100 MG/1
200 TABLET ORAL DAILY
Qty: 180 TABLET | Refills: 0 | OUTPATIENT
Start: 2024-01-04

## 2024-01-08 ENCOUNTER — PATIENT OUTREACH (OUTPATIENT)
Dept: GERIATRIC MEDICINE | Facility: CLINIC | Age: 74
End: 2024-01-08
Payer: COMMERCIAL

## 2024-01-08 SDOH — HEALTH STABILITY: PHYSICAL HEALTH: ON AVERAGE, HOW MANY MINUTES DO YOU ENGAGE IN EXERCISE AT THIS LEVEL?: 30 MIN

## 2024-01-08 SDOH — HEALTH STABILITY: PHYSICAL HEALTH: ON AVERAGE, HOW MANY DAYS PER WEEK DO YOU ENGAGE IN MODERATE TO STRENUOUS EXERCISE (LIKE A BRISK WALK)?: 2 DAYS

## 2024-01-08 ASSESSMENT — LIFESTYLE VARIABLES
HOW MANY STANDARD DRINKS CONTAINING ALCOHOL DO YOU HAVE ON A TYPICAL DAY: PATIENT DOES NOT DRINK
SKIP TO QUESTIONS 9-10: 1
HOW OFTEN DO YOU HAVE SIX OR MORE DRINKS ON ONE OCCASION: NEVER
HOW OFTEN DO YOU HAVE A DRINK CONTAINING ALCOHOL: NEVER
AUDIT-C TOTAL SCORE: 0

## 2024-01-08 ASSESSMENT — SOCIAL DETERMINANTS OF HEALTH (SDOH)
HOW OFTEN DO YOU GET TOGETHER WITH FRIENDS OR RELATIVES?: MORE THAN THREE TIMES A WEEK
HOW OFTEN DO YOU ATTEND CHURCH OR RELIGIOUS SERVICES?: MORE THAN 4 TIMES PER YEAR
IN A TYPICAL WEEK, HOW MANY TIMES DO YOU TALK ON THE PHONE WITH FAMILY, FRIENDS, OR NEIGHBORS?: MORE THAN THREE TIMES A WEEK
HOW OFTEN DO YOU ATTENT MEETINGS OF THE CLUB OR ORGANIZATION YOU BELONG TO?: PATIENT UNABLE TO ANSWER
DO YOU BELONG TO ANY CLUBS OR ORGANIZATIONS SUCH AS CHURCH GROUPS UNIONS, FRATERNAL OR ATHLETIC GROUPS, OR SCHOOL GROUPS?: NO

## 2024-01-08 NOTE — PROGRESS NOTES
[facility] Care Coordination Contact    [facility] Home Visit Assessment     Home visit for Health Risk Assessment with Laure LISETH Wood completed on January 8, 2024    Type of residence:: Private home - stairs  Current living arrangement:: I live in a private home with family     Assessment completed with:: Patient    Current Care Plan  Member currently receiving the following home care services:     Member currently receiving the following community resources: PCA, Home Care, Housekeeping/Chore Agency, Lifeline    Medication Review  Medication reconciliation completed in Epic: If no, please explain No current med list in the home.  The granddaughter sets them up weekly.  Medication set-up & administration: Family/informal caregiver sets up weekly.  Family caregiver administers medications.  Medication Risk Assessment Medication (1 or more, place referral to MTM): N/A: No risk factors identified  MTM Referral Placed: No: No risk factors idenified    Mental/Behavioral Health   Depression Screening:   PHQ-2 Total Score (Adult) - Positive if 3 or more points; Administer PHQ-9 if positive: 0     Mental health DX:: No        Falls Assessment:   Fallen 2 or more times in the past year?: No   Any fall with injury in the past year?: No    ADL/IADL Dependencies:   Dependent ADLs:: Bathing, Dressing, Grooming, Incontinence, Toileting, Transfers, Ambulation-walker, Positioning, Eating  Dependent IADLs:: Cleaning, Cooking, Laundry, Shopping, Medication Management, Meal Preparation, Money Management, Incontinence, Transportation    Health Plan sponsored benefits: [employer]: Shared information regarding [employer]. Reviewed preventative health screening and health plan supplemental benefits/incentives. Reviewed medication disposal form.    PCA Assessment completed at visit: Yes Annual PCA assessment indicated 6.25 hours per day of PCA. This is an increase from the previous assessment. This is due to  extra time in eating.  If she is having an exacerbation of the gouty arthritis she needs to be fed.     Elderly Waiver Eligibility: Yes-will continue on EW    Care Plan & Recommendations: Member would like to continue current services.      See [facility] for detailed assessment information.    Follow-Up Plan: Member informed of future contact, plan to f/u with member with a 6 month telephone assessment.  Contact information shared with member and family, encouraged member to call with any questions or concerns at any time.    [place] care continuum providers: Please see Snapshot and Care Management Flowsheets for Specific details of care plan.    This CC note routed to PCP, Yoshi Bowen RN,  PHN  [facility] Care Coordinator  [phone]

## 2024-01-08 NOTE — Clinical Note
Name: Jessica Wood ADMIT: 9/15/2021   : 1961  PCP: Tyler Venegas MD    MRN: [id number] LOS: 0 days   AGE/SEX: 59 y.o. female  ROOM: Room/bed info not found       Chief complaint left breast enhancing lesion    Present Illness or Internal History:  Patient is a 59 y.o. female presents with a left breast enhancing lesion.     Past Surgical History:  Past Surgical History:   Procedure Laterality Date   •  SECTION     • TOTAL THYROIDECTOMY         Past Medical History:  Past Medical History:   Diagnosis Date   • Allergic rhinitis    • Anxiety    • Hypothyroidism        Home Medications:  (Not in a hospital admission)      Allergies:  Sulfa antibiotics and Latex    Family History:  Family History   Problem Relation Age of Onset   • Cancer Mother         BREAST   • Breast cancer Mother    • Cancer Sister         BREAST    • Breast cancer Sister        Social History:  Social History     Tobacco Use   • Smoking status: Never Smoker   Substance Use Topics   • Alcohol use: Yes   • Drug use: Not on file        Objective     Physical Exam:    No exam performed today,    Vital Signs  Temp:  [97.1 °F (36.2 °C)] 97.1 °F (36.2 °C)  Heart Rate:  [89] 89  Resp:  [16] 16  BP: (145)/(81) 145/81    Anticipated Surgical Procedure:  MRI guided vacuum assisted left breast biopsy with clip placement    The risks, benefits and alternatives of this procedure have been discussed with the patient or responsible party: Yes        Toy Rockwell Jr., MD  09/15/21  07:22 EDT                                           Jimmie Bowen,  I was able to complete the annual assessment in the home. Only change is the PCA hours increased a little because she needs more assistance with eating due to the gouty arthritis. No other concerns.  Thanks,  JOSE Del Valle

## 2024-01-08 NOTE — COMMUNITY RESOURCES LIST (ENGLISH)
01/08/2024   [facility] [employer]  N/A  For questions about this resource list or additional care needs, please contact your primary care clinic or care manager.  Phone: [phone]   Email: N/A   Address: [address] [place] [id number]   Hours: N/A        Exercise and Recreation       Gym or workout facility  1  [address] [place] [address] - Kickboxing Classes Distance: 5.48 miles      In-Person   [address] [place] [address]  Language: English  Hours: Mon - Fri 6:00 AM - 12:30 PM , Mon - Fri 3:00 PM - 8:00 PM , Sat 8:00 AM - 12:00 PM  Fees: Self Pay   Phone: [phone] Website: [email or web address]     2  [employer] [facility] Distance: 5.66 miles      In-Person   [address] [place] [address]  Language: English  Hours: Mon - Sun Open 24 Hours  Fees: Insurance, Self Pay, Sliding Fee   Phone: [phone] Website: [email or web address]          Important Numbers & Websites       Emergency Services   [address]  [place] [address]  Poison Control   [phone] [phone]  Suicide Prevention Lifeline   (990) 609-7342 (TALK)  Child Abuse Hotline   (995) 352-7642 (4-A-Child)  Sexual Assault Hotline   (531) 298-3764 (HOPE)  National Runaway Safeline   (689) 212-9848 (RUNAWAY)  All-Options Talkline   (145) 152-5054  Substance Abuse Referral   (166) 530-7510 (HELP)

## 2024-01-11 ENCOUNTER — PATIENT OUTREACH (OUTPATIENT)
Dept: GERIATRIC MEDICINE | Facility: CLINIC | Age: 74
End: 2024-01-11
Payer: COMMERCIAL

## 2024-01-11 NOTE — LETTER
January 11, 2024    Important [employer] Information    LAURE LOPEZ  [address]  [facility] [address]  Your Care Plan  Dear Laure,  When we spoke recently, I promised to send you a Care Plan. The plan enclosed is a summary of our discussion. It includes the steps we agreed would help you meet your health goals. In addition, I can help you with:  [id number]  This program is available to members who need a ride to medical and dental visits. To schedule a ride, call [phone] or [phone] (toll free). TTY: 711. You can call Monday - Thursday 8 a.m. to 5 p.m. and Fridays 9 a.m. to 5 p.m.  One Pass  One Pass is your no-cost, complete, fitness solution for your mind and body. To learn more visit [employer]fitness or call One Pass, toll-free 1 [phone] (TTY: 711) 8 a.m. to 9 p.m. Monday-Friday.  Health Care Directive   This form helps you outline your health care wishes. You can request a form from me and I will answer any questions you have before you discuss it with your doctor.   Annual Physical  Take a key step on your path to good health and set up an annual physical at your clinic.  Questions?  Call me at [phone] Monday-Friday between 8am and 5pm.  TTY: 711. As we discussed, I plan to be in touch with you again in 6 months to follow up via phone.  Sincerely,    Ivon Lopez RN, PHN  [phone]  [email or web address]    cc: member records

## 2024-01-11 NOTE — PROGRESS NOTES
[facility] Care Coordination Contact    Received after visit chart from care coordinator.  Completed following tasks: Mailed copy of care plan/support plan to member, Mailed Safe Medication Disposal , Mailed [employer] Leave Behind Letter, Sent the following resources/forms: [facility] , Submitted referrals/auths for PCA and homemaking, and Updated services in Database  , Provider Signature - No POC Shared:  Member indicates that they do not want their POC shared with any [facility] providers.    , and [employer]:  Faxed completed PCA assessment to PCA Agency and mailed copies to member.  Faxed MD Communication to PCP.  Emailed referral form for auth to [employer].    Jenelle Pope  Care Management Specialist  [facility]  [phone]

## 2024-02-01 ENCOUNTER — ANCILLARY PROCEDURE (OUTPATIENT)
Dept: GENERAL RADIOLOGY | Facility: CLINIC | Age: 74
End: 2024-02-01
Attending: INTERNAL MEDICINE
Payer: COMMERCIAL

## 2024-02-01 ENCOUNTER — OFFICE VISIT (OUTPATIENT)
Dept: INTERNAL MEDICINE | Facility: CLINIC | Age: 74
End: 2024-02-01
Payer: COMMERCIAL

## 2024-02-01 VITALS
BODY MASS INDEX: 32.95 KG/M2 | RESPIRATION RATE: 16 BRPM | HEART RATE: 69 BPM | SYSTOLIC BLOOD PRESSURE: 120 MMHG | DIASTOLIC BLOOD PRESSURE: 80 MMHG | WEIGHT: 186 LBS | OXYGEN SATURATION: 99 %

## 2024-02-01 DIAGNOSIS — R07.89 CHEST WALL PAIN: ICD-10-CM

## 2024-02-01 DIAGNOSIS — Z23 HIGH PRIORITY FOR 2019-NCOV VACCINE: ICD-10-CM

## 2024-02-01 DIAGNOSIS — Z71.84 TRAVEL ADVICE ENCOUNTER: ICD-10-CM

## 2024-02-01 DIAGNOSIS — K21.9 GASTROESOPHAGEAL REFLUX DISEASE WITHOUT ESOPHAGITIS: ICD-10-CM

## 2024-02-01 DIAGNOSIS — R07.89 CHEST WALL PAIN: Primary | ICD-10-CM

## 2024-02-01 DIAGNOSIS — I10 ESSENTIAL HYPERTENSION, BENIGN: ICD-10-CM

## 2024-02-01 PROBLEM — D32.9 MENINGIOMA (H): Status: ACTIVE | Noted: 2024-02-01

## 2024-02-01 PROCEDURE — 90480 ADMN SARSCOV2 VAC 1/ONLY CMP: CPT | Performed by: INTERNAL MEDICINE

## 2024-02-01 PROCEDURE — 91320 SARSCV2 VAC 30MCG TRS-SUC IM: CPT | Performed by: INTERNAL MEDICINE

## 2024-02-01 PROCEDURE — 71046 X-RAY EXAM CHEST 2 VIEWS: CPT | Mod: TC | Performed by: RADIOLOGY

## 2024-02-01 PROCEDURE — 99214 OFFICE O/P EST MOD 30 MIN: CPT | Mod: 25 | Performed by: INTERNAL MEDICINE

## 2024-02-01 RX ORDER — ATOVAQUONE AND PROGUANIL HYDROCHLORIDE 250; 100 MG/1; MG/1
1 TABLET, FILM COATED ORAL DAILY
Qty: 60 TABLET | Refills: 0 | Status: SHIPPED | OUTPATIENT
Start: 2024-02-01 | End: 2024-07-02

## 2024-02-01 RX ORDER — HYDROCHLOROTHIAZIDE 25 MG/1
25 TABLET ORAL DAILY
Qty: 90 TABLET | Refills: 3 | Status: SHIPPED | OUTPATIENT
Start: 2024-02-01

## 2024-02-01 RX ORDER — FAMOTIDINE 20 MG/1
TABLET, FILM COATED ORAL
Qty: 180 TABLET | Refills: 3 | Status: SHIPPED | OUTPATIENT
Start: 2024-02-01

## 2024-02-01 NOTE — PROGRESS NOTES
"  Assessment & Plan     Gastroesophageal reflux disease without esophagitis    - omeprazole (PRILOSEC) 20 MG DR capsule; Take 1 capsule (20 mg) by mouth daily as needed (pt takes prn for occasional GERD symptoms)  - famotidine (PEPCID) 20 MG tablet; TAKE 1 TABLET BY MOUTH TWICE DAILY AS NEEDED FOR  HEARTBURN    Essential hypertension, benign    - hydrochlorothiazide (HYDRODIURIL) 25 MG tablet; Take 1 tablet (25 mg) by mouth daily    Chest wall pain    - XR Chest 2 Views; Future      Travel advice encounter    - typhoid (VIVOTIF) CR capsule; Take 1 capsule by mouth every other day for 4 doses One capsule on alternate days (day 1, 3, 5, and 7) for a total of 4 doses; all doses should be complete at least 1 week prior to potential exposure.  - atovaquone-proguanil (MALARONE) 250-100 MG tablet; Take 1 tablet by mouth daily Start 2 days before travel and continue 7 days after return.    High priority for 2019-nCoV vaccine              BMI  Estimated body mass index is 32.95 kg/m  as calculated from the following:    Height as of 10/2/23: 1.6 m (5' 3\").    Weight as of this encounter: 84.4 kg (186 lb).             Subjective   Laure is a 73 year old, presenting for the following health issues:  Gastrointestinal Problem    HPI     Pt is having some right sided pain for about a month and radiates to side of breast and around to the back. Pt is traveling for a month and two weeks and needs some refills to last her. (Refills requested pending).               Review of Systems  Constitutional, HEENT, cardiovascular, pulmonary, gi and gu systems are negative, except as otherwise noted.      Objective    /80 (BP Location: Left arm, Patient Position: Chair, Cuff Size: Adult Large)   Pulse 69   Resp 16   Wt 84.4 kg (186 lb)   LMP  (LMP Unknown)   SpO2 99%   BMI 32.95 kg/m    Body mass index is 32.95 kg/m .  Physical Exam   GENERAL: alert and no distress  NECK: no adenopathy, no asymmetry, masses, or scars  RESP: lungs " clear to auscultation - no rales, rhonchi or wheezes  CV: regular rate and rhythm, normal S1 S2, no S3 or S4, no murmur, click or rub, no peripheral edema  ABDOMEN: soft, nontender, no hepatosplenomegaly, no masses and bowel sounds normal  MS: no gross musculoskeletal defects noted, no edema            Signed Electronically by: Edwin Payne MD

## 2024-02-01 NOTE — PATIENT INSTRUCTIONS
- Take ibuprofen, 600 mg three times daily (WITH FOOD), only for next 2-3 days.     - I will contact you if the chest x-ray shows anything that needs further follow-up.    - Start taking Vivotif (Typhoid vaccine) one capsule every other day for 4 doses total.  (Prescription has been sent to your pharmacy)    - Start taking Malarone (malaria prevention) 2 days before going to [facility] - continue it daily while there, and continue it until 7 days after you return.  (Prescription has been sent to your pharmacy)

## 2024-02-05 NOTE — PROGRESS NOTES
[facility] Care Coordination Contact    [facility] was asking for the PCA Summary to update the POC.  Emailed Tamara at [facility] with the requested summary.     Ivon Hanson RN,  PHN  [facility] Care Coordinator  [phone]

## 2024-02-13 ENCOUNTER — OFFICE VISIT (OUTPATIENT)
Dept: FAMILY MEDICINE | Facility: CLINIC | Age: 74
End: 2024-02-13
Payer: COMMERCIAL

## 2024-02-13 ENCOUNTER — TELEPHONE (OUTPATIENT)
Dept: INTERNAL MEDICINE | Facility: CLINIC | Age: 74
End: 2024-02-13

## 2024-02-13 VITALS
WEIGHT: 186 LBS | HEART RATE: 80 BPM | HEIGHT: 62 IN | TEMPERATURE: 98.3 F | BODY MASS INDEX: 34.23 KG/M2 | RESPIRATION RATE: 14 BRPM | DIASTOLIC BLOOD PRESSURE: 81 MMHG | OXYGEN SATURATION: 99 % | SYSTOLIC BLOOD PRESSURE: 159 MMHG

## 2024-02-13 DIAGNOSIS — Z71.84 ENCOUNTER FOR COUNSELING FOR TRAVEL: Primary | ICD-10-CM

## 2024-02-13 DIAGNOSIS — Z23 NEED FOR DIPHTHERIA-TETANUS-PERTUSSIS (TDAP) VACCINE: ICD-10-CM

## 2024-02-13 DIAGNOSIS — Z23 NEED FOR HEPATITIS A IMMUNIZATION: ICD-10-CM

## 2024-02-13 PROCEDURE — 99401 PREV MED CNSL INDIV APPRX 15: CPT | Performed by: PHYSICIAN ASSISTANT

## 2024-02-13 RX ORDER — ATOVAQUONE AND PROGUANIL HYDROCHLORIDE 250; 100 MG/1; MG/1
1 TABLET, FILM COATED ORAL DAILY
Qty: 40 TABLET | Refills: 0 | Status: SHIPPED | OUTPATIENT
Start: 2024-02-13 | End: 2024-07-02

## 2024-02-13 RX ORDER — AZITHROMYCIN 500 MG/1
TABLET, FILM COATED ORAL
Qty: 6 TABLET | Refills: 0 | Status: SHIPPED | OUTPATIENT
Start: 2024-02-13 | End: 2024-07-02

## 2024-02-13 RX ORDER — LOPERAMIDE HYDROCHLORIDE 2 MG/1
2 TABLET ORAL 4 TIMES DAILY PRN
Qty: 40 TABLET | Refills: 0 | Status: SHIPPED | OUTPATIENT
Start: 2024-02-13

## 2024-02-13 NOTE — PATIENT INSTRUCTIONS
"See travel packet provided  Recommend ultrathon (mosquito repellant), pepto bismol and imodium  The food and drink choices you make while traveling can impact your likelihood of getting sick.   If you aren't sure if a food or drink is safe, the saying \" BOIL IT, COOK IT, PEEL IT, OR FORGET IT\" can help you decide whether it's okay to consume.   Also bring hand  and sun screen with you.  Safe Travels     If you first start to get mild to moderate diarrhea, take imodium.      If diarrhea is severe or you have a fever with the diarrhea, take the antibiotic (azithromycin).       Today February 13, 2024 you received the    Hepatitis A Vaccine - This is your final dose.    Tetanus (Tdap) Vaccine.    These appointments can be made as a NURSE ONLY visit.    **It is very important for the vaccinations to be given on the scheduled day(s), this helps ensure you receive the full effectiveness of the vaccine.**    Please call [facility] with any questions [phone]    Thank you for visiting [facility]    "

## 2024-02-13 NOTE — PROGRESS NOTES
SUBJECTIVE: Laure Wood , a 73 year old  female, presents for counseling and information regarding upcoming travel to [facility]. Special medical concerns include: none. She anticipates the following unusual exposures: none.    Itinerary:  family visit    Departure Date: 3/16/2024 Return date: 4/13/2024 (approximate)    Reason for travel (i.e. Business, pleasure): pleasure    Visiting an urban or rural area?: urban    Accommodations (i.e. hotel, hostel, friends, family, etc): family    Women - First day of your last period: NA    IMMUNIZATION HISTORY  Have you received any vaccinations in the past 4 weeks?  Yes  Have you ever fainted from having your blood drawn or from an injection?  No  Have you ever had a fever reaction to vaccination?  No  Have you ever had any bad reaction or side effect from any vaccination?  No  Have you ever had hepatitis A or B vaccine?  Yes  Do you live (or work closely) with anyone who has AIDS, an AIDS-like condition, any other immune disorder or who is on chemotherapy for cancer?  No  Have you received any injection of immune globulin or any blood products during the past 12 months?  No    GENERAL MEDICAL HISTORY  Do you have a medical condition that warrants maintenance medication or physician follow-up?  No  Do you have a medical condition that is stable now, but that may recur while traveling?  No  Has your spleen been removed?  No  Have you had an acute illness or a fever in the past 48 hours?  No  Are you pregnant, or might you become pregnant on this trip?  Any chance of pregnancy?  No  Are you breastfeeding?  No  Do you have HIV, AIDS, an AIDS-like condition, any other immune disorder, leukemia or cancer?  No  Do you have a severe combined immunodeficiency disease?  No  Have you had your thymus gland removed or history of problems with your thymus, such as myasthenia gravis, DiGeorge syndrome, or thymoma?  No    Do you have severe thrombocytopenia (low platelet count) or a  coagulation disorder?  No  Have you ever had a convulsion, seizure, epilepsy, neurologic condition or brain infection?  No  Do you have any stomach conditions?  No  Do you have a G6PD deficiency?  No  Do you have severe renal or kidney impairment?  No  Do you have a history of psychiatric problems?  No  Do you have a problem with strange dreams and/or nightmares?  No  Do you have insomnia?  No  Do you have problems with vaginitis?  No  Do you have psoriasis?  No  Are you prone to motion sickness?  No  Have you ever had headaches, nausea, vomiting, or breathing problems from altitude exposure?  No      Past Medical History:   Diagnosis Date    Gastroesophageal reflux disease     Hyperlipidemia LDL goal <160     Hypertension, essential, benign     Knee osteoarthritis     knees, hands    Motion sickness     PONV (postoperative nausea and vomiting)       Immunization History   Administered Date(s) Administered    COVID-19 12+ (2023-24) (Pfizer) 02/01/2024    COVID-19 MONOVALENT 12+ (Pfizer) 03/26/2021, 04/16/2021, 11/14/2021    COVID-19 Monovalent 12+ (Pfizer 2022) 08/18/2022    HEPA 06/04/2013    Influenza (High Dose) 3 valent vaccine 10/21/2019    Influenza (IIV3) PF 10/01/2012, 11/01/2013, 10/01/2014    Influenza Vaccine 65+ (Fluzone HD) 10/11/2022, 10/02/2023    MMR 03/21/2006, 04/18/2006    Pneumococcal 20 valent Conjugate (Prevnar 20) 08/18/2022    Poliovirus, inactivated (IPV) 08/11/2009    TDAP Vaccine (Adacel) 05/29/2012    Td (Adult), Adsorbed 03/21/2006, 04/18/2006, 10/17/2006    Typhoid IM 06/04/2013    Typhoid, Unspecified Formulation 08/11/2009    Varicella 03/21/2006, 04/18/2006    Yellow Fever 08/11/2009       Current Outpatient Medications   Medication Sig Dispense Refill    acetaminophen (TYLENOL) 500 MG tablet Take 2 tablets (1,000 mg) by mouth 3 times daily      allopurinol (ZYLOPRIM) 300 MG tablet Take 1 tablet (300 mg) by mouth daily 90 tablet 3    atovaquone-proguanil (MALARONE) 250-100 MG tablet  Take 1 tablet by mouth daily Start 2 days before travel and continue 7 days after return. 60 tablet 0    colchicine (COLCRYS) 0.6 MG tablet Take 1 tablet (0.6 mg) by mouth daily 90 tablet 3    diclofenac (VOLTAREN) 1 % topical gel Apply 2-4 g topically 4 times daily R Knee, foot as needed 100 g 11    famotidine (PEPCID) 20 MG tablet TAKE 1 TABLET BY MOUTH TWICE DAILY AS NEEDED FOR  HEARTBURN 180 tablet 3    hydrochlorothiazide (HYDRODIURIL) 25 MG tablet Take 1 tablet (25 mg) by mouth daily 90 tablet 3    omeprazole (PRILOSEC) 20 MG DR capsule Take 1 capsule (20 mg) by mouth daily as needed (pt takes prn for occasional GERD symptoms) 90 capsule 3    simvastatin (ZOCOR) 40 MG tablet Take 1 tablet (40 mg) by mouth at bedtime 90 tablet 3     Allergies   Allergen Reactions    No Known Drug Allergy         EXAM: deferred    Immunizations discussed include: Hepatitis A and Tetanus/Diphtheria (given at pharmacy)  Malaria prophylaxis recommended: Malarone  Symptomatic treatment for traveler's diarrhea: bismuth subsalicylate, loperamide/diphenoxylate, and azithromycin    ASSESSMENT/PLAN:    (Z71.84) Encounter for counseling for travel  (primary encounter diagnosis)    Comment: Hepatitis A and typhoid vaccines today at pharmacy. Patient will return or follow-up with PCP as needed. Prophylaxis given for Traveler's diarrhea and Malaria. All questions were answered.     Plan: typhoid (VIVOTIF) CR capsule,         atovaquone-proguanil (MALARONE) 250-100 MG         tablet, loperamide (IMODIUM A-D) 2 MG tablet,         azithromycin (ZITHROMAX) 500 MG tablet            (Z23) Need for hepatitis A immunization  Comment:   Plan:     (Z23) Need for diphtheria-tetanus-pertussis (Tdap) vaccine  Comment:   Plan:       I have reviewed general recommendations for safe travel   including: food/water precautions, insect avoidance, safe sex   practices given high prevalence of HIV and other STDs,   roadway safety. Educational materials and links  to the [facility]   Traveler's health website have been provided.    Total time 15 minutes, greater than 50 percent in counseling   and coordination of care.

## 2024-02-13 NOTE — TELEPHONE ENCOUNTER
Patient is interested in the Typhoid injection to be done in clinic. Transferred call to  who will assist with appointment.  Nakia Amor RN

## 2024-02-13 NOTE — TELEPHONE ENCOUNTER
Please call patient. Typhoid injection may be covered in the clinic. This would last for 2 years. It can be a nurse only appointment if she wants to come back for that.    Lane Rivers PA-C on 2/13/2024 at 11:54 AM

## 2024-02-13 NOTE — TELEPHONE ENCOUNTER
Neftaly Sherman was seen for OV 2/13/24. [facility] pharmacy calls to report that Vivotif has been sent to their pharmacy twice, and that this medication is not covered by the patient's insurance pharmacy benefits. The pharmacist does state, that this medication may be covered through the patient's medical benefits. Please review and advise, thanks!  Guilherme Lara RN on 2/13/2024 at 10:59 AM

## 2024-02-13 NOTE — NURSING NOTE
Prior to immunization administration, verified patients identity using patient s name and date of birth. Please see Immunization Activity for additional information.     Screening Questionnaire for Adult Immunization    Are you sick today?   No   Do you have allergies to medications, food, a vaccine component or latex?   No   Have you ever had a serious reaction after receiving a vaccination?   No   Do you have a long-term health problem with heart, lung, kidney, or metabolic disease (e.g., diabetes), asthma, a blood disorder, no spleen, complement component deficiency, a cochlear implant, or a spinal fluid leak?  Are you on long-term aspirin therapy?   No   Do you have cancer, leukemia, HIV/AIDS, or any other immune system problem?   No   Do you have a parent, brother, or sister with an immune system problem?   No   In the past 3 months, have you taken medications that affect  your immune system, such as prednisone, other steroids, or anticancer drugs; drugs for the treatment of rheumatoid arthritis, Crohn s disease, or psoriasis; or have you had radiation treatments?   No   Have you had a seizure, or a brain or other nervous system problem?   No   During the past year, have you received a transfusion of blood or blood    products, or been given immune (gamma) globulin or antiviral drug?   No   For women: Are you pregnant or is there a chance you could become       pregnant during the next month?   No   Have you received any vaccinations in the past 4 weeks?   Yes     Immunization questionnaire was positive for at least one answer.  Notified Lane Rivers.      Patient instructed to remain in clinic for 15 minutes afterwards, and to report any adverse reactions.     Screening performed by Leonor Covington CMA on 2/13/2024 at 9:49 AM.

## 2024-02-14 ENCOUNTER — ALLIED HEALTH/NURSE VISIT (OUTPATIENT)
Dept: FAMILY MEDICINE | Facility: CLINIC | Age: 74
End: 2024-02-14
Payer: COMMERCIAL

## 2024-02-14 DIAGNOSIS — Z23 NEED FOR IMMUNIZATION AGAINST TYPHOID: Primary | ICD-10-CM

## 2024-02-14 PROCEDURE — 90471 IMMUNIZATION ADMIN: CPT

## 2024-02-14 PROCEDURE — 99207 PR NO CHARGE NURSE ONLY: CPT

## 2024-02-14 PROCEDURE — 90691 TYPHOID VACCINE IM: CPT

## 2024-02-26 ENCOUNTER — PATIENT OUTREACH (OUTPATIENT)
Dept: GERIATRIC MEDICINE | Facility: CLINIC | Age: 74
End: 2024-02-26
Payer: COMMERCIAL

## 2024-02-26 NOTE — LETTER
February 26, 2024    Important [employer] Information    ABDULLAHI LOPEZ  [address] [facility] [address]    Your New Care Coordinator  Dear Abdullahi,  My name is KIRAN Viramontes  and I am your new Care Coordinator. You may reach me by calling [phone]. I will be in touch with you shortly to address any questions you may have.   I have also been in contact with  GIULIANA Wills, ANABEL , your previous care coordinator, to ensure a smooth transition.  Questions?  Call me at [phone] Monday-Friday between 8am and 5pm. TTY: 711. I look forward to working with you as a [employer]  member.  Sincerely,    KIRAN Viramontes  [phone]  [email or web address]    cc: member records

## 2024-02-26 NOTE — PROGRESS NOTES
[facility] Care Coordination Contact    Internal CC change effective 3/1/2024.  Mailed member CC Change letter.  Additional tasks to be completed by CMS include: update database & EPIC, enter CC Change in MMIS, and move member file.    Karen Chance  Case Management Specialist   [facility]  [phone]

## 2024-06-06 ENCOUNTER — OFFICE VISIT (OUTPATIENT)
Dept: URGENT CARE | Facility: URGENT CARE | Age: 74
End: 2024-06-06
Payer: COMMERCIAL

## 2024-06-06 VITALS
TEMPERATURE: 97.2 F | RESPIRATION RATE: 12 BRPM | SYSTOLIC BLOOD PRESSURE: 160 MMHG | OXYGEN SATURATION: 99 % | HEART RATE: 60 BPM | DIASTOLIC BLOOD PRESSURE: 76 MMHG

## 2024-06-06 DIAGNOSIS — H81.10 BENIGN PAROXYSMAL POSITIONAL VERTIGO, UNSPECIFIED LATERALITY: Primary | ICD-10-CM

## 2024-06-06 DIAGNOSIS — I10 ESSENTIAL HYPERTENSION, BENIGN: ICD-10-CM

## 2024-06-06 PROCEDURE — 99214 OFFICE O/P EST MOD 30 MIN: CPT | Performed by: EMERGENCY MEDICINE

## 2024-06-06 RX ORDER — MECLIZINE HYDROCHLORIDE 25 MG/1
25 TABLET ORAL 3 TIMES DAILY PRN
Qty: 21 TABLET | Refills: 0 | Status: SHIPPED | OUTPATIENT
Start: 2024-06-06

## 2024-06-06 NOTE — PATIENT INSTRUCTIONS
If worsening dizziness, lightheadedness, your blood pressure remains high after your medications (especially >180/100), or any headache, vision changes, numbness/weakness go to the ER for further evaluation.

## 2024-06-06 NOTE — PROGRESS NOTES
Assessment & Plan     Diagnosis:    ICD-10-CM    1. Benign paroxysmal positional vertigo, unspecified laterality  H81.10 meclizine (ANTIVERT) 25 MG tablet      2. Essential hypertension, benign  I10     continue your hydrochlorothiazide and other BP meds at home; follow up with PCP for recheck          Medical Decision Making:  Laure Wood is a 73 year old female who presents for evaluation of vertigo. She has no dizziness now but does when laying down and changing position in bed. The differential diagnosis of vertigo is broad and includes common etiologies such as meniere's disease, labyrinthitis, benign positional vertigo, otitis media, etc.  More serious etiologies considered include central etiologies such as tumor, intracerebral bleed, dissection, ischemic cerebral vascular accident.  The history, physical exam including detailed neurologic exam, and workup in the  urgent care suggests a benign cause of vertigo today.  Further outpatient management is indicated with vertigo medications.   Blood pressure is notably higher at 160; she has not yet taken her BP meds today and will when she gets home.     No indication for advanced imaging at this point (CT/MRI) as there are no definite signs of a central and concerning etiology for the vertigo.      Vertigo precautions given for home.        Eddie Mota PA-C  [facility]    Subjective     Laure Wood is a 73 year old female who presents to clinic today for the following health issues:  Chief Complaint   Patient presents with    Dizziness     Patient here with concern off recent episodes of dizziness over the past 4-5 days.        HPI  States for last 4 days she has been experiencing his dizziness described as room spinning, mainly when laying down and changing position in bed.  She notes this has happened in the past, was given medication for it.  She denies any headaches, vision changes, numbness or weakness, confusion, chest pain,  palpitations, lightheadedness, nausea, vomiting or any recent head or neck injuries.  Patient notes that she did not take her blood pressure medication this morning.  She notes she is dizzy mainly when she goes from laying to sitting and when turning certain directions in bed.  She has not fallen but does feel dizzy, not lightheaded.    Review of Systems    See HPI    Objective      Vitals: BP (!) 160/76 (BP Location: Left arm, Patient Position: Sitting, Cuff Size: Adult Large)   Pulse 60   Temp 97.2  F (36.2  C) (Tympanic)   Resp 12   LMP  (LMP Unknown)   SpO2 99%       Patient Vitals for the past 24 hrs:   BP Temp Temp src Pulse Resp SpO2   06/06/24 1242 (!) 160/76 -- -- -- -- --   06/06/24 1204 (!) 169/74 97.2  F (36.2  C) Tympanic 60 12 99 %       Vital signs reviewed by: Eddie Mota PA-C    Physical Exam   Constitutional: Patient is alert and cooperative. No acute distress.  Ears: Right TM is normal. Left TM is normal. External ear canals are normal.  Mouth: Mucous membranes are moist. Normal tongue and tonsil. Posterior oropharynx is clear.  Eyes: Conjunctivae, EOMI and lids are normal. PERRL. No nystagmus.  Cardiovascular: Regular rate and rhythm  Pulmonary/Chest: Lungs are clear to auscultation throughout. Effort normal. No respiratory distress. No wheezes, rales or rhonchi.  Neurological: Alert and oriented x3. CN 3-7 and 9-12 intact. Strength and sensation are intact and symmetric in the bilateral upper and lower extremities. Gait stable. Normal romberg. Speech is fluent and face is symmetric.   Skin: No rash noted on visualized skin.  Psychiatric:The patient has a normal mood and affect.       Eddie Mota PA-C, June 6, 2024

## 2024-06-28 ENCOUNTER — NURSE TRIAGE (OUTPATIENT)
Dept: INTERNAL MEDICINE | Facility: CLINIC | Age: 74
End: 2024-06-28
Payer: COMMERCIAL

## 2024-06-28 NOTE — TELEPHONE ENCOUNTER
Spoke to patient and conveyed PCP's message -   Pt verbalized understanding, and said that she would visit UC on Saturday 6/29 because she was unavailable today. Educated patient on how important it was to be seen in UC today, and patient stated she understood and plans to go in as soon as she can on Saturday.     Assisted patient with scheduling followup:    Jul 02, 2024 11:30 AM  (Arrive by 11:10 AM)  Provider Visit with Yoshi Bowen MD  [facility] ([facility] ) [phone]         Sadie Escobar RN

## 2024-06-28 NOTE — TELEPHONE ENCOUNTER
Nurse Triage SBAR    Is this a 2nd Level Triage? YES, LICENSED PRACTITIONER REVIEW IS REQUIRED    Situation: Patient calling in with vertigo symptoms    Background: She has been seen several times this month per her report for this.    Assessment: Patient calling in with moderate dizziness that is disrupting her ability to perform basic functions. She reports his dizziness while laying down, sitting, or standing. She often needs to hold onto something or sit until an episode passes before she can do anything, but the dizziness always comes back quickly. She reports having this in the past few years, but doesn't remember what ended up happening for treatment to help with it. She does not report  fever, chest pain, difficulty breathing, or a racing heart. She does endorse nausea without any vomiting at this point. She reports a headache with pain being 10/10, though she hasn't attempted to take anything to help treat this. She should be seen for this per the disposition, but she doesn't want to go into the ED or UC since she was seen there in the previous couple weeks.     Protocol Recommended Disposition:   Call ADS/Go to ED/UCC Now (Or To Office with PCP Approval)    Recommendation: Go to ED. Patient does not want to and would rather speak with PCP.      Routed to provider    Does the patient meet one of the following criteria for ADS visit consideration? 16+ years old, with an [facility] PCP     TIP  Providers, please consider if this condition is appropriate for management at one of our Acute and Diagnostic Services sites.     If patient is a good candidate, please use dotphrase <dot>triageresponse and select Refer to ADS to document.  Reason for Disposition   SEVERE headache or neck pain    Additional Information   Negative: SEVERE difficulty breathing (e.g., struggling for each breath, speaks in single words)   Negative: Shock suspected (e.g., cold/pale/clammy skin, too weak to stand, low BP, rapid pulse)   Negative:  "Difficult to awaken or acting confused (e.g., disoriented, slurred speech)   Negative: Fainted, and still feels dizzy afterwards   Negative: Overdose (accidental or intentional) of medications   Negative: New neurologic deficit that is present now: * Weakness of the face, arm, or leg on one side of the body * Numbness of the face, arm, or leg on one side of the body * Loss of speech or garbled speech   Negative: Heart beating < 50 beats per minute OR > 140 beats per minute   Negative: Sounds like a life-threatening emergency to the triager   Negative: Chest pain   Negative: Rectal bleeding, bloody stool, or tarry-black stool   Negative: Vomiting is main symptom   Negative: Diarrhea is main symptom   Negative: Headache is main symptom   Negative: Heat exhaustion suspected (i.e., dehydration from heat exposure)   Negative: Patient states that they are having an anxiety or panic attack   Negative: Dizziness from low blood sugar (i.e., < 60 mg/dl or 3.5 mmol/l)   Negative: SEVERE dizziness (e.g., unable to stand, requires support to walk, feels like passing out now)    Answer Assessment - Initial Assessment Questions  1. DESCRIPTION: \"Describe your dizziness.\"      When lying down, sitting, or moving she feels as if she can't move around without wanting to fall.  2. LIGHTHEADED: \"Do you feel lightheaded?\" (e.g., somewhat faint, woozy, weak upon standing)      Feeling weak when she stands up  3. VERTIGO: \"Do you feel like either you or the room is spinning or tilting?\" (i.e. vertigo)      Yes room is spinning  4. SEVERITY: \"How bad is it?\"  \"Do you feel like you are going to faint?\" \"Can you stand and walk?\"    - MILD: Feels slightly dizzy, but walking normally.    - MODERATE: Feels unsteady when walking, but not falling; interferes with normal activities (e.g., school, work).    - SEVERE: Unable to walk without falling, or requires assistance to walk without falling; feels like passing out now.       Moderate  5. ONSET: " " \"When did the dizziness begin?\"      Last Thursday 6/20  6. AGGRAVATING FACTORS: \"Does anything make it worse?\" (e.g., standing, change in head position)      If she doesn't take medications  7. HEART RATE: \"Can you tell me your heart rate?\" \"How many beats in 15 seconds?\"  (Note: not all patients can do this)        Can't do this  8. CAUSE: \"What do you think is causing the dizziness?\"      No  9. RECURRENT SYMPTOM: \"Have you had dizziness before?\" If Yes, ask: \"When was the last time?\" \"What happened that time?\"      Yes, doesn't remember what happened for it  10. OTHER SYMPTOMS: \"Do you have any other symptoms?\" (e.g., fever, chest pain, vomiting, diarrhea, bleeding)        Nausea  11. PREGNANCY: \"Is there any chance you are pregnant?\" \"When was your last menstrual period?\"        No    Protocols used: Dizziness-A-OH    Anthony Chanel RN  [facility]  "

## 2024-06-28 NOTE — TELEPHONE ENCOUNTER
Would have her go to UC today- given the sx, her last BP on record a few weeks ago - don't think I would go to ADS w/o eval there as in the past she will report 10/10 sx and then on exam she is very comfortable and not to the severity that one would expect.    So - given her reported sx I think we have to eval her - [facility] or acute visit w/provider toay. Would also set up f/u appt next week with me - ok to usee any slot avail.

## 2024-07-02 ENCOUNTER — OFFICE VISIT (OUTPATIENT)
Dept: INTERNAL MEDICINE | Facility: CLINIC | Age: 74
End: 2024-07-02
Payer: COMMERCIAL

## 2024-07-02 VITALS
OXYGEN SATURATION: 99 % | WEIGHT: 189.1 LBS | HEIGHT: 62 IN | DIASTOLIC BLOOD PRESSURE: 78 MMHG | SYSTOLIC BLOOD PRESSURE: 114 MMHG | TEMPERATURE: 97.5 F | HEART RATE: 79 BPM | BODY MASS INDEX: 34.8 KG/M2 | RESPIRATION RATE: 15 BRPM

## 2024-07-02 DIAGNOSIS — H81.13 BPV (BENIGN POSITIONAL VERTIGO), BILATERAL: Primary | ICD-10-CM

## 2024-07-02 DIAGNOSIS — Z98.890 HISTORY OF RESECTION OF MENINGIOMA: ICD-10-CM

## 2024-07-02 DIAGNOSIS — Z86.03 HISTORY OF RESECTION OF MENINGIOMA: ICD-10-CM

## 2024-07-02 PROBLEM — M54.41 ACUTE BILATERAL LOW BACK PAIN WITH RIGHT-SIDED SCIATICA: Status: RESOLVED | Noted: 2022-10-19 | Resolved: 2024-07-02

## 2024-07-02 PROBLEM — G95.89: Status: RESOLVED | Noted: 2023-01-16 | Resolved: 2024-07-02

## 2024-07-02 PROCEDURE — G2211 COMPLEX E/M VISIT ADD ON: HCPCS | Performed by: INTERNAL MEDICINE

## 2024-07-02 PROCEDURE — 99213 OFFICE O/P EST LOW 20 MIN: CPT | Performed by: INTERNAL MEDICINE

## 2024-07-02 NOTE — PROGRESS NOTES
"  Assessment & Plan     BPV (benign positional vertigo), bilateral  Positional sx consistent with this.  Epley manv at home     History of resection of meningioma  S/p surgery. Resection complete by post op MRI          BMI  Estimated body mass index is 34.59 kg/m  as calculated from the following:    Height as of this encounter: 1.575 m (5' 2\").    Weight as of this encounter: 85.8 kg (189 lb 1.6 oz).   Weight management plan: Discussed healthy diet and exercise guidelines    The longitudinal plan of care for the diagnosis(es)/condition(s) as documented were addressed during this visit. Due to the added complexity in care, I will continue to support Laure in the subsequent management and with ongoing continuity of care.      Martha Kelsey is a 73 year old, presenting for the following health issues:  UC Follow-Up    HPI       ED/UC Followup:    Facility:  [facility]  Date of visit: 06/06/2024  Reason for visit: vertigo  Current Status: improvement with the meclizine, pt states she did not go to  over the weekend as she was not in town.              Objective    /78   Pulse 79   Temp 97.5  F (36.4  C) (Temporal)   Resp 15   Ht 1.575 m (5' 2\")   Wt 85.8 kg (189 lb 1.6 oz)   LMP  (LMP Unknown)   SpO2 99%   BMI 34.59 kg/m    Body mass index is 34.59 kg/m .  Physical Exam   GENERAL: alert and no distress  NEURO: no nystagmus. Impulse test +, absent skew test            Signed Electronically by: Yoshi Bowen MD    "

## 2024-07-12 ENCOUNTER — PATIENT OUTREACH (OUTPATIENT)
Dept: GERIATRIC MEDICINE | Facility: CLINIC | Age: 74
End: 2024-07-12
Payer: COMMERCIAL

## 2024-07-12 NOTE — PROGRESS NOTES
[facility] Care Coordination Contact    Called member to complete six month assessment and left a message requesting a return call.    KIRAN Viramontes  [facility]  [phone]

## 2024-07-15 ENCOUNTER — PATIENT OUTREACH (OUTPATIENT)
Dept: GERIATRIC MEDICINE | Facility: CLINIC | Age: 74
End: 2024-07-15
Payer: COMMERCIAL

## 2024-07-15 ASSESSMENT — PATIENT HEALTH QUESTIONNAIRE - PHQ9: SUM OF ALL RESPONSES TO PHQ QUESTIONS 1-9: 2

## 2024-07-15 NOTE — PROGRESS NOTES
[facility] Care Coordination Contact      [facility] Six-Month Telephone Assessment    6 month telephone assessment completed on 7/15/23.    ER visits: No  Hospitalizations: No  TCU stays: No  Significant health status changes: none  Falls/Injuries: No  ADL/IADL changes: No  Changes in services: No    Caregiver Assessment follow up:  none    Goals: See POC in chart for goal progress documentation. Laure had not had dental appointment and request to help him with scheduling one in [facility], care coordinator will request CMS to help dental appointment.    Will see member in 6 months for an annual health risk assessment.   Encouraged member to call CC with any questions or concerns in the meantime.     KIRAN Viramontes  [facility]  [phone]

## 2024-07-16 ENCOUNTER — PATIENT OUTREACH (OUTPATIENT)
Dept: GERIATRIC MEDICINE | Facility: CLINIC | Age: 74
End: 2024-07-16
Payer: COMMERCIAL

## 2024-09-03 ENCOUNTER — PATIENT OUTREACH (OUTPATIENT)
Dept: CARE COORDINATION | Facility: CLINIC | Age: 74
End: 2024-09-03
Payer: COMMERCIAL

## 2024-09-17 ENCOUNTER — PATIENT OUTREACH (OUTPATIENT)
Dept: CARE COORDINATION | Facility: CLINIC | Age: 74
End: 2024-09-17
Payer: COMMERCIAL

## 2024-09-23 ENCOUNTER — PATIENT OUTREACH (OUTPATIENT)
Dept: GERIATRIC MEDICINE | Facility: CLINIC | Age: 74
End: 2024-09-23
Payer: COMMERCIAL

## 2024-09-23 ENCOUNTER — MEDICAL CORRESPONDENCE (OUTPATIENT)
Dept: HEALTH INFORMATION MANAGEMENT | Facility: CLINIC | Age: 74
End: 2024-09-23
Payer: COMMERCIAL

## 2024-09-23 NOTE — PROGRESS NOTES
[facility] Care Coordination Contact    Called member and left a voice mail to remind member to schedule dental appt. If member requires assistance with scheduling in message advised to call [facility].  Provided this [facility] contact information.     DWIGHT Rodriguez  [facility]  [phone]

## 2024-10-29 ENCOUNTER — HOSPITAL ENCOUNTER (OUTPATIENT)
Dept: MAMMOGRAPHY | Facility: CLINIC | Age: 74
Discharge: HOME | End: 2024-10-29
Admitting: INTERNAL MEDICINE
Payer: COMMERCIAL

## 2024-10-29 DIAGNOSIS — Z12.31 VISIT FOR SCREENING MAMMOGRAM: ICD-10-CM

## 2024-10-29 PROCEDURE — 77063 BREAST TOMOSYNTHESIS BI: CPT

## 2024-11-11 DIAGNOSIS — M1A.09X0 IDIOPATHIC CHRONIC GOUT OF MULTIPLE SITES WITHOUT TOPHUS: ICD-10-CM

## 2024-11-11 DIAGNOSIS — M25.511 ACUTE PAIN OF BOTH SHOULDERS: ICD-10-CM

## 2024-11-11 DIAGNOSIS — I10 ESSENTIAL HYPERTENSION, BENIGN: ICD-10-CM

## 2024-11-11 DIAGNOSIS — M25.512 ACUTE PAIN OF BOTH SHOULDERS: ICD-10-CM

## 2024-11-11 DIAGNOSIS — K21.9 GASTROESOPHAGEAL REFLUX DISEASE WITHOUT ESOPHAGITIS: ICD-10-CM

## 2024-11-11 NOTE — TELEPHONE ENCOUNTER
Pt called the clinic asking for refills. Pt stated she went to the pharmacy and they dont have any refills on file.     AWV was scheduled per pt request.     Routing to PCP to review and advise.

## 2024-11-12 RX ORDER — HYDROCHLOROTHIAZIDE 25 MG/1
25 TABLET ORAL DAILY
Qty: 90 TABLET | Refills: 0 | Status: SHIPPED | OUTPATIENT
Start: 2024-11-12

## 2024-11-12 RX ORDER — COLCHICINE 0.6 MG/1
0.6 TABLET ORAL DAILY
Qty: 90 TABLET | Refills: 0 | Status: SHIPPED | OUTPATIENT
Start: 2024-11-12

## 2024-11-12 RX ORDER — ALLOPURINOL 300 MG/1
300 TABLET ORAL DAILY
Qty: 90 TABLET | Refills: 0 | Status: SHIPPED | OUTPATIENT
Start: 2024-11-12

## 2024-12-04 ENCOUNTER — PATIENT OUTREACH (OUTPATIENT)
Dept: GERIATRIC MEDICINE | Facility: CLINIC | Age: 74
End: 2024-12-04
Payer: COMMERCIAL

## 2024-12-04 NOTE — PROGRESS NOTES
[facility] Care Coordination Contact    Called member to schedule annual HRA home visit. HRA has been scheduled for Laure Wood on 12/17/24 @ 1:30pm, no  required at this visit, Laure speaks English and has not requested to have one.    KIRAN Viramontes  [facility]  [phone]

## 2024-12-17 ENCOUNTER — PATIENT OUTREACH (OUTPATIENT)
Dept: GERIATRIC MEDICINE | Facility: CLINIC | Age: 74
End: 2024-12-17
Payer: COMMERCIAL

## 2024-12-17 NOTE — Clinical Note
Hi Dr. Guan,  I hope you're doing well. I'm Laure's care coordinator with [facility] I recently met with her and completed her annual health risk assessment. Overall, things are going well for her. She will continue to receive PCA services and homemaking, and there are no issues that need to be addressed at this time. I've encouraged her to get her flu shot during her upcoming appointment with you in January.  Please feel free to reach out if you have any questions for me.  Thanks! KIRAN Viramontes [facility] [phone]

## 2024-12-18 ASSESSMENT — ACTIVITIES OF DAILY LIVING (ADL)
DEPENDENT_IADLS:: CLEANING;COOKING;LAUNDRY;SHOPPING;MEAL PREPARATION;MEDICATION MANAGEMENT;MONEY MANAGEMENT;TRANSPORTATION;INCONTINENCE

## 2024-12-18 ASSESSMENT — LIFESTYLE VARIABLES
AUDIT-C TOTAL SCORE: 0
SKIP TO QUESTIONS 9-10: 1

## 2024-12-18 ASSESSMENT — PATIENT HEALTH QUESTIONNAIRE - PHQ9: SUM OF ALL RESPONSES TO PHQ QUESTIONS 1-9: 2

## 2024-12-18 NOTE — PROGRESS NOTES
[facility] Care Coordination Contact    [facility] Home Visit Assessment     Home visit for Health Risk Assessment with Laure Wood completed on December 17, 2024 at 1:30pm.    Type of residence:: Private home - stairs  Current living arrangement:: I live in a private home with family     Assessment completed with:: Patient    Current Care Plan  Member currently receiving the following home care services:     Member currently receiving the following community resources: DME, Lifeline, PCA, Housekeeping/Chore Agency      Medication Review  Medication reconciliation completed in Epic: Yes  Medication set-up & administration: Family/informal caregiver sets up weekly.  Self-administers medications.  Medication Risk Assessment Medication (1 or more, place referral to MTM): N/A: No risk factors identified  MTM Referral Placed: No: No risk factors idenified    Mental/Behavioral Health   Depression Screening:   PHQ-2 Total Score (Adult) - Positive if 3 or more points; Administer PHQ-9 if positive: 0  PHQ-9 Total Score: 2    Mental health DX:: No        Falls Assessment:   Fallen 2 or more times in the past year?: No   Any fall with injury in the past year?: No    ADL/IADL Dependencies:   Dependent ADLs:: Ambulation-walker, Grooming, Dressing, Bathing, Incontinence, Transfers, Ambulation-cane, Toileting  Dependent IADLs:: Cleaning, Cooking, Laundry, Shopping, Meal Preparation, Medication Management, Money Management, Transportation, Incontinence    Health Plan sponsored benefits: [employer]: Shared information regarding [employer]. Reviewed preventative health screening and health plan supplemental benefits/incentives. Reviewed medication disposal form.    CFSS Assessment completed at visit: [employer] Assessment completed, assessment indicates 25 units of CFSS, however member is choosing not to utilize CFSS at this time. No CFSS authorization to be placed.     Elderly Waiver Eligibility:  Yes-will continue on EW    Care Plan & Recommendations:     Care coordinator completed annual reassessment with Laure, who was alone at home and remained alert throughout the evaluation. Laure requires assistance with both ADLs and IADLs and has been assessed for PCA/CFSS and homemaking services. She will continue to live with her daughter and use her PERS button to call 911 in case of an emergency. For mobility she will use her walker or cane and she will continue to wear pull-ups to prevent rashes while using the toilet.     Her daughter will assist with medication refills, pickups, and reminders to ensure Laure takes her medications daily. Additionally, her granddaughter will help with scheduling medical appointments and providing transportation. Laure is satisfied with her current living situation and the services in place that support her independence. There are no concerns at this time, and Laure did not request any additional services. The care coordinator encouraged her to have her annual wellness checkup and receive the flu shot during her upcoming appointment in January, she recently had her mammogram exam in Oct and the result was negative.     See [employer] Assessment for detailed assessment information.    Follow-Up Plan: Member informed of future contact, plan to f/u with member with a 6 month telephone assessment.  Contact information shared with member and family, encouraged member to call with any questions or concerns at any time.    Section care continuum providers: Please see Snapshot and Care Management Flowsheets for Specific details of care plan.    This CC note routed to PCP, Yoshi Bowen, KIRAN  [facility]  [phone]

## 2025-01-13 ENCOUNTER — OFFICE VISIT (OUTPATIENT)
Dept: INTERNAL MEDICINE | Facility: CLINIC | Age: 75
End: 2025-01-13
Payer: COMMERCIAL

## 2025-01-13 VITALS
RESPIRATION RATE: 16 BRPM | HEIGHT: 62 IN | HEART RATE: 75 BPM | WEIGHT: 186.8 LBS | OXYGEN SATURATION: 97 % | BODY MASS INDEX: 34.37 KG/M2 | TEMPERATURE: 98.1 F | DIASTOLIC BLOOD PRESSURE: 82 MMHG | SYSTOLIC BLOOD PRESSURE: 118 MMHG

## 2025-01-13 DIAGNOSIS — I10 ESSENTIAL HYPERTENSION, BENIGN: ICD-10-CM

## 2025-01-13 DIAGNOSIS — Z00.00 ENCOUNTER FOR MEDICARE ANNUAL WELLNESS EXAM: Primary | ICD-10-CM

## 2025-01-13 DIAGNOSIS — K21.9 GASTROESOPHAGEAL REFLUX DISEASE WITHOUT ESOPHAGITIS: ICD-10-CM

## 2025-01-13 DIAGNOSIS — Z78.0 POSTMENOPAUSAL: ICD-10-CM

## 2025-01-13 DIAGNOSIS — M1A.09X0 IDIOPATHIC CHRONIC GOUT OF MULTIPLE SITES WITHOUT TOPHUS: ICD-10-CM

## 2025-01-13 DIAGNOSIS — E78.2 MIXED HYPERLIPIDEMIA: ICD-10-CM

## 2025-01-13 PROBLEM — D32.9 MENINGIOMA (H): Status: ACTIVE | Noted: 2025-01-13

## 2025-01-13 PROBLEM — D32.9 MENINGIOMA (H): Status: RESOLVED | Noted: 2025-01-13 | Resolved: 2025-01-13

## 2025-01-13 LAB
ANION GAP SERPL CALCULATED.3IONS-SCNC: 11 MMOL/L (ref 7–15)
BUN SERPL-MCNC: 19.1 MG/DL (ref 8–23)
CALCIUM SERPL-MCNC: 10.4 MG/DL (ref 8.8–10.4)
CHLORIDE SERPL-SCNC: 105 MMOL/L (ref 98–107)
CHOLEST SERPL-MCNC: 246 MG/DL
CREAT SERPL-MCNC: 1 MG/DL (ref 0.51–0.95)
EGFRCR SERPLBLD CKD-EPI 2021: 59 ML/MIN/1.73M2
FASTING STATUS PATIENT QL REPORTED: YES
FASTING STATUS PATIENT QL REPORTED: YES
GLUCOSE SERPL-MCNC: 94 MG/DL (ref 70–99)
HCO3 SERPL-SCNC: 26 MMOL/L (ref 22–29)
HDLC SERPL-MCNC: 91 MG/DL
LDLC SERPL CALC-MCNC: 140 MG/DL
NONHDLC SERPL-MCNC: 155 MG/DL
POTASSIUM SERPL-SCNC: 4.1 MMOL/L (ref 3.4–5.3)
SODIUM SERPL-SCNC: 142 MMOL/L (ref 135–145)
TRIGL SERPL-MCNC: 77 MG/DL
URATE SERPL-MCNC: 8.3 MG/DL (ref 2.4–5.7)

## 2025-01-13 PROCEDURE — 84550 ASSAY OF BLOOD/URIC ACID: CPT | Performed by: INTERNAL MEDICINE

## 2025-01-13 PROCEDURE — 36415 COLL VENOUS BLD VENIPUNCTURE: CPT | Performed by: INTERNAL MEDICINE

## 2025-01-13 PROCEDURE — G0008 ADMIN INFLUENZA VIRUS VAC: HCPCS | Performed by: INTERNAL MEDICINE

## 2025-01-13 PROCEDURE — 80061 LIPID PANEL: CPT | Performed by: INTERNAL MEDICINE

## 2025-01-13 PROCEDURE — 91320 SARSCV2 VAC 30MCG TRS-SUC IM: CPT | Performed by: INTERNAL MEDICINE

## 2025-01-13 PROCEDURE — 90480 ADMN SARSCOV2 VAC 1/ONLY CMP: CPT | Performed by: INTERNAL MEDICINE

## 2025-01-13 PROCEDURE — G0439 PPPS, SUBSEQ VISIT: HCPCS | Performed by: INTERNAL MEDICINE

## 2025-01-13 PROCEDURE — 80048 BASIC METABOLIC PNL TOTAL CA: CPT | Performed by: INTERNAL MEDICINE

## 2025-01-13 PROCEDURE — 99214 OFFICE O/P EST MOD 30 MIN: CPT | Mod: 25 | Performed by: INTERNAL MEDICINE

## 2025-01-13 PROCEDURE — 90662 IIV NO PRSV INCREASED AG IM: CPT | Performed by: INTERNAL MEDICINE

## 2025-01-13 RX ORDER — SIMVASTATIN 40 MG
40 TABLET ORAL AT BEDTIME
Qty: 90 TABLET | Refills: 4 | Status: SHIPPED | OUTPATIENT
Start: 2025-01-13

## 2025-01-13 RX ORDER — ALLOPURINOL 300 MG/1
300 TABLET ORAL DAILY
Qty: 90 TABLET | Refills: 4 | Status: SHIPPED | OUTPATIENT
Start: 2025-01-13

## 2025-01-13 RX ORDER — FAMOTIDINE 20 MG/1
TABLET, FILM COATED ORAL
Qty: 60 TABLET | Refills: 11 | Status: SHIPPED | OUTPATIENT
Start: 2025-01-13

## 2025-01-13 RX ORDER — HYDROCHLOROTHIAZIDE 25 MG/1
25 TABLET ORAL DAILY
Qty: 90 TABLET | Refills: 4 | Status: SHIPPED | OUTPATIENT
Start: 2025-01-13

## 2025-01-13 SDOH — HEALTH STABILITY: PHYSICAL HEALTH: ON AVERAGE, HOW MANY MINUTES DO YOU ENGAGE IN EXERCISE AT THIS LEVEL?: 0 MIN

## 2025-01-13 SDOH — HEALTH STABILITY: PHYSICAL HEALTH: ON AVERAGE, HOW MANY DAYS PER WEEK DO YOU ENGAGE IN MODERATE TO STRENUOUS EXERCISE (LIKE A BRISK WALK)?: 0 DAYS

## 2025-01-13 ASSESSMENT — SOCIAL DETERMINANTS OF HEALTH (SDOH): HOW OFTEN DO YOU GET TOGETHER WITH FRIENDS OR RELATIVES?: ONCE A WEEK

## 2025-01-13 NOTE — PROGRESS NOTES
"Preventive Care Visit  [facility]  Yoshi Bowen MD, Internal Medicine  Jan 13, 2025      Assessment & Plan     Encounter for Medicare annual wellness exam  Updated screening, immunizations, prevention.  Please see health maintenance list, care gaps     Essential hypertension, benign  Well controlled  - BASIC METABOLIC PANEL; Future  - hydrochlorothiazide (HYDRODIURIL) 25 MG tablet; Take 1 tablet (25 mg) by mouth daily.  - BASIC METABOLIC PANEL    Idiopathic chronic gout of multiple sites without tophus  - Uric acid; Future  - allopurinol (ZYLOPRIM) 300 MG tablet; Take 1 tablet (300 mg) by mouth daily.  - Uric acid    Gastroesophageal reflux disease without esophagitis  Occasional sx- use H2B> PPI for this   - famotidine (PEPCID) 20 MG tablet; TAKE 1 TABLET BY MOUTH TWICE DAILY AS NEEDED FOR  HEARTBURN    Mixed hyperlipidemia  Cont statin   - Lipid panel reflex to direct LDL Non-fasting; Future  - simvastatin (ZOCOR) 40 MG tablet; Take 1 tablet (40 mg) by mouth at bedtime.  - Lipid panel reflex to direct LDL Non-fasting    Postmenopausal  - DX Bone Density; Future    Patient has been advised of split billing requirements and indicates understanding: Yes        BMI  Estimated body mass index is 34.17 kg/m  as calculated from the following:    Height as of this encounter: 1.575 m (5' 2\").    Weight as of this encounter: 84.7 kg (186 lb 12.8 oz).   Weight management plan: Discussed healthy diet and exercise guidelines    Counseling  Appropriate preventive services were addressed with this patient via screening, questionnaire, or discussion as appropriate for fall prevention, nutrition, physical activity, Tobacco-use cessation, social engagement, weight loss and cognition.  Checklist reviewing preventive services available has been given to the patient.  Reviewed patient's diet, addressing concerns and/or questions.   She is at risk for psychosocial distress and has been provided with " information to reduce risk.   Discussed possible causes of fatigue. Updated plan of care.  Patient reported difficulty with activities of daily living were addressed today.Information on urinary incontinence and treatment options given to patient.       Work on weight loss  Regular exercise  See Patient Instructions    Martha Kelsey is a 74 year old, presenting for the following:  Physical          HPI  No complaints        Health Care Directive  Patient does not have a Health Care Directive: Discussed advance care planning with patient; however, patient declined at this time.      1/13/2025   General Health   How would you rate your overall physical health? Excellent   Feel stress (tense, anxious, or unable to sleep) Rather much   (!) STRESS CONCERN      1/13/2025   Nutrition   Diet: Regular (no restrictions)         1/13/2025   Exercise   Days per week of moderate/strenous exercise 0 days   Average minutes spent exercising at this level 0 min   (!) EXERCISE CONCERN      1/13/2025   Social Factors   Frequency of gathering with friends or relatives Once a week   Worry food won't last until get money to buy more Patient declined   Food not last or not have enough money for food? Patient declined   Do you have housing? (Housing is defined as stable permanent housing and does not include staying ouside in a car, in a tent, in an abandoned building, in an overnight shelter, or couch-surfing.) Patient declined   Are you worried about losing your housing? Patient declined   Lack of transportation? Patient declined   Unable to get utilities (heat,electricity)? Patient declined         1/13/2025   Fall Risk   Fallen 2 or more times in the past year? No    Trouble with walking or balance? No        Proxy-reported          1/13/2025   Activities of Daily Living- Home Safety   Needs help with the following daily activites Telephone use    Transportation    Shopping    Preparing meals    Housework    Bathing    Laundry     Medication administration    Money management    Toileting    Feeding    Dressing   Safety concerns in the home None of the above       Multiple values from one day are sorted in reverse-chronological order         1/13/2025   Dental   Dentist two times every year? Yes         1/13/2025   Hearing Screening   Hearing concerns? None of the above         1/13/2025   Driving Risk Screening   Patient/family members have concerns about driving No         1/13/2025   General Alertness/Fatigue Screening   Have you been more tired than usual lately? (!) YES         1/13/2025   Urinary Incontinence Screening   Bothered by leaking urine in past 6 months Yes         1/13/2025   TB Screening   Were you born outside of the [place]? Yes         Today's PHQ-2 Score:       1/13/2025    11:07 AM   PHQ-2 ( 1999 Pfizer)   PHQ-2 Score Incomplete           1/13/2025   Substance Use   Alcohol more than 3/day or more than 7/wk No   Do you have a current opioid prescription? No   How severe/bad is pain from 1 to 10? 10/10   Do you use any other substances recreationally? No     Social History     Tobacco Use    Smoking status: Never    Smokeless tobacco: Never   Vaping Use    Vaping status: Never Used   Substance Use Topics    Alcohol use: No    Drug use: No           12/18/2024   LAST FHS-7 RESULTS   Any relative bilateral breast cancer No   Any male have breast cancer No   Any ONE woman have BOTH breast AND ovarian cancer No   Any woman with breast cancer before 50yrs No   2 or more relatives with breast AND/OR ovarian cancer No   2 or more relatives with breast AND/OR bowel cancer No            ASCVD Risk   The 10-year ASCVD risk score (Tay BELLE, et al., 2019) is: 16.8%    Values used to calculate the score:      Age: 74 years      Sex: Female      Is Non- : Yes      Diabetic: No      Tobacco smoker: No      Systolic Blood Pressure: 118 mmHg      Is BP treated: Yes      HDL Cholesterol: 79 mg/dL      Total  "Cholesterol: 237 mg/dL            Reviewed and updated as needed this visit by Provider    Allergies  Meds  Problems               Labs reviewed in EPIC  Current providers sharing in care for this patient include:  Patient Care Team:  Yoshi Bowen MD as PCP - General (Internal Medicine)  Yoshi Bowen MD as Assigned PCP  Edel Bean MD as MD (OB/Gyn)  Kory Rodriguez MD as MD (OB/Gyn)  Katina Dominguez MD as MD (Neurology)  Ambar Antunez MD as MD (Neurological Surgery)  Ally Luna PA-C as Physician Assistant (Neurological Surgery)  Jillian Vazquez, RN as Lead Care Coordinator (Primary Care - CC)    The following health maintenance items are reviewed in Epic and correct as of today:  Health Maintenance   Topic Date Due    ZOSTER IMMUNIZATION (1 of 2) Never done    RSV VACCINE (1 - Risk 60-74 years 1-dose series) Never done    COVID-19 Vaccine (6 - 2024-25 season) 09/01/2024    MEDICARE ANNUAL WELLNESS VISIT  10/02/2024    BMP  10/02/2024    LIPID  10/02/2024    ANNUAL REVIEW OF HM ORDERS  10/02/2024    URIC ACID  10/02/2024    PHQ-2 (once per calendar year)  01/01/2025    FALL RISK ASSESSMENT  01/13/2026    GLUCOSE  10/02/2026    MAMMO SCREENING  10/29/2026    ADVANCE CARE PLANNING  01/13/2030    DTAP/TDAP/TD IMMUNIZATION (3 - Td or Tdap) 02/13/2034    DEXA  05/02/2034    HEPATITIS C SCREENING  Completed    INFLUENZA VACCINE  Completed    Pneumococcal Vaccine: 50+ Years  Completed    HPV IMMUNIZATION  Aged Out    MENINGITIS IMMUNIZATION  Aged Out    RSV MONOCLONAL ANTIBODY  Aged Out    COLORECTAL CANCER SCREENING  Discontinued            Objective    Exam  /82   Pulse 75   Temp 98.1  F (36.7  C) (Temporal)   Resp 16   Ht 1.575 m (5' 2\")   Wt 84.7 kg (186 lb 12.8 oz)   LMP  (LMP Unknown)   SpO2 97%   BMI 34.17 kg/m     Estimated body mass index is 34.17 kg/m  as calculated from the following:    Height as of this encounter: 1.575 m (5' 2\").    Weight as of this " encounter: 84.7 kg (186 lb 12.8 oz).    Physical Exam  GENERAL: alert and no distress  EYES: Eyes grossly normal to inspection, PERRL and conjunctivae and sclerae normal  HENT: normal cephalic/atraumatic, nose and mouth without ulcers or lesions, oropharynx clear, and oral mucous membranes moist  NECK: no adenopathy, no asymmetry, masses, or scars  RESP: lungs clear to auscultation - no rales, rhonchi or wheezes  CV: regular rate and rhythm, normal S1 S2, no S3 or S4, no murmur, click or rub, no peripheral edema  ABDOMEN: soft, nontender, no hepatosplenomegaly, no masses and bowel sounds normal  MS: no gross musculoskeletal defects noted, no edema  SKIN: no suspicious lesions or rashes  NEURO: Normal strength and tone, mentation intact and speech normal  PSYCH: mentation appears normal, affect normal/bright  LYMPH: no cervical adenopathy        1/13/2025   Mini Cog   Clock Draw Score 0 Abnormal   3 Item Recall 3 objects recalled   Mini Cog Total Score 3              Signed Electronically by: Yoshi Bowen MD

## 2025-01-13 NOTE — PATIENT INSTRUCTIONS
Patient Education   Preventive Care Advice   This is general advice given by our system to help you stay healthy. However, your care team may have specific advice just for you. Please talk to your care team about your preventive care needs.  Nutrition  Eat 5 or more servings of fruits and vegetables each day.  Try wheat bread, brown rice and whole grain pasta (instead of white bread, rice, and pasta).  Get enough calcium and vitamin D. Check the label on foods and aim for 100% of the RDA (recommended daily allowance).  Lifestyle  Exercise at least 150 minutes each week  (30 minutes a day, 5 days a week).  Do muscle strengthening activities 2 days a week. These help control your weight and prevent disease.  No smoking.  Wear sunscreen to prevent skin cancer.  Have a dental exam and cleaning every 6 months.  Yearly exams  See your health care team every year to talk about:  Any changes in your health.  Any medicines your care team has prescribed.  Preventive care, family planning, and ways to prevent chronic diseases.  Shots (vaccines)   HPV shots (up to age 26), if you've never had them before.  Hepatitis B shots (up to age 59), if you've never had them before.  COVID-19 shot: Get this shot when it's due.  Flu shot: Get a flu shot every year.  Tetanus shot: Get a tetanus shot every 10 years.  Pneumococcal, hepatitis A, and RSV shots: Ask your care team if you need these based on your risk.  Shingles shot (for age 50 and up)  General health tests  Diabetes screening:  Starting at age 35, Get screened for diabetes at least every 3 years.  If you are younger than age 35, ask your care team if you should be screened for diabetes.  Cholesterol test: At age 39, start having a cholesterol test every 5 years, or more often if advised.  Bone density scan (DEXA): At age 50, ask your care team if you should have this scan for osteoporosis (brittle bones).  Hepatitis C: Get tested at least once in your life.  STIs (sexually  transmitted infections)  Before age 24: Ask your care team if you should be screened for STIs.  After age 24: Get screened for STIs if you're at risk. You are at risk for STIs (including HIV) if:  You are sexually active with more than one person.  You don't use condoms every time.  You or a partner was diagnosed with a sexually transmitted infection.  If you are at risk for HIV, ask about PrEP medicine to prevent HIV.  Get tested for HIV at least once in your life, whether you are at risk for HIV or not.  Cancer screening tests  Cervical cancer screening: If you have a cervix, begin getting regular cervical cancer screening tests starting at age 21.  Breast cancer scan (mammogram): If you've ever had breasts, begin having regular mammograms starting at age 40. This is a scan to check for breast cancer.  Colon cancer screening: It is important to start screening for colon cancer at age 45.  Have a colonoscopy test every 10 years (or more often if you're at risk) Or, ask your provider about stool tests like a FIT test every year or Cologuard test every 3 years.  To learn more about your testing options, visit:   .  For help making a decision, visit:   https://bit.ly/wr58010.  Prostate cancer screening test: If you have a prostate, ask your care team if a prostate cancer screening test (PSA) at age 55 is right for you.  Lung cancer screening: If you are a current or former smoker ages 50 to 80, ask your care team if ongoing lung cancer screenings are right for you.  For informational purposes only. Not to replace the advice of your health care provider. Copyright   2023 [facility] [employer] [facility]. All rights reserved. Clinically reviewed by the [facility] Transitions Program. [employer] [id number] - REV 01/24.  Learning About Activities of Daily Living  What are activities of daily living?     Activities of daily living (ADLs) are the basic self-care tasks you do every day. These include eating, bathing, dressing,  and moving around.  As you age, and if you have health problems, you may find that it's harder to do some of these tasks. If so, your doctor can suggest ideas that may help.  To measure what kind of help you may need, your doctor will ask how well you are able to do ADLs. Let your doctor know if there are any tasks that you are having trouble doing. This is an important first step to getting help. And when you have the help you need, you can stay as independent as possible.  How will a doctor assess your ADLs?  Asking about ADLs is part of a routine health checkup your doctor will likely do as you age. Your health check might be done in a doctor's office, in your home, or at a hospital. The goal is to find out if you are having any problems that could make it hard to care for yourself or that make it unsafe for you to be on your own.  To measure your ADLs, your doctor will ask how hard it is for you to do routine tasks. Your doctor may also want to know if you have changed the way you do a task because of a health problem. Your doctor may watch how you:  Walk back and forth.  Keep your balance while you stand or walk.  Move from sitting to standing or from a bed to a chair.  Button or unbutton a shirt or sweater.  Remove and put on your shoes.  It's common to feel a little worried or anxious if you find you can't do all the things you used to be able to do. Talking with your doctor about ADLs is a way to make sure you're as safe as possible and able to care for yourself as well as you can. You may want to bring a caregiver, friend, or family member to your checkup. They can help you talk to your doctor.  Follow-up care is a key part of your treatment and safety. Be sure to make and go to all appointments, and call your doctor if you are having problems. It's also a good idea to know your test results and keep a list of the medicines you take.  Current as of: October 24, 2023  Content Version: 14.3    2024 [facility]  [employer].   Care instructions adapted under license by your healthcare professional. If you have questions about a medical condition or this instruction, always ask your healthcare professional. [facility] [employer] disclaims any warranty or liability for your use of this information.    Learning About Stress  What is stress?     Stress is your body's response to a hard situation. Your body can have a physical, emotional, or mental response. Stress is a fact of life for most people, and it affects everyone differently. What causes stress for you may not be stressful for someone else.  A lot of things can cause stress. You may feel stress when you go on a job interview, take a test, or run a race. This kind of short-term stress is normal and even useful. It can help you if you need to work hard or react quickly. For example, stress can help you finish an important job on time.  Long-term stress is caused by ongoing stressful situations or events. Examples of long-term stress include long-term health problems, ongoing problems at work, or conflicts in your family. Long-term stress can harm your health.  How does stress affect your health?  When you are stressed, your body responds as though you are in danger. It makes hormones that speed up your heart, make you breathe faster, and give you a burst of energy. This is called the fight-or-flight stress response. If the stress is over quickly, your body goes back to normal and no harm is done.  But if stress happens too often or lasts too long, it can have bad effects. Long-term stress can make you more likely to get sick, and it can make symptoms of some diseases worse. If you tense up when you are stressed, you may develop neck, shoulder, or low back pain. Stress is linked to high blood pressure and heart disease.  Stress also harms your emotional health. It can make you bains, tense, or depressed. Your relationships may suffer, and you may not do well at work  or school.  What can you do to manage stress?  You can try these things to help manage stress:   Do something active. Exercise or activity can help reduce stress. Walking is a great way to get started. Even everyday activities such as housecleaning or yard work can help.  Try yoga or yossi chi. These techniques combine exercise and meditation. You may need some training at first to learn them.  Do something you enjoy. For example, listen to music or go to a movie. Practice your hobby or do volunteer work.  Meditate. This can help you relax, because you are not worrying about what happened before or what may happen in the future.  Do guided imagery. Imagine yourself in any setting that helps you feel calm. You can use online videos, books, or a teacher to guide you.  Do breathing exercises. For example:  From a standing position, bend forward from the waist with your knees slightly bent. Let your arms dangle close to the floor.  Breathe in slowly and deeply as you return to a standing position. Roll up slowly and lift your head last.  Hold your breath for just a few seconds in the standing position.  Breathe out slowly and bend forward from the waist.  Let your feelings out. Talk, laugh, cry, and express anger when you need to. Talking with supportive friends or family, a counselor, or a [place] leader about your feelings is a healthy way to relieve stress. Avoid discussing your feelings with people who make you feel worse.  Write. It may help to write about things that are bothering you. This helps you find out how much stress you feel and what is causing it. When you know this, you can find better ways to cope.  What can you do to prevent stress?  You might try some of these things to help prevent stress:  Manage your time. This helps you find time to do the things you want and need to do.  Get enough sleep. Your body recovers from the stresses of the day while you are sleeping.  Get support. Your family, friends, and  "community can make a difference in how you experience stress.  Limit your news feed. Avoid or limit time on social media or news that may make you feel stressed.  Do something active. Exercise or activity can help reduce stress. Walking is a great way to get started.  Where can you learn more?  Go to https://www.[employer].net/patiented  Enter N032 in the search box to learn more about \"Learning About Stress.\"  Current as of: October 24, 2023  Content Version: 14.3    2024 [employer]   Care instructions adapted under license by your healthcare professional. If you have questions about a medical condition or this instruction, always ask your healthcare professional. [employer] disclaims any warranty or liability for your use of this information.    Learning About Sleeping Well  What does sleeping well mean?     Sleeping well means getting enough sleep to feel good and stay healthy. How much sleep is enough varies among people.  The number of hours you sleep and how you feel when you wake up are both important. If you do not feel refreshed, you probably need more sleep. Another sign of not getting enough sleep is feeling tired during the day.  Experts recommend that adults get at least 7 or more hours of sleep per day. Children and older adults need more sleep.  Why is getting enough sleep important?  Getting enough quality sleep is a basic part of good health. When your sleep suffers, your physical health, mood, and your thoughts can suffer too. You may find yourself feeling more grumpy or stressed. Not getting enough sleep also can lead to serious problems, including injury, accidents, anxiety, and depression.  What might cause poor sleeping?  Many things can cause sleep problems, including:  Changes to your sleep schedule.  Stress. Stress can be caused by fear about a single event, such as giving a speech. Or you may have ongoing stress, such as worry about work or school.  Depression, " "anxiety, and other mental or emotional conditions.  Changes in your sleep habits or surroundings. This includes changes that happen where you sleep, such as noise, light, or sleeping in a different bed. It also includes changes in your sleep pattern, such as having jet lag or working a late shift.  Health problems, such as pain, breathing problems, and restless legs syndrome.  Lack of regular exercise.  Using alcohol, nicotine, or caffeine before bed.  How can you help yourself?  Here are some tips that may help you sleep more soundly and wake up feeling more refreshed.  Your sleeping area   Use your bedroom only for sleeping and sex. A bit of light reading may help you fall asleep. But if it doesn't, do your reading elsewhere in the house. Try not to use your TV, computer, smartphone, or tablet while you are in bed.  Be sure your bed is big enough to stretch out comfortably, especially if you have a sleep partner.  Keep your bedroom quiet, dark, and cool. Use curtains, blinds, or a sleep mask to block out light. To block out noise, use earplugs, soothing music, or a \"white noise\" machine.  Your evening and bedtime routine   Create a relaxing bedtime routine. You might want to take a warm shower or bath, or listen to soothing music.  Go to bed at the same time every night. And get up at the same time every morning, even if you feel tired.  What to avoid   Limit caffeine (coffee, tea, caffeinated sodas) during the day, and don't have any for at least 6 hours before bedtime.  Avoid drinking alcohol before bedtime. Alcohol can cause you to wake up more often during the night.  Try not to smoke or use tobacco, especially in the evening. Nicotine can keep you awake.  Limit naps during the day, especially close to bedtime.  Avoid lying in bed awake for too long. If you can't fall asleep or if you wake up in the middle of the night and can't get back to sleep within about 20 minutes, get out of bed and go to another room " "until you feel sleepy.  Avoid taking medicine right before bed that may keep you awake or make you feel hyper or energized. Your doctor can tell you if your medicine may do this and if you can take it earlier in the day.  If you can't sleep   Imagine yourself in a peaceful, pleasant scene. Focus on the details and feelings of being in a place that is relaxing.  Get up and do a quiet or boring activity until you feel sleepy.  Avoid drinking any liquids before going to bed to help prevent waking up often to use the bathroom.  Where can you learn more?  Go to https://www.[facility].net/patiented  Enter J942 in the search box to learn more about \"Learning About Sleeping Well.\"  Current as of: July 31, 2024  Content Version: 14.3    2024 [employer].   Care instructions adapted under license by your healthcare professional. If you have questions about a medical condition or this instruction, always ask your healthcare professional. [employer] disclaims any warranty or liability for your use of this information.    Bladder Training: Care Instructions  Your Care Instructions     Bladder training is used to treat urge incontinence and stress incontinence. Urge incontinence means that the need to urinate comes on so fast that you can't get to a toilet in time. Stress incontinence means that you leak urine because of pressure on your bladder. For example, it may happen when you laugh, cough, or lift something heavy.  Bladder training can increase how long you can wait before you have to urinate. It can also help your bladder hold more urine. And it can give you better control over the urge to urinate.  It is important to remember that bladder training takes a few weeks to a few months to make a difference. You may not see results right away, but don't give up.  Follow-up care is a key part of your treatment and safety. Be sure to make and go to all appointments, and call your doctor if you are having " problems. It's also a good idea to know your test results and keep a list of the medicines you take.  How can you care for yourself at home?  Work with your doctor to come up with a bladder training program that is right for you. You may use one or more of the following methods.  Delayed urination  In the beginning, try to keep from urinating for 5 minutes after you first feel the need to go.  While you wait, take deep, slow breaths to relax. Kegel exercises can also help you delay the need to go to the bathroom.  After some practice, when you can easily wait 5 minutes to urinate, try to wait 10 minutes before you urinate.  Slowly increase the waiting period until you are able to control when you have to urinate.  Scheduled urination  Empty your bladder when you first wake up in the morning.  Schedule times throughout the day when you will urinate.  Start by going to the bathroom every hour, even if you don't need to go.  Slowly increase the time between trips to the bathroom.  When you have found a schedule that works well for you, keep doing it.  If you wake up during the night and have to urinate, do it. Apply your schedule to waking hours only.  Kegel exercises  These tighten and strengthen pelvic muscles, which can help you control the flow of urine. (If doing these exercises causes pain, stop doing them and talk with your doctor.) To do Kegel exercises:  Squeeze your muscles as if you were trying not to pass gas. Or squeeze your muscles as if you were stopping the flow of urine. Your belly, legs, and buttocks shouldn't move.  Hold the squeeze for 3 seconds, then relax for 5 to 10 seconds.  Start with 3 seconds, then add 1 second each week until you are able to squeeze for 10 seconds.  Repeat the exercise 10 times a session. Do 3 to 8 sessions a day.  When should you call for help?  Watch closely for changes in your health, and be sure to contact your doctor if:    Your incontinence is getting worse.     You do  "not get better as expected.   Where can you learn more?  Go to https://www.[employer].net/patiented  Enter V684 in the search box to learn more about \"Bladder Training: Care Instructions.\"  Current as of: April 30, 2024  Content Version: 14.3    2024 [employer].   Care instructions adapted under license by your healthcare professional. If you have questions about a medical condition or this instruction, always ask your healthcare professional. [employer] disclaims any warranty or liability for your use of this information.       "

## 2025-01-14 ENCOUNTER — PATIENT OUTREACH (OUTPATIENT)
Dept: CARE COORDINATION | Facility: CLINIC | Age: 75
End: 2025-01-14
Payer: COMMERCIAL

## 2025-01-16 ENCOUNTER — PATIENT OUTREACH (OUTPATIENT)
Dept: GERIATRIC MEDICINE | Facility: CLINIC | Age: 75
End: 2025-01-16
Payer: COMMERCIAL

## 2025-01-16 NOTE — LETTER
January 16, 2025    ABDULLAHI LOPEZ  [address] [place] [address] [facility] [address]      Dionisio Kelsey,    I hope you ve been well since we last spoke. Attached is your copy of your personalized Support Plan which summarizes our conversation.   My goal is to help you keep your health on track. Your Support Plan includes the steps we agreed would help you with that. We can talk about these steps during our next meeting or sooner if you d like.   Here are additional programs and services I can help you with:    Get to appointments with [facility]    If you need a ride to medical or dental visits, [facility] can help. Call to schedule a ride. 7 [phone] ([phone]), 8 a.m. - 6 p.m. CT, Monday - Friday.    Access One-Pass to boost your health    Get a gym membership, at-home fitness classes, and free access to fun activities that help your brain. To get access, go to [employer] or call One-Pass.   7 [phone] (TTY:[phone]), 8 a.m. - 9 p.m. CT, Monday - Friday      Set up your health care directive  A directive is a legal form that explains your health care wishes. You can request this form from me, and I ll walk you through it before you discuss your plans with your doctor.    Schedule your annual physical  I can help set up your annual physical at your clinic of choice. It's an important step towards good health.      Have questions? I m here to help.  Call me at [phone] ([phone]) 8am - 5pm, Monday - Friday.  I ll reach out to you again in 6 months to follow up via telephone.  Warm regards,    KIRAN Viramontes  [phone]  [email or web address]    cc: member records                                                                    [id number]

## 2025-01-16 NOTE — PROGRESS NOTES
[facility] Care Coordination Contact    Received after visit chart from care coordinator.  Completed following tasks: Mailed copy of support plan to member, Mailed [place] Choices signature sheet pages 3-4, Mailed Safe Medication Disposal , Mailed [employer] Leave Behind Letter, Submitted referrals/auths for homemaking, Uploaded consent to communicate form(s) to Epic, and Updated services in Database.  , Provider Signature - No Support Plan Shared:  Member indicates that they do not want their support plan shared with any EW providers.    , and [employer]:  Faxed completed CFSS assessment summary to [employer] Agency. Mailed member supplemental summary chart and assessment summary. Emailed referral form for auth to [employer].  -PCA auth submitted previously     Jenelle Pope  Care Management Specialist  [facility]  [phone]

## 2025-05-14 ENCOUNTER — PATIENT OUTREACH (OUTPATIENT)
Dept: GERIATRIC MEDICINE | Facility: CLINIC | Age: 75
End: 2025-05-14
Payer: COMMERCIAL

## 2025-05-14 NOTE — PROGRESS NOTES
Completed following tasks: Sent copy of service delivery plan and addendum to member and [facility]. Submitted auth to health plan. Updated database. Mailed revised support plan to member.    Danna Amor  Care Management Specialist  [facility]  [phone]

## 2025-06-30 ENCOUNTER — PATIENT OUTREACH (OUTPATIENT)
Dept: GERIATRIC MEDICINE | Facility: CLINIC | Age: 75
End: 2025-06-30
Payer: COMMERCIAL

## 2025-06-30 NOTE — PROGRESS NOTES
[facility] Care Coordination Contact      [facility] Six-Month Telephone Assessment    6 month telephone assessment completed on 06/30/25.    ER visits: No  Hospitalizations: No  TCU stays: No  Significant health status changes: none  Falls/Injuries: No  ADL/IADL changes: No  Changes in services: No    Caregiver Assessment follow up:  none.  Laure reports that she is doing well and continues to make use of PCA, homemaker services, and family support. She regularly attends Worship with her daughter every Sunday and participates in a [employer] study group every Wednesday. Laure states that she is taking her medications as prescribed. She recently completed a wellness check, and all lab results came back normal. Her sleep has improved, and she wears her Lifeline button daily. Laure expresses gratitude for the services she receives at home.    Goals: See Support Plan for goal progress documentation.    Will see member in 6 months for an annual health risk assessment.   Encouraged member to call CC with any questions or concerns in the meantime.     KIRAN Viramontes  [facility]  [phone]

## (undated) DEVICE — ESU GROUND PAD ADULT W/CORD E7507

## (undated) DEVICE — GLOVE PROTEXIS BLUE W/NEU-THERA 8.0  2D73EB80

## (undated) DEVICE — BLADE SAW SAGITTAL STRK 18X90X1.27MM HD SYS 6 6118-127-090

## (undated) DEVICE — DRSG GAUZE 4X4" TRAY

## (undated) DEVICE — DRSG ABDOMINAL 07 1/2X8" 7197D

## (undated) DEVICE — BAG CLEAR TRASH 1.3M 39X33" P4040C

## (undated) DEVICE — IMM KNEE 20"

## (undated) DEVICE — SU VICRYL 1 MO-4 18" J702D

## (undated) DEVICE — PREP CHLORAPREP 26ML TINTED HI-LITE ORANGE 930815

## (undated) DEVICE — TOURNIQUET SGL BLADDER 34"X4" PURPLE 5921-034-135

## (undated) DEVICE — DECANTER VIAL 2006S

## (undated) DEVICE — BONE CEMENT MIXEVAC III HI VAC KIT  0206-015-000

## (undated) DEVICE — SPONGE COTTONOID 1/2X3" 80-1407

## (undated) DEVICE — IOM SUPPLIES/CASE FEE

## (undated) DEVICE — LINEN FULL SHEET 5511

## (undated) DEVICE — STPL SKIN 35W ROTATING HEAD PRW35

## (undated) DEVICE — SET HANDPIECE INTERPULSE W/COAXIAL FAN SPRAY TIP 0210118000

## (undated) DEVICE — STRAP UNIVERSAL POSITIONING 2-PIECE 4X47X76" 91-287

## (undated) DEVICE — SPONGE SURGIFOAM 01GM POWDER 1978

## (undated) DEVICE — CAST PADDING 6" UNSTERILE 9046

## (undated) DEVICE — DRAIN HEMOVAC RESERVOIR KIT 10FR 1/8" MED 00-2550-002-10

## (undated) DEVICE — SU PROLENE 6-0 BV-1DA 18" 8709H

## (undated) DEVICE — GLOVE PROTEXIS POWDER FREE 7.5 ORTHOPEDIC 2D73ET75

## (undated) DEVICE — LINEN ORTHO ACL PACK 5447

## (undated) DEVICE — SOL NACL 0.9% IRRIG 3000ML BAG 2B7477

## (undated) DEVICE — PACK TOTAL KNEE BOXED LATEX FREE PO15TKFCT

## (undated) DEVICE — DRAPE MAYO STAND 23X54 8337

## (undated) DEVICE — LINEN TOWEL PACK X30 5481

## (undated) DEVICE — ESU ELEC BLADE E-SEP INSULATED NEPTUNE 70MM 0703-070-002

## (undated) DEVICE — SURGICEL HEMOSTAT 2X3" 1953

## (undated) DEVICE — SU VICRYL 0 CT-1 CR 8X18" J740D

## (undated) DEVICE — SOL WATER IRRIG 1000ML BOTTLE 2F7114

## (undated) DEVICE — GLOVE BIOGEL PI MICRO SZ 7.0 48570

## (undated) DEVICE — PREP CHLORAPREP CLEAR 3ML 930400

## (undated) DEVICE — GLOVE BIOGEL PI MICRO INDICATOR UNDERGLOVE SZ 7.0 48970

## (undated) DEVICE — SU VICRYL 2-0 CT-1 27" UND J259H

## (undated) DEVICE — KIT ENDO TURNOVER/PROCEDURE W/CLEAN A SCOPE LINERS 103888

## (undated) DEVICE — CAST PADDING 6" STERILE 9046S

## (undated) DEVICE — DRAPE C-ARM W/STRAPS 42X72" 07-CA104

## (undated) DEVICE — DRSG TELFA 3X8" 1238

## (undated) DEVICE — SOL NACL 0.9% IRRIG 1000ML BOTTLE 2F7124

## (undated) DEVICE — TUBING SUCTION 10'X3/16" N510

## (undated) DEVICE — DRSG ADAPTIC 3X8" 6113

## (undated) DEVICE — TOURNIQUET CUFF 30" REPRO BLUE 60-7070-105

## (undated) DEVICE — SU ETHILON 3-0 PS-1 18" 1663H

## (undated) DEVICE — PREP POVIDONE IODINE SOLUTION 10% 4OZ BOTTLE 29906-004

## (undated) DEVICE — SU VICRYL 2-0 CT-2 CR 8X18" J726D

## (undated) DEVICE — DRSG PRIMAPORE 03 1/8X6" 66000318

## (undated) DEVICE — SPONGE COTTONOID 1/2X1/2" 20-04S

## (undated) DEVICE — PREP SKIN SCRUB TRAY 4461A

## (undated) DEVICE — SPONGE SURGIFOAM 100 1974

## (undated) DEVICE — LINEN TOWEL PACK X6 WHITE 5487

## (undated) DEVICE — SUCTION MANIFOLD NEPTUNE 2 SYS 4 PORT 0702-020-000

## (undated) DEVICE — SU NUROLON 4-0 TF CR 8X18" C584D

## (undated) DEVICE — PACK NEURO MINOR UMMC SNE32MNMU4

## (undated) DEVICE — SYR 30ML LL W/O NDL 302832

## (undated) DEVICE — LINEN HALF SHEET 5512

## (undated) DEVICE — CATH TRAY FOLEY SURESTEP 16FR W/URNE MTR STLK LATEX A303316A

## (undated) DEVICE — PREP POVIDONE-IODINE 7.5% SCRUB 4OZ BOTTLE MDS093945

## (undated) DEVICE — NDL SPINAL 18GA 3.5" 405184

## (undated) DEVICE — CATH TRAY FOLEY SURESTEP 16FR W/TMP PRB STLK LATEX A319416AM

## (undated) DEVICE — SYR BULB IRRIG 50ML LATEX FREE 0035280

## (undated) DEVICE — BUR STRK CARBIDE MATCH HEAD 3.0MM 5820-107-530C

## (undated) DEVICE — SPONGE COTTONOID 1/2X1/2" 80-1400

## (undated) DEVICE — DRAPE MICROSCOPE LEICA 54X120" 09-MK653

## (undated) RX ORDER — HYDROMORPHONE HYDROCHLORIDE 1 MG/ML
INJECTION, SOLUTION INTRAMUSCULAR; INTRAVENOUS; SUBCUTANEOUS
Status: DISPENSED
Start: 2023-01-16

## (undated) RX ORDER — LIDOCAINE HYDROCHLORIDE 10 MG/ML
INJECTION, SOLUTION EPIDURAL; INFILTRATION; INTRACAUDAL; PERINEURAL
Status: DISPENSED
Start: 2021-04-06

## (undated) RX ORDER — FENTANYL CITRATE 50 UG/ML
INJECTION, SOLUTION INTRAMUSCULAR; INTRAVENOUS
Status: DISPENSED
Start: 2021-04-06

## (undated) RX ORDER — ONDANSETRON 2 MG/ML
INJECTION INTRAMUSCULAR; INTRAVENOUS
Status: DISPENSED
Start: 2020-07-27

## (undated) RX ORDER — GLYCOPYRROLATE 0.2 MG/ML
INJECTION INTRAMUSCULAR; INTRAVENOUS
Status: DISPENSED
Start: 2020-07-27

## (undated) RX ORDER — EPHEDRINE SULFATE 50 MG/ML
INJECTION, SOLUTION INTRAMUSCULAR; INTRAVENOUS; SUBCUTANEOUS
Status: DISPENSED
Start: 2020-07-27

## (undated) RX ORDER — PROPOFOL 10 MG/ML
INJECTION, EMULSION INTRAVENOUS
Status: DISPENSED
Start: 2023-01-16

## (undated) RX ORDER — PROPOFOL 10 MG/ML
INJECTION, EMULSION INTRAVENOUS
Status: DISPENSED
Start: 2021-04-06

## (undated) RX ORDER — FENTANYL CITRATE 50 UG/ML
INJECTION, SOLUTION INTRAMUSCULAR; INTRAVENOUS
Status: DISPENSED
Start: 2023-01-16

## (undated) RX ORDER — TRANEXAMIC ACID 10 MG/ML
INJECTION, SOLUTION INTRAVENOUS
Status: DISPENSED
Start: 2021-04-06

## (undated) RX ORDER — ONDANSETRON 2 MG/ML
INJECTION INTRAMUSCULAR; INTRAVENOUS
Status: DISPENSED
Start: 2021-04-06

## (undated) RX ORDER — FENTANYL CITRATE-0.9 % NACL/PF 10 MCG/ML
PLASTIC BAG, INJECTION (ML) INTRAVENOUS
Status: DISPENSED
Start: 2021-04-06

## (undated) RX ORDER — HYDROMORPHONE HCL IN WATER/PF 6 MG/30 ML
PATIENT CONTROLLED ANALGESIA SYRINGE INTRAVENOUS
Status: DISPENSED
Start: 2023-01-16

## (undated) RX ORDER — FENTANYL CITRATE 50 UG/ML
INJECTION, SOLUTION INTRAMUSCULAR; INTRAVENOUS
Status: DISPENSED
Start: 2019-07-25

## (undated) RX ORDER — GABAPENTIN 300 MG/1
CAPSULE ORAL
Status: DISPENSED
Start: 2020-07-27

## (undated) RX ORDER — FENTANYL CITRATE-0.9 % NACL/PF 10 MCG/ML
PLASTIC BAG, INJECTION (ML) INTRAVENOUS
Status: DISPENSED
Start: 2020-07-27

## (undated) RX ORDER — CEFAZOLIN SODIUM 2 G/100ML
INJECTION, SOLUTION INTRAVENOUS
Status: DISPENSED
Start: 2020-07-27

## (undated) RX ORDER — TRANEXAMIC ACID 650 MG/1
TABLET ORAL
Status: DISPENSED
Start: 2020-07-27

## (undated) RX ORDER — DEXAMETHASONE SODIUM PHOSPHATE 4 MG/ML
INJECTION, SOLUTION INTRA-ARTICULAR; INTRALESIONAL; INTRAMUSCULAR; INTRAVENOUS; SOFT TISSUE
Status: DISPENSED
Start: 2020-07-27

## (undated) RX ORDER — SODIUM CHLORIDE 9 MG/ML
INJECTION, SOLUTION INTRAVENOUS
Status: DISPENSED
Start: 2023-01-16

## (undated) RX ORDER — HYDRALAZINE HYDROCHLORIDE 20 MG/ML
INJECTION INTRAMUSCULAR; INTRAVENOUS
Status: DISPENSED
Start: 2023-01-16

## (undated) RX ORDER — DEXAMETHASONE SODIUM PHOSPHATE 4 MG/ML
INJECTION, SOLUTION INTRA-ARTICULAR; INTRALESIONAL; INTRAMUSCULAR; INTRAVENOUS; SOFT TISSUE
Status: DISPENSED
Start: 2021-04-06

## (undated) RX ORDER — ACETAMINOPHEN 325 MG/1
TABLET ORAL
Status: DISPENSED
Start: 2020-07-27

## (undated) RX ORDER — OXYCODONE HCL 10 MG/1
TABLET, FILM COATED, EXTENDED RELEASE ORAL
Status: DISPENSED
Start: 2021-04-06

## (undated) RX ORDER — GLYCOPYRROLATE 0.2 MG/ML
INJECTION INTRAMUSCULAR; INTRAVENOUS
Status: DISPENSED
Start: 2021-04-06

## (undated) RX ORDER — GABAPENTIN 100 MG/1
CAPSULE ORAL
Status: DISPENSED
Start: 2020-07-27

## (undated) RX ORDER — ACETAMINOPHEN 325 MG/1
TABLET ORAL
Status: DISPENSED
Start: 2021-04-06

## (undated) RX ORDER — PROPOFOL 10 MG/ML
INJECTION, EMULSION INTRAVENOUS
Status: DISPENSED
Start: 2020-07-27